# Patient Record
Sex: FEMALE | Race: WHITE | NOT HISPANIC OR LATINO | Employment: FULL TIME | URBAN - METROPOLITAN AREA
[De-identification: names, ages, dates, MRNs, and addresses within clinical notes are randomized per-mention and may not be internally consistent; named-entity substitution may affect disease eponyms.]

---

## 2017-02-07 ENCOUNTER — TELEPHONE (OUTPATIENT)
Dept: FAMILY MEDICINE | Facility: CLINIC | Age: 22
End: 2017-02-07

## 2017-02-07 NOTE — TELEPHONE ENCOUNTER
Reason for Call:  Other call back    Detailed comments: She is breasting feeding her almost 4 month old.   He eats on both sides and still acts like he is hungry.  Liat feels like her milk is not coming in. She is wondering if she should give him a bottle or what she should do.  Please advise.    Phone Number Patient can be reached at: Other phone number:  [phone]    Best Time: any    Can we leave a detailed message on this number? YES    Call taken on 2/7/2017 at 10:49 AM by Jessica Smith

## 2017-03-06 ENCOUNTER — TELEPHONE (OUTPATIENT)
Dept: FAMILY MEDICINE | Facility: CLINIC | Age: 22
End: 2017-03-06

## 2017-03-06 NOTE — TELEPHONE ENCOUNTER
Spoke with Liat, advised cool packs, might want to try to manually express a small amount of milk if pressure is too great. Also may Take Ibuprofen and Tylenol for discomfort. She does not have a fever and breasts are not hot to touch or red. Will call back with further concerns.

## 2017-03-06 NOTE — TELEPHONE ENCOUNTER
Pt stopped breast feeding a couple days ago. She says her breasts are so sore she doesn't know what to do.  Call to RN to triage.

## 2017-04-14 ENCOUNTER — TELEPHONE (OUTPATIENT)
Dept: FAMILY MEDICINE | Facility: CLINIC | Age: 22
End: 2017-04-14

## 2017-04-14 NOTE — TELEPHONE ENCOUNTER
Liat delivered in October and after got her period for 2 months and now has not had one and has nausea and cramping.  2 pregnancy tests come back negative but that happened with her last pregnancy too.  Please call.  [facility]  Clinic Station [place]

## 2017-04-14 NOTE — TELEPHONE ENCOUNTER
S-(situation): Patient called to report her LMP was 1/8/17.    B-(background): Patient has a 6 month old baby and breast feed for the first 4 months.  Patient has been having unprotected sex.    A-(assessment): Patient has been having unprotected sex.  Patient has been feeling nausea and cramping.  Patient feels she has been gaining weight. Patient has breast tenderness.  Patient has taken 2 at home pregnancy tests and they are negative.  Patient reports with her last pregnancy she had negative at home pregnancy tests.      R-(recommendations): Advised patient to be seen in clinic to discuss symptoms and further evaluation. Patient was scheduled for 4/17/17. Patient agrees with plan.     Dilcia PORTER RN

## 2017-04-17 ENCOUNTER — OFFICE VISIT (OUTPATIENT)
Dept: FAMILY MEDICINE | Facility: CLINIC | Age: 22
End: 2017-04-17
Payer: COMMERCIAL

## 2017-04-17 VITALS
HEART RATE: 94 BPM | BODY MASS INDEX: 39.38 KG/M2 | HEIGHT: 61 IN | OXYGEN SATURATION: 96 % | TEMPERATURE: 97.7 F | WEIGHT: 208.6 LBS | SYSTOLIC BLOOD PRESSURE: 110 MMHG | DIASTOLIC BLOOD PRESSURE: 84 MMHG

## 2017-04-17 DIAGNOSIS — N91.2 AMENORRHEA: Primary | ICD-10-CM

## 2017-04-17 DIAGNOSIS — R10.2 PELVIC PAIN IN FEMALE: ICD-10-CM

## 2017-04-17 DIAGNOSIS — R80.9 PROTEINURIA: ICD-10-CM

## 2017-04-17 LAB
ALBUMIN SERPL-MCNC: 3.7 G/DL (ref 3.4–5)
ALBUMIN UR-MCNC: 100 MG/DL
ALP SERPL-CCNC: 92 U/L (ref 40–150)
ALT SERPL W P-5'-P-CCNC: 18 U/L (ref 0–50)
ANION GAP SERPL CALCULATED.3IONS-SCNC: 6 MMOL/L (ref 3–14)
APPEARANCE UR: CLEAR
AST SERPL W P-5'-P-CCNC: 16 U/L (ref 0–45)
BACTERIA #/AREA URNS HPF: ABNORMAL /HPF
BASOPHILS # BLD AUTO: 0.1 10E9/L (ref 0–0.2)
BASOPHILS NFR BLD AUTO: 0.7 %
BETA HCG QUAL IFA URINE: NEGATIVE
BILIRUB SERPL-MCNC: 0.6 MG/DL (ref 0.2–1.3)
BILIRUB UR QL STRIP: NEGATIVE
BUN SERPL-MCNC: 16 MG/DL (ref 7–30)
CALCIUM SERPL-MCNC: 9 MG/DL (ref 8.5–10.1)
CHLORIDE SERPL-SCNC: 104 MMOL/L (ref 94–109)
CO2 SERPL-SCNC: 25 MMOL/L (ref 20–32)
COLOR UR AUTO: YELLOW
CREAT SERPL-MCNC: 0.81 MG/DL (ref 0.52–1.04)
DIFFERENTIAL METHOD BLD: NORMAL
EOSINOPHIL # BLD AUTO: 0.1 10E9/L (ref 0–0.7)
EOSINOPHIL NFR BLD AUTO: 1.2 %
ERYTHROCYTE [DISTWIDTH] IN BLOOD BY AUTOMATED COUNT: 13.5 % (ref 10–15)
GFR SERPL CREATININE-BSD FRML MDRD: 88 ML/MIN/1.7M2
GLUCOSE SERPL-MCNC: 74 MG/DL (ref 70–99)
GLUCOSE UR STRIP-MCNC: NEGATIVE MG/DL
GRAN CASTS #/AREA URNS LPF: ABNORMAL /LPF
HCT VFR BLD AUTO: 46.6 % (ref 35–47)
HGB BLD-MCNC: 14.9 G/DL (ref 11.7–15.7)
HGB UR QL STRIP: ABNORMAL
IMM GRANULOCYTES # BLD: 0 10E9/L (ref 0–0.4)
IMM GRANULOCYTES NFR BLD: 0.3 %
KETONES UR STRIP-MCNC: NEGATIVE MG/DL
LEUKOCYTE ESTERASE UR QL STRIP: NEGATIVE
LYMPHOCYTES # BLD AUTO: 3.7 10E9/L (ref 0.8–5.3)
LYMPHOCYTES NFR BLD AUTO: 48.6 %
MCH RBC QN AUTO: 27.6 PG (ref 26.5–33)
MCHC RBC AUTO-ENTMCNC: 32 G/DL (ref 31.5–36.5)
MCV RBC AUTO: 87 FL (ref 78–100)
MONOCYTES # BLD AUTO: 0.8 10E9/L (ref 0–1.3)
MONOCYTES NFR BLD AUTO: 10.2 %
MUCOUS THREADS #/AREA URNS LPF: PRESENT /LPF
NEUTROPHILS # BLD AUTO: 3 10E9/L (ref 1.6–8.3)
NEUTROPHILS NFR BLD AUTO: 39 %
NITRATE UR QL: NEGATIVE
NON-SQ EPI CELLS #/AREA URNS LPF: ABNORMAL /LPF
PH UR STRIP: 5.5 PH (ref 5–7)
PLATELET # BLD AUTO: 282 10E9/L (ref 150–450)
POTASSIUM SERPL-SCNC: 4.1 MMOL/L (ref 3.4–5.3)
PROT SERPL-MCNC: 8.3 G/DL (ref 6.8–8.8)
RBC # BLD AUTO: 5.39 10E12/L (ref 3.8–5.2)
RBC #/AREA URNS AUTO: ABNORMAL /HPF (ref 0–2)
SODIUM SERPL-SCNC: 135 MMOL/L (ref 133–144)
SP GR UR STRIP: >1.03 (ref 1–1.03)
URN SPEC COLLECT METH UR: ABNORMAL
UROBILINOGEN UR STRIP-ACNC: 0.2 EU/DL (ref 0.2–1)
WBC # BLD AUTO: 7.7 10E9/L (ref 4–11)
WBC #/AREA URNS AUTO: ABNORMAL /HPF (ref 0–2)

## 2017-04-17 PROCEDURE — 81001 URINALYSIS AUTO W/SCOPE: CPT | Performed by: NURSE PRACTITIONER

## 2017-04-17 PROCEDURE — 99214 OFFICE O/P EST MOD 30 MIN: CPT | Performed by: NURSE PRACTITIONER

## 2017-04-17 PROCEDURE — 85004 AUTOMATED DIFF WBC COUNT: CPT | Performed by: NURSE PRACTITIONER

## 2017-04-17 PROCEDURE — 36415 COLL VENOUS BLD VENIPUNCTURE: CPT | Performed by: NURSE PRACTITIONER

## 2017-04-17 PROCEDURE — 85027 COMPLETE CBC AUTOMATED: CPT | Performed by: NURSE PRACTITIONER

## 2017-04-17 PROCEDURE — 80053 COMPREHEN METABOLIC PANEL: CPT | Performed by: NURSE PRACTITIONER

## 2017-04-17 PROCEDURE — 84703 CHORIONIC GONADOTROPIN ASSAY: CPT | Performed by: NURSE PRACTITIONER
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
## 2017-04-17 NOTE — PATIENT INSTRUCTIONS
Urine pregnancy negative    Urinalysis negative for infection, but did show some protein and blood. Will have you schedule a pelvic and abdominal ultrasound to rule out anything going on in the kidneys and uterus/ovaries.     Will update you with results - will also get labs today    Return to clinic will be determined after test results     Amenorrhea     Normally, the uterine lining builds up and is shed each month during a woman s period. With amenorrhea, this process does not happen.   Menstruation occurs when a woman sheds the lining of her uterus (womb). It is also called a  period.  For most women, this happens once a month. But some women may not get their periods. This is called amenorrhea.  Amenorrhea may be due to a number of causes. These include:    Pregnancy, breastfeeding, or menopause    Hormone problems    Emotional stress    Being at too low body weight    Exercising too much    Poor nutrition or eating disorders    Taking certain medicines    Having certain birth defects or genetic disorders  There are 2 types of amenorrhea:    Primary amenorrhea is when a girl has not had her first period by age 15.    Secondary amenorrhea is when:    A girl or woman who has been having normal periods stops getting them for 3 months in a row    A girl or woman who has been having irregular periods stops getting them for 6 months in a row  One or more tests can be done to find out why you re not having periods. These include blood tests and imaging tests. Once the cause is found, it can be treated. Treatments can range from lifestyle and diet changes to medicines, procedures, or surgery. Your healthcare provider will discuss options with you as needed.  Follow-up care  Follow up with your healthcare provider as advised. You'll be told the results of any tests as soon as they are ready.   Note: Know that you can still become pregnant even if you are not having regular periods. It is important to use some form of  birth control if you are sexually active and do not wish to become pregnant.   When to seek medical advice  Call your healthcare provider right way if any of these occur:    Severe pain in the abdomen (belly)    Swelling of the belly    Sudden, severe vaginal bleeding    Feeling faint or passing out    5676-3930 [employer]. [address] [place] [address]. All rights reserved. This information is not intended as a substitute for professional medical care. Always follow your healthcare professional's instructions.        Proteinuria (Protein in the Urine)  The kidney filters waste products and unwanted substances from the blood. These waste products end up in the urine. Protein is an important part of the blood and is not filtered out. Normally, there is no protein in the urine.  Proteinuria occurs when some of the normal protein in the bloodstream ends up in the urine. Protein in the urine will show up on a standard urine test.   Protein in the urine can be a sign of serious disease or a harmless temporary condition. For example, heavy exercise or fever can cause temporary proteinuria. This is normal and will go away if the urine test is repeated.  If urine tests continue to show protein in the urine, chronic kidney disease may be present. Common causes of kidney disease include diabetes, hypertension, and conditions that cause inflammation in the kidneys.  Protein in the blood helps keep fluids from leaking out of the blood vessels and into the surrounding tissues. Mild or temporary proteinuria doesn't cause any symptoms. However, if too much protein is lost from the body, there may be swelling as fluid leaks from the blood vessels. Swelling may appear in legs, feet, and ankles or elsewhere such as lower back, face and eyelids.  If proteinuria persists on repeat urine testing, more tests will be needed to figure out the cause. If the cause is still unclear, you may be told to see a kidney  specialist.  Home care  No special home care is needed until the cause of your proteinuria is known.  Follow-up care  Follow up with your doctor or as advised by our staff.  When to seek medical care  Get prompt medical attention if any of the following occur:    Nausea or vomiting    Severe weakness, dizziness, fainting, drowsiness, or confusion    Chest pain or shortness of breath    Unexpected weight gain or swelling in the legs, ankles, or around the eyes    Dark colored urine    Decreased or absent urine output    2414-5980 [employer]. [address] [place] [address]87. All rights reserved. This information is not intended as a substitute for professional medical care. Always follow your healthcare professional's instructions.

## 2017-04-17 NOTE — MR AVS SNAPSHOT
After Visit Summary   4/17/2017    Liat Corcoran    MRN: [id number]           Patient Information     Date Of Birth          1995        Visit Information        Provider Department      4/17/2017 10:00 AM Alicia Cartagena APRN [facility]        Today's Diagnoses     Amenorrhea    -  1    Proteinuria        Pelvic pain in female          Care Instructions    Urine pregnancy negative    Urinalysis negative for infection, but did show some protein and blood. Will have you schedule a pelvic and abdominal ultrasound to rule out anything going on in the kidneys and uterus/ovaries.     Will update you with results - will also get labs today    Return to clinic will be determined after test results     Amenorrhea     Normally, the uterine lining builds up and is shed each month during a woman s period. With amenorrhea, this process does not happen.   Menstruation occurs when a woman sheds the lining of her uterus (womb). It is also called a  period.  For most women, this happens once a month. But some women may not get their periods. This is called amenorrhea.  Amenorrhea may be due to a number of causes. These include:    Pregnancy, breastfeeding, or menopause    Hormone problems    Emotional stress    Being at too low body weight    Exercising too much    Poor nutrition or eating disorders    Taking certain medicines    Having certain birth defects or genetic disorders  There are 2 types of amenorrhea:    Primary amenorrhea is when a girl has not had her first period by age 15.    Secondary amenorrhea is when:    A girl or woman who has been having normal periods stops getting them for 3 months in a row    A girl or woman who has been having irregular periods stops getting them for 6 months in a row  One or more tests can be done to find out why you re not having periods. These include blood tests and imaging tests. Once the cause is found, it can be treated. Treatments  can range from lifestyle and diet changes to medicines, procedures, or surgery. Your healthcare provider will discuss options with you as needed.  Follow-up care  Follow up with your healthcare provider as advised. You'll be told the results of any tests as soon as they are ready.   Note: Know that you can still become pregnant even if you are not having regular periods. It is important to use some form of birth control if you are sexually active and do not wish to become pregnant.   When to seek medical advice  Call your healthcare provider right way if any of these occur:    Severe pain in the abdomen (belly)    Swelling of the belly    Sudden, severe vaginal bleeding    Feeling faint or passing out    9623-1203 [employer]. [address] [place] [address], [place] [address]. All rights reserved. This information is not intended as a substitute for professional medical care. Always follow your healthcare professional's instructions.        Proteinuria (Protein in the Urine)  The kidney filters waste products and unwanted substances from the blood. These waste products end up in the urine. Protein is an important part of the blood and is not filtered out. Normally, there is no protein in the urine.  Proteinuria occurs when some of the normal protein in the bloodstream ends up in the urine. Protein in the urine will show up on a standard urine test.   Protein in the urine can be a sign of serious disease or a harmless temporary condition. For example, heavy exercise or fever can cause temporary proteinuria. This is normal and will go away if the urine test is repeated.  If urine tests continue to show protein in the urine, chronic kidney disease may be present. Common causes of kidney disease include diabetes, hypertension, and conditions that cause inflammation in the kidneys.  Protein in the blood helps keep fluids from leaking out of the blood vessels and into the surrounding tissues. Mild or temporary  proteinuria doesn't cause any symptoms. However, if too much protein is lost from the body, there may be swelling as fluid leaks from the blood vessels. Swelling may appear in legs, feet, and ankles or elsewhere such as lower back, face and eyelids.  If proteinuria persists on repeat urine testing, more tests will be needed to figure out the cause. If the cause is still unclear, you may be told to see a kidney specialist.  Home care  No special home care is needed until the cause of your proteinuria is known.  Follow-up care  Follow up with your doctor or as advised by our staff.  When to seek medical care  Get prompt medical attention if any of the following occur:    Nausea or vomiting    Severe weakness, dizziness, fainting, drowsiness, or confusion    Chest pain or shortness of breath    Unexpected weight gain or swelling in the legs, ankles, or around the eyes    Dark colored urine    Decreased or absent urine output    6690-4943 [employer]. [address] [place] [address]. All rights reserved. This information is not intended as a substitute for professional medical care. Always follow your healthcare professional's instructions.              Follow-ups after your visit        Future tests that were ordered for you today     Open Future Orders        Priority Expected Expires Ordered    US Pelvic Complete w Transvaginal Routine 7/16/2017 4/17/2018 4/17/2017    US Abdomen Complete Routine 7/16/2017 4/17/2018 4/17/2017            Who to contact     If you have questions or need follow up information about today's clinic visit or your schedule please [facility] at [phone].  Normal or non-critical lab and imaging results will be communicated to you by MyChart, letter or phone within 4 business days after the clinic has received the results. If you do not hear from us within 7 days, please contact the clinic through MyChart or phone. If you have a  "critical or abnormal lab result, we will notify you by phone as soon as possible.  Submit refill requests through [employer] or call your pharmacy and they will forward the refill request to us. Please allow 3 business days for your refill to be completed.          Additional Information About Your Visit        [facility] Information     [employer] gives you secure access to your electronic health record. If you see a primary care provider, you can also send messages to your care team and make appointments. If you have questions, please call your primary care clinic.  If you do not have a primary care provider, please call [phone] and they will assist you.        Care EveryWhere ID     This is your Care EveryWhere ID. This could be used by other organizations to access your [place] [facility] medical records  [id number]        Your Vitals Were     Pulse Temperature Height Last Period Pulse Oximetry BMI (Body Mass Index)    94 97.7  F (36.5  C) (Tympanic) 5' 1\" (1.549 m) 01/08/2017 96% 39.41 kg/m2       Blood Pressure from Last 3 Encounters:   04/17/17 110/84   10/26/16 120/76   10/22/16 110/65    Weight from Last 3 Encounters:   04/17/17 208 lb 9.6 oz (94.6 kg)   10/19/16 230 lb (104.3 kg)   10/13/16 228 lb 9.6 oz (103.7 kg)              We Performed the Following     *UA reflex to Microscopic and Culture ([place] [facility] (except [place] and [place])     Beta HCG qual IFA urine     CBC with platelets     Comprehensive metabolic panel (BMP + Alb, Alk Phos, ALT, AST, Total. Bili, TP)     Urine Microscopic        Primary Care Provider Office Phone # Fax #    Swati Schaffer PA-C [phone]       [facility] [address] [facility] [id number]        Goals        General    I want to get my Medicaid in place (pt-stated)     Notes - Note created  12/2/2014 12:52 PM by Dilcia Stringer LSW    As of today's date 12/2/2014 goal is met at 51 - 75%.   Goal Status:  Active  Application in " into the county and waiting for response        I want to get on the right medications for my Mental Health  (pt-stated)     Notes - Note created  12/2/2014 12:54 PM by Dilcia Stringer LSW    As of today's date 12/2/2014 goal is met at 51 - 75%.   Goal Status:  Active  Pt has been prescribed medications and is waiting for MA to be started so she can pick them up        I want to lose weight (pt-stated)     Notes - Note created  12/2/2014 12:55 PM by Dilcia Stringer LSW    As of today's date 12/2/2014 goal is met at 0 - 25%.   Goal Status:  Active  Just starting on the process with plans to join a gym once MA is in place if possible        I want to write a book about my life (pt-stated)     Notes - Note created  12/2/2014 12:54 PM by Dilcia Stringer LSW    As of today's date 12/2/2014 goal is met at 26 - 50%.   Goal Status:  Active          Thank you!     Thank you for choosing [facility]  for your care. Our goal is always to provide you with excellent care. Hearing back from our patients is one way we can continue to improve our services. Please take a few minutes to complete the written survey that you may receive in the mail after your visit with us. Thank you!             Your Updated Medication List - Protect others around you: Learn how to safely use, store and throw away your medicines at www.disposemymeds.org.          This list is accurate as of: 4/17/17 11:11 AM.  Always use your most recent med list.                   Brand Name Dispense Instructions for use    TYLENOL PO

## 2017-04-17 NOTE — NURSING NOTE
"Chief Complaint   Patient presents with     Amenorrhea     /84  Pulse 94  Ht 5' 1\" (1.549 m)  Wt 208 lb 9.6 oz (94.6 kg)  LMP 01/08/2017  SpO2 96%  BMI 39.41 kg/m2 Estimated body mass index is 39.41 kg/(m^2) as calculated from the following:    Height as of this encounter: 5' 1\" (1.549 m).    Weight as of this encounter: 208 lb 9.6 oz (94.6 kg).  bp completed using cuff size: large      Health Maintenance that is potentially due pending provider review:  Pap, Depression action plan, Chlamydia screening        "

## 2017-04-20 ENCOUNTER — HOSPITAL ENCOUNTER (OUTPATIENT)
Dept: ULTRASOUND IMAGING | Facility: CLINIC | Age: 22
End: 2017-04-20
Attending: NURSE PRACTITIONER
Payer: COMMERCIAL

## 2017-04-20 ENCOUNTER — HOSPITAL ENCOUNTER (OUTPATIENT)
Dept: ULTRASOUND IMAGING | Facility: CLINIC | Age: 22
Discharge: HOME | End: 2017-04-20
Attending: NURSE PRACTITIONER | Admitting: NURSE PRACTITIONER
Payer: COMMERCIAL

## 2017-04-20 DIAGNOSIS — R10.2 PELVIC PAIN IN FEMALE: ICD-10-CM

## 2017-04-20 DIAGNOSIS — N91.2 AMENORRHEA: ICD-10-CM

## 2017-04-20 DIAGNOSIS — R80.9 PROTEINURIA: ICD-10-CM

## 2017-04-20 PROCEDURE — 76700 US EXAM ABDOM COMPLETE: CPT

## 2017-04-20 PROCEDURE — 76830 TRANSVAGINAL US NON-OB: CPT

## 2017-04-21 ENCOUNTER — TELEPHONE (OUTPATIENT)
Dept: FAMILY MEDICINE | Facility: CLINIC | Age: 22
End: 2017-04-21

## 2017-04-21 DIAGNOSIS — R19.7 DIARRHEA, UNSPECIFIED TYPE: Primary | ICD-10-CM

## 2017-04-21 NOTE — TELEPHONE ENCOUNTER
Reason for call:  Patient reporting a symptom    Symptom or request: Liat says she was seen by Alicia on Monday April 17 to check for pregnancy but it was negative.. She says at the time she told her that her diarrhea was better that day but she says now it's worse and she can't even tell when it's coming. She says she isn't able to drink anything. She is wondering if there is something she can reshma.     Phone Number patient can be reached at:  [phone]    Best Time:  anytime    Can we leave a detailed message on this number: yes    Call taken on 4/21/2017 at 8:27 AM by Suly Ervin

## 2017-04-21 NOTE — TELEPHONE ENCOUNTER
Is she having any abdominal pain, bloody stools, nausea/vomiting or fevers? If not we can have Liat do some stool samples and then if negative for c-difficile can take imodium. Will put in orders for patient, she'll have to come  at the clinic and bring back.  If she is having any of the other symptoms will need to see her in clinic or go to ER.     Thanks.  ARIEL Abarca CNP

## 2017-04-21 NOTE — TELEPHONE ENCOUNTER
No fevers, no blood, has abdominal pain but states this is r/t to her period. Pt agreed to stool samples and then taking imodium. Adilene Norris RN

## 2017-04-21 NOTE — TELEPHONE ENCOUNTER
Pt called, states she has had diarrhea since last Thursday. It improved and now it is worse again. States she is having diarrhea constantly, states every time she eats or drinks it goes right through her. Liquid and yellow in color. Also has period now.  Has not taken anything for it. Please advise.Adilene Norris RN

## 2017-05-30 ENCOUNTER — TELEPHONE (OUTPATIENT)
Dept: FAMILY MEDICINE | Facility: CLINIC | Age: 22
End: 2017-05-30

## 2017-05-30 NOTE — TELEPHONE ENCOUNTER
S-(situation): had period last month, but not this month    B-(background): had baby 7 months ago.  Quit breastfeeding end of February/March.  States feels crampy at times and breast tender.  Has not done home pregnancy test.  Ultrasound normal, but, pelvic ultrasound shows tiny follicles on both ovaries.    A-(assessment): amenorrhea     R-(recommendations): advised to take a home pregnancy test.  If negative should see Alicia Cartagena, may need to start birth control pill  Claire Lora RN

## 2017-06-12 ENCOUNTER — TELEPHONE (OUTPATIENT)
Dept: LAB | Facility: CLINIC | Age: 22
End: 2017-06-12

## 2017-06-12 NOTE — TELEPHONE ENCOUNTER
Reason for call:  Patient reporting a symptom    Symptom or request: Liat says she has not been getting her period. She says she did talk about it with Alicia Cartagena at her last visit. She had her baby last October. She has not been breast feeding for 4 months.  She says she got her period in December and January and again in March but nothing since. She has done pregnancy tests. She wonders if she should get a pregnancy blood test.    Phone Number patient can be reached at: [phone]      Best Time:  anytime    Can we leave a detailed message on this number:  YES    Call taken on 6/12/2017 at 10:13 AM by Suly Ervin

## 2017-06-13 ENCOUNTER — OFFICE VISIT (OUTPATIENT)
Dept: FAMILY MEDICINE | Facility: CLINIC | Age: 22
End: 2017-06-13
Payer: COMMERCIAL

## 2017-06-13 VITALS
TEMPERATURE: 98.1 F | BODY MASS INDEX: 39.46 KG/M2 | HEIGHT: 61 IN | SYSTOLIC BLOOD PRESSURE: 102 MMHG | WEIGHT: 209 LBS | HEART RATE: 88 BPM | DIASTOLIC BLOOD PRESSURE: 70 MMHG

## 2017-06-13 DIAGNOSIS — R10.2 PELVIC PAIN IN FEMALE: ICD-10-CM

## 2017-06-13 DIAGNOSIS — Z01.419 ENCOUNTER FOR GYNECOLOGICAL EXAMINATION WITHOUT ABNORMAL FINDING: Primary | ICD-10-CM

## 2017-06-13 DIAGNOSIS — Z11.3 SCREEN FOR STD (SEXUALLY TRANSMITTED DISEASE): ICD-10-CM

## 2017-06-13 LAB — BETA HCG QUAL IFA URINE: NEGATIVE

## 2017-06-13 PROCEDURE — 87491 CHLMYD TRACH DNA AMP PROBE: CPT | Performed by: NURSE PRACTITIONER

## 2017-06-13 PROCEDURE — 84703 CHORIONIC GONADOTROPIN ASSAY: CPT | Performed by: NURSE PRACTITIONER

## 2017-06-13 PROCEDURE — G0145 SCR C/V CYTO,THINLAYER,RESCR: HCPCS | Performed by: NURSE PRACTITIONER

## 2017-06-13 PROCEDURE — 99213 OFFICE O/P EST LOW 20 MIN: CPT | Performed by: NURSE PRACTITIONER

## 2017-06-13 NOTE — NURSING NOTE
"Chief Complaint   Patient presents with     Confirmation Of Pregnancy       Initial /70 (Cuff Size: Adult Large)  Pulse 88  Temp 98.1  F (36.7  C) (Tympanic)  Ht 5' 1\" (1.549 m)  Wt 209 lb (94.8 kg)  LMP 04/18/2017 (Exact Date)  BMI 39.49 kg/m2 Estimated body mass index is 39.49 kg/(m^2) as calculated from the following:    Height as of this encounter: 5' 1\" (1.549 m).    Weight as of this encounter: 209 lb (94.8 kg).  Medication Reconciliation: complete    Health Maintenance that is potentially due pending provider review:  Pap Smear - possible pregnancy     Idalia Infante, CMA        "

## 2017-06-13 NOTE — PATIENT INSTRUCTIONS
Please call [phone] to schedule your ultrasound    Follow up immediately with any worsening symptoms

## 2017-06-13 NOTE — LETTER
June 19, 2017      Liat SANDHU Nilo  [address] [place] [id number]    Dear MsEloinaNilo,      I am happy to inform you that your recent cervical cancer screening test (PAP smear) was normal.      Preventative screenings such as this help to ensure your health for years to come. You should repeat a pap smear in 3 years, unless otherwise directed.      You will still need to return to the clinic every year for your annual exam and other preventive tests.     Please contact the clinic at [phone] if you have further questions.       Sincerely,      ARIEL Peck CNP/rlm

## 2017-06-13 NOTE — MR AVS SNAPSHOT
After Visit Summary   6/13/2017    Liat Corcoran    MRN: [id number]           Patient Information     Date Of Birth          1995        Visit Information        Provider Department      6/13/2017 1:20 PM Ginette Kate APRN CNP [facility]        Today's Diagnoses     Pregnancy test positive    -  1    Screen for STD (sexually transmitted disease)        Pelvic pain in female          Care Instructions    Please call [phone] to schedule your ultrasound    Follow up immediately with any worsening symptoms          Follow-ups after your visit        Future tests that were ordered for you today     Open Future Orders        Priority Expected Expires Ordered    US Pelvic Complete with Transvaginal Routine  6/14/2018 6/13/2017            Who to contact     If you have questions or need follow up information about today's clinic visit or your schedule please contact [facility] directly at [phone].  Normal or non-critical lab and imaging results will be communicated to you by Slicethepiehart, letter or phone within 4 business days after the clinic has received the results. If you do not hear from us within 7 days, please contact the clinic through Slicethepiehart or phone. If you have a critical or abnormal lab result, we will notify you by phone as soon as possible.  Submit refill requests through [employer] or call your pharmacy and they will forward the refill request to us. Please allow 3 business days for your refill to be completed.          Additional Information About Your Visit        MyChart Information     [employer] gives you secure access to your electronic health record. If you see a primary care provider, you can also send messages to your care team and make appointments. If you have questions, please call your primary care clinic.  If you do not have a primary care provider, please call [phone] and they will assist you.        Care EveryWhere ID  "    This is your Care EveryWhere ID. This could be used by other organizations to access your [place] medical records  [id number]        Your Vitals Were     Pulse Temperature Height Last Period BMI (Body Mass Index)       88 98.1  F (36.7  C) (Tympanic) 5' 1\" (1.549 m) 04/18/2017 (Exact Date) 39.49 kg/m2        Blood Pressure from Last 3 Encounters:   06/13/17 102/70   04/17/17 110/84   10/26/16 120/76    Weight from Last 3 Encounters:   06/13/17 209 lb (94.8 kg)   04/17/17 208 lb 9.6 oz (94.6 kg)   10/19/16 230 lb (104.3 kg)              We Performed the Following     Beta HCG qual IFA urine     Chlamydia trachomatis PCR          Today's Medication Changes          These changes are accurate as of: 6/13/17  1:42 PM.  If you have any questions, ask your nurse or doctor.               Stop taking these medicines if you haven't already. Please contact your care team if you have questions.     TYLENOL PO   Stopped by:  Ginette Kate APRN CNP                    Primary Care Provider Office Phone # Fax #    ARIEL Major [phone]       [address] [address]        Goals        General    I want to get my Medicaid in place (pt-stated)     Notes - Note created  12/2/2014 12:52 PM by Dilcia Stringer LSW    As of today's date 12/2/2014 goal is met at 51 - 75%.   Goal Status:  Active  Application in into the [facility] and waiting for response        I want to get on the right medications for my Mental Health  (pt-stated)     Notes - Note created  12/2/2014 12:54 PM by Dilcia Stringer LSW    As of today's date 12/2/2014 goal is met at 51 - 75%.   Goal Status:  Active  Pt has been prescribed medications and is waiting for MA to be started so she can pick them up        I want to lose weight (pt-stated)     Notes - Note created  12/2/2014 12:55 PM by Dilcia Stringer LSW    As of today's date 12/2/2014 goal is met at 0 - 25%.   Goal " Status:  Active  Just starting on the process with plans to join a gym once MA is in place if possible        I want to write a book about my life (pt-stated)     Notes - Note created  12/2/2014 12:54 PM by Dilcia Stringer LSW    As of today's date 12/2/2014 goal is met at 26 - 50%.   Goal Status:  Active          Thank you!     Thank you for choosing [facility]  for your care. Our goal is always to provide you with excellent care. Hearing back from our patients is one way we can continue to improve our services. Please take a few minutes to complete the written survey that you may receive in the mail after your visit with us. Thank you!             Your Updated Medication List - Protect others around you: Learn how to safely use, store and throw away your medicines at www.disposemymeds.org.      Notice  As of 6/13/2017  1:42 PM    You have not been prescribed any medications.

## 2017-06-13 NOTE — TELEPHONE ENCOUNTER
S-(situation): patient states had baby in October    B-(background): got first period December 2016.  Home pregnancy test last month was negative. One and half weeks ago home pregnancy test showed light pink line.  Feels breast tenderness. Three weeks ago threw up every morning, lately not so much.  Some nausea.  No fever.  Diarrhea for three days last week.  C/O abdominal pain below belly button and describes it as cramp feeling, like getting a period.  The pain may radiate to lower back on occasion.  No C/O urinary symptoms.  C/O increase in heartburn  LMP was 4-18-17 and ended 4-25-17    A-(assessment): abdominal pain. Possibly pregnant    R-(recommendations): advised needs appointment to sort all of these symptoms.  Claire Lora RN
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
## 2017-06-14 LAB
C TRACH DNA SPEC QL NAA+PROBE: NORMAL
SPECIMEN SOURCE: NORMAL

## 2017-06-15 ENCOUNTER — HOSPITAL ENCOUNTER (OUTPATIENT)
Dept: ULTRASOUND IMAGING | Facility: CLINIC | Age: 22
Discharge: HOME | End: 2017-06-15
Attending: NURSE PRACTITIONER | Admitting: NURSE PRACTITIONER
Payer: COMMERCIAL

## 2017-06-15 DIAGNOSIS — R10.2 PELVIC PAIN IN FEMALE: ICD-10-CM

## 2017-06-15 LAB
COPATH REPORT: NORMAL
PAP: NORMAL

## 2017-06-15 PROCEDURE — 76830 TRANSVAGINAL US NON-OB: CPT

## 2017-06-19 ENCOUNTER — TELEPHONE (OUTPATIENT)
Dept: FAMILY MEDICINE | Facility: CLINIC | Age: 22
End: 2017-06-19

## 2017-06-19 NOTE — TELEPHONE ENCOUNTER
Liat Corcoran is a 22 year old female  who calls with abdominal pain.    NURSING ASSESSMENT:  The pain began many months ago.    Pain scale (0-10): 5/10  LMP  was  0-95-00-4-25-17.  The pain is described as cramping and is located belly button down, which radiates to lower back  Symptom associated with the abdominal pain: nausea.  Patient has had previous abdominal surgery,   Pain is aggravated by nothing, and relieved by nothing.  Allergies:   Allergies   Allergen Reactions     Amoxicillin Hives     Penicillin G Hives       MEDICATIONS:   Taking medication(s) as prescribed? N/A  Taking over the counter medication(s)? N/A  Any medication side effects? No significant side effects    Any barriers to taking medication(s) as prescribed?  No  Medication(s) improving/managing symptoms?  N/A  Medication reconciliation completed: N/A    NURSING PLAN: Nursing advice to patient advised needs follow up appointment    RECOMMENDED DISPOSITION:  Home care advice -see above  Will comply with recommendation: Yes  If further questions/concerns or if symptoms do not improve, worsen or new symptoms develop, call your PCP or [facility] as soon as possible.    Guideline used:  Telephone Triage Protocols for Nurses    Claire Lora RN

## 2017-06-22 ENCOUNTER — OFFICE VISIT (OUTPATIENT)
Dept: FAMILY MEDICINE | Facility: CLINIC | Age: 22
End: 2017-06-22
Payer: COMMERCIAL

## 2017-06-22 VITALS
WEIGHT: 208 LBS | DIASTOLIC BLOOD PRESSURE: 73 MMHG | HEART RATE: 81 BPM | BODY MASS INDEX: 39.3 KG/M2 | SYSTOLIC BLOOD PRESSURE: 132 MMHG | TEMPERATURE: 97.1 F

## 2017-06-22 DIAGNOSIS — Z71.85 IMMUNIZATION COUNSELING: ICD-10-CM

## 2017-06-22 DIAGNOSIS — F31.31 BIPOLAR AFFECTIVE DISORDER, CURRENTLY DEPRESSED, MILD (H): Primary | ICD-10-CM

## 2017-06-22 DIAGNOSIS — F41.9 ANXIETY: ICD-10-CM

## 2017-06-22 PROCEDURE — 99214 OFFICE O/P EST MOD 30 MIN: CPT | Performed by: PHYSICIAN ASSISTANT

## 2017-06-22 RX ORDER — BUPROPION HYDROCHLORIDE 150 MG/1
150 TABLET ORAL EVERY MORNING
Qty: 30 TABLET | Refills: 0 | Status: SHIPPED | OUTPATIENT
Start: 2017-06-22 | End: 2017-07-07 | Stop reason: ALTCHOICE

## 2017-06-22 ASSESSMENT — ANXIETY QUESTIONNAIRES
6. BECOMING EASILY ANNOYED OR IRRITABLE: NEARLY EVERY DAY
GAD7 TOTAL SCORE: 17
7. FEELING AFRAID AS IF SOMETHING AWFUL MIGHT HAPPEN: MORE THAN HALF THE DAYS
3. WORRYING TOO MUCH ABOUT DIFFERENT THINGS: NEARLY EVERY DAY
2. NOT BEING ABLE TO STOP OR CONTROL WORRYING: NEARLY EVERY DAY
1. FEELING NERVOUS, ANXIOUS, OR ON EDGE: NEARLY EVERY DAY
5. BEING SO RESTLESS THAT IT IS HARD TO SIT STILL: MORE THAN HALF THE DAYS

## 2017-06-22 ASSESSMENT — PATIENT HEALTH QUESTIONNAIRE - PHQ9: 5. POOR APPETITE OR OVEREATING: SEVERAL DAYS

## 2017-06-22 NOTE — MR AVS SNAPSHOT
After Visit Summary   6/22/2017    Liat Corcoran    MRN: [id number]           Patient Information     Date Of Birth          1995        Visit Information        Provider Department      6/22/2017 10:40 AM Swati Schaffer PA-C [facility]        Today's Diagnoses     Bipolar affective disorder, currently depressed, mild (H)    -  1    Anxiety          Care Instructions    Decided to try wellbutrin  Take in mornings  Recheck in 3 weeks  Sooner if needed  Watch for any worsening  And call the clinic if you have questions    Consider vaccine:  Gardasil (Human papilloma virus vaccine) - to protect against sexually transmitted virus which causes warts and cancers of vagina, cervix, penis, anus, and head/neck.  Generally recommendations from numerous professional groups is to vaccinate at age 11-12.  Ideally vaccinate before first sexual experience but can be given up to age 26.  Is series of 3 spread over 6+ months.                    Follow-ups after your visit        Who to contact     If you have questions or need follow up information about today's clinic visit or your schedule please contact [facility] directly at [phone].  Normal or non-critical lab and imaging results will be communicated to you by Soft Tissue Regenerationhart, letter or phone within 4 business days after the clinic has received the results. If you do not hear from us within 7 days, please contact the clinic through [employer] or phone. If you have a critical or abnormal lab result, we will notify you by phone as soon as possible.  Submit refill requests through [employer] or call your pharmacy and they will forward the refill request to us. Please allow 3 business days for your refill to be completed.          Additional Information About Your Visit        Soft Tissue Regenerationhart Information     E[employer]Factor lets you send messages to your doctor, view your test results, renew your prescriptions, schedule appointments and  "more. To sign up, go to [email or web address] . Click on \"Log in\" on the left side of the screen, which will take you to the Welcome page. Then click on \"Sign up Now\" on the right side of the page.     You will be asked to enter the access code listed below, as well as some personal information. Please follow the directions to create your username and password.     Your access code is: [email or web address]  Expires: 2017 11:29 AM     Your access code will  in 90 days. If you need help or a new code, please call your [place] clinic or [phone].        Care EveryWhere ID     This is your Care EveryWhere ID. This could be used by other organizations to access your [place] medical records  [id number]        Your Vitals Were     Pulse Temperature Last Period BMI (Body Mass Index)          81 97.1  F (36.2  C) (Tympanic) 2017 39.3 kg/m2         Blood Pressure from Last 3 Encounters:   17 132/73   17 102/70   17 110/84    Weight from Last 3 Encounters:   17 208 lb (94.3 kg)   17 209 lb (94.8 kg)   17 208 lb 9.6 oz (94.6 kg)              Today, you had the following     No orders found for display         Today's Medication Changes          These changes are accurate as of: 17 11:30 AM.  If you have any questions, ask your nurse or doctor.               Start taking these medicines.        Dose/Directions    buPROPion 150 MG 24 hr tablet   Commonly known as:  WELLBUTRIN XL   Used for:  Bipolar affective disorder, currently depressed, mild (H)   Started by:  Swati Schaffer PA-C        Dose:  150 mg   Take 1 tablet (150 mg) by mouth every morning   Quantity:  30 tablet   Refills:  0            Where to get your medicines      These medications were sent to [id number] - [place], [place] - [address]  [address] [address]    Hours:  Closed 10-16-08 business to [facility] Phone:  [phone]     buPROPion 150 MG 24 hr " tablet                Primary Care Provider Office Phone # Fax #    ARIEL Major [facility] [phone]       [facility] [address] [facility] [address]        Goals        General    I want to get my Medicaid in place (pt-stated)     Notes - Note created  12/2/2014 12:52 PM by Dilcia Stringer LSW    As of today's date 12/2/2014 goal is met at 51 - 75%.   Goal Status:  Active  Application in into the [facility] and waiting for response        I want to get on the right medications for my Mental Health  (pt-stated)     Notes - Note created  12/2/2014 12:54 PM by Dilcia Stringer LSW    As of today's date 12/2/2014 goal is met at 51 - 75%.   Goal Status:  Active  Pt has been prescribed medications and is waiting for MA to be started so she can pick them up        I want to lose weight (pt-stated)     Notes - Note created  12/2/2014 12:55 PM by Dilcia Stringer LSW    As of today's date 12/2/2014 goal is met at 0 - 25%.   Goal Status:  Active  Just starting on the process with plans to join a gym once MA is in place if possible        I want to write a book about my life (pt-stated)     Notes - Note created  12/2/2014 12:54 PM by Dilcia Stringer LSW    As of today's date 12/2/2014 goal is met at 26 - 50%.   Goal Status:  Active          Equal Access to Services     [facility] AH: Hadii alonzo todd hadcalino Sotima, waaxda luqadaha, qaybta kaalmada sumi, pranay garcia gaurangcarlos trinidad. So [facility] [phone].    ATENCIÓN: Si habla español, tiene a bruno disposición servicios gratuitos de asistencia lingüística. Llame al [phone].    We comply with applicable federal civil rights laws and [place] laws. We do not discriminate on the basis of race, color, national origin, age, disability sex, sexual orientation or gender identity.            Thank you!     Thank you for choosing [facility]  for your care. Our goal is always to provide you with  excellent care. Hearing back from our patients is one way we can continue to improve our services. Please take a few minutes to complete the written survey that you may receive in the mail after your visit with us. Thank you!             Your Updated Medication List - Protect others around you: Learn how to safely use, store and throw away your medicines at www.disposemymeds.org.          This list is accurate as of: 6/22/17 11:30 AM.  Always use your most recent med list.                   Brand Name Dispense Instructions for use Diagnosis    buPROPion 150 MG 24 hr tablet    WELLBUTRIN XL    30 tablet    Take 1 tablet (150 mg) by mouth every morning    Bipolar affective disorder, currently depressed, mild (H)

## 2017-06-22 NOTE — PATIENT INSTRUCTIONS
Decided to try wellbutrin  Take in mornings  Recheck in 3 weeks  Sooner if needed  Watch for any worsening  And call the clinic if you have questions    Consider vaccine:  Gardasil (Human papilloma virus vaccine) - to protect against sexually transmitted virus which causes warts and cancers of vagina, cervix, penis, anus, and head/neck.  Generally recommendations from numerous professional groups is to vaccinate at age 11-12.  Ideally vaccinate before first sexual experience but can be given up to age 26.  Is series of 3 spread over 6+ months.

## 2017-06-22 NOTE — NURSING NOTE
"Chief Complaint   Patient presents with     Anxiety       Initial /73  Pulse 81  Temp 97.1  F (36.2  C) (Tympanic)  Wt 208 lb (94.3 kg)  LMP 06/21/2017  BMI 39.3 kg/m2 Estimated body mass index is 39.3 kg/(m^2) as calculated from the following:    Height as of 6/13/17: 5' 1\" (1.549 m).    Weight as of this encounter: 208 lb (94.3 kg).  Medication Reconciliation: complete    Health Maintenance that is potentially due pending provider review:  PHQ9    Possibly completing today per provider review.      "

## 2017-06-22 NOTE — LETTER
My Depression Action Plan  Name: Liat Corcoran   Date of Birth 1995  Date: 6/23/2017    My doctor: Alicia Cartagena   My clinic: [address] [id number] [phone]          GREEN    ZONE   Good Control    What it looks like:     Things are going generally well. You have normal up s and down s. You may even feel depressed from time to time, but bad moods usually last less than a day.   What you need to do:  1. Continue to care for yourself (see self care plan)  2. Check your depression survival kit and update it as needed  3. Follow your physician s recommendations including any medication.  4. Do not stop taking medication unless you consult with your physician first.           YELLOW         ZONE Getting Worse    What it looks like:     Depression is starting to interfere with your life.     It may be hard to get out of bed; you may be starting to isolate yourself from others.    Symptoms of depression are starting to last most all day and this has happened for several days.     You may have suicidal thoughts but they are not constant.   What you need to do:     1. Call your care team, your response to treatment will improve if you keep your care team informed of your progress. Yellow periods are signs an adjustment may need to be made.     2. Continue your self-care, even if you have to fake it!    3. Talk to someone in your support network    4. Open up your depression survival kit           RED    ZONE Medical Alert - Get Help    What it looks like:     Depression is seriously interfering with your life.     You may experience these or other symptoms: You can t get out of bed most days, can t work or engage in other necessary activities, you have trouble taking care of basic hygiene, or basic responsibilities, thoughts of suicide or death that will not go away, self-injurious behavior.     What you need to do:  1. Call your care  team and request a same-day appointment. If they are not available (weekends or after hours) call your local crisis line, emergency room or 911.      Electronically signed by: Pam Gomez, June 23, 2017    Depression Self Care Plan / Survival Kit    Self-Care for Depression  Here s the deal. Your body and mind are really not as separate as most people think.  What you do and think affects how you feel and how you feel influences what you do and think. This means if you do things that people who feel good do, it will help you feel better.  Sometimes this is all it takes.  There is also a place for medication and therapy depending on how severe your depression is, so be sure to consult with your medical provider and/ or Behavioral Health Consultant if your symptoms are worsening or not improving.     In order to better manage my stress, I will:    Exercise  Get some form of exercise, every day. This will help reduce pain and release endorphins, the  feel good  chemicals in your brain. This is almost as good as taking antidepressants!  This is not the same as joining a gym and then never going! (they count on that by the way ) It can be as simple as just going for a walk or doing some gardening, anything that will get you moving.      Hygiene   Maintain good hygiene (Get out of bed in the morning, Make your bed, Brush your teeth, Take a shower, and Get dressed like you were going to work, even if you are unemployed).  If your clothes don't fit try to get ones that do.    Diet  I will strive to eat foods that are good for me, drink plenty of water, and avoid excessive sugar, caffeine, alcohol, and other mood-altering substances.  Some foods that are helpful in depression are: complex carbohydrates, B vitamins, flaxseed, fish or fish oil, fresh fruits and vegetables.    Psychotherapy  I agree to participate in Individual Therapy (if recommended).    Medication  If prescribed medications, I agree to take them.  Missing  doses can result in serious side effects.  I understand that drinking alcohol, or other illicit drug use, may cause potential side effects.  I will not stop my medication abruptly without first discussing it with my provider.    Staying Connected With Others  I will stay in touch with my friends, family members, and my primary care provider/team.    Use your imagination  Be creative.  We all have a creative side; it doesn t matter if it s oil painting, sand castles, or mud pies! This will also kick up the endorphins.    Witness Beauty  (AKA stop and smell the roses) Take a look outside, even in mid-winter. Notice colors, textures. Watch the squirrels and birds.     Service to others  Be of service to others.  There is always someone else in need.  By helping others we can  get out of ourselves  and remember the really important things.  This also provides opportunities for practicing all the other parts of the program.    Humor  Laugh and be silly!  Adjust your TV habits for less news and crime-drama and more comedy.    Control your stress  Try breathing deep, massage therapy, biofeedback, and meditation. Find time to relax each day.     My support system    Clinic Contact:  Phone number:    Contact 1:  Phone number:    Contact 2:  Phone number:    Restorationist/:  Phone number:    Therapist:  Phone number:    Local crisis center:    Phone number:    Other community support:  Phone number:

## 2017-06-22 NOTE — PROGRESS NOTES
"  SUBJECTIVE:                                                    Liat Corcoran is a 22 year old female who presents to clinic today for the following health issues:    Abnormal Mood Symptoms      Duration: few weeks    Description:  Depression: YES  Anxiety: YES  Panic attacks: YES- 3 in the last 2 days     Accompanying signs and symptoms: see PHQ-9 and LIDIA scores    History (similar episodes/previous evaluation): has been on meds in the passed doesn't recall names of meds    Precipitating or alleviating factors: dealing with mother's death, bunch of things adding up    Therapies tried and outcome: none   Diagnoses include bipolar, anxiety, PTSD.  Past meds - celexa, trazodone, lexapro, buspar, lamotrigine, wellbutrin, numerous others which does not recall.   Can't recall how she did on any meds.  Has been on meds since age 5.  No history javier.    Last seen in Oct - started celexa but quit right away, which is typical of her.  \"In my mind, I had one good day, so I quit the med\".  Was on for about 1-1.5 mo, made tired whole time.    Feels has been not great for awhile, but worse x 2-3 weeks  Stressors - moved to new place she doesn't want to be/temporary but stressful living with other family, and still not talking to dad but hearing negative things being said about her.  \"Not happy to the point I want to be.\"  Sleep is fine.  8 mo old doing well.  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    Feels is mix of depression and anxiety.    Problem list and histories reviewed & adjusted, as indicated.  Additional history: as documented    Reviewed and updated as needed this visit by clinical staff       Reviewed and updated as needed this visit by Provider         ROS:  Constitutional, psych systems are negative, except as otherwise noted.    OBJECTIVE:                                                    /73  Pulse 81  Temp 97.1  F (36.2  C) (Tympanic)  Wt 208 lb (94.3 kg)  LMP 06/21/2017  BMI 39.3 kg/m2  Body mass index is " 39.3 kg/(m^2).  GENERAL: healthy, alert and no distress  PSYCH: mentation appears normal, affect normal/bright    PHQ-9 SCORE 10/13/2015 10/26/2016 6/22/2017   Total Score - - -   Total Score 14 3 10     ILDIA-7 SCORE 10/13/2015 10/26/2016 6/22/2017   Total Score - - -   Total Score 13 13 17        ASSESSMENT/PLAN:                                                        ICD-10-CM    1. Bipolar affective disorder, currently depressed, mild (H) F31.31 buPROPion (WELLBUTRIN XL) 150 MG 24 hr tablet   2. Anxiety F41.9    3. Immunization counseling Z71.89    decided to start with wellbutrin, but discussed monitoring for worsening of anxiety.  No history javier, but wellbutrin still may be best choice of anti-depressant.  Any consideration of lamictal would be cautious given history of frequently stopping her meds, and concern for SJS if she were to suddenly restart.  Patient Instructions   Decided to try wellbutrin  Take in mornings  Recheck in 3 weeks  Sooner if needed  Watch for any worsening  And call the clinic if you have questions    Consider vaccine:  Gardasil (Human papilloma virus vaccine) - to protect against sexually transmitted virus which causes warts and cancers of vagina, cervix, penis, anus, and head/neck.  Generally recommendations from numerous professional groups is to vaccinate at age 11-12.  Ideally vaccinate before first sexual experience but can be given up to age 26.  Is series of 3 spread over 6+ months.                Swati Schaffer PA-C  [facility]

## 2017-06-23 ASSESSMENT — ANXIETY QUESTIONNAIRES: GAD7 TOTAL SCORE: 17

## 2017-06-23 ASSESSMENT — PATIENT HEALTH QUESTIONNAIRE - PHQ9: SUM OF ALL RESPONSES TO PHQ QUESTIONS 1-9: 10

## 2017-07-07 ENCOUNTER — TELEPHONE (OUTPATIENT)
Dept: FAMILY MEDICINE | Facility: CLINIC | Age: 22
End: 2017-07-07

## 2017-07-07 DIAGNOSIS — F31.31 BIPOLAR AFFECTIVE DISORDER, CURRENTLY DEPRESSED, MILD (H): Primary | ICD-10-CM

## 2017-07-07 RX ORDER — DULOXETIN HYDROCHLORIDE 30 MG/1
30 CAPSULE, DELAYED RELEASE ORAL DAILY
Qty: 30 CAPSULE | Refills: 0 | Status: SHIPPED | OUTPATIENT
Start: 2017-07-07 | End: 2018-03-14

## 2017-07-07 NOTE — TELEPHONE ENCOUNTER
"Swati Schaffer,  Seen in clinic and prescribed Wellbutrin on 6-22-17. Patient states she took the Wellbutrin for a week and noticed her symptoms did not improve, states that she felt more irritable and angry/crabby and \"everything went down hill\" while on the medication . Continued to take until 2 days ago. Patient states no thoughts of suicide or harm to others.  States she feels better since being off the medication. Reports that her depression and anxiety worse when on the Wellbutrin.  Advised patient to do deep breathing and go to quiet place to relax if feeling anxious. Routing to provider for review.  Padmini    "

## 2017-07-07 NOTE — TELEPHONE ENCOUNTER
Dc wellbutrin, start cymbalta.  See me for recheck in 3 weeks, or we can have her see psych specialist since she has tried so many meds in past.  I placed referral that will call her to set up appt with psych specialist in [place].

## 2017-10-23 ENCOUNTER — OFFICE VISIT (OUTPATIENT)
Dept: FAMILY MEDICINE | Facility: CLINIC | Age: 22
End: 2017-10-23

## 2017-10-23 VITALS — SYSTOLIC BLOOD PRESSURE: 122 MMHG | DIASTOLIC BLOOD PRESSURE: 78 MMHG

## 2017-10-23 DIAGNOSIS — N92.6 IRREGULAR MENSTRUAL CYCLE: Primary | ICD-10-CM

## 2017-10-23 LAB — BETA HCG QUAL IFA URINE: NEGATIVE

## 2017-10-23 PROCEDURE — 83001 ASSAY OF GONADOTROPIN (FSH): CPT | Performed by: NURSE PRACTITIONER

## 2017-10-23 PROCEDURE — 84443 ASSAY THYROID STIM HORMONE: CPT | Performed by: NURSE PRACTITIONER

## 2017-10-23 PROCEDURE — 36415 COLL VENOUS BLD VENIPUNCTURE: CPT | Performed by: NURSE PRACTITIONER

## 2017-10-23 PROCEDURE — 99213 OFFICE O/P EST LOW 20 MIN: CPT | Performed by: NURSE PRACTITIONER

## 2017-10-23 PROCEDURE — 84703 CHORIONIC GONADOTROPIN ASSAY: CPT | Performed by: NURSE PRACTITIONER

## 2017-10-23 PROCEDURE — 84403 ASSAY OF TOTAL TESTOSTERONE: CPT | Performed by: NURSE PRACTITIONER

## 2017-10-23 ASSESSMENT — PAIN SCALES - GENERAL: PAINLEVEL: NO PAIN (0)
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
## 2017-10-23 NOTE — MR AVS SNAPSHOT
"              After Visit Summary   10/23/2017    Liat Corcoran    MRN: [id number]           Patient Information     Date Of Birth          1995        Visit Information        Provider Department      10/23/2017 10:20 AM Alicia Cartagena APRN CNP [facility]        Today's Diagnoses     Irregular menstrual cycle    -  1      Care Instructions    Labs today - including pregnancy - will update you on results and any further management or follow up     Return to clinic as needed          Follow-ups after your visit        Who to contact     If you have questions or need follow up information about today's clinic visit or your schedule please contact [facility] directly at [phone].  Normal or non-critical lab and imaging results will be communicated to you by MyChart, letter or phone within 4 business days after the clinic has received the results. If you do not hear from us within 7 days, please contact the clinic through MyChart or phone. If you have a critical or abnormal lab result, we will notify you by phone as soon as possible.  Submit refill requests through [employer] or call your pharmacy and they will forward the refill request to us. Please allow 3 business days for your refill to be completed.          Additional Information About Your Visit        MyChart Information     [employer] lets you send messages to your doctor, view your test results, renew your prescriptions, schedule appointments and more. To sign up, go to [email or web address] . Click on \"Log in\" on the left side of the screen, which will take you to the Welcome page. Then click on \"Sign up Now\" on the right side of the page.     You will be asked to enter the access code listed below, as well as some personal information. Please follow the directions to create your username and password.     Your access code is: [email or web address]  Expires: 2018 11:09 AM     Your access code will  in 90 " days. If you need help or a new code, please call your [facility] clinic or [phone].        Care EveryWhere ID     This is your Care EveryWhere ID. This could be used by other organizations to access your [facility] medical records  [id number]         Blood Pressure from Last 3 Encounters:   10/23/17 122/78   06/22/17 132/73   06/13/17 102/70    Weight from Last 3 Encounters:   06/22/17 208 lb (94.3 kg)   06/13/17 209 lb (94.8 kg)   04/17/17 208 lb 9.6 oz (94.6 kg)              We Performed the Following     Beta HCG qual IFA urine - [facility] and [place]     Follicle stimulating hormone     Testosterone, total     TSH with free T4 reflex        Primary Care Provider Office Phone # Fax #    Alicia ARIEL uJnior [facility] [phone]       [facility] [address] [facility] [address]        Goals        General    I want to get my Medicaid in place (pt-stated)     Notes - Note created  12/2/2014 12:52 PM by Dilcia Stringer LSW    As of today's date 12/2/2014 goal is met at 51 - 75%.   Goal Status:  Active  Application in into the [facility] and waiting for response        I want to get on the right medications for my Mental Health  (pt-stated)     Notes - Note created  12/2/2014 12:54 PM by Dilcia Stringer LSW    As of today's date 12/2/2014 goal is met at 51 - 75%.   Goal Status:  Active  Pt has been prescribed medications and is waiting for MA to be started so she can pick them up        I want to lose weight (pt-stated)     Notes - Note created  12/2/2014 12:55 PM by Dilcia Stringer LSW    As of today's date 12/2/2014 goal is met at 0 - 25%.   Goal Status:  Active  Just starting on the process with plans to join a gym once MA is in place if possible        I want to write a book about my life (pt-stated)     Notes - Note created  12/2/2014 12:54 PM by Dilcia Stringer LSW    As of today's date 12/2/2014 goal is met at 26 - 50%.   Goal Status:  Active          Equal Access to  Services     [facility]: Hadii alonzo Goncalves, waaditida luqadaha, qaybta kaalmalouie jonas, pranay trinidad. So [facility] [phone].    ATENCIÓN: Si habla español, tiene a bruno disposición servicios gratuitos de asistencia lingüística. Llame al [phone].    We comply with applicable federal civil rights laws and [place] laws. We do not discriminate on the basis of race, color, national origin, age, disability, sex, sexual orientation, or gender identity.            Thank you!     Thank you for choosing [facility]  for your care. Our goal is always to provide you with excellent care. Hearing back from our patients is one way we can continue to improve our services. Please take a few minutes to complete the written survey that you may receive in the mail after your visit with us. Thank you!             Your Updated Medication List - Protect others around you: Learn how to safely use, store and throw away your medicines at www.disposemymeds.org.          This list is accurate as of: 10/23/17 11:09 AM.  Always use your most recent med list.                   Brand Name Dispense Instructions for use Diagnosis    DULoxetine 30 MG EC capsule    CYMBALTA    30 capsule    Take 1 capsule (30 mg) by mouth daily    Bipolar affective disorder, currently depressed, mild (H)

## 2017-10-23 NOTE — NURSING NOTE
"Chief Complaint   Patient presents with     irregular periods       Initial /78 (BP Location: Right arm, Patient Position: Chair, Cuff Size: Adult Large)  Pulse (P) 72  Temp (P) 97.8  F (36.6  C) (Tympanic)  Resp (P) 18 Estimated body mass index is 39.3 kg/(m^2) as calculated from the following:    Height as of 6/13/17: 5' 1\" (1.549 m).    Weight as of 6/22/17: 208 lb (94.3 kg).  Medication Reconciliation: complete    Health Maintenance that is potentially due pending provider review:  NONE    Nettie Marina MA  10:23 AM 10/23/2017  .        "

## 2017-10-23 NOTE — PATIENT INSTRUCTIONS
Labs today - including pregnancy - will update you on results and any further management or follow up     Return to clinic as needed

## 2017-10-24 LAB
FSH SERPL-ACNC: 5 IU/L
TSH SERPL DL<=0.005 MIU/L-ACNC: 0.98 MU/L (ref 0.4–4)

## 2017-10-25 LAB — TESTOST SERPL-MCNC: 26 NG/DL (ref 8–60)

## 2018-02-12 ENCOUNTER — TELEPHONE (OUTPATIENT)
Dept: FAMILY MEDICINE | Facility: CLINIC | Age: 23
End: 2018-02-12

## 2018-02-13 ENCOUNTER — OFFICE VISIT (OUTPATIENT)
Dept: FAMILY MEDICINE | Facility: CLINIC | Age: 23
End: 2018-02-13
Payer: COMMERCIAL

## 2018-02-13 VITALS
RESPIRATION RATE: 16 BRPM | HEART RATE: 84 BPM | TEMPERATURE: 98.5 F | DIASTOLIC BLOOD PRESSURE: 64 MMHG | WEIGHT: 180.4 LBS | SYSTOLIC BLOOD PRESSURE: 114 MMHG | BODY MASS INDEX: 34.09 KG/M2

## 2018-02-13 DIAGNOSIS — R19.7 DIARRHEA, UNSPECIFIED TYPE: ICD-10-CM

## 2018-02-13 DIAGNOSIS — A08.4 VIRAL GASTROENTERITIS: Primary | ICD-10-CM

## 2018-02-13 PROCEDURE — 99213 OFFICE O/P EST LOW 20 MIN: CPT | Performed by: NURSE PRACTITIONER

## 2018-02-13 RX ORDER — ONDANSETRON 4 MG/1
4-8 TABLET, ORALLY DISINTEGRATING ORAL EVERY 8 HOURS PRN
Qty: 20 TABLET | Refills: 1 | Status: SHIPPED | OUTPATIENT
Start: 2018-02-13 | End: 2018-03-14

## 2018-02-13 ASSESSMENT — PAIN SCALES - GENERAL: PAINLEVEL: NO PAIN (0)

## 2018-02-13 NOTE — MR AVS SNAPSHOT
After Visit Summary   2/13/2018    Liat Corcoran    MRN: [id number]           Patient Information     Date Of Birth          1995        Visit Information        Provider Department      2/13/2018 10:20 AM Alicia Cartagena APRN [facility]        Today's Diagnoses     Viral gastroenteritis    -  1    Diarrhea, unspecified type          Care Instructions    Likely gastroenteritis (stomach flu)    Push fluids - dizziness should get better     Bring in stool samples if continue to have diarrhea    Zofran for nausea as needed     Return to clinic if symptoms not improving or worsening     Viral Gastroenteritis (Adult)    Gastroenteritis is commonly called the stomach flu. It is most often caused by a virus that affects the stomach and intestinal tract and usually lasts from 2 to 7 days. Common viruses causing gastroenteritis include norovirus, rotavirus, and hepatitis A. Non-viral causes of gastroenteritis include bacteria, parasites, and toxins.  The danger from repeated vomiting or diarrhea is dehydration. This is the loss of too much fluid from the body. When this occurs, body fluids must be replaced. Antibiotics do not help with this illness because it is usually viral.Simple home treatment will be helpful.  Symptoms of viral gastroenteritis may include:    Watery, loose stools    Stomach pain or abdominal cramps    Fever and chills    Nausea and vomiting    Loss of bowel control    Headache  Home care  Gastroenteritis is transmitted by contact with the stool or vomit of an infected person. This can occur from person to person or from contact with a contaminated surface.  Follow these guidelines when caring for yourself at home:    If symptoms are severe, rest at home for the next 24 hours or until you are feeling better.    Wash your hands with soap and water or use alcohol-based  to prevent the spread of infection. Wash your hands after touching anyone who  is sick.    Wash your hands or use alcohol-based  after using the toilet and before meals. Clean the toilet after each use.  Remember these tips when preparing food:    People with diarrhea should not prepare or serve food to others. When preparing foods, wash your hands before and after.    Wash your hands after using cutting boards, countertops, knives, or utensils that have been in contact with raw food.    Keep uncooked meats away from cooked and ready-to-eat foods.  Medicine  You may use acetaminophen or NSAID medicines like ibuprofen or naproxen to control fever unless another medicine was given. If you have chronic liver or kidney disease, talk with your healthcare provider before using these medicines. Also talk with your provider if you've had a stomach ulcer or gastrointestinal bleeding. Don't give aspirin to anyone under 18 years of age who is ill with a fever. It may cause severe liver damage. Don't use NSAIDS is you are already taking one for another condition (like arthritis) or are on aspirin (such as for heart disease or after a stroke).  If medicine for vomiting or diarrhea are prescribed, take these only as directed. Do not take over-the-counter medicines for vomiting or diarrhea unless instructed by your healthcare provider.  Diet  Follow these guidelines for food:    Water and liquids are important so you don't get dehydrated. Drink a small amount at a time or suck on ice chips if you are vomiting.    If you eat, avoid fatty, greasy, spicy, or fried foods.    Don't eat dairy if you have diarrhea. This can make diarrhea worse.    Avoid tobacco, alcohol, and caffeine which may worsen symptoms.  During the first 24 hours (the first full day), follow the diet below:    Beverages. Sports drinks, soft drinks without caffeine, ginger ale, mineral water (plain or flavored), decaffeinated tea and coffee. If you are very dehydrated, sports drinks aren't a good choice. They have too much sugar and  not enough electrolytes. In this case, commercially available products called oral rehydration solutions, are best.    Soups. Eat clear broth, consommé, and bouillon.    Desserts. Eat gelatin, popsicles, and fruit juice bars.  During the next 24 hours (the second day), you may add the following to the above:    Hot cereal, plain toast, bread, rolls, and crackers    Plain noodles, rice, mashed potatoes, chicken noodle or rice soup    Unsweetened canned fruit (avoid pineapple), bananas    Limit fat intake to less than 15 grams per day. Do this by avoiding margarine, butter, oils, mayonnaise, sauces, gravies, fried foods, peanut butter, meat, poultry, and fish.    Limit fiber and avoid raw or cooked vegetables, fresh fruits (except bananas), and bran cereals.    Limit caffeine and chocolate. Don't use spices or seasonings other than salt.    Limit dairy products.    Avoid alcohol.  During the next 24 hours:    Gradually resume a normal diet as you feel better and your symptoms improve.    If at any time it starts getting worse again, go back to clear liquids until you feel better.  Follow-up care  Follow up with your healthcare provider, or as advised. Call your provider if you don't get better within 24 hours or if diarrhea lasts more than a week. Also follow up if you are unable to keep down liquids and get dehydrated. If a stool (diarrhea) sample was taken, call as directed for the results.  Call 911  Call 911 if any of these occur:    Trouble breathing    Chest pain    Confused    Severe drowsiness or trouble awakening    Fainting or loss of consciousness    Rapid heart rate    Seizure    Stiff neck  When to seek medical advice  Call your healthcare provider right away if any of these occur:    Abdominal pain that gets worse    Continued vomiting (unable to keep liquids down)    Frequent diarrhea (more than 5 times a day)    Blood in vomit or stool (black or red color)    Dark urine, reduced urine output, or  "extreme thirst    Weakness or dizziness    Drowsiness    Fever of 100.4 F (38 C) oral or higher that does not get better with fever medicine    New rash  Date Last Reviewed: 1/3/2016    0877-9123 [employer] [address] [place] [address], [place] [address]. All rights reserved. This information is not intended as a substitute for professional medical care. Always follow your healthcare professional's instructions.                Follow-ups after your visit        Future tests that were ordered for you today     Open Future Orders        Priority Expected Expires Ordered    Clostridium difficile Toxin B PCR Routine  3/15/2018 2/13/2018    Enteric Bacteria and Virus Panel by MORALES Stool Routine  2/13/2019 2/13/2018            Who to contact     If you have questions or need follow up information about today's clinic visit or your schedule please contact [facility] directly at [phone].  Normal or non-critical lab and imaging results will be communicated to you by MyChart, letter or phone within 4 business days after the clinic has received the results. If you do not hear from us within 7 days, please contact the clinic through tok tok tokhart or phone. If you have a critical or abnormal lab result, we will notify you by phone as soon as possible.  Submit refill requests through [employer] or call your pharmacy and they will forward the refill request to us. Please allow 3 business days for your refill to be completed.          Additional Information About Your Visit        MyChart Information     [employer] lets you send messages to your doctor, view your test results, renew your prescriptions, schedule appointments and more. To sign up, go to [email or web address] . Click on \"Log in\" on the left side of the screen, which will take you to the Welcome page. Then click on \"Sign up Now\" on the right side of the page.     You will be asked to enter the access code listed below, as well as some personal " information. Please follow the directions to create your username and password.     Your access code is: [email or web address]  Expires: 2018 10:53 AM     Your access code will  in 90 days. If you need help or a new code, please call your [facility] or [phone].        Care EveryWhere ID     This is your Care EveryWhere ID. This could be used by other organizations to access your [place] medical records  [id number]        Your Vitals Were     Pulse Temperature Respirations Last Period BMI (Body Mass Index)       84 98.5  F (36.9  C) (Tympanic) 16 2018 34.09 kg/m2        Blood Pressure from Last 3 Encounters:   18 114/64   10/23/17 122/78   17 132/73    Weight from Last 3 Encounters:   18 180 lb 6.4 oz (81.8 kg)   17 208 lb (94.3 kg)   17 209 lb (94.8 kg)                 Today's Medication Changes          These changes are accurate as of 18 10:53 AM.  If you have any questions, ask your nurse or doctor.               Start taking these medicines.        Dose/Directions    ondansetron 4 MG ODT tab   Commonly known as:  ZOFRAN ODT   Used for:  Viral gastroenteritis   Started by:  Alicia Cartagena APRN CNP        Dose:  4-8 mg   Take 1-2 tablets (4-8 mg) by mouth every 8 hours as needed for nausea   Quantity:  20 tablet   Refills:  1            Where to get your medicines      These medications were sent to [facility] - [place] - [address]  [address] [address]     Phone:  [phone]     ondansetron 4 MG ODT tab                Primary Care Provider Office Phone # Fax #    ARIEL Major [phone]       [facility] [address] [place] [address]        Goals        General    I want to get my Medicaid in place (pt-stated)     Notes - Note created  2014 12:52 PM by Dilcia Stringer LSW    As of today's date 2014 goal is met at 51 - 75%.   Goal Status:  Active  Application in  into the county and waiting for response        I want to get on the right medications for my Mental Health  (pt-stated)     Notes - Note created  12/2/2014 12:54 PM by Dilcia Stringer LSW    As of today's date 12/2/2014 goal is met at 51 - 75%.   Goal Status:  Active  Pt has been prescribed medications and is waiting for MA to be started so she can pick them up        I want to lose weight (pt-stated)     Notes - Note created  12/2/2014 12:55 PM by Dilcia Stringer LSW    As of today's date 12/2/2014 goal is met at 0 - 25%.   Goal Status:  Active  Just starting on the process with plans to join a gym once MA is in place if possible        I want to write a book about my life (pt-stated)     Notes - Note created  12/2/2014 12:54 PM by Dilcia Stringer LSW    As of today's date 12/2/2014 goal is met at 26 - 50%.   Goal Status:  Active          Equal Access to Services     [facility]: Hadii aad ku hadasho Soomaali, waaxda luqadaha, qaybta kaalmada adeegyada, waxay taylorin jose todd . So [facility] [phone].    ATENCIÓN: Si habla español, tiene a bruno disposición servicios gratuitos de asistencia lingüística. Llame al [phone].    We comply with applicable federal civil rights laws and [place] laws. We do not discriminate on the basis of race, color, national origin, age, disability, sex, sexual orientation, or gender identity.            Thank you!     Thank you for choosing [facility]  for your care. Our goal is always to provide you with excellent care. Hearing back from our patients is one way we can continue to improve our services. Please take a few minutes to complete the written survey that you may receive in the mail after your visit with us. Thank you!             Your Updated Medication List - Protect others around you: Learn how to safely use, store and throw away your medicines at www.disposemymeds.org.          This list is accurate as of 2/13/18 10:53 AM.   Always use your most recent med list.                   Brand Name Dispense Instructions for use Diagnosis    DULoxetine 30 MG EC capsule    CYMBALTA    30 capsule    Take 1 capsule (30 mg) by mouth daily    Bipolar affective disorder, currently depressed, mild (H)       ondansetron 4 MG ODT tab    ZOFRAN ODT    20 tablet    Take 1-2 tablets (4-8 mg) by mouth every 8 hours as needed for nausea    Viral gastroenteritis
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
## 2018-02-13 NOTE — NURSING NOTE
"Chief Complaint   Patient presents with     Diarrhea       Initial /64 (BP Location: Right arm, Patient Position: Chair, Cuff Size: Adult Regular)  Pulse 84  Temp 98.5  F (36.9  C) (Tympanic)  Resp 16  Wt 180 lb 6.4 oz (81.8 kg)  LMP 02/01/2018  BMI 34.09 kg/m2 Estimated body mass index is 34.09 kg/(m^2) as calculated from the following:    Height as of 6/13/17: 5' 1\" (1.549 m).    Weight as of this encounter: 180 lb 6.4 oz (81.8 kg).      Health Maintenance that is potentially due pending provider review:  NONE    Nettie Marina MA  10:17 AM 2/13/2018  .      "

## 2018-02-13 NOTE — PATIENT INSTRUCTIONS
Likely gastroenteritis (stomach flu)    Push fluids - dizziness should get better     Bring in stool samples if continue to have diarrhea    Zofran for nausea as needed     Return to clinic if symptoms not improving or worsening     Viral Gastroenteritis (Adult)    Gastroenteritis is commonly called the stomach flu. It is most often caused by a virus that affects the stomach and intestinal tract and usually lasts from 2 to 7 days. Common viruses causing gastroenteritis include norovirus, rotavirus, and hepatitis A. Non-viral causes of gastroenteritis include bacteria, parasites, and toxins.  The danger from repeated vomiting or diarrhea is dehydration. This is the loss of too much fluid from the body. When this occurs, body fluids must be replaced. Antibiotics do not help with this illness because it is usually viral.Simple home treatment will be helpful.  Symptoms of viral gastroenteritis may include:    Watery, loose stools    Stomach pain or abdominal cramps    Fever and chills    Nausea and vomiting    Loss of bowel control    Headache  Home care  Gastroenteritis is transmitted by contact with the stool or vomit of an infected person. This can occur from person to person or from contact with a contaminated surface.  Follow these guidelines when caring for yourself at home:    If symptoms are severe, rest at home for the next 24 hours or until you are feeling better.    Wash your hands with soap and water or use alcohol-based  to prevent the spread of infection. Wash your hands after touching anyone who is sick.    Wash your hands or use alcohol-based  after using the toilet and before meals. Clean the toilet after each use.  Remember these tips when preparing food:    People with diarrhea should not prepare or serve food to others. When preparing foods, wash your hands before and after.    Wash your hands after using cutting boards, countertops, knives, or utensils that have been in contact  with raw food.    Keep uncooked meats away from cooked and ready-to-eat foods.  Medicine  You may use acetaminophen or NSAID medicines like ibuprofen or naproxen to control fever unless another medicine was given. If you have chronic liver or kidney disease, talk with your healthcare provider before using these medicines. Also talk with your provider if you've had a stomach ulcer or gastrointestinal bleeding. Don't give aspirin to anyone under 18 years of age who is ill with a fever. It may cause severe liver damage. Don't use NSAIDS is you are already taking one for another condition (like arthritis) or are on aspirin (such as for heart disease or after a stroke).  If medicine for vomiting or diarrhea are prescribed, take these only as directed. Do not take over-the-counter medicines for vomiting or diarrhea unless instructed by your healthcare provider.  Diet  Follow these guidelines for food:    Water and liquids are important so you don't get dehydrated. Drink a small amount at a time or suck on ice chips if you are vomiting.    If you eat, avoid fatty, greasy, spicy, or fried foods.    Don't eat dairy if you have diarrhea. This can make diarrhea worse.    Avoid tobacco, alcohol, and caffeine which may worsen symptoms.  During the first 24 hours (the first full day), follow the diet below:    Beverages. Sports drinks, soft drinks without caffeine, ginger ale, mineral water (plain or flavored), decaffeinated tea and coffee. If you are very dehydrated, sports drinks aren't a good choice. They have too much sugar and not enough electrolytes. In this case, commercially available products called oral rehydration solutions, are best.    Soups. Eat clear broth, consommé, and bouillon.    Desserts. Eat gelatin, popsicles, and fruit juice bars.  During the next 24 hours (the second day), you may add the following to the above:    Hot cereal, plain toast, bread, rolls, and crackers    Plain noodles, rice, mashed potatoes,  chicken noodle or rice soup    Unsweetened canned fruit (avoid pineapple), bananas    Limit fat intake to less than 15 grams per day. Do this by avoiding margarine, butter, oils, mayonnaise, sauces, gravies, fried foods, peanut butter, meat, poultry, and fish.    Limit fiber and avoid raw or cooked vegetables, fresh fruits (except bananas), and bran cereals.    Limit caffeine and chocolate. Don't use spices or seasonings other than salt.    Limit dairy products.    Avoid alcohol.  During the next 24 hours:    Gradually resume a normal diet as you feel better and your symptoms improve.    If at any time it starts getting worse again, go back to clear liquids until you feel better.  Follow-up care  Follow up with your healthcare provider, or as advised. Call your provider if you don't get better within 24 hours or if diarrhea lasts more than a week. Also follow up if you are unable to keep down liquids and get dehydrated. If a stool (diarrhea) sample was taken, call as directed for the results.  Call 911  Call 911 if any of these occur:    Trouble breathing    Chest pain    Confused    Severe drowsiness or trouble awakening    Fainting or loss of consciousness    Rapid heart rate    Seizure    Stiff neck  When to seek medical advice  Call your healthcare provider right away if any of these occur:    Abdominal pain that gets worse    Continued vomiting (unable to keep liquids down)    Frequent diarrhea (more than 5 times a day)    Blood in vomit or stool (black or red color)    Dark urine, reduced urine output, or extreme thirst    Weakness or dizziness    Drowsiness    Fever of 100.4 F (38 C) oral or higher that does not get better with fever medicine    New rash  Date Last Reviewed: 1/3/2016    1041-1430 [employer]. [address] [place] [address], [place] [address]. All rights reserved. This information is not intended as a substitute for professional medical care. Always follow your healthcare professional's  instructions.

## 2018-03-14 ENCOUNTER — OFFICE VISIT (OUTPATIENT)
Dept: FAMILY MEDICINE | Facility: CLINIC | Age: 23
End: 2018-03-14
Payer: COMMERCIAL

## 2018-03-14 VITALS
HEIGHT: 61 IN | RESPIRATION RATE: 18 BRPM | SYSTOLIC BLOOD PRESSURE: 131 MMHG | DIASTOLIC BLOOD PRESSURE: 81 MMHG | WEIGHT: 182 LBS | TEMPERATURE: 98.4 F | HEART RATE: 90 BPM | BODY MASS INDEX: 34.36 KG/M2

## 2018-03-14 DIAGNOSIS — R10.2 PELVIC PRESSURE IN FEMALE: Primary | ICD-10-CM

## 2018-03-14 LAB
ALBUMIN UR-MCNC: 30 MG/DL
APPEARANCE UR: CLEAR
BILIRUB UR QL STRIP: NEGATIVE
COLOR UR AUTO: YELLOW
GLUCOSE UR STRIP-MCNC: NEGATIVE MG/DL
HGB UR QL STRIP: NEGATIVE
KETONES UR STRIP-MCNC: NEGATIVE MG/DL
LEUKOCYTE ESTERASE UR QL STRIP: NEGATIVE
NITRATE UR QL: NEGATIVE
PH UR STRIP: 7 PH (ref 5–7)
RBC #/AREA URNS AUTO: ABNORMAL /HPF
SOURCE: ABNORMAL
SP GR UR STRIP: 1.01 (ref 1–1.03)
UROBILINOGEN UR STRIP-ACNC: 0.2 EU/DL (ref 0.2–1)
WBC #/AREA URNS AUTO: ABNORMAL /HPF

## 2018-03-14 PROCEDURE — 84703 CHORIONIC GONADOTROPIN ASSAY: CPT | Performed by: FAMILY MEDICINE

## 2018-03-14 PROCEDURE — 99213 OFFICE O/P EST LOW 20 MIN: CPT | Performed by: FAMILY MEDICINE

## 2018-03-14 PROCEDURE — 81001 URINALYSIS AUTO W/SCOPE: CPT | Performed by: FAMILY MEDICINE

## 2018-03-14 NOTE — MR AVS SNAPSHOT
After Visit Summary   3/14/2018    Liat Corcoran    MRN: [id number]           Patient Information     Date Of Birth          1995        Visit Information        Provider Department      3/14/2018 10:20 AM Rodrigo Quintanilla MD [facility]        Today's Diagnoses     Pelvic pressure in female    -  1      Care Instructions      Pelvic Pain, Uncertain Cause    Pelvic pain is pain felt in the lowest part of the belly (abdomen) and between the hipbones. The pain may be acute. This means it occurred suddenly and recently. Or the pain may be chronic. This means it has occurred for 6 months or longer.  There are many possible causes of pelvic pain. The pain may be due to a problem in the female reproductive system (pictured here). Or, it may be due to a problem in the digestive, urinary, or musculoskeletal systems.  Based on your visit today, the exact cause of your pelvic pain is not certain. Your condition does not appear to be serious at this time. But it is important for you to keep watching for any new symptoms or worsening of your condition.  General care  Your healthcare provider may advise a number of ways to help manage your pain. These can include:    Taking over-the-counter pain medicine. Stronger pain medicine may also be prescribed, if needed.    Applying heat to the pelvic area. Use a heating pad or a hot pack. Taking a hot bath may also help.    Getting plenty of rest.    Making certain lifestyle changes. These can include practicing good posture and getting regular exercise. (Studies have shown that these changes help reduce pelvic pain in some women.)    Seeing a physical therapist or pain specialist. These healthcare providers can discuss other ways to manage pain with you.  Follow-up care  Follow up with your healthcare provider, or as advised.   When to seek medical advice  Call your healthcare provider right away if any of the following occur:    Fever of 100.4 F  "or higher, or as directed by your healthcare provider    Pain worsens or you have sudden, severe pain or new pain    Nausea, vomiting, sweating, or restlessness    Dizziness or fainting    Unusual vaginal discharge    Abnormal vaginal bleeding (especially bleeding after menopause)  Date Last Reviewed: 6/11/2015 2000-2017 [employer]. [address] [place] [address]. All rights reserved. This information is not intended as a substitute for professional medical care. Always follow your healthcare professional's instructions.                Follow-ups after your visit        Future tests that were ordered for you today     Open Future Orders        Priority Expected Expires Ordered    US Pelvic Complete w Transvaginal Routine  3/14/2019 3/14/2018            Who to contact     If you have questions or need follow up information about today's clinic visit or your schedule please contact [facility] directly at [phone].  Normal or non-critical lab and imaging results will be communicated to you by MyChart, letter or phone within 4 business days after the clinic has received the results. If you do not hear from us within 7 days, please contact the clinic through [employer] or phone. If you have a critical or abnormal lab result, we will notify you by phone as soon as possible.  Submit refill requests through [employer] or call your pharmacy and they will forward the refill request to us. Please allow 3 business days for your refill to be completed.          Additional Information About Your Visit        [employer] Information     [employer] lets you send messages to your doctor, view your test results, renew your prescriptions, schedule appointments and more. To sign up, go to [email or web address] . Click on \"Log in\" on the left side of the screen, which will take you to the Welcome page. Then click on \"Sign up Now\" on the right side of the page.     You will be asked to enter the " "access code listed below, as well as some personal information. Please follow the directions to create your username and password.     Your access code is: [email or web address]  Expires: 2018 11:53 AM     Your access code will  in 90 days. If you need help or a new code, please call your [facility] or [phone].        Care EveryWhere ID     This is your Care EveryWhere ID. This could be used by other organizations to access your [facility] medical records  [id number]        Your Vitals Were     Pulse Temperature Respirations Height Last Period Breastfeeding?    90 98.4  F (36.9  C) (Tympanic) 18 5' 1\" (1.549 m) 2018 No    BMI (Body Mass Index)                   34.39 kg/m2            Blood Pressure from Last 3 Encounters:   18 131/81   18 114/64   10/23/17 122/78    Weight from Last 3 Encounters:   18 182 lb (82.6 kg)   18 180 lb 6.4 oz (81.8 kg)   17 208 lb (94.3 kg)              We Performed the Following     HCG qualitative     UA with Microscopic        Primary Care Provider Office Phone # Fax #    ARIEL Major [facility] [phone]       [address] [address]        Goals        General    I want to get my Medicaid in place (pt-stated)     Notes - Note created  2014 12:52 PM by Dilcia Stringer LSW    As of today's date 2014 goal is met at 51 - 75%.   Goal Status:  Active  Application in into the [facility] and waiting for response        I want to get on the right medications for my Mental Health  (pt-stated)     Notes - Note created  2014 12:54 PM by Dilcia Stringer LSW    As of today's date 2014 goal is met at 51 - 75%.   Goal Status:  Active  Pt has been prescribed medications and is waiting for MA to be started so she can pick them up        I want to lose weight (pt-stated)     Notes - Note created  2014 12:55 PM by Siomara, Dilcia, LSW    As of today's date 2014 " goal is met at 0 - 25%.   Goal Status:  Active  Just starting on the process with plans to join a gym once MA is in place if possible        I want to write a book about my life (pt-stated)     Notes - Note created  12/2/2014 12:54 PM by Dilcia Stringer LSW    As of today's date 12/2/2014 goal is met at 26 - 50%.   Goal Status:  Active          Equal Access to Services     [facility] AH: Hadii aad ku hadasho Soomaali, waaxda luqadaha, qaybta kaalmada adeegyada, waxay idiin hayaan adeeg kharash la'aan ah. So [facility] [phone].    ATENCIÓN: Si habla español, tiene a bruno disposición servicios gratuitos de asistencia lingüística. Llame al [phone].    We comply with applicable federal civil rights laws and [place] laws. We do not discriminate on the basis of race, color, national origin, age, disability, sex, sexual orientation, or gender identity.            Thank you!     Thank you for choosing [facility]  for your care. Our goal is always to provide you with excellent care. Hearing back from our patients is one way we can continue to improve our services. Please take a few minutes to complete the written survey that you may receive in the mail after your visit with us. Thank you!             Your Updated Medication List - Protect others around you: Learn how to safely use, store and throw away your medicines at www.disposemymeds.org.      Notice  As of 3/14/2018 11:20 AM    You have not been prescribed any medications.

## 2018-03-14 NOTE — NURSING NOTE
"Chief Complaint   Patient presents with     Vaginal Problem       Initial /81 (Cuff Size: Adult Regular)  Pulse 90  Temp 98.4  F (36.9  C) (Tympanic)  Resp 18  Ht 5' 1\" (1.549 m)  Wt 182 lb (82.6 kg)  LMP 03/04/2018  Breastfeeding? No  BMI 34.39 kg/m2 Estimated body mass index is 34.39 kg/(m^2) as calculated from the following:    Height as of this encounter: 5' 1\" (1.549 m).    Weight as of this encounter: 182 lb (82.6 kg).      Health Maintenance that is potentially due pending provider review:  NONE    n/a    Is there anyone who you would like to be able to receive your results? Not Applicable  If yes have patient fill out FADIA    "

## 2018-03-14 NOTE — PATIENT INSTRUCTIONS
Pelvic Pain, Uncertain Cause    Pelvic pain is pain felt in the lowest part of the belly (abdomen) and between the hipbones. The pain may be acute. This means it occurred suddenly and recently. Or the pain may be chronic. This means it has occurred for 6 months or longer.  There are many possible causes of pelvic pain. The pain may be due to a problem in the female reproductive system (pictured here). Or, it may be due to a problem in the digestive, urinary, or musculoskeletal systems.  Based on your visit today, the exact cause of your pelvic pain is not certain. Your condition does not appear to be serious at this time. But it is important for you to keep watching for any new symptoms or worsening of your condition.  General care  Your healthcare provider may advise a number of ways to help manage your pain. These can include:    Taking over-the-counter pain medicine. Stronger pain medicine may also be prescribed, if needed.    Applying heat to the pelvic area. Use a heating pad or a hot pack. Taking a hot bath may also help.    Getting plenty of rest.    Making certain lifestyle changes. These can include practicing good posture and getting regular exercise. (Studies have shown that these changes help reduce pelvic pain in some women.)    Seeing a physical therapist or pain specialist. These healthcare providers can discuss other ways to manage pain with you.  Follow-up care  Follow up with your healthcare provider, or as advised.   When to seek medical advice  Call your healthcare provider right away if any of the following occur:    Fever of 100.4 F or higher, or as directed by your healthcare provider    Pain worsens or you have sudden, severe pain or new pain    Nausea, vomiting, sweating, or restlessness    Dizziness or fainting    Unusual vaginal discharge    Abnormal vaginal bleeding (especially bleeding after menopause)  Date Last Reviewed: 6/11/2015 2000-2017 [employer]. [address] [place] [address]  [place] [address]. All rights reserved. This information is not intended as a substitute for professional medical care. Always follow your healthcare professional's instructions.
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
## 2018-03-15 ENCOUNTER — HOSPITAL ENCOUNTER (OUTPATIENT)
Dept: ULTRASOUND IMAGING | Facility: CLINIC | Age: 23
Discharge: HOME | End: 2018-03-15
Attending: FAMILY MEDICINE | Admitting: FAMILY MEDICINE
Payer: COMMERCIAL

## 2018-03-15 DIAGNOSIS — R10.2 PELVIC PRESSURE IN FEMALE: ICD-10-CM

## 2018-03-15 LAB — HCG SERPL QL: NEGATIVE

## 2018-03-15 PROCEDURE — 76856 US EXAM PELVIC COMPLETE: CPT

## 2018-03-16 ENCOUNTER — DOCUMENTATION ONLY (OUTPATIENT)
Dept: FAMILY MEDICINE | Facility: CLINIC | Age: 23
End: 2018-03-16

## 2018-03-16 DIAGNOSIS — R10.2 PELVIC PRESSURE IN FEMALE: ICD-10-CM

## 2018-03-16 DIAGNOSIS — R14.0 BLOATING SYMPTOM: Primary | ICD-10-CM

## 2018-03-16 NOTE — PROGRESS NOTES
Ultrasound results discussed with the patient.  UA and pregnancy test were negative.  Patient continues to have bloating, nausea and pelvic pressure.  Instructed to go to ER if symptom persists or worsen over the weekend.  CT abdomen/pelvis ordered for further evaluation.  Will consider gynecology referral depending upon CT scan result.  All questions answered.    Results for orders placed or performed during the hospital encounter of 03/15/18   US Pelvic Complete w Transvaginal    Narrative    US PELVIC COMPLETE WITH TRANSVAGINAL 3/15/2018 6:48 PM    HISTORY: Pelvic pressure for one month.    TECHNIQUE: Transabdominal images of the pelvis are supplemented with  endovaginal images to better define anatomy.    COMPARISON: 6/15/2007.    FINDINGS: The uterus measures 8.2 x 4.1 x 5.0cm. No fibroids are  present. Endometrial stripe thickness is 1.2 cm. The right ovary is  within normal limits. There is a 2.2 cm simple cyst in the left ovary.  There is trace free pelvic fluid.      Impression    IMPRESSION: There is a small simple appearing cyst in the left ovary.    MD Rodrigo LEWIS MD  [facility]

## 2018-03-19 RX ORDER — IOPAMIDOL 755 MG/ML
89 INJECTION, SOLUTION INTRAVASCULAR ONCE
Status: COMPLETED | OUTPATIENT
Start: 2018-03-20 | End: 2018-03-20

## 2018-03-20 ENCOUNTER — HOSPITAL ENCOUNTER (OUTPATIENT)
Dept: CT IMAGING | Facility: CLINIC | Age: 23
Discharge: HOME | End: 2018-03-20
Attending: FAMILY MEDICINE | Admitting: FAMILY MEDICINE
Payer: COMMERCIAL

## 2018-03-20 DIAGNOSIS — R10.2 PELVIC PRESSURE IN FEMALE: ICD-10-CM

## 2018-03-20 DIAGNOSIS — R10.2 PELVIC PRESSURE IN FEMALE: Primary | ICD-10-CM

## 2018-03-20 DIAGNOSIS — R14.0 BLOATING SYMPTOM: ICD-10-CM

## 2018-03-20 PROCEDURE — 25000125 ZZHC RX 250: Performed by: RADIOLOGY

## 2018-03-20 PROCEDURE — 74177 CT ABD & PELVIS W/CONTRAST: CPT

## 2018-03-20 PROCEDURE — 25000128 H RX IP 250 OP 636: Performed by: RADIOLOGY

## 2018-03-20 RX ADMIN — SODIUM CHLORIDE 62 ML: 9 INJECTION, SOLUTION INTRAVENOUS at 12:31

## 2018-03-20 RX ADMIN — IOPAMIDOL 89 ML: 755 INJECTION, SOLUTION INTRAVENOUS at 12:30

## 2018-06-22 ENCOUNTER — TELEPHONE (OUTPATIENT)
Dept: FAMILY MEDICINE | Facility: CLINIC | Age: 23
End: 2018-06-22

## 2018-06-22 NOTE — TELEPHONE ENCOUNTER
Patient had questions regarding the at home pregnancy test she took. She is not sure if it worked or if she got a faulty one. Advised to check the package insert for reading directions and if not sure she should buy another one and test again. She agreed with this plan.    Jennifer Youngblood RN

## 2018-06-22 NOTE — TELEPHONE ENCOUNTER
Reason for Call:  Other call back    Detailed comments: Patient requesting a call back from RN regarding a pregnancy test she took. Patient states it does not have lines on the result like it should.    Phone Number Patient can be reached at: Home number on file [phone] (home)    Best Time: any    Can we leave a detailed message on this number? YES    Call taken on 6/22/2018 at 12:11 PM by Marquita Delgado

## 2018-06-22 NOTE — TELEPHONE ENCOUNTER
I have attempted to contact this patient by phone with the following results: no answer-mailbox is full.    Jennifer Youngblood RN

## 2018-07-16 ENCOUNTER — TELEPHONE (OUTPATIENT)
Dept: FAMILY MEDICINE | Facility: CLINIC | Age: 23
End: 2018-07-16

## 2018-07-16 NOTE — TELEPHONE ENCOUNTER
Patient is calling stating she has had chest pain off and on for the last two weeks and now in the last two days her belly up to her chest is extremely tender. It hurts to even sit in her car and turn the steering wheel. Please advise.    Catrina Arred[facility] [place]

## 2018-07-16 NOTE — TELEPHONE ENCOUNTER
"Reports onset chest/ upper abd  \"naval to chest area\" pain/ tenderness 2-3 wks ago intermittently until past 2 days pain has been continuous.  Pain worsens after meal.  Also reports associated SOA, bloating, slight nausea.  Denies radiating pain, jaw/ arm pain, diaphoresis, dizziness, lightheadedness, weakness.   Denies constipation, loose stools, no vomiting.  States has not experience similar sx in past.  No current scheduled meds.  Mom passed from cardiac problems 4 yrs ago, strong family cardiac hx.  Advised ED today for eval.  Pt voices understanding/ agreement.  MICKIE Chahal RN        "

## 2018-08-21 ENCOUNTER — APPOINTMENT (OUTPATIENT)
Dept: OBGYN | Facility: CLINIC | Age: 23
End: 2018-08-21
Payer: COMMERCIAL

## 2018-08-21 ENCOUNTER — PRENATAL OFFICE VISIT (OUTPATIENT)
Dept: OBGYN | Facility: CLINIC | Age: 23
End: 2018-08-21

## 2018-08-21 DIAGNOSIS — Z34.80 PRENATAL CARE, SUBSEQUENT PREGNANCY: Primary | ICD-10-CM

## 2018-08-21 PROCEDURE — 99207 ZZC NO CHARGE NURSE ONLY: CPT | Performed by: OBSTETRICS & GYNECOLOGY

## 2018-08-21 NOTE — MR AVS SNAPSHOT
"              After Visit Summary   8/21/2018    Liat Corcoran    MRN: [id number]           Patient Information     Date Of Birth          1995        Visit Information        Provider Department      8/21/2018 5:38 PM Carli Mcmahon MD [facility]        Today's Diagnoses     Prenatal care, subsequent pregnancy    -  1       Follow-ups after your visit        Your next 10 appointments already scheduled     Sep 17, 2018  1:00 PM CDT   New Prenatal with Carli Mcmahon MD, [facility] 2   [facility]    [address] [address]   [phone]              Who to contact     If you have questions or need follow up information about today's clinic visit or your schedule please contact [facility] directly at [phone].  Normal or non-critical lab and imaging results will be communicated to you by MyChart, letter or phone within 4 business days after the clinic has received the results. If you do not hear from us within 7 days, please contact the clinic through MyChart or phone. If you have a critical or abnormal lab result, we will notify you by phone as soon as possible.  Submit refill requests through H2[employer] or call your pharmacy and they will forward the refill request to us. Please allow 3 business days for your refill to be completed.          Additional Information About Your Visit        MyChart Information     [employer] lets you send messages to your doctor, view your test results, renew your prescriptions, schedule appointments and more. To sign up, go to [email or web address] . Click on \"Log in\" on the left side of the screen, which will take you to the Welcome page. Then click on \"Sign up Now\" on the right side of the page.     You will be asked to enter the access code listed below, as well as some personal information. Please follow the directions to create your username and password.   "   Your access code is: [email or web address]  Expires: 2018 10:38 AM     Your access code will  in 90 days. If you need help or a new code, please call your [place] clinic or [phone].        Care EveryWhere ID     This is your Care EveryWhere ID. This could be used by other organizations to access your [place] medical records  [id number]        Your Vitals Were     Last Period                   2018 (Approximate)            Blood Pressure from Last 3 Encounters:   18 131/81   18 114/64   10/23/17 122/78    Weight from Last 3 Encounters:   18 82.6 kg (182 lb)   18 81.8 kg (180 lb 6.4 oz)   17 94.3 kg (208 lb)              Today, you had the following     No orders found for display       Primary Care Provider Office Phone # Fax #    Alicia Braun ARIEL [facility] [phone]       [facility] [address] [place] [address]        Goals        General    I want to get my Medicaid in place (pt-stated)     Notes - Note created  2014 12:52 PM by Dilcia Stringer LSW    As of today's date 2014 goal is met at 51 - 75%.   Goal Status:  Active  Application in into the [facility] and waiting for response        I want to get on the right medications for my Mental Health  (pt-stated)     Notes - Note created  2014 12:54 PM by Dilcia Stringer LSW    As of today's date 2014 goal is met at 51 - 75%.   Goal Status:  Active  Pt has been prescribed medications and is waiting for MA to be started so she can pick them up        I want to lose weight (pt-stated)     Notes - Note created  2014 12:55 PM by Dilcia Stringer LSW    As of today's date 2014 goal is met at 0 - 25%.   Goal Status:  Active  Just starting on the process with plans to join a gym once MA is in place if possible        I want to write a book about my life (pt-stated)     Notes - Note created  2014 12:54 PM by Dilcia Stringer LSW    As of today's  date 12/2/2014 goal is met at 26 - 50%.   Goal Status:  Active          Equal Access to Services     KAITMAXINE [facility] HospitalDARRIAN : Hadii aad ku hadcalingreer Goncalves, bandar barreto, janicefred barrerarodylouie mtzjoellouie, pranay blanco alejandraaurelio merloscarlos veenajakykelsy trinidad. So [facility] [phone].    ATENCIÓN: Si habla español, tiene a bruno disposición servicios gratuitos de asistencia lingüística. Llame al [phone].    We comply with applicable federal civil rights laws and [place] laws. We do not discriminate on the basis of race, color, national origin, age, disability, sex, sexual orientation, or gender identity.            Thank you!     Thank you for choosing [facility]  for your care. Our goal is always to provide you with excellent care. Hearing back from our patients is one way we can continue to improve our services. Please take a few minutes to complete the written survey that you may receive in the mail after your visit with us. Thank you!             Your Updated Medication List - Protect others around you: Learn how to safely use, store and throw away your medicines at www.disposemymeds.org.      Notice  As of 8/21/2018 11:59 PM    You have not been prescribed any medications.

## 2018-08-22 ENCOUNTER — PRENATAL OFFICE VISIT (OUTPATIENT)
Dept: OBGYN | Facility: CLINIC | Age: 23
End: 2018-08-22
Payer: COMMERCIAL

## 2018-08-22 DIAGNOSIS — Z53.9 ERRONEOUS ENCOUNTER--DISREGARD: Primary | ICD-10-CM

## 2018-08-22 NOTE — MR AVS SNAPSHOT
"              After Visit Summary   8/22/2018    Liat Corcoran    MRN: [id number]           Patient Information     Date Of Birth          1995        Visit Information        Provider Department      8/22/2018 10:28 AM Carli Mcmahon MD [facility]        Today's Diagnoses     ERRONEOUS ENCOUNTER--DISREGARD    -  1       Follow-ups after your visit        Your next 10 appointments already scheduled     Sep 17, 2018  1:00 PM CDT   New Prenatal with Carli Mcmahon MD, [facility] 2   [facility]    [address] [facility] [address]   [phone]              Who to contact     If you have questions or need follow up information about today's clinic visit or your schedule please contact [facility] directly at [phone].  Normal or non-critical lab and imaging results will be communicated to you by MyChart, letter or phone within 4 business days after the clinic has received the results. If you do not hear from us within 7 days, please contact the clinic through MyChart or phone. If you have a critical or abnormal lab result, we will notify you by phone as soon as possible.  Submit refill requests through [employer] or call your pharmacy and they will forward the refill request to us. Please allow 3 business days for your refill to be completed.          Additional Information About Your Visit        MyChart Information     [employer] [place] lets you send messages to your doctor, view your test results, renew your prescriptions, schedule appointments and more. To sign up, go to [email or web address] . Click on \"Log in\" on the left side of the screen, which will take you to the Welcome page. Then click on \"Sign up Now\" on the right side of the page.     You will be asked to enter the access code listed below, as well as some personal information. Please follow the directions to create your username and password.     Your " access code is: [email or web address]  Expires: 2018 10:38 AM     Your access code will  in 90 days. If you need help or a new code, please call your [place] clinic or [phone].        Care EveryWhere ID     This is your Care EveryWhere ID. This could be used by other organizations to access your [place] medical records  [id number]        Your Vitals Were     Last Period                   2018 (Approximate)            Blood Pressure from Last 3 Encounters:   18 131/81   18 114/64   10/23/17 122/78    Weight from Last 3 Encounters:   18 82.6 kg (182 lb)   18 81.8 kg (180 lb 6.4 oz)   17 94.3 kg (208 lb)              Today, you had the following     No orders found for display       Primary Care Provider Office Phone # Fax #    Alicia ARIEL [facility] [phone]       [facility] [address] [place] [address]        Goals        General    I want to get my Medicaid in place (pt-stated)     Notes - Note created  2014 12:52 PM by Dilcia Stringer LSW    As of today's date 2014 goal is met at 51 - 75%.   Goal Status:  Active  Application in into the [facility] and waiting for response        I want to get on the right medications for my Mental Health  (pt-stated)     Notes - Note created  2014 12:54 PM by Dilcia Stringer LSW    As of today's date 2014 goal is met at 51 - 75%.   Goal Status:  Active  Pt has been prescribed medications and is waiting for MA to be started so she can pick them up        I want to lose weight (pt-stated)     Notes - Note created  2014 12:55 PM by Dilcia Stringer LSW    As of today's date 2014 goal is met at 0 - 25%.   Goal Status:  Active  Just starting on the process with plans to join a gym once MA is in place if possible        I want to write a book about my life (pt-stated)     Notes - Note created  2014 12:54 PM by Dilcia Stringer LSW    As of today's date  12/2/2014 goal is met at 26 - 50%.   Goal Status:  Active          Equal Access to Services     ROBBIE [facility] CenterDARRIAN : Hadii aad ku hadcalingreer Goncalves, bandar barreto, latishasumaya barrerarodylouie jonas, pranay blanco alejandraaurelio merloscarlos veenajakykelsy trinidad. So [facility] [phone].    ATENCIÓN: Si habla español, tiene a bruno disposición servicios gratuitos de asistencia lingüística. Llame al [phone].    We comply with applicable federal civil rights laws and [place] laws. We do not discriminate on the basis of race, color, national origin, age, disability, sex, sexual orientation, or gender identity.            Thank you!     Thank you for choosing [facility]  for your care. Our goal is always to provide you with excellent care. Hearing back from our patients is one way we can continue to improve our services. Please take a few minutes to complete the written survey that you may receive in the mail after your visit with us. Thank you!             Your Updated Medication List - Protect others around you: Learn how to safely use, store and throw away your medicines at www.disposemymeds.org.      Notice  As of 8/22/2018 10:40 AM    You have not been prescribed any medications.

## 2018-09-17 ENCOUNTER — PRENATAL OFFICE VISIT (OUTPATIENT)
Dept: OBGYN | Facility: CLINIC | Age: 23
End: 2018-09-17
Payer: COMMERCIAL

## 2018-09-17 VITALS
BODY MASS INDEX: 34.36 KG/M2 | HEART RATE: 101 BPM | WEIGHT: 182 LBS | TEMPERATURE: 97.5 F | RESPIRATION RATE: 18 BRPM | HEIGHT: 61 IN | SYSTOLIC BLOOD PRESSURE: 99 MMHG | DIASTOLIC BLOOD PRESSURE: 71 MMHG

## 2018-09-17 DIAGNOSIS — Z33.1 PREGNANT STATE, INCIDENTAL: Primary | ICD-10-CM

## 2018-09-17 DIAGNOSIS — F17.200 SMOKER: ICD-10-CM

## 2018-09-17 DIAGNOSIS — Z72.51 PROBLEMS RELATED TO HIGH-RISK SEXUAL BEHAVIOR: ICD-10-CM

## 2018-09-17 LAB
ALBUMIN UR-MCNC: NEGATIVE MG/DL
APPEARANCE UR: ABNORMAL
BILIRUB UR QL STRIP: NEGATIVE
COLOR UR AUTO: YELLOW
GLUCOSE UR STRIP-MCNC: NEGATIVE MG/DL
HGB UR QL STRIP: ABNORMAL
KETONES UR STRIP-MCNC: ABNORMAL MG/DL
LEUKOCYTE ESTERASE UR QL STRIP: ABNORMAL
MUCOUS THREADS #/AREA URNS LPF: PRESENT /LPF
NITRATE UR QL: NEGATIVE
NON-SQ EPI CELLS #/AREA URNS LPF: ABNORMAL /LPF
PH UR STRIP: 6 PH (ref 5–7)
RBC #/AREA URNS AUTO: ABNORMAL /HPF
SOURCE: ABNORMAL
SP GR UR STRIP: 1.02 (ref 1–1.03)
UROBILINOGEN UR STRIP-ACNC: 0.2 EU/DL (ref 0.2–1)
WBC #/AREA URNS AUTO: ABNORMAL /HPF

## 2018-09-17 PROCEDURE — 87086 URINE CULTURE/COLONY COUNT: CPT | Performed by: OBSTETRICS & GYNECOLOGY

## 2018-09-17 PROCEDURE — 87591 N.GONORRHOEAE DNA AMP PROB: CPT | Performed by: OBSTETRICS & GYNECOLOGY

## 2018-09-17 PROCEDURE — 76817 TRANSVAGINAL US OBSTETRIC: CPT | Performed by: OBSTETRICS & GYNECOLOGY

## 2018-09-17 PROCEDURE — 87491 CHLMYD TRACH DNA AMP PROBE: CPT | Performed by: OBSTETRICS & GYNECOLOGY

## 2018-09-17 PROCEDURE — 81001 URINALYSIS AUTO W/SCOPE: CPT | Performed by: OBSTETRICS & GYNECOLOGY

## 2018-09-17 PROCEDURE — 99207 ZZC FIRST OB VISIT: CPT | Performed by: OBSTETRICS & GYNECOLOGY

## 2018-09-17 RX ORDER — PRENATAL VIT/IRON FUM/FOLIC AC 27MG-0.8MG
1 TABLET ORAL DAILY
COMMUNITY
End: 2019-04-29

## 2018-09-17 NOTE — MR AVS SNAPSHOT
After Visit Summary   9/17/2018    Liat Corcoran    MRN: [id number]           Patient Information     Date Of Birth          1995        Visit Information        Provider Department      9/17/2018 1:00 PM Carli Mcmahon MD; [facility] 2 [facility]        Today's Diagnoses     Pregnant state, incidental    -  1    Smoker           Follow-ups after your visit        Follow-up notes from your care team     Return in about 4 weeks (around 10/15/2018).      Your next 10 appointments already scheduled     Oct 15, 2018  1:30 PM CDT   ESTABLISHED PRENATAL with [facility] MD   [facility]    [address] [facility] [address] [phone]              Who to contact     If you have questions or need follow up information about today's clinic visit or your schedule please contact [facility] directly at [phone].  Normal or non-critical lab and imaging results will be communicated to you by Integrienhart, letter or phone within 4 business days after the clinic has received the results. If you do not hear from us within 7 days, please contact the clinic through Integrienhart or phone. If you have a critical or abnormal lab result, we will notify you by phone as soon as possible.  Submit refill requests through [employer] or call your pharmacy and they will forward the refill request to us. Please allow 3 business days for your refill to be completed.          Additional Information About Your Visit        MyChart Information     [employer] gives you secure access to your electronic health record. If you see a primary care provider, you can also send messages to your care team and make appointments. If you have questions, please call your primary care clinic.  If you do not have a primary care provider, please call [phone] and they will assist you.        Care EveryWhere ID     This is your Care EveryWhere ID.  "This could be used by other organizations to access your [place] medical records  [id number]        Your Vitals Were     Pulse Temperature Respirations Height Last Period BMI (Body Mass Index)    101 97.5  F (36.4  C) (Tympanic) 18 5' 1\" (1.549 m) 07/20/2018 (Approximate) 34.39 kg/m2       Blood Pressure from Last 3 Encounters:   09/17/18 99/71   03/14/18 131/81   02/13/18 114/64    Weight from Last 3 Encounters:   09/17/18 182 lb (82.6 kg)   03/14/18 182 lb (82.6 kg)   02/13/18 180 lb 6.4 oz (81.8 kg)              We Performed the Following     *UA reflex to Microscopic     ABO/Rh type and screen     CBC with platelets     Chlamydia trachomatis PCR     Hepatitis B surface antigen     HIV Antigen Antibody Combo     Neisseria gonorrhoeae PCR     Rubella Antibody IgG Quantitative     Treponema Abs w Reflex to RPR and Titer     Urine Culture Aerobic Bacterial     Urine Microscopic     US OB <14 Weeks w Transvaginal Single        Primary Care Provider Office Phone # Fax #    Alicia Cartagena, ARIEL [facility] [phone]       [facility] [address] [facility] [address]        Goals        General    I want to get my Medicaid in place (pt-stated)     Notes - Note created  12/2/2014 12:52 PM by Dilcia Stringer LSW    As of today's date 12/2/2014 goal is met at 51 - 75%.   Goal Status:  Active  Application in into the [facility] and waiting for response        I want to get on the right medications for my Mental Health  (pt-stated)     Notes - Note created  12/2/2014 12:54 PM by Dilcia Stringer LSW    As of today's date 12/2/2014 goal is met at 51 - 75%.   Goal Status:  Active  Pt has been prescribed medications and is waiting for MA to be started so she can pick them up        I want to lose weight (pt-stated)     Notes - Note created  12/2/2014 12:55 PM by Dilcia Stringer LSW    As of today's date 12/2/2014 goal is met at 0 - 25%.   Goal Status:  Active  Just starting on the process " with plans to join a gym once MA is in place if possible        I want to write a book about my life (pt-stated)     Notes - Note created  12/2/2014 12:54 PM by Dilcia Stringer LSW    As of today's date 12/2/2014 goal is met at 26 - 50%.   Goal Status:  Active          Equal Access to Services     ROBBIE GASPAR : Hadii aad ku hadasho Soomaali, waaxda luqadaha, qaybta kaalmada adeegyada, pranay blanco haynazian bibi tomkelsy lajdn . So [facility] [phone].    ATENCIÓN: Si habla español, tiene a bruno disposición servicios gratuitos de asistencia lingüística. Nemo al [phone].    We comply with applicable federal civil rights laws and [place] laws. We do not discriminate on the basis of race, color, national origin, age, disability, sex, sexual orientation, or gender identity.            Thank you!     Thank you for choosing [facility]  for your care. Our goal is always to provide you with excellent care. Hearing back from our patients is one way we can continue to improve our services. Please take a few minutes to complete the written survey that you may receive in the mail after your visit with us. Thank you!             Your Updated Medication List - Protect others around you: Learn how to safely use, store and throw away your medicines at www.disposemymeds.org.          This list is accurate as of 9/17/18  3:36 PM.  Always use your most recent med list.                   Brand Name Dispense Instructions for use Diagnosis    HYDROXYZINE HCL PO      Take 10 mg by mouth        prenatal multivitamin plus iron 27-0.8 MG Tabs per tablet      Take 1 tablet by mouth daily        ZOFRAN ODT PO      Take 1 tablet by mouth
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
## 2018-09-18 DIAGNOSIS — Z34.81 PRENATAL CARE, SUBSEQUENT PREGNANCY IN FIRST TRIMESTER: Primary | ICD-10-CM

## 2018-09-18 LAB
C TRACH DNA SPEC QL NAA+PROBE: POSITIVE
N GONORRHOEA DNA SPEC QL NAA+PROBE: NEGATIVE
SPECIMEN SOURCE: ABNORMAL
SPECIMEN SOURCE: NORMAL

## 2018-09-18 NOTE — PROGRESS NOTES
Chlamydia positive.  Please call in 1g azithromycin for the patient and expedited partner therapy for her partner (unless he is able to be seen and treated independently).  Avoid unprotected intercourse until both have tested negative.  Recommend repeat testing 3 weeks after treatment to make sure it has worked.  Also please add on hepatitis C screening to round out STI testing.    Carli Mcmahon MD, MPH  [facility]

## 2018-09-18 NOTE — PROGRESS NOTES
Call to pt to notify of below.  Unable to reach.  Left message for pt to call back.    Jennifer Brown   [facility]  RN

## 2018-09-19 LAB
BACTERIA SPEC CULT: NORMAL
Lab: NORMAL
SPECIMEN SOURCE: NORMAL

## 2018-09-19 RX ORDER — AZITHROMYCIN 500 MG/1
1000 TABLET, FILM COATED ORAL ONCE
Qty: 2 TABLET | Refills: 0 | Status: SHIPPED | OUTPATIENT
Start: 2018-09-19 | End: 2018-09-21

## 2018-09-19 NOTE — PROGRESS NOTES
Call to pt to notify of below.  Unable to reach.  Left message for pt to call back     Jennifer Brown   [facility]  RN

## 2018-09-20 ENCOUNTER — TELEPHONE (OUTPATIENT)
Dept: OBGYN | Facility: CLINIC | Age: 23
End: 2018-09-20

## 2018-09-20 DIAGNOSIS — Z72.51 PROBLEMS RELATED TO HIGH-RISK SEXUAL BEHAVIOR: ICD-10-CM

## 2018-09-21 RX ORDER — AZITHROMYCIN 500 MG/1
1000 TABLET, FILM COATED ORAL ONCE
Qty: 2 TABLET | Refills: 0 | Status: SHIPPED | OUTPATIENT
Start: 2018-09-21 | End: 2018-09-21

## 2018-09-21 NOTE — TELEPHONE ENCOUNTER
Liat tested + for chlamydia, did take the Zithromax today and threw up within 45 min-1 hour.  Does have Zofran, but reports it has not been helpful during this pregnancy.  She is pregnant, allergy to Amoxicillin.  Can you please advise on other option for treatment?  [facility]      Thanks  Roopa Ventura RN  FNA

## 2018-09-21 NOTE — TELEPHONE ENCOUNTER
She has a history of nausea/vomiting so I wouldn't interpret this as an allergy to the medication.  I would just prescribe the same dosage again (1g azithromycin) and have her attempt to take it again, hopefully at a time she isn't having the terrible nausea symptoms.    Carli Mcmahon MD, MPH  [facility]

## 2018-09-21 NOTE — TELEPHONE ENCOUNTER
Return call to patient.  Spoke with patient on the phone.  Patient will re-try the medication.  Patient has nausea throughout most of the day.  Patient will try to take at a time she is feeling well.  Patient will callback with further questions / concerns.  Will also follow up at next clinic visit.  Rx sent to pharmacy.    Jennifer Brown   [facility]Gyn Clinic  MARY

## 2018-09-26 ENCOUNTER — NURSE TRIAGE (OUTPATIENT)
Dept: NURSING | Facility: CLINIC | Age: 23
End: 2018-09-26

## 2018-09-26 NOTE — TELEPHONE ENCOUNTER
"Patient says she is 9 weeks and 3 days pregnant.  For the last hour or so she has the urge to push her stomach out.  Patient says there's pressure at top of abdomen.  Patient denies being in labor, has no  Abdominal, no bleeding.  Patient says she did have some dizziness but did not sound significant.  Reviewed guideline and care advice with caller.  FNA advised to call back with questions or worsening symptoms.  Caller verbalizes understanding.      Additional Information    Negative: MODERATE-SEVERE abdominal pain (e.g., interferes with normal activities, awakens from sleep)    Negative: Vaginal bleeding or spotting    Negative: [1] Baby moving less today (e.g., kick count < 5 in 1 hour or < 10 in 2 hours) AND [2] pregnant 23 or more weeks    Negative: Leakage of fluid from vagina    Negative: New hand or face swelling    Negative: Blurred vision or visual changes    Negative: Passed out (i.e., lost consciousness, collapsed and was not responding)    Negative: Shock suspected (e.g., cold/pale/clammy skin, too weak to stand, low BP, rapid pulse)    Negative: Difficult to awaken or acting confused  (e.g., disoriented, slurred speech)    Negative: Sounds like a life-threatening emergency to the triager    Negative: Followed an abdomen (stomach) injury    Negative: [1] Abdominal pain AND [2] pregnant > 20 weeks    Negative: MODERATE-SEVERE abdominal pain (e.g., interferes with normal activities, awakens from sleep)    Negative: [1] SEVERE vaginal bleeding (i.e., soaking 2 pads / hour, large blood clots) AND [2] present 2 or more hours    Negative: [1] MODERATE vaginal bleeding (i.e., soaking 1 pad / hour; clots) AND [2] present > 6 hours    Negative: [1] MODERATE vaginal bleeding (i.e., soaking 1 pad / hour; clots) AND [2] pregnant > 12 weeks    Negative: Passed tissue (e.g., gray-white)    Negative: [1] Vomiting AND [2] contains red blood or black (\"coffee ground\") material  (Exception: few red streaks in vomit that " "only happened once)    Negative: Shoulder pain    Negative: Lightheadedness or dizziness (e.g., feels like passing out)    Negative: Patient sounds very sick or weak to the triager    Negative: [1] Constant abdominal pain AND [2] present > 2 hours    Negative: Blood in urine (red, pink, or tea-colored)    Negative: Fever > 100.4 F (38.0 C)    Negative: White of the eyes have turned yellow (i.e., jaundice)    Negative: [1] Intermittent lower abdominal pain (e.g., cramping) AND [2] present > 24 hours    Negative: MILD vaginal bleeding (e.g., < 1 pad / hour) or SPOTTING    Negative: Discomfort when passing urine (e.g., pain, burning or stinging)    Negative: Prior history of \"ectopic pregnancy\" or previous tubal surgery (e.g., tubal ligation)    Negative: Has IUD    Negative: Unusual vaginal discharge (e.g., bad smelling, yellow, green, or foamy-white)    Negative: Not feeling pregnant any longer (e.g., breast tenderness or nausea has disappeared)    Negative: [1] Upper abdominal pains AND [2] radiate into chest, with sour taste in mouth    Negative: [1] Upper abdominal pains AND [2] occur regularly about 1 hour after meals    Negative: Abdominal pain is a chronic symptom (recurrent or ongoing AND present > 4 weeks)    Mild abdominal pain (all triage questions negative)    Protocols used: PREGNANCY - ABDOMINAL PAIN GREATER THAN 20 WEEKS EGA-ADULT-, PREGNANCY - ABDOMINAL PAIN LESS THAN 20 WEEKS EGA-ADULT-AH      "

## 2018-10-10 ENCOUNTER — PRENATAL OFFICE VISIT (OUTPATIENT)
Dept: OBGYN | Facility: CLINIC | Age: 23
End: 2018-10-10
Payer: COMMERCIAL

## 2018-10-10 VITALS
RESPIRATION RATE: 18 BRPM | DIASTOLIC BLOOD PRESSURE: 68 MMHG | HEIGHT: 61 IN | HEART RATE: 86 BPM | BODY MASS INDEX: 33.99 KG/M2 | SYSTOLIC BLOOD PRESSURE: 117 MMHG | WEIGHT: 180 LBS | TEMPERATURE: 98.5 F

## 2018-10-10 DIAGNOSIS — Z33.1 PREGNANT STATE, INCIDENTAL: ICD-10-CM

## 2018-10-10 DIAGNOSIS — Z34.81 PRENATAL CARE, SUBSEQUENT PREGNANCY IN FIRST TRIMESTER: Primary | ICD-10-CM

## 2018-10-10 LAB
ABO + RH BLD: NORMAL
ABO + RH BLD: NORMAL
BLD GP AB SCN SERPL QL: NORMAL
BLOOD BANK CMNT PATIENT-IMP: NORMAL
ERYTHROCYTE [DISTWIDTH] IN BLOOD BY AUTOMATED COUNT: 13.1 % (ref 10–15)
HCT VFR BLD AUTO: 39.2 % (ref 35–47)
HGB BLD-MCNC: 13 G/DL (ref 11.7–15.7)
MCH RBC QN AUTO: 28.6 PG (ref 26.5–33)
MCHC RBC AUTO-ENTMCNC: 33.2 G/DL (ref 31.5–36.5)
MCV RBC AUTO: 86 FL (ref 78–100)
PLATELET # BLD AUTO: 309 10E9/L (ref 150–450)
RBC # BLD AUTO: 4.54 10E12/L (ref 3.8–5.2)
SPECIMEN EXP DATE BLD: NORMAL
WBC # BLD AUTO: 10.1 10E9/L (ref 4–11)

## 2018-10-10 PROCEDURE — 86803 HEPATITIS C AB TEST: CPT | Performed by: OBSTETRICS & GYNECOLOGY

## 2018-10-10 PROCEDURE — 36415 COLL VENOUS BLD VENIPUNCTURE: CPT | Performed by: OBSTETRICS & GYNECOLOGY

## 2018-10-10 PROCEDURE — 86900 BLOOD TYPING SEROLOGIC ABO: CPT | Performed by: OBSTETRICS & GYNECOLOGY

## 2018-10-10 PROCEDURE — 87340 HEPATITIS B SURFACE AG IA: CPT | Performed by: OBSTETRICS & GYNECOLOGY

## 2018-10-10 PROCEDURE — 86780 TREPONEMA PALLIDUM: CPT | Performed by: OBSTETRICS & GYNECOLOGY

## 2018-10-10 PROCEDURE — 86901 BLOOD TYPING SEROLOGIC RH(D): CPT | Performed by: OBSTETRICS & GYNECOLOGY

## 2018-10-10 PROCEDURE — 85027 COMPLETE CBC AUTOMATED: CPT | Performed by: OBSTETRICS & GYNECOLOGY

## 2018-10-10 PROCEDURE — 86850 RBC ANTIBODY SCREEN: CPT | Performed by: OBSTETRICS & GYNECOLOGY

## 2018-10-10 PROCEDURE — 99207 ZZC PRENATAL VISIT: CPT | Performed by: OBSTETRICS & GYNECOLOGY

## 2018-10-10 PROCEDURE — 86762 RUBELLA ANTIBODY: CPT | Performed by: OBSTETRICS & GYNECOLOGY

## 2018-10-10 PROCEDURE — 87389 HIV-1 AG W/HIV-1&-2 AB AG IA: CPT | Performed by: OBSTETRICS & GYNECOLOGY

## 2018-10-10 NOTE — PROGRESS NOTES
"CC: Here for routine prenatal visit @ 11w2d   HPI:  Reviewed lab results; pt and partner took meds for chlamydia; cannot given repeat urine test today; has not had prenatal labs drawn  Declines  screening for Down's syndrome    PE: /68 (BP Location: Right arm, Patient Position: Chair, Cuff Size: Adult Small)  Pulse 86  Temp 98.5  F (36.9  C) (Tympanic)  Resp 18  Ht 5' 1\" (1.549 m)  Wt 180 lb (81.6 kg)  LMP 2018 (Approximate)  BMI 34.01 kg/m2   See OB flowsheet      A:  1. Prenatal care, subsequent pregnancy in first trimester        Routine prenatal care  RTC 4 weeks.      Brenda Tran M.D.     "

## 2018-10-10 NOTE — MR AVS SNAPSHOT
After Visit Summary   10/10/2018    Liat Corcoran    MRN: [id number]           Patient Information     Date Of Birth          1995        Visit Information        Provider Department      10/10/2018 4:00 PM Brenda Tran MD [facility]        Today's Diagnoses     Prenatal care, subsequent pregnancy in first trimester    -  1       Follow-ups after your visit        Your next 10 appointments already scheduled     Nov 05, 2018 10:15 AM CST   ESTABLISHED PRENATAL with Brenda Tran MD   [facility] ([facility])    [address] [place] [id number] [phone]              Who to contact     If you have questions or need follow up information about today's clinic visit or your schedule please contact [facility] directly at [phone].  Normal or non-critical lab and imaging results will be communicated to you by [employer], letter or phone within 4 business days after the clinic has received the results. If you do not hear from us within 7 days, please contact the clinic through [employer] or phone. If you have a critical or abnormal lab result, we will notify you by phone as soon as possible.  Submit refill requests through [employer] or call your pharmacy and they will forward the refill request to us. Please allow 3 business days for your refill to be completed.          Additional Information About Your Visit        MyChart Information     [employer] gives you secure access to your electronic health record. If you see a primary care provider, you can also send messages to your care team and make appointments. If you have questions, please call your primary care clinic.  If you do not have a primary care provider, please call [phone] and they will assist you.        Care EveryWhere ID     This is your Care EveryWhere ID. This could be used by other organizations to access your [facility]  "records  [id number]        Your Vitals Were     Pulse Temperature Respirations Height Last Period BMI (Body Mass Index)    86 98.5  F (36.9  C) (Tympanic) 18 5' 1\" (1.549 m) 07/20/2018 (Approximate) 34.01 kg/m2       Blood Pressure from Last 3 Encounters:   10/10/18 117/68   09/17/18 99/71   03/14/18 131/81    Weight from Last 3 Encounters:   10/10/18 180 lb (81.6 kg)   09/17/18 182 lb (82.6 kg)   03/14/18 182 lb (82.6 kg)              Today, you had the following     No orders found for display       Primary Care Provider Office Phone # Fax #    Alicia HammondsARIEL Nowak [facility] [phone]       [facility] [address] [facility] [address]        Goals        General    I want to get my Medicaid in place (pt-stated)     Notes - Note created  12/2/2014 12:52 PM by Dilcia Stringer LSW    As of today's date 12/2/2014 goal is met at 51 - 75%.   Goal Status:  Active  Application in into the [facility] and waiting for response        I want to get on the right medications for my Mental Health  (pt-stated)     Notes - Note created  12/2/2014 12:54 PM by Dilcia Stringer LSW    As of today's date 12/2/2014 goal is met at 51 - 75%.   Goal Status:  Active  Pt has been prescribed medications and is waiting for MA to be started so she can pick them up        I want to lose weight (pt-stated)     Notes - Note created  12/2/2014 12:55 PM by Dilcia Stringer LSW    As of today's date 12/2/2014 goal is met at 0 - 25%.   Goal Status:  Active  Just starting on the process with plans to join a gym once MA is in place if possible        I want to write a book about my life (pt-stated)     Notes - Note created  12/2/2014 12:54 PM by Dilcia Stringer LSW    As of today's date 12/2/2014 goal is met at 26 - 50%.   Goal Status:  Active          Equal Access to Services     MAXINE GASPAR : Sudeep Goncalves, bandar barreto, pranay ramirez. " So [facility] [phone].    ATENCIÓN: Si habla apryl, tiene a bruno disposición servicios gratuitos de asistencia lingüística. Nemo al [phone].    We comply with applicable federal civil rights laws and [place] laws. We do not discriminate on the basis of race, color, national origin, age, disability, sex, sexual orientation, or gender identity.            Thank you!     Thank you for choosing [facility]  for your care. Our goal is always to provide you with excellent care. Hearing back from our patients is one way we can continue to improve our services. Please take a few minutes to complete the written survey that you may receive in the mail after your visit with us. Thank you!             Your Updated Medication List - Protect others around you: Learn how to safely use, store and throw away your medicines at www.disposemymeds.org.          This list is accurate as of 10/10/18  4:19 PM.  Always use your most recent med list.                   Brand Name Dispense Instructions for use Diagnosis    HYDROXYZINE HCL PO      Take 10 mg by mouth        prenatal multivitamin plus iron 27-0.8 MG Tabs per tablet      Take 1 tablet by mouth daily        ZOFRAN ODT PO      Take 1 tablet by mouth

## 2018-10-11 LAB
HBV SURFACE AG SERPL QL IA: NONREACTIVE
HCV AB SERPL QL IA: NONREACTIVE
HIV 1+2 AB+HIV1 P24 AG SERPL QL IA: NONREACTIVE
RUBV IGG SERPL IA-ACNC: 27 IU/ML
T PALLIDUM AB SER QL: NONREACTIVE

## 2018-11-02 NOTE — PROGRESS NOTES
CT abdomen came back normal.  Patient continues to have lower abdominal cramping along with abnormal menstrual cycles.  Gynecology referral placed for further review and recommendations.  All questions answered.      Results for orders placed or performed during the hospital encounter of 03/20/18   CT Abdomen Pelvis w Contrast    Narrative    CT ABDOMEN AND PELVIS WITH CONTRAST  3/20/2018 12:42 PM     HISTORY: Bloating, nausea, pelvic pressure. Normal ultrasound pelvis.    COMPARISON: CT abdomen and pelvis 12/26/2014.    TECHNIQUE: Axial images from the lung bases to the symphysis are  performed with additional coronal reformatted images. 89 mL of Isovue  370 are given intravenously.  Radiation dose for this scan was reduced  using automated exposure control, adjustment of the mA and/or kV  according to patient size, or iterative reconstruction technique. Oral  contrast is also given.    FINDINGS: The lung bases are clear.    Abdomen: The liver, spleen, gallbladder, pancreas, adrenal glands and  kidneys are unremarkable. No hydronephrosis or obvious renal calculi.  No enlarged lymph nodes. The bowel is normal in caliber without  obstruction or diverticulitis. Appendix is normal.    Pelvis: The bladder and rectum are unremarkable. Uterus is within  normal limits. Corpus luteal cyst left ovary is noted on series 2,  image 73. Ovaries otherwise appear symmetric in size. No significant  free pelvic fluid or pelvic lymph node enlargement. Bone window  examination is unremarkable.      Impression    IMPRESSION: No acute changes in the abdomen or pelvis to account for  patient's symptoms. No evidence of bowel obstruction or  diverticulitis. Appendix is normal.    MD Rodrigo MORALES MD  [facility]      Pulse: 97   Temp: 97.9 °F (36.6 °C)   TempSrc: Tympanic   SpO2: 98%   Weight: 129 lb 9.6 oz (58.8 kg)   Height: 5' (1.524 m)         Physical Exam   Constitutional: She is oriented to person, place, and time. She appears well-developed and well-nourished. No distress. HENT:   Head: Normocephalic and atraumatic. Eyes: Pupils are equal, round, and reactive to light. Conjunctivae are normal.   Neck: Normal range of motion. Neck supple. Cardiovascular: Normal rate and regular rhythm. Exam reveals no gallop and no friction rub. No murmur heard. Pulmonary/Chest: Effort normal and breath sounds normal. No respiratory distress. She has no wheezes. She has no rales. She exhibits no tenderness. Abdominal: Soft. She exhibits no distension. There is no tenderness. There is no rebound. Musculoskeletal: She exhibits no edema or tenderness. Lymphadenopathy:     She has no cervical adenopathy. Neurological: She is alert and oriented to person, place, and time. Skin: Skin is warm and dry. She is not diaphoretic. No erythema. Psychiatric: She has a normal mood and affect. Judgment normal.       Imaging studies reviewed by me  CT chest reviewed by me shows centrilobular emphysema, left lingular nodule abutting the heart noted, has a right lower lobe calcified granuloma, and calcified mediastinal lymph node. Lab results reviewed in chart  PFT  FEV1 46%, with FEV1 to FVC of 0.63, residual volume 148%, and diffusion capacity 68%       Assessment and Plan       Diagnosis Orders   1. Lung nodule  PET CT Skull Base To Mid Melissa Michel MD   2. Tobacco abuse     3. Chronic obstructive pulmonary disease, unspecified COPD type ([facility] [address]     4. Mucopurulent chronic bronchitis (HCC)       · Lung nodule patient high risk due to smoking history, location of the nodule is tricky, will obtain PET scan and will refer patient for CT-guided biopsy. Further plans after above evaluation is done.   · Smoking

## 2018-11-05 ENCOUNTER — PRENATAL OFFICE VISIT (OUTPATIENT)
Dept: OBGYN | Facility: CLINIC | Age: 23
End: 2018-11-05
Payer: COMMERCIAL

## 2018-11-05 VITALS
HEART RATE: 106 BPM | WEIGHT: 177 LBS | BODY MASS INDEX: 33.44 KG/M2 | DIASTOLIC BLOOD PRESSURE: 71 MMHG | TEMPERATURE: 99 F | SYSTOLIC BLOOD PRESSURE: 111 MMHG

## 2018-11-05 DIAGNOSIS — Z34.82 PRENATAL CARE, SUBSEQUENT PREGNANCY IN SECOND TRIMESTER: Primary | ICD-10-CM

## 2018-11-05 PROCEDURE — 99207 ZZC PRENATAL VISIT: CPT | Performed by: OBSTETRICS & GYNECOLOGY

## 2018-11-05 NOTE — PROGRESS NOTES
CC: Here for routine prenatal visit @ 15w0d   HPI:  Feeling well; a lot of pelvic pressure; no bleeding    PE: /71 (BP Location: Right arm, Patient Position: Chair, Cuff Size: Adult Large)  Pulse 106  Temp 99  F (37.2  C) (Tympanic)  Wt 177 lb (80.3 kg)  LMP 07/20/2018 (Approximate)  BMI 33.44 kg/m2   See OB flowsheet      A:  1. Prenatal care, subsequent pregnancy in second trimester    - US OB > 14 Weeks; Future      Routine prenatal care  RTC 4 weeks.      Brenda Tran M.D.

## 2018-11-05 NOTE — MR AVS SNAPSHOT
After Visit Summary   11/5/2018    Liat Corcoran    MRN: [id number]           Patient Information     Date Of Birth          1995        Visit Information        Provider Department      11/5/2018 10:15 AM Brenda Tran MD [facility]        Today's Diagnoses     Prenatal care, subsequent pregnancy in second trimester    -  1       Follow-ups after your visit        Future tests that were ordered for you today     Open Future Orders        Priority Expected Expires Ordered    US OB > 14 Weeks Routine  11/5/2019 11/5/2018            Who to contact     If you have questions or need follow up information about today's clinic visit or your schedule please contact [facility] directly at [phone].  Normal or non-critical lab and imaging results will be communicated to you by [facility], letter or phone within 4 business days after the clinic has received the results. If you do not hear from us within 7 days, please contact the clinic through [facility] or phone. If you have a critical or abnormal lab result, we will notify you by phone as soon as possible.  Submit refill requests through [facility] or call your pharmacy and they will forward the refill request to us. Please allow 3 business days for your refill to be completed.          Additional Information About Your Visit        MyChart Information     [facility] gives you secure access to your electronic health record. If you see a primary care provider, you can also send messages to your care team and make appointments. If you have questions, please call your primary care clinic.  If you do not have a primary care provider, please call [phone] and they will assist you.        Care EveryWhere ID     This is your Care EveryWhere ID. This could be used by other organizations to access your [facility] medical records  [id number]        Your Vitals Were     Pulse Temperature Last Period BMI (Body Mass Index)          106  99  F (37.2  C) (Tympanic) 07/20/2018 (Approximate) 33.44 kg/m2         Blood Pressure from Last 3 Encounters:   11/05/18 111/71   10/10/18 117/68   09/17/18 99/71    Weight from Last 3 Encounters:   11/05/18 177 lb (80.3 kg)   10/10/18 180 lb (81.6 kg)   09/17/18 182 lb (82.6 kg)               Primary Care Provider Office Phone # Fax #    Alicia [facility]EL [facility] [phone]       [facility] [address] [facility] [address]        Goals        General    I want to get my Medicaid in place (pt-stated)     Notes - Note created  12/2/2014 12:52 PM by Dilcia Stringer LSW    As of today's date 12/2/2014 goal is met at 51 - 75%.   Goal Status:  Active  Application in into the [facility] and waiting for response        I want to get on the right medications for my Mental Health  (pt-stated)     Notes - Note created  12/2/2014 12:54 PM by Dilcia Stringer LSW    As of today's date 12/2/2014 goal is met at 51 - 75%.   Goal Status:  Active  Pt has been prescribed medications and is waiting for MA to be started so she can pick them up        I want to lose weight (pt-stated)     Notes - Note created  12/2/2014 12:55 PM by Dilcia Stringer LSW    As of today's date 12/2/2014 goal is met at 0 - 25%.   Goal Status:  Active  Just starting on the process with plans to join a gym once MA is in place if possible        I want to write a book about my life (pt-stated)     Notes - Note created  12/2/2014 12:54 PM by Dilcia Stringer LSW    As of today's date 12/2/2014 goal is met at 26 - 50%.   Goal Status:  Active          Equal Access to Services     ROBBIE GASPAR : Sudeep Goncalves, waaditida luqadaha, qaybta kaalpranay devi. [facility] [phone].    ATENCIÓN: Si habla español, tiene a bruno disposición servicios gratuitos de asistencia lingüística. Llame al [phone].    We comply with applicable federal civil rights laws and [place]  laws. We do not discriminate on the basis of race, color, national origin, age, disability, sex, sexual orientation, or gender identity.            Thank you!     Thank you for choosing [facility]  for your care. Our goal is always to provide you with excellent care. Hearing back from our patients is one way we can continue to improve our services. Please take a few minutes to complete the written survey that you may receive in the mail after your visit with us. Thank you!             Your Updated Medication List - Protect others around you: Learn how to safely use, store and throw away your medicines at www.disposemymeds.org.          This list is accurate as of 11/5/18 11:05 AM.  Always use your most recent med list.                   Brand Name Dispense Instructions for use Diagnosis    HYDROXYZINE HCL PO      Take 10 mg by mouth        prenatal multivitamin plus iron 27-0.8 MG Tabs per tablet      Take 1 tablet by mouth daily        ZOFRAN ODT PO      Take 1 tablet by mouth

## 2018-11-05 NOTE — NURSING NOTE
"Initial /71 (BP Location: Right arm, Patient Position: Chair, Cuff Size: Adult Large)  Pulse 106  Temp 99  F (37.2  C) (Tympanic)  Wt 177 lb (80.3 kg)  LMP 07/20/2018 (Approximate)  BMI 33.44 kg/m2 Estimated body mass index is 33.44 kg/(m^2) as calculated from the following:    Height as of 10/10/18: 5' 1\" (1.549 m).    Weight as of this encounter: 177 lb (80.3 kg). .    Chante Chaudhry    "

## 2018-11-13 ENCOUNTER — TELEPHONE (OUTPATIENT)
Dept: FAMILY MEDICINE | Facility: CLINIC | Age: 23
End: 2018-11-13

## 2018-11-13 NOTE — TELEPHONE ENCOUNTER
15 wk OB, reports sudden onset sx 2 hrs ago- sharp lt buttocks pain, lower back pain, lt leg numbness.   States above pain has been alleviated some with tylenol, now pain has moved around to rt lower pelvic area with mild cramping.  Denies injury or activity causing sx.  Denies GI/ sx. Denies any vag bleeding, spotting.  Similar sx with previous pregnancy- sciatica.   Has not done any stretching as too painful, no icing or heat.  Pt requesting appt with Alicia tomorrow- scheduled.  In meantime advised to continue tyl, icing, light stretching.  If sx worsen, needs to go to ED/UC.  Pt voices understanding/ agreement.  MICKIE Chahal RN

## 2018-11-13 NOTE — TELEPHONE ENCOUNTER
Reason for Call:  Other     Detailed comments: pain in left side numbness, pt 15 1/2 weeks Pregnant, also feeling nauseated.  Please advise      Phone Number Patient can be reached at: Home number on file [phone] (home)    Best Time: any    Can we leave a detailed message on this number? YES    Call taken on 11/13/2018 at 1:53 PM by Laverne Lees

## 2018-11-14 ENCOUNTER — OFFICE VISIT (OUTPATIENT)
Dept: FAMILY MEDICINE | Facility: CLINIC | Age: 23
End: 2018-11-14
Payer: COMMERCIAL

## 2018-11-14 VITALS
BODY MASS INDEX: 34.01 KG/M2 | RESPIRATION RATE: 24 BRPM | OXYGEN SATURATION: 99 % | SYSTOLIC BLOOD PRESSURE: 122 MMHG | WEIGHT: 180 LBS | DIASTOLIC BLOOD PRESSURE: 82 MMHG | HEART RATE: 98 BPM | TEMPERATURE: 99 F

## 2018-11-14 DIAGNOSIS — M54.42 ACUTE BILATERAL LOW BACK PAIN WITH LEFT-SIDED SCIATICA: Primary | ICD-10-CM

## 2018-11-14 DIAGNOSIS — N76.0 BV (BACTERIAL VAGINOSIS): ICD-10-CM

## 2018-11-14 DIAGNOSIS — B96.89 BV (BACTERIAL VAGINOSIS): ICD-10-CM

## 2018-11-14 DIAGNOSIS — R10.30 ABDOMINAL PAIN, LOWER: ICD-10-CM

## 2018-11-14 LAB
ALBUMIN UR-MCNC: NEGATIVE MG/DL
APPEARANCE UR: CLEAR
BACTERIA #/AREA URNS HPF: ABNORMAL /HPF
BILIRUB UR QL STRIP: NEGATIVE
COLOR UR AUTO: YELLOW
GLUCOSE UR STRIP-MCNC: NEGATIVE MG/DL
HGB UR QL STRIP: ABNORMAL
KETONES UR STRIP-MCNC: ABNORMAL MG/DL
LEUKOCYTE ESTERASE UR QL STRIP: ABNORMAL
MUCOUS THREADS #/AREA URNS LPF: PRESENT /LPF
NITRATE UR QL: NEGATIVE
NON-SQ EPI CELLS #/AREA URNS LPF: ABNORMAL /LPF
PH UR STRIP: 7 PH (ref 5–7)
RBC #/AREA URNS AUTO: ABNORMAL /HPF
SOURCE: ABNORMAL
SP GR UR STRIP: 1.02 (ref 1–1.03)
SPECIMEN SOURCE: ABNORMAL
UROBILINOGEN UR STRIP-ACNC: 0.2 EU/DL (ref 0.2–1)
WBC #/AREA URNS AUTO: ABNORMAL /HPF
WET PREP SPEC: ABNORMAL

## 2018-11-14 PROCEDURE — 87210 SMEAR WET MOUNT SALINE/INK: CPT | Performed by: NURSE PRACTITIONER

## 2018-11-14 PROCEDURE — 99213 OFFICE O/P EST LOW 20 MIN: CPT | Performed by: NURSE PRACTITIONER

## 2018-11-14 PROCEDURE — 81001 URINALYSIS AUTO W/SCOPE: CPT | Performed by: NURSE PRACTITIONER

## 2018-11-14 RX ORDER — METRONIDAZOLE 500 MG/1
500 TABLET ORAL 2 TIMES DAILY
Qty: 14 TABLET | Refills: 0 | Status: SHIPPED | OUTPATIENT
Start: 2018-11-14 | End: 2019-04-09

## 2018-11-14 ASSESSMENT — PAIN SCALES - GENERAL: PAINLEVEL: MODERATE PAIN (5)
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
## 2018-11-14 NOTE — NURSING NOTE
"Chief Complaint   Patient presents with     Back Pain       Initial LMP 07/20/2018 (Approximate) Estimated body mass index is 33.44 kg/(m^2) as calculated from the following:    Height as of 10/10/18: 5' 1\" (1.549 m).    Weight as of 11/5/18: 177 lb (80.3 kg).    Patient presents to the clinic using No DME    Health Maintenance that is potentially due pending provider review:  NONE    n/a    Is there anyone who you would like to be able to receive your results? not asked  If yes have patient fill out FADIA    "

## 2018-11-14 NOTE — MR AVS SNAPSHOT
After Visit Summary   11/14/2018    Liat Corcoran    MRN: [id number]           Patient Information     Date Of Birth          1995        Visit Information        Provider Department      11/14/2018 10:40 AM Alicia Cartagena APRN [facility]        Today's Diagnoses     Left-sided low back pain with left-sided sciatica    -  1    Vaginal discharge        Abdominal pain, lower        BV (bacterial vaginosis)        Acute bilateral low back pain with left-sided sciatica          Care Instructions    Urinalysis negative for infection - push fluids     Wet prep positive for bacterial vaginosis  - will treat this with antibiotics two times daily for 7 days as long as you are symptomatic     Start using a heating pad on low back     For stools want you to start prune/grape mix daily - miralax should be okay for occasional significant constipation    See OB as scheduled or sooner if symptoms not improving     Bacterial Vaginosis    You have a vaginal infection called bacterial vaginosis (BV). Both good and bad bacteria are present in a healthy vagina. BV occurs when these bacteria get out of balance. The number of bad bacteria increase. And the number of good bacteria decrease. Although BV is associated with sexual activity, it is not a sexually transmitted disease.  BV may or may not cause symptoms. If symptoms do occur, they can include:    Thin, gray, milky-white, or sometimes green discharge    Unpleasant odor or  fishy  smell    Itching, burning, or pain in or around the vagina  It is not known what causes BV, but certain factors can make the problem more likely. This can include:    Douching    Having sex with a new partner    Having sex with more than one partner  BV will sometimes go away on its own. But treatment is usually recommended. This is because untreated BV can increase the risk of more serious health problems such as:    Pelvic inflammatory disease  (PID)     delivery (giving birth to a baby early if you re pregnant)    HIV and certain other sexually transmitted diseases (STDs)    Infection after surgery on the reproductive organs  Home care  General care    BV is most often treated with medicines called antibiotics. These may be given as pills or as a vaginal cream. If antibiotics are prescribed, be sure to use them exactly as directed. Also, be sure to complete all of the medicine, even if your symptoms go away.    Don't douche or having sex during treatment.    If you have sex with a female partner, ask your healthcare provider if she should also be treated.  Prevention    Don't douche.    Don't have sex. If you do have sex, then take steps to lower your risk:  ? Use condoms when having sex.  ? Limit the number of sexual partners you have.  Follow-up care  Follow up with your healthcare provider, or as advised.  When to seek medical advice  Call your healthcare provider right away if:    You have a fever of 100.4 F (38 C) or higher, or as directed by your provider.    Your symptoms worsen, or they don t go away within a few days of starting treatment.    You have new pain in the lower belly or pelvic region.    You have side effects that bother you or a reaction to the pills or cream you re prescribed.    You or any partners you have sex with have new symptoms, such as a rash, joint pain, or sores.  Date Last Reviewed: 10/1/2017    9328-1779 [employer]. [address] [place] [address]. All rights reserved. This information is not intended as a substitute for professional medical care. Always follow your healthcare professional's instructions.                Follow-ups after your visit        Additional Services     PHYSICAL THERAPY REFERRAL       If you have not heard from the scheduling office within 2 business days, please call [phone] for all locations, with the exception of [place], please call [phone] and [place], please call [phone].    Please be aware that coverage of these services is subject to the terms and limitations of your health insurance plan.  Call member services at your health plan with any benefit or coverage questions.                  Follow-up notes from your care team     Return in about 1 week (around 11/21/2018), or if symptoms worsen or fail to improve.      Your next 10 appointments already scheduled     Dec 06, 2018  2:00 PM CST   US OB >14 WEEKS with WYUS2   [facility] ([facility])    [address] [facility] [address]   [phone]           How do I prepare for my exam? (Food and drink instructions) Drink four 8-ounce glasses of fluid an hour before your exam. If you need to empty your bladder before your exam, try to release only a little urine. Then, drink another glass of fluid.  How do I prepare for my exam? (Other instructions) You may have up to two family members in the exam room. If you bring a small child, an adult must be there to care for him or her. No video or camera photography during the procedure.  What should I wear: Wear comfortable clothes.  How long does the exam take: Most ultrasounds take 30 to 60 minutes.  What should I bring: Bring a list of your medicines, including vitamins, minerals and over-the-counter drugs. It is safest to leave personal items at home.  Do I need a :  No  is needed.  What do I need to tell my doctor: Tell your doctor about any allergies you may have.  What should I do after the exam: No restrictions, You may resume normal activities.  What is this test: An ultrasound uses sound waves to make pictures of the body. Sound waves do not cause pain. The only discomfort may be the pressure of the wand against your skin or full bladder.  Who should I call with questions: If you have any questions, please call the Imaging Department where you will have your exam. Directions, parking  instructions, and other information is available on our website, [email or web address].            Dec 06, 2018  3:15 PM CST   ESTABLISHED PRENATAL with Vlad Tavarez MD   [facility] ([facility]    [address]  [facility] [place] [place] [address]   [phone]              Future tests that were ordered for you today     Open Future Orders        Priority Expected Expires Ordered    PHYSICAL THERAPY REFERRAL Routine  11/14/2019 11/14/2018            Who to contact     If you have questions or need follow up information about today's clinic visit or your schedule please contact [facility] directly at [phone].  Normal or non-critical lab and imaging results will be communicated to you by MyChart, letter or phone within 4 business days after the clinic has received the results. If you do not hear from us within 7 days, please contact the clinic through [employer] or phone. If you have a critical or abnormal lab result, we will notify you by phone as soon as possible.  Submit refill requests through [employer] or call your pharmacy and they will forward the refill request to us. Please allow 3 business days for your refill to be completed.          Additional Information About Your Visit        MyChart Information     [employer] gives you secure access to your electronic health record. If you see a primary care provider, you can also send messages to your care team and make appointments. If you have questions, please call your primary care clinic.  If you do not have a primary care provider, please call [phone] and they will assist you.        Care EveryWhere ID     This is your Care EveryWhere ID. This could be used by other organizations to access your [facility] medical records  [id number]        Your Vitals Were     Pulse Temperature Respirations Last Period Pulse Oximetry BMI (Body Mass Index)    98 99  F (37.2  C) 24 07/20/2018 (Approximate) 99% 34.01 kg/m2        Blood Pressure from Last 3 Encounters:   11/14/18 122/82   11/05/18 111/71   10/10/18 117/68    Weight from Last 3 Encounters:   11/14/18 180 lb (81.6 kg)   11/05/18 177 lb (80.3 kg)   10/10/18 180 lb (81.6 kg)              We Performed the Following     UA reflex to Microscopic and Culture     Urine Microscopic     Wet prep          Today's Medication Changes          These changes are accurate as of 11/14/18 11:37 AM.  If you have any questions, ask your nurse or doctor.               Start taking these medicines.        Dose/Directions    metroNIDAZOLE 500 MG tablet   Commonly known as:  FLAGYL   Used for:  BV (bacterial vaginosis)   Started by:  Alicia Cartagena APRN CNP        Dose:  500 mg   Take 1 tablet (500 mg) by mouth 2 times daily for 7 days   Quantity:  14 tablet   Refills:  0            Where to get your medicines      These medications were sent to [id number] - [place], [place] - [address]  [address] [place] [address] [place] [address]     Phone:  [phone]     metroNIDAZOLE 500 MG tablet                Primary Care Provider Office Phone # Fax #    ARIEL Mjaor [phone]       [facility] [address] [address]        Goals        General    I want to get my Medicaid in place (pt-stated)     Notes - Note created  12/2/2014 12:52 PM by Dilcia Stringer LSW    As of today's date 12/2/2014 goal is met at 51 - 75%.   Goal Status:  Active  Application in into the [facility] and waiting for response        I want to get on the right medications for my Mental Health  (pt-stated)     Notes - Note created  12/2/2014 12:54 PM by Dilcia Stringer LSW    As of today's date 12/2/2014 goal is met at 51 - 75%.   Goal Status:  Active  Pt has been prescribed medications and is waiting for MA to be started so she can pick them up        I want to lose weight (pt-stated)     Notes - Note created  12/2/2014 12:55 PM by Dilcia Stringer LSW    As of today's date  12/2/2014 goal is met at 0 - 25%.   Goal Status:  Active  Just starting on the process with plans to join a gym once MA is in place if possible        I want to write a book about my life (pt-stated)     Notes - Note created  12/2/2014 12:54 PM by Dilcia Stringer LSW    As of today's date 12/2/2014 goal is met at 26 - 50%.   Goal Status:  Active          Equal Access to Services     MAX[facility] Laird HospitalDARRIAN : Hadii aad ku hadasho Soomaali, waaxda luqadaha, qaybta kaalmada adeegyada, waxюлия blanco alejandran adecarlos lowe laanibal . So [facility] [phone].    ATENCIÓN: Si mollyla apryl, tiene a bruno disposición servicios gratuitos de asistencia lingüística. Llame al [phone].    We comply with applicable federal civil rights laws and [place] laws. We do not discriminate on the basis of race, color, national origin, age, disability, sex, sexual orientation, or gender identity.            Thank you!     Thank you for choosing [facility]  for your care. Our goal is always to provide you with excellent care. Hearing back from our patients is one way we can continue to improve our services. Please take a few minutes to complete the written survey that you may receive in the mail after your visit with us. Thank you!             Your Updated Medication List - Protect others around you: Learn how to safely use, store and throw away your medicines at www.disposemymeds.org.          This list is accurate as of 11/14/18 11:37 AM.  Always use your most recent med list.                   Brand Name Dispense Instructions for use Diagnosis    HYDROXYZINE HCL PO      Take 10 mg by mouth        metroNIDAZOLE 500 MG tablet    FLAGYL    14 tablet    Take 1 tablet (500 mg) by mouth 2 times daily for 7 days    BV (bacterial vaginosis)       prenatal multivitamin plus iron 27-0.8 MG Tabs per tablet      Take 1 tablet by mouth daily        ZOFRAN ODT PO      Take 1 tablet by mouth

## 2018-11-14 NOTE — PATIENT INSTRUCTIONS
Urinalysis negative for infection - push fluids     Wet prep positive for bacterial vaginosis  - will treat this with antibiotics two times daily for 7 days as long as you are symptomatic     Start using a heating pad on low back     For stools want you to start prune/grape mix daily - miralax should be okay for occasional significant constipation    See OB as scheduled or sooner if symptoms not improving     Bacterial Vaginosis    You have a vaginal infection called bacterial vaginosis (BV). Both good and bad bacteria are present in a healthy vagina. BV occurs when these bacteria get out of balance. The number of bad bacteria increase. And the number of good bacteria decrease. Although BV is associated with sexual activity, it is not a sexually transmitted disease.  BV may or may not cause symptoms. If symptoms do occur, they can include:    Thin, gray, milky-white, or sometimes green discharge    Unpleasant odor or  fishy  smell    Itching, burning, or pain in or around the vagina  It is not known what causes BV, but certain factors can make the problem more likely. This can include:    Douching    Having sex with a new partner    Having sex with more than one partner  BV will sometimes go away on its own. But treatment is usually recommended. This is because untreated BV can increase the risk of more serious health problems such as:    Pelvic inflammatory disease (PID)     delivery (giving birth to a baby early if you re pregnant)    HIV and certain other sexually transmitted diseases (STDs)    Infection after surgery on the reproductive organs  Home care  General care    BV is most often treated with medicines called antibiotics. These may be given as pills or as a vaginal cream. If antibiotics are prescribed, be sure to use them exactly as directed. Also, be sure to complete all of the medicine, even if your symptoms go away.    Don't douche or having sex during treatment.    If you have sex with a  female partner, ask your healthcare provider if she should also be treated.  Prevention    Don't douche.    Don't have sex. If you do have sex, then take steps to lower your risk:  ? Use condoms when having sex.  ? Limit the number of sexual partners you have.  Follow-up care  Follow up with your healthcare provider, or as advised.  When to seek medical advice  Call your healthcare provider right away if:    You have a fever of 100.4 F (38 C) or higher, or as directed by your provider.    Your symptoms worsen, or they don t go away within a few days of starting treatment.    You have new pain in the lower belly or pelvic region.    You have side effects that bother you or a reaction to the pills or cream you re prescribed.    You or any partners you have sex with have new symptoms, such as a rash, joint pain, or sores.  Date Last Reviewed: 10/1/2017    1739-8960 [employer]. [address] [place] [address]. All rights reserved. This information is not intended as a substitute for professional medical care. Always follow your healthcare professional's instructions.

## 2018-12-06 ENCOUNTER — PRENATAL OFFICE VISIT (OUTPATIENT)
Dept: OBGYN | Facility: CLINIC | Age: 23
End: 2018-12-06
Payer: COMMERCIAL

## 2018-12-06 ENCOUNTER — HOSPITAL ENCOUNTER (OUTPATIENT)
Dept: ULTRASOUND IMAGING | Facility: CLINIC | Age: 23
Discharge: HOME | End: 2018-12-06
Attending: OBSTETRICS & GYNECOLOGY | Admitting: OBSTETRICS & GYNECOLOGY
Payer: COMMERCIAL

## 2018-12-06 VITALS
SYSTOLIC BLOOD PRESSURE: 126 MMHG | BODY MASS INDEX: 34.63 KG/M2 | HEART RATE: 82 BPM | TEMPERATURE: 98.4 F | RESPIRATION RATE: 16 BRPM | WEIGHT: 183.4 LBS | HEIGHT: 61 IN | DIASTOLIC BLOOD PRESSURE: 81 MMHG

## 2018-12-06 DIAGNOSIS — Z34.82 PRENATAL CARE, SUBSEQUENT PREGNANCY IN SECOND TRIMESTER: ICD-10-CM

## 2018-12-06 DIAGNOSIS — Z34.80 PRENATAL CARE, SUBSEQUENT PREGNANCY, UNSPECIFIED TRIMESTER: Primary | ICD-10-CM

## 2018-12-06 PROCEDURE — 76805 OB US >/= 14 WKS SNGL FETUS: CPT

## 2018-12-06 PROCEDURE — 99207 ZZC PRENATAL VISIT: CPT | Performed by: OBSTETRICS & GYNECOLOGY

## 2018-12-06 NOTE — MR AVS SNAPSHOT
After Visit Summary   12/6/2018    Liat Corcoran    MRN: [id number]           Patient Information     Date Of Birth          1995        Visit Information        Provider Department      12/6/2018 3:15 PM Vlad Tavarez MD [facility]        Today's Diagnoses     Prenatal care, subsequent pregnancy, unspecified trimester    -  1       Follow-ups after your visit        Follow-up notes from your care team     Return in about 4 weeks (around 1/3/2019).      Your next 10 appointments already scheduled     Dec 07, 2018  1:45 PM CST   Ortho Eval with Catherine Rodrigues PT   [facility]    [address] [facility] [place] [address]   [phone]            Jan 09, 2019  2:45 PM CST   ESTABLISHED PRENATAL with Brenda Tran MD   [facility] ([facility])    [address] [facility] [address] [phone]              Future tests that were ordered for you today     Open Future Orders        Priority Expected Expires Ordered    US OB >14 Weeks Follow Up Routine  12/6/2019 12/6/2018            Who to contact     If you have questions or need follow up information about today's clinic visit or your schedule please contact [facility] directly at [phone].  Normal or non-critical lab and imaging results will be communicated to you by MyChart, letter or phone within 4 business days after the clinic has received the results. If you do not hear from us within 7 days, please contact the clinic through MyChart or phone. If you have a critical or abnormal lab result, we will notify you by phone as soon as possible.  Submit refill requests through [employer] or call your pharmacy and they will forward the refill request to us. Please allow 3 business days for your refill to be completed.          Additional Information About Your Visit        MyChart Information     [employer] gives  "you secure access to your electronic health record. If you see a primary care provider, you can also send messages to your care team and make appointments. If you have questions, please call your primary care clinic.  If you do not have a primary care provider, please call [phone] and they will assist you.        Care EveryWhere ID     This is your Care EveryWhere ID. This could be used by other organizations to access your [facility] medical records  [id number]        Your Vitals Were     Pulse Temperature Respirations Height Last Period BMI (Body Mass Index)    82 98.4  F (36.9  C) (Tympanic) 16 5' 1\" (1.549 m) 07/20/2018 (Approximate) 34.65 kg/m2       Blood Pressure from Last 3 Encounters:   12/06/18 126/81   11/14/18 122/82   11/05/18 111/71    Weight from Last 3 Encounters:   12/06/18 183 lb 6.4 oz (83.2 kg)   11/14/18 180 lb (81.6 kg)   11/05/18 177 lb (80.3 kg)               Primary Care Provider Office Phone # Fax #    Alicia ARIEL Junior [facility] [phone]       [facility] [address] [place] [address]        Goals        General    I want to get my Medicaid in place (pt-stated)     Notes - Note created  12/2/2014 12:52 PM by Dilcia Stringer LSW    As of today's date 12/2/2014 goal is met at 51 - 75%.   Goal Status:  Active  Application in into the [facility] and waiting for response        I want to get on the right medications for my Mental Health  (pt-stated)     Notes - Note created  12/2/2014 12:54 PM by Dilcia Stringer LSW    As of today's date 12/2/2014 goal is met at 51 - 75%.   Goal Status:  Active  Pt has been prescribed medications and is waiting for MA to be started so she can pick them up        I want to lose weight (pt-stated)     Notes - Note created  12/2/2014 12:55 PM by Dilcia Stringer LSW    As of today's date 12/2/2014 goal is met at 0 - 25%.   Goal Status:  Active  Just starting on the process with plans to join a gym once MA is in place " if possible        I want to write a book about my life (pt-stated)     Notes - Note created  12/2/2014 12:54 PM by Dilcia Stringer LSW    As of today's date 12/2/2014 goal is met at 26 - 50%.   Goal Status:  Active          Equal Access to Services     KAITMAXINE HUBERT AH: Hadii aad ku hadcalino Soomaali, waaxda luqadaha, qaybta kaalmada adeegyada, waxay francis alejandraaurelio mtz chrissy peyton trinidad. So [facility] [phone].    ATENCIÓN: Si habla español, tiene a bruno disposición servicios gratuitos de asistencia lingüística. Llame al [phone].    We comply with applicable federal civil rights laws and [place] laws. We do not discriminate on the basis of race, color, national origin, age, disability, sex, sexual orientation, or gender identity.            Thank you!     Thank you for choosing [facility]  for your care. Our goal is always to provide you with excellent care. Hearing back from our patients is one way we can continue to improve our services. Please take a few minutes to complete the written survey that you may receive in the mail after your visit with us. Thank you!             Your Updated Medication List - Protect others around you: Learn how to safely use, store and throw away your medicines at www.disposemymeds.org.          This list is accurate as of 12/6/18  4:30 PM.  Always use your most recent med list.                   Brand Name Dispense Instructions for use Diagnosis    HYDROXYZINE HCL PO      Take 10 mg by mouth        prenatal multivitamin w/iron 27-0.8 MG tablet      Take 1 tablet by mouth daily        ZOFRAN ODT PO      Take 1 tablet by mouth

## 2018-12-06 NOTE — PROGRESS NOTES
Call to pt to notify of below.  Unable to reach.  Left message for pt to call back   Patient did have clinic appointment today with Dr. Tavarez.  Will check to see if results were reviewed.    Jennifer Brown   Ob/Gyn Clinic  RN

## 2018-12-06 NOTE — NURSING NOTE
"Initial /81 (BP Location: Right arm, Patient Position: Chair, Cuff Size: Adult Regular)  Pulse 82  Temp 98.4  F (36.9  C) (Tympanic)  Resp 16  Ht 5' 1\" (1.549 m)  Wt 183 lb 6.4 oz (83.2 kg)  LMP 07/20/2018 (Approximate)  BMI 34.65 kg/m2 Estimated body mass index is 34.65 kg/(m^2) as calculated from the following:    Height as of this encounter: 5' 1\" (1.549 m).    Weight as of this encounter: 183 lb 6.4 oz (83.2 kg). .      "

## 2018-12-06 NOTE — PROGRESS NOTES
Concerns today: sharp pain bilateral  PELVIS THAT RADIATES TO HER BACK LASTING 1 MINUTE   NOT EVERY DAY  Had a similar problem with her son not as severe  She has a referral to PT that she has not yet attended  No nausea/vomiting. No heartburn.  No vaginal bleeding, no contractions/severe cramping, no leakage of fluid.  No vaginal discharge. No dysuria  No headache, vision changes, lower extremity swelling, upper abdominal pain, chest pain, shortness of breath.   OB ULTRASOUND reviewed and normal but incomplete  Repeat ULTRASOUND ordered    Vlad Tavarez MD

## 2018-12-18 ENCOUNTER — TELEPHONE (OUTPATIENT)
Dept: OBGYN | Facility: CLINIC | Age: 23
End: 2018-12-18

## 2018-12-18 NOTE — TELEPHONE ENCOUNTER
Return call to pt.  Unable to reach.  Left message for pt to return call to clinic.    Jennifer Brown   Ob/Gyn Clinic  RN

## 2018-12-18 NOTE — TELEPHONE ENCOUNTER
Reason for call:  Patient reporting a symptom    Symptom or request: 21 wks and 1 day pregnant.  Pt states she can't keep anything down - vomiting, throat hurt, runny nose, can't breath, eyes are puffy.    Duration (how long have symptoms been present): 3 days     Have you been treated for this before? No    Additional comments: states she went into the clinic and was told it is a viral thing - but it keeps getting worse.    Phone Number patient can be reached at:  Home number on file [phone] (home)    Best Time:  any    Can we leave a detailed message on this number:  YES    Call taken on 12/18/2018 at 9:16 AM by Lory Luke

## 2018-12-21 ENCOUNTER — TELEPHONE (OUTPATIENT)
Dept: FAMILY MEDICINE | Facility: CLINIC | Age: 23
End: 2018-12-21

## 2018-12-21 NOTE — TELEPHONE ENCOUNTER
"S-(situation): Pt 22 weeks pregnant and calling with cramping.    B-(background): Started three days ago.     A-(assessment):   Describes \"a lot of pressure like someone pushing down on my vagina and shooting in to my back, and into my abdominal area at the same time.\"  Pain is \"tolerable\" but \"irritating,\" then takes her \"breath away.\"  \"Every time the pain hits I have constant butterflies in my stomach.\"  Some relief with walking and taking Tylenol but then it comes back.  Pain is not getting worse but the frequency is increasing.  No vaginal bleeding.     R-(recommendations): Go to [facility] - pt is in [place] at time of this call and was told to go straight there. Pt verbalized understanding and agreement with plan.     Melisa BRUNO RN        "

## 2018-12-21 NOTE — TELEPHONE ENCOUNTER
Pt is 22 weeks pregnant. Has been having severe cramping the last 3 days. They shoot around into back. They started 1/2 hour apart and are not 10 to 15 min apart. Lots of pressure pushing down.

## 2019-01-09 ENCOUNTER — TELEPHONE (OUTPATIENT)
Dept: OBGYN | Facility: CLINIC | Age: 24
End: 2019-01-09

## 2019-01-09 ENCOUNTER — HOSPITAL ENCOUNTER (OUTPATIENT)
Dept: ULTRASOUND IMAGING | Facility: CLINIC | Age: 24
Discharge: HOME | End: 2019-01-09
Attending: OBSTETRICS & GYNECOLOGY | Admitting: OBSTETRICS & GYNECOLOGY
Payer: COMMERCIAL

## 2019-01-09 DIAGNOSIS — Z34.80 PRENATAL CARE, SUBSEQUENT PREGNANCY, UNSPECIFIED TRIMESTER: ICD-10-CM

## 2019-01-09 PROCEDURE — 76816 OB US FOLLOW-UP PER FETUS: CPT

## 2019-01-09 NOTE — TELEPHONE ENCOUNTER
Notes recorded by Vlad Tavarez MD on 1/9/2019 at 2:46 PM CST  Your results are back and they are normal.  Vlad Tavarez

## 2019-01-09 NOTE — TELEPHONE ENCOUNTER
Call to patient to notify of below.  Unable to reach.  Left message for patient to return call to clinic. Also advised that message is available on Qardio.    Jennifer Brown   [facility]  RN

## 2019-01-09 NOTE — TELEPHONE ENCOUNTER
Pt notified of below.  Pt reports understanding.  Pt does not have further questions or concerns. Patient cancelled appointment today due to needing to  son, recommend patient reschedule as soon as possible.  Patient understands.    Jennifer Brown   [facility]  RN

## 2019-01-09 NOTE — TELEPHONE ENCOUNTER
Reason for Call:  Request for results:    Name of test or procedure: US    Date of test of procedure: 1/9    Location of the test or procedure: [place]    OK to leave the result message on voice mail or with a family member? YES    Phone number Patient can be reached at:  Home number on file [phone] (home)    Additional comments: pt calling stating she had to leave her appt after US because her son was sick at . She had to cancel her appt today at 245. Please call w/ US results.     Call taken on 1/9/2019 at 1:57 PM by Elke Soto

## 2019-01-16 ENCOUNTER — OFFICE VISIT (OUTPATIENT)
Dept: FAMILY MEDICINE | Facility: CLINIC | Age: 24
End: 2019-01-16
Payer: COMMERCIAL

## 2019-01-16 VITALS
HEART RATE: 107 BPM | TEMPERATURE: 98.9 F | WEIGHT: 190 LBS | SYSTOLIC BLOOD PRESSURE: 102 MMHG | HEIGHT: 61 IN | BODY MASS INDEX: 35.87 KG/M2 | OXYGEN SATURATION: 96 % | RESPIRATION RATE: 18 BRPM | DIASTOLIC BLOOD PRESSURE: 74 MMHG

## 2019-01-16 DIAGNOSIS — R10.2 PELVIC PRESSURE IN PREGNANCY: ICD-10-CM

## 2019-01-16 DIAGNOSIS — O26.899 PELVIC PRESSURE IN PREGNANCY: ICD-10-CM

## 2019-01-16 DIAGNOSIS — J20.9 ACUTE BRONCHITIS, UNSPECIFIED ORGANISM: Primary | ICD-10-CM

## 2019-01-16 DIAGNOSIS — R68.89 FLU-LIKE SYMPTOMS: ICD-10-CM

## 2019-01-16 LAB
ALBUMIN UR-MCNC: NEGATIVE MG/DL
AMORPH CRY #/AREA URNS HPF: ABNORMAL /HPF
APPEARANCE UR: CLEAR
BACTERIA #/AREA URNS HPF: ABNORMAL /HPF
BILIRUB UR QL STRIP: NEGATIVE
COLOR UR AUTO: YELLOW
FLUAV+FLUBV AG SPEC QL: NEGATIVE
FLUAV+FLUBV AG SPEC QL: NEGATIVE
GLUCOSE UR STRIP-MCNC: NEGATIVE MG/DL
HGB UR QL STRIP: ABNORMAL
KETONES UR STRIP-MCNC: ABNORMAL MG/DL
LEUKOCYTE ESTERASE UR QL STRIP: NEGATIVE
NITRATE UR QL: NEGATIVE
NON-SQ EPI CELLS #/AREA URNS LPF: ABNORMAL /LPF
PH UR STRIP: 7.5 PH (ref 5–7)
RBC #/AREA URNS AUTO: ABNORMAL /HPF
SOURCE: ABNORMAL
SP GR UR STRIP: 1.02 (ref 1–1.03)
SPECIMEN SOURCE: NORMAL
UROBILINOGEN UR STRIP-ACNC: 0.2 EU/DL (ref 0.2–1)
WBC #/AREA URNS AUTO: ABNORMAL /HPF

## 2019-01-16 PROCEDURE — 81001 URINALYSIS AUTO W/SCOPE: CPT | Performed by: NURSE PRACTITIONER

## 2019-01-16 PROCEDURE — 99213 OFFICE O/P EST LOW 20 MIN: CPT | Performed by: NURSE PRACTITIONER

## 2019-01-16 PROCEDURE — 87804 INFLUENZA ASSAY W/OPTIC: CPT | Performed by: NURSE PRACTITIONER

## 2019-01-16 ASSESSMENT — MIFFLIN-ST. JEOR: SCORE: 1554.21

## 2019-01-16 NOTE — PATIENT INSTRUCTIONS
Influenza negative     Sounds like bronchitis and you are already on inhaler - continue to use every 4-6 hours as needed     Start Prednisone today     Use cool mist vaporizer and elevate head of bed     Will get urine and call you with results     Return to clinic if not improving or to GYN if pelvic pressure not improving despite a normal urine

## 2019-01-16 NOTE — NURSING NOTE
"Chief Complaint   Patient presents with     ER F/U       Initial /74   Pulse 107   Temp 98.9  F (37.2  C) (Tympanic)   Resp 18   Ht 1.549 m (5' 1\")   Wt 86.2 kg (190 lb)   LMP 07/20/2018 (Approximate)   SpO2 96%   BMI 35.90 kg/m   Estimated body mass index is 35.9 kg/m  as calculated from the following:    Height as of this encounter: 1.549 m (5' 1\").    Weight as of this encounter: 86.2 kg (190 lb).    Patient presents to the clinic using No DME    Health Maintenance that is potentially due pending provider review:  NONE    n/a    Is there anyone who you would like to be able to receive your results? Not Applicable  If yes have patient fill out FADIA    "

## 2019-01-23 ENCOUNTER — TELEPHONE (OUTPATIENT)
Dept: OBGYN | Facility: CLINIC | Age: 24
End: 2019-01-23

## 2019-01-23 ENCOUNTER — HOSPITAL ENCOUNTER (OUTPATIENT)
Facility: CLINIC | Age: 24
Discharge: HOME | End: 2019-01-23
Attending: OBSTETRICS & GYNECOLOGY | Admitting: OBSTETRICS & GYNECOLOGY
Payer: COMMERCIAL

## 2019-01-23 VITALS
SYSTOLIC BLOOD PRESSURE: 112 MMHG | RESPIRATION RATE: 24 BRPM | TEMPERATURE: 98.4 F | DIASTOLIC BLOOD PRESSURE: 71 MMHG | HEART RATE: 107 BPM

## 2019-01-23 DIAGNOSIS — Z34.82 PRENATAL CARE, SUBSEQUENT PREGNANCY IN SECOND TRIMESTER: Primary | ICD-10-CM

## 2019-01-23 LAB
ALBUMIN UR-MCNC: NEGATIVE MG/DL
AMPHETAMINES UR QL SCN: NEGATIVE
APPEARANCE UR: ABNORMAL
BACTERIA #/AREA URNS HPF: ABNORMAL /HPF
BARBITURATES UR QL: NEGATIVE
BENZODIAZ UR QL: NEGATIVE
BILIRUB UR QL STRIP: NEGATIVE
CANNABINOIDS UR QL SCN: NEGATIVE
COCAINE UR QL: NEGATIVE
COLOR UR AUTO: YELLOW
GLUCOSE UR STRIP-MCNC: NEGATIVE MG/DL
HGB UR QL STRIP: ABNORMAL
KETONES UR STRIP-MCNC: NEGATIVE MG/DL
LEUKOCYTE ESTERASE UR QL STRIP: NEGATIVE
MUCOUS THREADS #/AREA URNS LPF: PRESENT /LPF
NITRATE UR QL: NEGATIVE
OPIATES UR QL SCN: NEGATIVE
PCP UR QL SCN: NEGATIVE
PH UR STRIP: 6 PH (ref 5–7)
RBC #/AREA URNS AUTO: 5 /HPF (ref 0–2)
SOURCE: ABNORMAL
SP GR UR STRIP: 1.02 (ref 1–1.03)
SQUAMOUS #/AREA URNS AUTO: 9 /HPF (ref 0–1)
UROBILINOGEN UR STRIP-MCNC: 0 MG/DL (ref 0–2)
WBC #/AREA URNS AUTO: 1 /HPF (ref 0–5)

## 2019-01-23 PROCEDURE — 25000128 H RX IP 250 OP 636: Performed by: OBSTETRICS & GYNECOLOGY

## 2019-01-23 PROCEDURE — 80307 DRUG TEST PRSMV CHEM ANLYZR: CPT | Performed by: OBSTETRICS & GYNECOLOGY

## 2019-01-23 PROCEDURE — 25000132 ZZH RX MED GY IP 250 OP 250 PS 637: Performed by: OBSTETRICS & GYNECOLOGY

## 2019-01-23 PROCEDURE — G0463 HOSPITAL OUTPT CLINIC VISIT: HCPCS | Mod: 25

## 2019-01-23 PROCEDURE — 81001 URINALYSIS AUTO W/SCOPE: CPT | Performed by: OBSTETRICS & GYNECOLOGY

## 2019-01-23 PROCEDURE — 96374 THER/PROPH/DIAG INJ IV PUSH: CPT

## 2019-01-23 RX ORDER — HYDROXYZINE HYDROCHLORIDE 50 MG/1
50 TABLET, FILM COATED ORAL EVERY 6 HOURS PRN
Status: DISCONTINUED | OUTPATIENT
Start: 2019-01-23 | End: 2019-01-23 | Stop reason: HOSPADM

## 2019-01-23 RX ORDER — ONDANSETRON 4 MG/1
4 TABLET, ORALLY DISINTEGRATING ORAL EVERY 6 HOURS PRN
Qty: 10 TABLET | Refills: 0 | Status: SHIPPED | OUTPATIENT
Start: 2019-01-23 | End: 2019-04-09

## 2019-01-23 RX ORDER — ACETAMINOPHEN 325 MG/1
650 TABLET ORAL EVERY 4 HOURS PRN
Status: DISCONTINUED | OUTPATIENT
Start: 2019-01-23 | End: 2019-01-23 | Stop reason: HOSPADM

## 2019-01-23 RX ORDER — SODIUM CHLORIDE, SODIUM LACTATE, POTASSIUM CHLORIDE, CALCIUM CHLORIDE 600; 310; 30; 20 MG/100ML; MG/100ML; MG/100ML; MG/100ML
INJECTION, SOLUTION INTRAVENOUS CONTINUOUS
Status: DISCONTINUED | OUTPATIENT
Start: 2019-01-23 | End: 2019-01-23 | Stop reason: HOSPADM

## 2019-01-23 RX ORDER — ACETAMINOPHEN 325 MG/1
975 TABLET ORAL EVERY 6 HOURS PRN
COMMUNITY
End: 2019-07-20 | Stop reason: DRUGHIGH

## 2019-01-23 RX ORDER — ONDANSETRON 2 MG/ML
4 INJECTION INTRAMUSCULAR; INTRAVENOUS EVERY 6 HOURS PRN
Status: DISCONTINUED | OUTPATIENT
Start: 2019-01-23 | End: 2019-01-23 | Stop reason: HOSPADM

## 2019-01-23 RX ORDER — HYDROXYZINE HYDROCHLORIDE 25 MG/1
25-50 TABLET, FILM COATED ORAL EVERY 6 HOURS PRN
Qty: 30 TABLET | Refills: 0 | Status: SHIPPED | OUTPATIENT
Start: 2019-01-23 | End: 2019-02-26

## 2019-01-23 RX ORDER — ALUMINA, MAGNESIA, AND SIMETHICONE 2400; 2400; 240 MG/30ML; MG/30ML; MG/30ML
30 SUSPENSION ORAL
Status: DISCONTINUED | OUTPATIENT
Start: 2019-01-23 | End: 2019-01-23 | Stop reason: HOSPADM

## 2019-01-23 RX ORDER — LIDOCAINE HYDROCHLORIDE 10 MG/ML
INJECTION, SOLUTION INFILTRATION; PERINEURAL
Status: DISCONTINUED
Start: 2019-01-23 | End: 2019-01-23 | Stop reason: WASHOUT

## 2019-01-23 RX ADMIN — SODIUM CHLORIDE, POTASSIUM CHLORIDE, SODIUM LACTATE AND CALCIUM CHLORIDE 500 ML: 600; 310; 30; 20 INJECTION, SOLUTION INTRAVENOUS at 13:04

## 2019-01-23 RX ADMIN — ONDANSETRON 4 MG: 2 INJECTION INTRAMUSCULAR; INTRAVENOUS at 13:07

## 2019-01-23 RX ADMIN — HYDROXYZINE HYDROCHLORIDE 50 MG: 50 TABLET ORAL at 13:48

## 2019-01-23 RX ADMIN — ACETAMINOPHEN 650 MG: 325 TABLET, FILM COATED ORAL at 13:48

## 2019-01-23 NOTE — TELEPHONE ENCOUNTER
Pt states she has tried a warm bath- resting until it awoke her due to the discomfort.  denies any blood, tissue, or discharge.  Constant cramping and pelvic pain.  (do not come and go any longer).    No history of pre term labor.  Called and consulted with OB RN in clinic today and consensus is ER.  Pt advised to have someone drive her to ED so she can recline and stay towards left side and relax as much as possible.  Pt agrees and states travel will be expected 1 hour.    Stefany Mejia RN  [facility]

## 2019-01-23 NOTE — TELEPHONE ENCOUNTER
"Reason for call:  Patient reporting a symptom    Symptom or request: Pt states she is about 26-27.  States had contractions from about 1-3 last night.  Then she fell asleep and then woke up again about an hour ago due to the pain of the contractions.  Very light headed, dizzy, can't stop throwing up - started last night.  When the baby moves \"I can feel her in my vagina\"    Duration (how long have symptoms been present): since 1am    Have you been treated for this before? No    Additional comments:     Phone Number patient can be reached at:  Home number on file [phone] (home)    Best Time:      Can we leave a detailed message on this number:  Not Applicable    Call taken on 1/23/2019 at 9:05 AM by Lory Luke    "

## 2019-01-23 NOTE — DISCHARGE INSTRUCTIONS
Discharge Instruction for Undelivered Patients      You were seen for: Labor Assessment and Fetal Assessment  We Consulted: Dr Tran  You had (Test or Medicine):EFM, UA, Drug Tox,  IV Fluids, Zofran and food     Diet:   Drink 8 to 12 glasses of liquids (milk, juice, water) every day.  You may eat meals and snacks.     Activity:  Count fetal kicks everyday (see handout)  Call your doctor or nurse midwife if your baby is moving less than usual.     Call your provider if you notice:  Swelling in your face or increased swelling in your hands or legs.  Headaches that are not relieved by Tylenol (acetaminophen).  Changes in your vision (blurring: seeing spots or stars.)  Nausea (sick to your stomach) and vomiting (throwing up).   Weight gain of 5 pounds or more per week.  Heartburn that doesn't go away.  Signs of bladder infection: pain when you urinate (use the toilet), need to go more often and more urgently.  The bag of hu (rupture of membranes) breaks, or you notice leaking in your underwear.  Bright red blood in your underwear.  Abdominal (lower belly) or stomach pain.  For first baby: Contractions (tightening) less than 5 minutes apart for one hour or more.  Second (plus) baby: Contractions (tightening) less than 10 minutes apart and getting stronger.  *If less than 34 weeks: Contractions (tightenings) more than 6 times in one hour.  Increase or change in vaginal discharge (note the color and amount)  Other: ***    Follow-up:  As scheduled in the clinic.   Call the clinic or the Birthplace after hours for any questions or concerns about your pregnancy.  @ [phone].

## 2019-01-23 NOTE — PROGRESS NOTES
Pt was given 1 liter of LR, Zofran and when she could tolerate oral, she received Tylenol and Vistaril 50 mg.  Pt's UA and drug screen were both negative.  Pt reports her pain on arrival had been intolerable.  Now her pain is tolerable low back discomfort but not radiating to her front abdomen.  Is very relaxed and sleepy.  Undelivered discharge instructions given and reviewed with pt.  Encouraged F/U as scheduled.  Note for work off tomorrow given along with the AVS.  Pt was wheel chaired to the door accompanied by her QUAN Danielson.  Will  her new RX's for Zofran and Atarax  On the way to the car at our outpt pharmacy.

## 2019-01-23 NOTE — PROGRESS NOTES
26+ arrived to the Birthplace in a wheelchair from the ED.  She was crying.  Reports low back pain and contractions.  Has been sick with an upper respiratory infection x 1 week.  Has difficulty with breathing.  Took inhaler at home.  +FM that definitely can hear on EFM.  Pt has been unable to keep anything down since 6 pm yesterday.  Even throwing up water.  Placed on EFM and oriented to it.  Will contact Dr Tran.  Start IV, Fluid bolus, and give Zofran.

## 2019-01-23 NOTE — PROGRESS NOTES
IV attempted in top of left hand.  Pt screamed.  The IV may have been up against a valve.  Good blood return but unable to advance it with a fluid flush.  Pt did not tolerate it at all.  Refused to have another one placed in in her hands or lower arms.  Currently, warm packs to right antecubital.  ED RN will attempt with lidocaine when available.  Continue to monitor.

## 2019-02-26 ENCOUNTER — PRENATAL OFFICE VISIT (OUTPATIENT)
Dept: OBGYN | Facility: CLINIC | Age: 24
End: 2019-02-26
Payer: COMMERCIAL

## 2019-02-26 VITALS
RESPIRATION RATE: 18 BRPM | HEIGHT: 61 IN | BODY MASS INDEX: 38.1 KG/M2 | SYSTOLIC BLOOD PRESSURE: 104 MMHG | TEMPERATURE: 98.5 F | HEART RATE: 91 BPM | DIASTOLIC BLOOD PRESSURE: 64 MMHG | WEIGHT: 201.8 LBS

## 2019-02-26 DIAGNOSIS — Z34.82 PRENATAL CARE, SUBSEQUENT PREGNANCY IN SECOND TRIMESTER: Primary | ICD-10-CM

## 2019-02-26 DIAGNOSIS — O26.893 PREGNANCY RELATED BACK PAIN IN THIRD TRIMESTER, ANTEPARTUM: ICD-10-CM

## 2019-02-26 DIAGNOSIS — M54.9 PREGNANCY RELATED BACK PAIN IN THIRD TRIMESTER, ANTEPARTUM: ICD-10-CM

## 2019-02-26 PROCEDURE — 99207 ZZC PRENATAL VISIT: CPT | Performed by: OBSTETRICS & GYNECOLOGY

## 2019-02-26 RX ORDER — HYDROXYZINE HYDROCHLORIDE 25 MG/1
25-50 TABLET, FILM COATED ORAL EVERY 6 HOURS PRN
Qty: 30 TABLET | Refills: 3 | Status: ON HOLD | OUTPATIENT
Start: 2019-02-26 | End: 2019-04-12

## 2019-02-26 RX ORDER — CYCLOBENZAPRINE HCL 5 MG
5 TABLET ORAL 3 TIMES DAILY PRN
Qty: 10 TABLET | Refills: 0 | Status: SHIPPED | OUTPATIENT
Start: 2019-02-26 | End: 2019-04-09

## 2019-02-26 ASSESSMENT — MIFFLIN-ST. JEOR: SCORE: 1607.74

## 2019-02-26 ASSESSMENT — PAIN SCALES - GENERAL: PAINLEVEL: MILD PAIN (3)

## 2019-02-26 NOTE — NURSING NOTE
"Initial /64 (BP Location: Right arm, Patient Position: Chair, Cuff Size: Adult Large)   Pulse 91   Temp 98.5  F (36.9  C) (Tympanic)   Resp 18   Ht 1.549 m (5' 1\")   Wt 91.5 kg (201 lb 12.8 oz)   LMP 07/20/2018 (Approximate)   Breastfeeding? No   BMI 38.13 kg/m   Estimated body mass index is 38.13 kg/m  as calculated from the following:    Height as of this encounter: 1.549 m (5' 1\").    Weight as of this encounter: 91.5 kg (201 lb 12.8 oz). .    Liz Kendall, TRINIDAD    "

## 2019-02-26 NOTE — PROGRESS NOTES
"CC: Here for routine prenatal visit @ 31w1d   HPI: + FM, no ctx, no LOF, no VB.  Still having a lot of back and pelvic pain.     PE: /64 (BP Location: Right arm, Patient Position: Chair, Cuff Size: Adult Large)   Pulse 91   Temp 98.5  F (36.9  C) (Tympanic)   Resp 18   Ht 1.549 m (5' 1\")   Wt 91.5 kg (201 lb 12.8 oz)   LMP 2018 (Approximate)   Breastfeeding? No   BMI 38.13 kg/m     See OB flowsheet    A/P  @ 31w1d normal pregnancy    1. Routine prenatal care.  Reviewed supportive care of aches/pains in pregnancy.  SI belt offered and declined.  Flexeril offered and accepted.  Vistaril refilled.     RTC 2 weeks.      Lizz Delgado M.D.    "

## 2019-03-13 ENCOUNTER — PRENATAL OFFICE VISIT (OUTPATIENT)
Dept: OBGYN | Facility: CLINIC | Age: 24
End: 2019-03-13
Payer: COMMERCIAL

## 2019-03-13 VITALS
RESPIRATION RATE: 18 BRPM | HEIGHT: 61 IN | WEIGHT: 205.4 LBS | BODY MASS INDEX: 38.78 KG/M2 | HEART RATE: 105 BPM | TEMPERATURE: 97.4 F | OXYGEN SATURATION: 93 % | DIASTOLIC BLOOD PRESSURE: 63 MMHG | SYSTOLIC BLOOD PRESSURE: 116 MMHG

## 2019-03-13 DIAGNOSIS — Z34.83 PRENATAL CARE, SUBSEQUENT PREGNANCY IN THIRD TRIMESTER: Primary | ICD-10-CM

## 2019-03-13 PROCEDURE — 99207 ZZC PRENATAL VISIT: CPT | Performed by: OBSTETRICS & GYNECOLOGY

## 2019-03-13 ASSESSMENT — MIFFLIN-ST. JEOR: SCORE: 1624.07

## 2019-03-15 NOTE — PROGRESS NOTES
"CC: Here for routine prenatal visit @ 33w4d   HPI: + FM, no ctx, no LOF, no VB.  Still having a lot of back and pelvic pain.      PE: /63   Pulse 105   Temp 97.4  F (36.3  C)   Resp 18   Ht 1.549 m (5' 1\")   Wt 93.2 kg (205 lb 6.4 oz)   LMP 2018 (Approximate)   SpO2 93%   BMI 38.81 kg/m      See OB flowsheet     A/P  @ 33w4d normal pregnancy  Needs CASSI for chlamydia at next visit   Needs flu/Tdap at next visit?      RTC 2 weeks.      Kiesha Gibson      "

## 2019-04-01 ENCOUNTER — PRENATAL OFFICE VISIT (OUTPATIENT)
Dept: OBGYN | Facility: CLINIC | Age: 24
End: 2019-04-01
Payer: COMMERCIAL

## 2019-04-01 VITALS
BODY MASS INDEX: 38.42 KG/M2 | RESPIRATION RATE: 18 BRPM | DIASTOLIC BLOOD PRESSURE: 68 MMHG | HEIGHT: 61 IN | WEIGHT: 203.5 LBS | HEART RATE: 101 BPM | SYSTOLIC BLOOD PRESSURE: 107 MMHG | TEMPERATURE: 97.3 F

## 2019-04-01 DIAGNOSIS — Z23 NEED FOR PROPHYLACTIC VACCINATION AND INOCULATION AGAINST INFLUENZA: ICD-10-CM

## 2019-04-01 DIAGNOSIS — Z34.83 PRENATAL CARE, SUBSEQUENT PREGNANCY IN THIRD TRIMESTER: Primary | ICD-10-CM

## 2019-04-01 DIAGNOSIS — O36.8130 DECREASED FETAL MOVEMENTS IN THIRD TRIMESTER, SINGLE OR UNSPECIFIED FETUS: ICD-10-CM

## 2019-04-01 PROCEDURE — 87591 N.GONORRHOEAE DNA AMP PROB: CPT | Performed by: OBSTETRICS & GYNECOLOGY

## 2019-04-01 PROCEDURE — 87491 CHLMYD TRACH DNA AMP PROBE: CPT | Performed by: OBSTETRICS & GYNECOLOGY

## 2019-04-01 PROCEDURE — 90471 IMMUNIZATION ADMIN: CPT | Performed by: OBSTETRICS & GYNECOLOGY

## 2019-04-01 PROCEDURE — 99207 ZZC PRENATAL VISIT: CPT | Performed by: OBSTETRICS & GYNECOLOGY

## 2019-04-01 PROCEDURE — 90686 IIV4 VACC NO PRSV 0.5 ML IM: CPT | Performed by: OBSTETRICS & GYNECOLOGY

## 2019-04-01 PROCEDURE — 87653 STREP B DNA AMP PROBE: CPT | Performed by: OBSTETRICS & GYNECOLOGY

## 2019-04-01 PROCEDURE — 87186 SC STD MICRODIL/AGAR DIL: CPT | Performed by: OBSTETRICS & GYNECOLOGY

## 2019-04-01 PROCEDURE — 59025 FETAL NON-STRESS TEST: CPT | Performed by: OBSTETRICS & GYNECOLOGY

## 2019-04-01 ASSESSMENT — MIFFLIN-ST. JEOR: SCORE: 1615.45

## 2019-04-01 NOTE — PROGRESS NOTES
"Doing well.   Reports decreased fetal movement. Grandmother recently  and as such has been very emotional.  is being planned. Also reports back discomfort radiating to lower abdomen; does not think that they are contractions, but she is unsure what contractions feel like. Denies bladder or bowel habit issues.   Denies VB, ctx, LOF. +FM  /68 (BP Location: Right arm, Patient Position: Chair, Cuff Size: Adult Large)   Pulse 101   Temp 97.3  F (36.3  C) (Tympanic)   Resp 18   Ht 1.549 m (5' 1\")   Wt 92.3 kg (203 lb 8 oz)   LMP 2018 (Approximate)   Breastfeeding? No   BMI 38.45 kg/m    General Appearance: NAD  Abdomen: Gravid, NT  Refer to flow sheet above.   Reactive NST  A/P: 23 year old  at 36w0d  -- reassurance provided regarding reactive NST, other concerns addressed above and reassurance provided  -- GBS collected today  -- history of Chlamydia in pregnancy: GC/CT cultures collected today  -- Discussed with Liat Corcoran, the following; indications; the agents and methods of labor augmentation, including risks, benefits, and alternative approaches; and the possible need for  birth. EFW is AGA. The Labor Induction:what you need to know information sheet was made available to her. Questions and concerns were addressed and patient agrees to above if necessary during the course of her labor.  -- labor precautions reviewed  RTC in 1 week    Trudy Flood MD  [facility]            "

## 2019-04-01 NOTE — PROGRESS NOTES

## 2019-04-01 NOTE — NURSING NOTE
"Initial /68 (BP Location: Right arm, Patient Position: Chair, Cuff Size: Adult Large)   Pulse 101   Temp 97.3  F (36.3  C) (Tympanic)   Resp 18   Ht 1.549 m (5' 1\")   Wt 92.3 kg (203 lb 8 oz)   LMP 07/20/2018 (Approximate)   Breastfeeding? No   BMI 38.45 kg/m   Estimated body mass index is 38.45 kg/m  as calculated from the following:    Height as of this encounter: 1.549 m (5' 1\").    Weight as of this encounter: 92.3 kg (203 lb 8 oz). .    Liz Kendall, TRINIDAD    "

## 2019-04-02 LAB
C TRACH DNA SPEC QL NAA+PROBE: NEGATIVE
GP B STREP DNA SPEC QL NAA+PROBE: POSITIVE
N GONORRHOEA DNA SPEC QL NAA+PROBE: NEGATIVE
SPECIMEN SOURCE: ABNORMAL
SPECIMEN SOURCE: NORMAL
SPECIMEN SOURCE: NORMAL

## 2019-04-04 PROBLEM — O99.820 GBS (GROUP B STREPTOCOCCUS CARRIER), +RV CULTURE, CURRENTLY PREGNANT: Status: ACTIVE | Noted: 2019-04-04

## 2019-04-04 NOTE — RESULT ENCOUNTER NOTE
Negative gonorrhea and chlamydia.   Positive GBS status.   Will review at next follow-up visit.     Trudy Flood MD  [facility]

## 2019-04-07 LAB
BACTERIA SPEC CULT: ABNORMAL
SPECIMEN SOURCE: ABNORMAL

## 2019-04-09 ENCOUNTER — PRENATAL OFFICE VISIT (OUTPATIENT)
Dept: OBGYN | Facility: CLINIC | Age: 24
End: 2019-04-09
Payer: COMMERCIAL

## 2019-04-09 VITALS
TEMPERATURE: 98.6 F | BODY MASS INDEX: 37.79 KG/M2 | SYSTOLIC BLOOD PRESSURE: 110 MMHG | WEIGHT: 200 LBS | HEART RATE: 104 BPM | DIASTOLIC BLOOD PRESSURE: 74 MMHG

## 2019-04-09 DIAGNOSIS — Z34.83 PRENATAL CARE, SUBSEQUENT PREGNANCY IN THIRD TRIMESTER: Primary | ICD-10-CM

## 2019-04-09 PROCEDURE — 99207 ZZC PRENATAL VISIT: CPT | Performed by: OBSTETRICS & GYNECOLOGY

## 2019-04-09 NOTE — PROGRESS NOTES
Doing well.   Desires membranes to be swept. Reports worsening contractions and pelvic pressure  Denies VB, LOF. +FM  /74 (BP Location: Left arm, Patient Position: Chair, Cuff Size: Adult Regular)   Pulse 104   Temp 98.6  F (37  C) (Tympanic)   Wt 90.7 kg (200 lb)   LMP 2018 (Approximate)   BMI 37.79 kg/m    General Appearance: NAD  Abdomen: Gravid, NT  Refer to flow sheet above.   A/P: 23 year old  at 37w1d  -- membranes swept  -- labor precautions reviewed  RTC in 1 week    Trudy Flood MD  [facility]

## 2019-04-09 NOTE — NURSING NOTE
"Initial /74 (BP Location: Left arm, Patient Position: Chair, Cuff Size: Adult Regular)   Pulse 104   Temp 98.6  F (37  C) (Tympanic)   Wt 90.7 kg (200 lb)   LMP 07/20/2018 (Approximate)   BMI 37.79 kg/m   Estimated body mass index is 37.79 kg/m  as calculated from the following:    Height as of 4/1/19: 1.549 m (5' 1\").    Weight as of this encounter: 90.7 kg (200 lb). .    Chante Chaudhry    "

## 2019-04-12 ENCOUNTER — HOSPITAL ENCOUNTER (OUTPATIENT)
Facility: CLINIC | Age: 24
Discharge: HOME | End: 2019-04-12
Attending: OBSTETRICS & GYNECOLOGY | Admitting: OBSTETRICS & GYNECOLOGY
Payer: COMMERCIAL

## 2019-04-12 ENCOUNTER — TELEPHONE (OUTPATIENT)
Dept: FAMILY MEDICINE | Facility: CLINIC | Age: 24
End: 2019-04-12

## 2019-04-12 ENCOUNTER — TELEPHONE (OUTPATIENT)
Dept: OBGYN | Facility: CLINIC | Age: 24
End: 2019-04-12

## 2019-04-12 VITALS
HEIGHT: 60 IN | SYSTOLIC BLOOD PRESSURE: 113 MMHG | WEIGHT: 200 LBS | RESPIRATION RATE: 20 BRPM | DIASTOLIC BLOOD PRESSURE: 76 MMHG | TEMPERATURE: 97.4 F | HEART RATE: 88 BPM | BODY MASS INDEX: 39.27 KG/M2

## 2019-04-12 LAB
ALBUMIN UR-MCNC: NEGATIVE MG/DL
AMPHETAMINES UR QL SCN: NEGATIVE
APPEARANCE UR: ABNORMAL
BARBITURATES UR QL: NEGATIVE
BENZODIAZ UR QL: NEGATIVE
BILIRUB UR QL STRIP: NEGATIVE
CANNABINOIDS UR QL SCN: NEGATIVE
COCAINE UR QL: NEGATIVE
COLOR UR AUTO: YELLOW
GLUCOSE UR STRIP-MCNC: NEGATIVE MG/DL
HGB UR QL STRIP: NEGATIVE
KETONES UR STRIP-MCNC: NEGATIVE MG/DL
LEUKOCYTE ESTERASE UR QL STRIP: NEGATIVE
MUCOUS THREADS #/AREA URNS LPF: PRESENT /LPF
NITRATE UR QL: NEGATIVE
OPIATES UR QL SCN: NEGATIVE
PCP UR QL SCN: NEGATIVE
PH UR STRIP: 7 PH (ref 5–7)
RBC #/AREA URNS AUTO: <1 /HPF (ref 0–2)
SOURCE: ABNORMAL
SP GR UR STRIP: 1.01 (ref 1–1.03)
SQUAMOUS #/AREA URNS AUTO: 10 /HPF (ref 0–1)
UROBILINOGEN UR STRIP-MCNC: 0 MG/DL (ref 0–2)
WBC #/AREA URNS AUTO: 3 /HPF (ref 0–5)

## 2019-04-12 PROCEDURE — G0463 HOSPITAL OUTPT CLINIC VISIT: HCPCS | Mod: 25

## 2019-04-12 PROCEDURE — 59025 FETAL NON-STRESS TEST: CPT | Mod: 26 | Performed by: OBSTETRICS & GYNECOLOGY

## 2019-04-12 PROCEDURE — 80307 DRUG TEST PRSMV CHEM ANLYZR: CPT | Performed by: OBSTETRICS & GYNECOLOGY

## 2019-04-12 PROCEDURE — 59025 FETAL NON-STRESS TEST: CPT

## 2019-04-12 PROCEDURE — 81001 URINALYSIS AUTO W/SCOPE: CPT | Mod: XU | Performed by: OBSTETRICS & GYNECOLOGY

## 2019-04-12 RX ORDER — ONDANSETRON 2 MG/ML
4 INJECTION INTRAMUSCULAR; INTRAVENOUS EVERY 6 HOURS PRN
Status: DISCONTINUED | OUTPATIENT
Start: 2019-04-12 | End: 2019-04-12 | Stop reason: HOSPADM

## 2019-04-12 ASSESSMENT — ACTIVITIES OF DAILY LIVING (ADL)
AMBULATION: 0-->INDEPENDENT
BATHING: 0-->INDEPENDENT
TOILETING: 0-->INDEPENDENT
RETIRED_EATING: 0-->INDEPENDENT
FALL_HISTORY_WITHIN_LAST_SIX_MONTHS: NO
SWALLOWING: 0-->SWALLOWS FOODS/LIQUIDS WITHOUT DIFFICULTY
TRANSFERRING: 0-->INDEPENDENT
RETIRED_COMMUNICATION: 0-->UNDERSTANDS/COMMUNICATES WITHOUT DIFFICULTY
DRESS: 0-->INDEPENDENT

## 2019-04-12 ASSESSMENT — MIFFLIN-ST. JEOR: SCORE: 1583.69

## 2019-04-12 NOTE — PROGRESS NOTES
Pt presented to the birthplace for low back, low abd, and pelvic pain.  She has had back pain for 1 week and abd pain 2 days.  Pt is having some irrit on the monitor.  Will send UA for drug test as well as to rule out UTI.  Pt want her cervix checked, will do that after labs come back.

## 2019-04-12 NOTE — TELEPHONE ENCOUNTER
Pt called stating she has been losing her mucous plug for 2 days.  Pt has had contractions every 30 min.  Baby is active.  Pt is currently in [place] and was wondering if she could be seen at the [facility] to have someone check her cervix.  She was informed that she could call the clinic to see if they could get her in.  Pt was given the contraction and fetal kick count instructions and stated she could come in to the hospital and get checked out if she is concerned.  Pt stated she will call the clinic and will keep the birthplace updated.

## 2019-04-12 NOTE — TELEPHONE ENCOUNTER
Pt is 37 weeks 4 days Pregnant. Pt is having some contractions last night. Pt was into see the Provider 4/9/19 TYRA Hough she was Dilated  to 2. Pt spoke with TYRA Hough and told her to call us to see if she can come here to be checked.  Stefany Orn Station Sec

## 2019-04-12 NOTE — PROGRESS NOTES
Dr. Tran was informed about the urine.  MD did a SVE.  Pt was discharged.  A reactive NST was obtained.

## 2019-04-12 NOTE — DISCHARGE INSTRUCTIONS
Discharge Instruction for Undelivered Patients      You were seen for: Labor Assessment  We Consulted: Dr. Tran  You had (Test or Medicine):NST, URINE     Diet:   Drink 8 to 12 glasses of liquids (milk, juice, water) every day.  You may eat meals and snacks.     Activity:  Count fetal kicks everyday (see handout)     Call your provider if you notice:  Swelling in your face or increased swelling in your hands or legs.  Headaches that are not relieved by Tylenol (acetaminophen).  Changes in your vision (blurring: seeing spots or stars.)  Nausea (sick to your stomach) and vomiting (throwing up).   Weight gain of 5 pounds or more per week.  Heartburn that doesn't go away.  Signs of bladder infection: pain when you urinate (use the toilet), need to go more often and more urgently.  The bag of hu (rupture of membranes) breaks, or you notice leaking in your underwear.  Bright red blood in your underwear.  Abdominal (lower belly) or stomach pain.  For first baby: Contractions (tightening) less than 5 minutes apart for one hour or more.  Second (plus) baby: Contractions (tightening) less than 10 minutes apart and getting stronger.  *If less than 34 weeks: Contractions (tightenings) more than 6 times in one hour.  Increase or change in vaginal discharge (note the color and amount)  Other: KEEP NEXT SCHEDULED APPOINTMENT    Follow-up:  As scheduled in the clinic   Kick Counts    It s normal to worry about your baby s health. One way you can know your baby s doing well is to record the baby s movements once a day. This is called a kick count. Remember to take your kick count records to all your appointments with your healthcare provider.  How to count kicks  Here are tips for counting kicks:    Choose a time when the baby is active, such as after a meal.     Sit comfortably or lie on your side.     The first time the baby moves, write down the time.     Count each movement until the baby has moved 10 times. This can take  from 20 minutes to 2 hours.     Try to do it at the same time each day.  When to call your healthcare provider  Call your healthcare provider right away if you notice any of the following:    Your baby moves fewer than 10 times in 2 hours while you re doing kick counts.    Your baby moves much less often than on the days before.    You have not felt your baby move all day.  Date Last Reviewed: 12/1/2017 2000-2018 [employer]. [address] [place] [address], [place] [id number]. All rights reserved. This information is not intended as a substitute for professional medical care. Always follow your healthcare professional's instructions.

## 2019-04-12 NOTE — TELEPHONE ENCOUNTER
Pt states she is having contractions every 10-15 minutes lasting a minute. States the pain is sharp in back and abdomen. Advised she needs to be evaluated at labor and delivery. Pt verbalized understanding. Adilene Norris RN

## 2019-04-17 ENCOUNTER — HOSPITAL ENCOUNTER (OUTPATIENT)
Facility: CLINIC | Age: 24
Discharge: HOME | End: 2019-04-17
Attending: OBSTETRICS & GYNECOLOGY | Admitting: OBSTETRICS & GYNECOLOGY
Payer: COMMERCIAL

## 2019-04-17 ENCOUNTER — PRENATAL OFFICE VISIT (OUTPATIENT)
Dept: OBGYN | Facility: CLINIC | Age: 24
End: 2019-04-17
Payer: COMMERCIAL

## 2019-04-17 VITALS
WEIGHT: 204 LBS | BODY MASS INDEX: 38.51 KG/M2 | HEIGHT: 61 IN | DIASTOLIC BLOOD PRESSURE: 74 MMHG | TEMPERATURE: 97.7 F | SYSTOLIC BLOOD PRESSURE: 112 MMHG | RESPIRATION RATE: 18 BRPM | HEART RATE: 86 BPM

## 2019-04-17 VITALS
HEART RATE: 99 BPM | WEIGHT: 204 LBS | DIASTOLIC BLOOD PRESSURE: 69 MMHG | RESPIRATION RATE: 16 BRPM | SYSTOLIC BLOOD PRESSURE: 118 MMHG | TEMPERATURE: 98.1 F | BODY MASS INDEX: 38.55 KG/M2

## 2019-04-17 DIAGNOSIS — Z34.83 PRENATAL CARE, SUBSEQUENT PREGNANCY IN THIRD TRIMESTER: Primary | ICD-10-CM

## 2019-04-17 PROBLEM — O98.819 CHLAMYDIA INFECTION AFFECTING PREGNANCY, ANTEPARTUM: Status: ACTIVE | Noted: 2018-09-18

## 2019-04-17 PROBLEM — A74.9 CHLAMYDIA INFECTION AFFECTING PREGNANCY, ANTEPARTUM: Status: ACTIVE | Noted: 2018-09-18

## 2019-04-17 LAB — RUPTURE OF FETAL MEMBRANES BY ROM PLUS: NEGATIVE

## 2019-04-17 PROCEDURE — 99207 ZZC PRENATAL VISIT: CPT | Performed by: OBSTETRICS & GYNECOLOGY

## 2019-04-17 PROCEDURE — 84112 EVAL AMNIOTIC FLUID PROTEIN: CPT | Performed by: OBSTETRICS & GYNECOLOGY

## 2019-04-17 PROCEDURE — 59025 FETAL NON-STRESS TEST: CPT | Mod: 26 | Performed by: OBSTETRICS & GYNECOLOGY

## 2019-04-17 PROCEDURE — G0463 HOSPITAL OUTPT CLINIC VISIT: HCPCS

## 2019-04-17 PROCEDURE — 59025 FETAL NON-STRESS TEST: CPT

## 2019-04-17 RX ORDER — ONDANSETRON 2 MG/ML
4 INJECTION INTRAMUSCULAR; INTRAVENOUS EVERY 6 HOURS PRN
Status: DISCONTINUED | OUTPATIENT
Start: 2019-04-17 | End: 2019-04-17 | Stop reason: HOSPADM

## 2019-04-17 ASSESSMENT — MIFFLIN-ST. JEOR: SCORE: 1617.72

## 2019-04-17 NOTE — PROGRESS NOTES
Pt arrived to OB unit to rule out ROM.  Put pt on eum/us.  ROM test obtained.  Updated Dr Tran of pt's arrival.  Will monitor, obtain results & update Dr Tran on pt's status.

## 2019-04-17 NOTE — PROGRESS NOTES
"CC: Here for routine prenatal visit @ 38w2d   HPI:  Very uncomfortable; some daily contractions; denies LOF; discussed s/s of labor    PE: /74 (BP Location: Right arm, Patient Position: Chair, Cuff Size: Adult Large)   Pulse 86   Temp 97.7  F (36.5  C) (Tympanic)   Resp 18   Ht 1.549 m (5' 1\")   Wt 92.5 kg (204 lb)   LMP 07/20/2018 (Approximate)   BMI 38.55 kg/m     See OB flowsheet      A:  1. Prenatal care, subsequent pregnancy in third trimester        Routine prenatal care  RTC 1 weeks.      Brenda Tran M.D.     "

## 2019-04-17 NOTE — PROGRESS NOTES
ROM neg result via lab.  Reactive NST. FHT's 125 mod jameel.  Pt having uterine irrit. Pt  states having membranes stripped at clinic visit today.    Updated Dr Tran on pt's status.  Orders to discharge pt to home & follow up at next scheduled appointment.

## 2019-04-22 ENCOUNTER — PRENATAL OFFICE VISIT (OUTPATIENT)
Dept: OBGYN | Facility: CLINIC | Age: 24
End: 2019-04-22
Payer: COMMERCIAL

## 2019-04-22 VITALS
WEIGHT: 207 LBS | RESPIRATION RATE: 16 BRPM | SYSTOLIC BLOOD PRESSURE: 114 MMHG | BODY MASS INDEX: 39.08 KG/M2 | DIASTOLIC BLOOD PRESSURE: 74 MMHG | HEIGHT: 61 IN | HEART RATE: 85 BPM | TEMPERATURE: 96.4 F

## 2019-04-22 DIAGNOSIS — O99.820 GBS (GROUP B STREPTOCOCCUS CARRIER), +RV CULTURE, CURRENTLY PREGNANT: ICD-10-CM

## 2019-04-22 PROCEDURE — 99207 ZZC PRENATAL VISIT: CPT | Performed by: OBSTETRICS & GYNECOLOGY

## 2019-04-22 ASSESSMENT — MIFFLIN-ST. JEOR: SCORE: 1631.33

## 2019-04-22 NOTE — NURSING NOTE
"Initial /74 (BP Location: Left arm, Patient Position: Chair, Cuff Size: Adult Regular)   Pulse 85   Temp 96.4  F (35.8  C) (Tympanic)   Resp 16   Ht 1.549 m (5' 1\")   Wt 93.9 kg (207 lb)   LMP 07/20/2018 (Approximate)   BMI 39.11 kg/m   Estimated body mass index is 39.11 kg/m  as calculated from the following:    Height as of this encounter: 1.549 m (5' 1\").    Weight as of this encounter: 93.9 kg (207 lb). .      "

## 2019-04-22 NOTE — PROGRESS NOTES
"[facility]   OB/GYN Clinic    CC: Return OB     Subjective:    Liat is a 23 year old  at 39w0d by Patient's last menstrual period was 2018 (approximate). who presents for return OB visit. She reports feeling very uncomfortable. She's been having significant pain, possible contractions for about a week. Had a small amount of vaginal bleeding yesterday, none today. No gush of fluid but has had lots of mucous liquid and some fluid in underwear. +fetal movement.     Concerns: wants induction    Objective:  /74 (BP Location: Left arm, Patient Position: Chair, Cuff Size: Adult Regular)   Pulse 85   Temp 96.4  F (35.8  C) (Tympanic)   Resp 16   Ht 1.549 m (5' 1\")   Wt 93.9 kg (207 lb)   LMP 2018 (Approximate)   BMI 39.11 kg/m      Estimated body mass index is 39.11 kg/m  as calculated from the following:    Height as of this encounter: 1.549 m (5' 1\").    Weight as of this encounter: 93.9 kg (207 lb).    Physical Exam:  Gen: Pleasant, talkative female in no apparent distress   Respiratory: breathing comfortably on room air   Cardiac: Warm and well-perfused.   GI: Abd soft and non-tender, gravid  : cervical exam 1.5/50/-2, anterior, medium - Briceño's score of 6  MSK: Grossly normal movement of all four extremities  Psych: mood and affect bright     Fetal dop tones: 140 bpm  Fundal height: 41 cm    Assessment/Plan:   23 year old  at 39w0d by LMP of Patient's last menstrual period was 2018 (approximate). who presents for follow-up OB visit.   1) New OB lab: O+, Betty-, RI. 36 weeks GBS +  2) Dating/viability scan confirmed JOSE ROBERTO 19, Anatomy scan 18 - normal  3) Concerns: wants elective induction  4) PMH/OBHx problems: none  5) Delivery plan: planning for elective induction with pitocin 19 at 7am, breastfeeding, pp contraception - pt does not want any, pain management in labor - pt would like epidural. Discussed with Liat Corcoran, the " following; indications; the agents and methods of labor augmentation, including risks, benefits, and alternative approaches; and the possible need for  birth. EFW is AGA. The Labor Induction:what you need to know information sheet was made available to her. Questions and concerns were addressed and patient agreed to above if necessary during the course of her labor.  6) Immunizations: s/p flu shot 19    Return to L&D tomorrow for elective induction as scheduled     Dilcia Kirk, MS3  [facility] [employer] [facility] [employer]    I interviewed and examined the patient myself along with the medical student and agree with the documentation above.   Kiesha Gibson MD  OB/GYN  2019

## 2019-04-23 ENCOUNTER — ANESTHESIA EVENT (OUTPATIENT)
Dept: OBGYN | Facility: CLINIC | Age: 24
End: 2019-04-23
Payer: COMMERCIAL

## 2019-04-23 ENCOUNTER — HOSPITAL ENCOUNTER (INPATIENT)
Facility: CLINIC | Age: 24
LOS: 2 days | Discharge: HOME | End: 2019-04-25
Attending: OBSTETRICS & GYNECOLOGY | Admitting: OBSTETRICS & GYNECOLOGY
Payer: COMMERCIAL

## 2019-04-23 ENCOUNTER — ANESTHESIA (OUTPATIENT)
Dept: OBGYN | Facility: CLINIC | Age: 24
End: 2019-04-23
Payer: COMMERCIAL

## 2019-04-23 LAB
ABO + RH BLD: NORMAL
ABO + RH BLD: NORMAL
AMPHETAMINES UR QL SCN: NEGATIVE
BARBITURATES UR QL: NEGATIVE
BASOPHILS # BLD AUTO: 0 10E9/L (ref 0–0.2)
BASOPHILS NFR BLD AUTO: 0.4 %
BENZODIAZ UR QL: NEGATIVE
BLD GP AB SCN SERPL QL: NORMAL
BLOOD BANK CMNT PATIENT-IMP: NORMAL
CANNABINOIDS UR QL SCN: NEGATIVE
COCAINE UR QL: NEGATIVE
DIFFERENTIAL METHOD BLD: ABNORMAL
EOSINOPHIL # BLD AUTO: 0.1 10E9/L (ref 0–0.7)
EOSINOPHIL NFR BLD AUTO: 1.2 %
ERYTHROCYTE [DISTWIDTH] IN BLOOD BY AUTOMATED COUNT: 12.8 % (ref 10–15)
HCT VFR BLD AUTO: 35 % (ref 35–47)
HGB BLD-MCNC: 10.7 G/DL (ref 11.7–15.7)
IMM GRANULOCYTES # BLD: 0 10E9/L (ref 0–0.4)
IMM GRANULOCYTES NFR BLD: 0.4 %
LYMPHOCYTES # BLD AUTO: 3.4 10E9/L (ref 0.8–5.3)
LYMPHOCYTES NFR BLD AUTO: 34.5 %
MCH RBC QN AUTO: 26.7 PG (ref 26.5–33)
MCHC RBC AUTO-ENTMCNC: 30.6 G/DL (ref 31.5–36.5)
MCV RBC AUTO: 87 FL (ref 78–100)
MONOCYTES # BLD AUTO: 0.5 10E9/L (ref 0–1.3)
MONOCYTES NFR BLD AUTO: 5.3 %
NEUTROPHILS # BLD AUTO: 5.8 10E9/L (ref 1.6–8.3)
NEUTROPHILS NFR BLD AUTO: 58.2 %
NRBC # BLD AUTO: 0 10*3/UL
NRBC BLD AUTO-RTO: 0 /100
OPIATES UR QL SCN: NEGATIVE
PCP UR QL SCN: NEGATIVE
PLATELET # BLD AUTO: 276 10E9/L (ref 150–450)
RBC # BLD AUTO: 4.01 10E12/L (ref 3.8–5.2)
SPECIMEN EXP DATE BLD: NORMAL
WBC # BLD AUTO: 9.9 10E9/L (ref 4–11)

## 2019-04-23 PROCEDURE — 80307 DRUG TEST PRSMV CHEM ANLYZR: CPT | Performed by: OBSTETRICS & GYNECOLOGY

## 2019-04-23 PROCEDURE — 59400 OBSTETRICAL CARE: CPT | Performed by: OBSTETRICS & GYNECOLOGY

## 2019-04-23 PROCEDURE — 25000132 ZZH RX MED GY IP 250 OP 250 PS 637: Performed by: OBSTETRICS & GYNECOLOGY

## 2019-04-23 PROCEDURE — 86900 BLOOD TYPING SEROLOGIC ABO: CPT | Performed by: OBSTETRICS & GYNECOLOGY

## 2019-04-23 PROCEDURE — 40000671 ZZH STATISTIC ANESTHESIA CASE

## 2019-04-23 PROCEDURE — 10907ZC DRAINAGE OF AMNIOTIC FLUID, THERAPEUTIC FROM PRODUCTS OF CONCEPTION, VIA NATURAL OR ARTIFICIAL OPENING: ICD-10-PCS | Performed by: OBSTETRICS & GYNECOLOGY

## 2019-04-23 PROCEDURE — 00HU33Z INSERTION OF INFUSION DEVICE INTO SPINAL CANAL, PERCUTANEOUS APPROACH: ICD-10-PCS | Performed by: NURSE ANESTHETIST, CERTIFIED REGISTERED

## 2019-04-23 PROCEDURE — 72200001 ZZH LABOR CARE VAGINAL DELIVERY SINGLE

## 2019-04-23 PROCEDURE — 25800030 ZZH RX IP 258 OP 636: Performed by: NURSE ANESTHETIST, CERTIFIED REGISTERED

## 2019-04-23 PROCEDURE — 27110038 ZZH RX 271: Performed by: NURSE ANESTHETIST, CERTIFIED REGISTERED

## 2019-04-23 PROCEDURE — 12000000 ZZH R&B MED SURG/OB

## 2019-04-23 PROCEDURE — 86850 RBC ANTIBODY SCREEN: CPT | Performed by: OBSTETRICS & GYNECOLOGY

## 2019-04-23 PROCEDURE — 25000128 H RX IP 250 OP 636: Performed by: OBSTETRICS & GYNECOLOGY

## 2019-04-23 PROCEDURE — 25800030 ZZH RX IP 258 OP 636: Performed by: OBSTETRICS & GYNECOLOGY

## 2019-04-23 PROCEDURE — 37000011 ZZH ANESTHESIA WARD SERVICE: Performed by: NURSE ANESTHETIST, CERTIFIED REGISTERED

## 2019-04-23 PROCEDURE — 3E033VJ INTRODUCTION OF OTHER HORMONE INTO PERIPHERAL VEIN, PERCUTANEOUS APPROACH: ICD-10-PCS | Performed by: OBSTETRICS & GYNECOLOGY

## 2019-04-23 PROCEDURE — 3E0R3BZ INTRODUCTION OF ANESTHETIC AGENT INTO SPINAL CANAL, PERCUTANEOUS APPROACH: ICD-10-PCS | Performed by: NURSE ANESTHETIST, CERTIFIED REGISTERED

## 2019-04-23 PROCEDURE — 86901 BLOOD TYPING SEROLOGIC RH(D): CPT | Performed by: OBSTETRICS & GYNECOLOGY

## 2019-04-23 PROCEDURE — 85025 COMPLETE CBC W/AUTO DIFF WBC: CPT | Performed by: OBSTETRICS & GYNECOLOGY

## 2019-04-23 PROCEDURE — 25000128 H RX IP 250 OP 636: Performed by: NURSE ANESTHETIST, CERTIFIED REGISTERED

## 2019-04-23 PROCEDURE — 0064U ANTB TP TOTAL&RPR IA QUAL: CPT | Performed by: OBSTETRICS & GYNECOLOGY

## 2019-04-23 PROCEDURE — 36415 COLL VENOUS BLD VENIPUNCTURE: CPT | Performed by: OBSTETRICS & GYNECOLOGY

## 2019-04-23 PROCEDURE — 25000125 ZZHC RX 250: Performed by: NURSE ANESTHETIST, CERTIFIED REGISTERED

## 2019-04-23 PROCEDURE — 25000125 ZZHC RX 250: Performed by: OBSTETRICS & GYNECOLOGY

## 2019-04-23 RX ORDER — NALOXONE HYDROCHLORIDE 0.4 MG/ML
.1-.4 INJECTION, SOLUTION INTRAMUSCULAR; INTRAVENOUS; SUBCUTANEOUS
Status: DISCONTINUED | OUTPATIENT
Start: 2019-04-23 | End: 2019-04-25 | Stop reason: HOSPADM

## 2019-04-23 RX ORDER — IBUPROFEN 800 MG/1
800 TABLET, FILM COATED ORAL
Status: DISCONTINUED | OUTPATIENT
Start: 2019-04-23 | End: 2019-04-23

## 2019-04-23 RX ORDER — ONDANSETRON 2 MG/ML
4 INJECTION INTRAMUSCULAR; INTRAVENOUS EVERY 6 HOURS PRN
Status: DISCONTINUED | OUTPATIENT
Start: 2019-04-23 | End: 2019-04-23

## 2019-04-23 RX ORDER — OXYTOCIN/0.9 % SODIUM CHLORIDE 30/500 ML
1-24 PLASTIC BAG, INJECTION (ML) INTRAVENOUS CONTINUOUS
Status: DISCONTINUED | OUTPATIENT
Start: 2019-04-23 | End: 2019-04-23

## 2019-04-23 RX ORDER — AMOXICILLIN 250 MG
2 CAPSULE ORAL 2 TIMES DAILY
Status: DISCONTINUED | OUTPATIENT
Start: 2019-04-23 | End: 2019-04-25 | Stop reason: HOSPADM

## 2019-04-23 RX ORDER — CEFAZOLIN SODIUM 2 G/100ML
2 INJECTION, SOLUTION INTRAVENOUS ONCE
Status: COMPLETED | OUTPATIENT
Start: 2019-04-23 | End: 2019-04-23

## 2019-04-23 RX ORDER — CEFAZOLIN SODIUM 1 G/50ML
1 INJECTION, SOLUTION INTRAVENOUS EVERY 8 HOURS
Status: DISCONTINUED | OUTPATIENT
Start: 2019-04-23 | End: 2019-04-23

## 2019-04-23 RX ORDER — HYDROCORTISONE 2.5 %
CREAM (GRAM) TOPICAL 3 TIMES DAILY PRN
Status: DISCONTINUED | OUTPATIENT
Start: 2019-04-23 | End: 2019-04-25 | Stop reason: HOSPADM

## 2019-04-23 RX ORDER — OXYTOCIN 10 [USP'U]/ML
10 INJECTION, SOLUTION INTRAMUSCULAR; INTRAVENOUS
Status: DISCONTINUED | OUTPATIENT
Start: 2019-04-23 | End: 2019-04-23

## 2019-04-23 RX ORDER — HYDROMORPHONE HYDROCHLORIDE 1 MG/ML
0.3 INJECTION, SOLUTION INTRAMUSCULAR; INTRAVENOUS; SUBCUTANEOUS
Status: DISCONTINUED | OUTPATIENT
Start: 2019-04-23 | End: 2019-04-25 | Stop reason: HOSPADM

## 2019-04-23 RX ORDER — OXYTOCIN/0.9 % SODIUM CHLORIDE 30/500 ML
100 PLASTIC BAG, INJECTION (ML) INTRAVENOUS CONTINUOUS
Status: DISCONTINUED | OUTPATIENT
Start: 2019-04-23 | End: 2019-04-25 | Stop reason: HOSPADM

## 2019-04-23 RX ORDER — FENTANYL CITRATE 50 UG/ML
50-100 INJECTION, SOLUTION INTRAMUSCULAR; INTRAVENOUS
Status: DISCONTINUED | OUTPATIENT
Start: 2019-04-23 | End: 2019-04-23

## 2019-04-23 RX ORDER — LIDOCAINE HCL/EPINEPHRINE/PF 2%-1:200K
VIAL (ML) INJECTION PRN
Status: DISCONTINUED | OUTPATIENT
Start: 2019-04-23 | End: 2019-04-23

## 2019-04-23 RX ORDER — IBUPROFEN 800 MG/1
800 TABLET, FILM COATED ORAL EVERY 6 HOURS PRN
Status: DISCONTINUED | OUTPATIENT
Start: 2019-04-23 | End: 2019-04-25 | Stop reason: HOSPADM

## 2019-04-23 RX ORDER — ONDANSETRON 4 MG/1
4 TABLET, ORALLY DISINTEGRATING ORAL EVERY 6 HOURS PRN
Status: DISCONTINUED | OUTPATIENT
Start: 2019-04-23 | End: 2019-04-23

## 2019-04-23 RX ORDER — OXYCODONE AND ACETAMINOPHEN 5; 325 MG/1; MG/1
1 TABLET ORAL
Status: DISCONTINUED | OUTPATIENT
Start: 2019-04-23 | End: 2019-04-23

## 2019-04-23 RX ORDER — MISOPROSTOL 200 UG/1
400 TABLET ORAL
Status: DISCONTINUED | OUTPATIENT
Start: 2019-04-23 | End: 2019-04-25 | Stop reason: HOSPADM

## 2019-04-23 RX ORDER — LIDOCAINE HYDROCHLORIDE 10 MG/ML
INJECTION, SOLUTION INFILTRATION; PERINEURAL PRN
Status: DISCONTINUED | OUTPATIENT
Start: 2019-04-23 | End: 2019-04-23

## 2019-04-23 RX ORDER — AMOXICILLIN 250 MG
1 CAPSULE ORAL 2 TIMES DAILY
Status: DISCONTINUED | OUTPATIENT
Start: 2019-04-23 | End: 2019-04-25 | Stop reason: HOSPADM

## 2019-04-23 RX ORDER — LANOLIN 100 %
OINTMENT (GRAM) TOPICAL
Status: DISCONTINUED | OUTPATIENT
Start: 2019-04-23 | End: 2019-04-25 | Stop reason: HOSPADM

## 2019-04-23 RX ORDER — CARBOPROST TROMETHAMINE 250 UG/ML
250 INJECTION, SOLUTION INTRAMUSCULAR
Status: DISCONTINUED | OUTPATIENT
Start: 2019-04-23 | End: 2019-04-25 | Stop reason: HOSPADM

## 2019-04-23 RX ORDER — OXYTOCIN/0.9 % SODIUM CHLORIDE 30/500 ML
340 PLASTIC BAG, INJECTION (ML) INTRAVENOUS CONTINUOUS PRN
Status: DISCONTINUED | OUTPATIENT
Start: 2019-04-23 | End: 2019-04-25 | Stop reason: HOSPADM

## 2019-04-23 RX ORDER — ALBUTEROL SULFATE 90 UG/1
2 AEROSOL, METERED RESPIRATORY (INHALATION) EVERY 6 HOURS PRN
Status: DISCONTINUED | OUTPATIENT
Start: 2019-04-23 | End: 2019-04-25 | Stop reason: HOSPADM

## 2019-04-23 RX ORDER — SODIUM CHLORIDE, SODIUM LACTATE, POTASSIUM CHLORIDE, CALCIUM CHLORIDE 600; 310; 30; 20 MG/100ML; MG/100ML; MG/100ML; MG/100ML
INJECTION, SOLUTION INTRAVENOUS CONTINUOUS
Status: DISCONTINUED | OUTPATIENT
Start: 2019-04-23 | End: 2019-04-23

## 2019-04-23 RX ORDER — METHYLERGONOVINE MALEATE 0.2 MG/ML
200 INJECTION INTRAVENOUS
Status: DISCONTINUED | OUTPATIENT
Start: 2019-04-23 | End: 2019-04-23

## 2019-04-23 RX ORDER — BISACODYL 10 MG
10 SUPPOSITORY, RECTAL RECTAL DAILY PRN
Status: DISCONTINUED | OUTPATIENT
Start: 2019-04-25 | End: 2019-04-25 | Stop reason: HOSPADM

## 2019-04-23 RX ORDER — METHYLERGONOVINE MALEATE 0.2 MG/ML
200 INJECTION INTRAVENOUS
Status: DISCONTINUED | OUTPATIENT
Start: 2019-04-23 | End: 2019-04-25 | Stop reason: HOSPADM

## 2019-04-23 RX ORDER — OXYCODONE HYDROCHLORIDE 5 MG/1
5 TABLET ORAL EVERY 4 HOURS PRN
Status: DISCONTINUED | OUTPATIENT
Start: 2019-04-23 | End: 2019-04-25 | Stop reason: HOSPADM

## 2019-04-23 RX ORDER — OXYTOCIN 10 [USP'U]/ML
10 INJECTION, SOLUTION INTRAMUSCULAR; INTRAVENOUS
Status: DISCONTINUED | OUTPATIENT
Start: 2019-04-23 | End: 2019-04-25 | Stop reason: HOSPADM

## 2019-04-23 RX ORDER — NALBUPHINE HYDROCHLORIDE 10 MG/ML
2.5-5 INJECTION, SOLUTION INTRAMUSCULAR; INTRAVENOUS; SUBCUTANEOUS EVERY 6 HOURS PRN
Status: DISCONTINUED | OUTPATIENT
Start: 2019-04-23 | End: 2019-04-23

## 2019-04-23 RX ORDER — ACETAMINOPHEN 325 MG/1
650 TABLET ORAL EVERY 4 HOURS PRN
Status: DISCONTINUED | OUTPATIENT
Start: 2019-04-23 | End: 2019-04-23

## 2019-04-23 RX ORDER — ACETAMINOPHEN 325 MG/1
650 TABLET ORAL EVERY 4 HOURS PRN
Status: DISCONTINUED | OUTPATIENT
Start: 2019-04-23 | End: 2019-04-25 | Stop reason: HOSPADM

## 2019-04-23 RX ORDER — NALOXONE HYDROCHLORIDE 0.4 MG/ML
.1-.4 INJECTION, SOLUTION INTRAMUSCULAR; INTRAVENOUS; SUBCUTANEOUS
Status: DISCONTINUED | OUTPATIENT
Start: 2019-04-23 | End: 2019-04-23

## 2019-04-23 RX ORDER — LIDOCAINE HCL/EPINEPHRINE/PF 2%-1:200K
VIAL (ML) INJECTION
Status: COMPLETED
Start: 2019-04-23 | End: 2019-04-23

## 2019-04-23 RX ORDER — CARBOPROST TROMETHAMINE 250 UG/ML
250 INJECTION, SOLUTION INTRAMUSCULAR
Status: DISCONTINUED | OUTPATIENT
Start: 2019-04-23 | End: 2019-04-23

## 2019-04-23 RX ORDER — OXYTOCIN/0.9 % SODIUM CHLORIDE 30/500 ML
100-340 PLASTIC BAG, INJECTION (ML) INTRAVENOUS CONTINUOUS PRN
Status: DISCONTINUED | OUTPATIENT
Start: 2019-04-23 | End: 2019-04-23

## 2019-04-23 RX ORDER — ALBUTEROL SULFATE 90 UG/1
2 AEROSOL, METERED RESPIRATORY (INHALATION) EVERY 6 HOURS PRN
COMMUNITY
End: 2019-11-08

## 2019-04-23 RX ORDER — LIDOCAINE 40 MG/G
CREAM TOPICAL
Status: DISCONTINUED | OUTPATIENT
Start: 2019-04-23 | End: 2019-04-23

## 2019-04-23 RX ORDER — EPHEDRINE SULFATE 50 MG/ML
5 INJECTION, SOLUTION INTRAMUSCULAR; INTRAVENOUS; SUBCUTANEOUS
Status: DISCONTINUED | OUTPATIENT
Start: 2019-04-23 | End: 2019-04-23

## 2019-04-23 RX ADMIN — LIDOCAINE HYDROCHLORIDE,EPINEPHRINE BITARTRATE 5 ML: 20; .005 INJECTION, SOLUTION EPIDURAL; INFILTRATION; INTRACAUDAL; PERINEURAL at 14:47

## 2019-04-23 RX ADMIN — SODIUM CHLORIDE, POTASSIUM CHLORIDE, SODIUM LACTATE AND CALCIUM CHLORIDE: 600; 310; 30; 20 INJECTION, SOLUTION INTRAVENOUS at 08:11

## 2019-04-23 RX ADMIN — OXYTOCIN-SODIUM CHLORIDE 0.9% IV SOLN 30 UNIT/500ML 2 MILLI-UNITS/MIN: 30-0.9/5 SOLUTION at 09:30

## 2019-04-23 RX ADMIN — LIDOCAINE HYDROCHLORIDE 50 MG: 10 INJECTION, SOLUTION INFILTRATION; PERINEURAL at 14:42

## 2019-04-23 RX ADMIN — IBUPROFEN 800 MG: 800 TABLET ORAL at 21:59

## 2019-04-23 RX ADMIN — SODIUM CHLORIDE, POTASSIUM CHLORIDE, SODIUM LACTATE AND CALCIUM CHLORIDE: 600; 310; 30; 20 INJECTION, SOLUTION INTRAVENOUS at 18:11

## 2019-04-23 RX ADMIN — CEFAZOLIN SODIUM 1 G: 1 INJECTION, SOLUTION INTRAVENOUS at 16:36

## 2019-04-23 RX ADMIN — Medication 10 ML: at 14:50

## 2019-04-23 RX ADMIN — LIDOCAINE HYDROCHLORIDE 50 MG: 10 INJECTION, SOLUTION INFILTRATION; PERINEURAL at 14:18

## 2019-04-23 RX ADMIN — CEFAZOLIN SODIUM 2 G: 2 INJECTION, SOLUTION INTRAVENOUS at 08:25

## 2019-04-23 RX ADMIN — SODIUM CHLORIDE, POTASSIUM CHLORIDE, SODIUM LACTATE AND CALCIUM CHLORIDE 1000 ML: 600; 310; 30; 20 INJECTION, SOLUTION INTRAVENOUS at 14:12

## 2019-04-23 RX ADMIN — Medication 12 ML/HR: at 14:54
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
## 2019-04-23 NOTE — ANESTHESIA PREPROCEDURE EVALUATION
Anesthesia Pre-Procedure Evaluation    Patient: Liat Corcoran   MRN: [id number] : 1995          Preoperative Diagnosis: * No surgery found *        Past Medical History:   Diagnosis Date     Accidental overdose     sleep meds, tylenol, opioids     Aspiration pneumonia (H) 2015     Bipolar disorder (H)      Chickenpox      Chlamydia infection affecting pregnancy, antepartum 2018     Depressive disorder      History of recurrent ear infection      Intracranial swelling 2015     Ovarian cysts     ultrasound dx by genoveva     Past Surgical History:   Procedure Laterality Date     PE TUBES       TONSILLECTOMY         Anesthesia Evaluation       history and physical reviewed .      No history of anesthetic complications          ROS/MED HX    ENT/Pulmonary: Comment: Hx aspiration pneumonia      Neurologic: Comment: Depression, bipolar disorder, PTSD, ADHD      Cardiovascular:  - neg cardiovascular ROS       METS/Exercise Tolerance:     Hematologic:         Musculoskeletal:         GI/Hepatic:  - neg GI/hepatic ROS       Renal/Genitourinary:         Endo:         Psychiatric:         Infectious Disease:         Malignancy:         Other:                     neg OB ROS            Physical Exam  Normal systems: cardiovascular, pulmonary and dental    Airway   Mallampati: II  TM distance: > 3 FB  Neck ROM: full  Mouth opening: > 3 cm    Dental     Cardiovascular       Pulmonary             Lab Results   Component Value Date    WBC 9.9 2019    HGB 10.7 (L) 2019    HCT 35.0 2019     2019    CRP 12.4 (H) 2014    SED 16 2014     2017    POTASSIUM 4.1 2017    CHLORIDE 104 2017    CO2 25 2017    BUN 16 2017    CR 0.81 2017    GLC 74 2017    ABEL 9.0 2017    ALBUMIN 3.7 2017    PROTTOTAL 8.3 2017    ALT 18 2017    AST 16 2017    ALKPHOS 92 2017    BILITOTAL 0.6 2017     "LIPASE 105 12/26/2014    TSH 0.98 10/23/2017    HCG Negative 02/04/2015    HCGS Negative 03/14/2018       Preop Vitals  BP Readings from Last 3 Encounters:   04/23/19 118/72   04/22/19 114/74   04/17/19 118/69    Pulse Readings from Last 3 Encounters:   04/23/19 90   04/22/19 85   04/17/19 99      Resp Readings from Last 3 Encounters:   04/23/19 16   04/22/19 16   04/17/19 16    SpO2 Readings from Last 3 Encounters:   03/13/19 93%   01/16/19 96%   11/14/18 99%      Temp Readings from Last 1 Encounters:   04/23/19 36.9  C (98.5  F)    Ht Readings from Last 1 Encounters:   04/22/19 1.549 m (5' 1\")      Wt Readings from Last 1 Encounters:   04/22/19 93.9 kg (207 lb)    Estimated body mass index is 39.11 kg/m  as calculated from the following:    Height as of 4/22/19: 1.549 m (5' 1\").    Weight as of 4/22/19: 93.9 kg (207 lb).       Anesthesia Plan      History & Physical Review      ASA Status:  .  OB Epidural Asa: 2            Postoperative Care      Consents  Anesthetic plan, risks, benefits and alternatives discussed with:  Patient..                 Jose Sampson CRNA, APRN CRNA  "

## 2019-04-23 NOTE — PROGRESS NOTES
From [id number] difficulties were encountered with maintaining US on EFM due to a variety of factors:  Fetal movement, maternal habitus, frequent coughing, frequent urination and unable to maintain one position due to back discomfort.  Day was set up.  Still some difficulty maintaining tracing due to battery issues but much better than EFM.  Verified that the provider has discussed with Liat Corcoran, the following; indications; the agents and methods of labor induction or augmentation, including risks, benefits, and alternative approached; and the possible need for repeat induction or  birth. Questions and concerns were addressed and patient agrees to above. Instructed regarding continuous fetal monitoring and BRP's prn if FHR/uterine activity stable.IV Oxytocin per OB protocol beginning at 2 mU/min. Patient has contractions every 3 min. lasting 45-60 sec. Pt rates her pain at a 0/10. Patient not aware of contractions.  Understands that she will be on continuous EFM throughout induction. FHR baseline is 130 with moderate variability. Patient agrees with plan of care. Will observe for active phase of labor and fetal tolerance.

## 2019-04-23 NOTE — PROGRESS NOTES
Patient arrived at 0700 for elective IOL.  She is currently by herself but states her mother and significant other will be arriving to support.  Patient oriented to room and OB profile completed.  Patient met criteria for urine drug tox due to smoking and has been informed that this test will be completed and cord specimen tested after delivery. Care Transitions was notified of testing.

## 2019-04-23 NOTE — ANESTHESIA PROCEDURE NOTES
Peripheral nerve/Neuraxial procedure note : epidural catheter  Pre-Procedure  Performed by  Jose Sampson APRN CRNA   Location: OB      Pre-Anesthestic Checklist: patient identified, IV checked, risks and benefits discussed, informed consent, monitors and equipment checked, pre-op evaluation and at physician/surgeon's request    Timeout  Correct Patient: Yes   Correct Procedure: Yes   Correct Site: Yes   Correct Laterality: N/A   Correct Position: Yes   Site Marked: N/A   .   Procedure Documentation    .    Procedure:    Epidural catheter.  Insertion Site:L4-5  (midline approach) Injection technique: LORT saline   Local skin infiltrated with mL of 1% lidocaine.       Patient Prep;mask, sterile gloves, patient draped.  .  Needle: Touhy needle Needle Gauge: 17.    Needle Length (Inches) 3.5  # of attempts: 2 and  # of redirects:  2 .   Catheter: 19 G . .  Catheter threaded easily  4 cm epidural space.  8 cm at skin.   .    Assessment/Narrative  Paresthesias: No.  .  .  Aspiration negative for heme or CSF  . Test dose of mL at. Test dose negative for signs of intravascular, subdural or intrathecal injection. Comments:  VAS pain score prior to epidural:10    VAS pain score after epidural:2    Pt. Tolerated well, FHR stable.    Difficult placement due to extreme anxiety and inability to maintain position due to anxiety.  Future epidurals should be placed EARLY in labor so she can co-operate with placement.

## 2019-04-23 NOTE — PLAN OF CARE
IOL in process.  Patient just beginning to have minimal discomfort with contractions which are become more frequent but no evidence of tachysystole.  Fetus tolerating pitocin.

## 2019-04-24 LAB — T PALLIDUM AB SER QL: NONREACTIVE

## 2019-04-24 PROCEDURE — 25000132 ZZH RX MED GY IP 250 OP 250 PS 637: Performed by: OBSTETRICS & GYNECOLOGY

## 2019-04-24 PROCEDURE — 12000000 ZZH R&B MED SURG/OB

## 2019-04-24 RX ADMIN — ACETAMINOPHEN 650 MG: 325 TABLET, FILM COATED ORAL at 04:23

## 2019-04-24 RX ADMIN — IBUPROFEN 800 MG: 800 TABLET ORAL at 23:40

## 2019-04-24 RX ADMIN — OXYCODONE HYDROCHLORIDE 5 MG: 5 TABLET ORAL at 08:19

## 2019-04-24 RX ADMIN — IBUPROFEN 800 MG: 800 TABLET ORAL at 18:02

## 2019-04-24 RX ADMIN — OXYCODONE HYDROCHLORIDE 5 MG: 5 TABLET ORAL at 11:21

## 2019-04-24 RX ADMIN — ACETAMINOPHEN 650 MG: 325 TABLET, FILM COATED ORAL at 08:25

## 2019-04-24 RX ADMIN — ACETAMINOPHEN 650 MG: 325 TABLET, FILM COATED ORAL at 14:43

## 2019-04-24 RX ADMIN — SENNOSIDES AND DOCUSATE SODIUM 1 TABLET: 8.6; 5 TABLET ORAL at 08:20

## 2019-04-24 RX ADMIN — OXYCODONE HYDROCHLORIDE 5 MG: 5 TABLET ORAL at 19:20

## 2019-04-24 RX ADMIN — OXYCODONE HYDROCHLORIDE 5 MG: 5 TABLET ORAL at 14:43

## 2019-04-24 RX ADMIN — IBUPROFEN 800 MG: 800 TABLET ORAL at 11:21

## 2019-04-24 RX ADMIN — OXYCODONE HYDROCHLORIDE 5 MG: 5 TABLET ORAL at 23:20

## 2019-04-24 RX ADMIN — IBUPROFEN 800 MG: 800 TABLET ORAL at 04:23

## 2019-04-24 RX ADMIN — ACETAMINOPHEN 650 MG: 325 TABLET, FILM COATED ORAL at 00:30

## 2019-04-24 RX ADMIN — ACETAMINOPHEN 650 MG: 325 TABLET, FILM COATED ORAL at 23:19

## 2019-04-24 RX ADMIN — ACETAMINOPHEN 650 MG: 325 TABLET, FILM COATED ORAL at 19:19

## 2019-04-24 RX ADMIN — SENNOSIDES AND DOCUSATE SODIUM 1 TABLET: 8.6; 5 TABLET ORAL at 19:19

## 2019-04-24 NOTE — L&D DELIVERY NOTE
"Delivery Summary    Liat Corcoran MRN# [id number]   Age: 23 year old YOB: 1995     ASSESSMENT & PLAN: 24 y/o  admitted at 39w1d for elective IOL; known GBS positive status, received appropriate ABX coverage in labor; pitocin utilized to effect labor and AROM after 1st dose of ABX in >4 hrs.  Epidural effective for labor analgesia  Normal stage 1 progress; at onset of stage 1, pt felt strong urge to push; pushing efforts stalled at perineum with FHR 80-90 ; Kiwi vacuum applied and fetal head delivered with ease over intact perineum  Delayed cord clamping performed  Placenta spontaneous, visually intact  QBL 50 ml  \"Callie\"  Brenda Tran MD  [facility]– [place]         Labor Event Times    Labor onset date:  19 Onset time:   4:00 PM   Dilation complete date:  19 Complete time:   8:30 PM   Start pushing date/time:  2019      Labor Length    1st Stage (hrs):  4 (min):  30   2nd Stage (hrs):  0 (min):  38   3rd Stage (hrs):  0 (min):  2      Labor Events     labor?:  No   steroids:  None  Labor Type:  Induction  Predominate monitoring during 1st stage:  continuous electronic fetal monitoring     Antibiotics received during labor?:  Yes  Reason for Antibiotics:  GBS  Antibiotics received for GBS:  Cefazolin  Antibiotics Given (GBS):  Greater than 4 hours prior to delivery     Rupture date/time: 19 1340   Rupture type:  Artificial Rupture of Membranes  Fluid color:  Clear, Pink  Fluid odor:  Normal     Induction:  Oxytocin  Induction date/time:     Cervical ripening date/time:     Indications for induction:  Elective     1:1 continuous labor support provided by?:  RN Labor partogram used?:  no      Delivery/Placenta Date and Time    Delivery Date:  19 Delivery Time:   9:08 PM   Placenta Date/Time:  2019  9:11 PM  Oxytocin given at the time of delivery:  after delivery of baby     Vaginal Counts     Initial count performed by 2 team " "members:   Two Team Members   Ashley Richmond       Needles Suture Vowinckel Sponges Instruments   Initial counts 2 1 5    Added to count       Final counts 2 1 5    Placed during labor Accounted for at the end of labor   NA NA   NA NA   NA NA    Final count performed by 2 team members:   Two Team Members   Dr. Marc Baez RN         Apgars    Living status:  Living   1 Minute 5 Minute 10 Minute 15 Minute 20 Minute   Skin color: 1  1       Heart rate: 2  2       Reflex irritability: 2  2       Muscle tone: 2  2       Respiratory effort: 2  2       Total: 9  9       Apgars assigned by:  LYSSA HERNANDEZ     Cord    Vessels:  3 Vessels Complications:  None   Cord Blood Disposition:  Lab Gases Sent?:  No       Resuscitation    Methods:  None     [place] Measurements    Weight:  6 lb 12.3 oz Length:  1' 8\"   Head circumference:  33.7 cm       Skin to Skin and Feeding Plan    Skin to skin initiation date/time: 1841    Skin to skin with:  Mother  Skin to skin end date/time: 1841    Breastfeeding initiated date/time:  2019  How do you plan to feed your baby:  Breastfeeding     Labor Events and Shoulder Dystocia    Fetal Tracing Prior to Delivery:  Category 2  Fetal Tracing Comments:  FHR 80-90 in late stage 2  Shoulder dystocia present?:  Neg     Delivery (Maternal) (Provider to Complete) ([id number])    Episiotomy:  None  Perineal lacerations:  None    Vaginal laceration?:  No    Cervical laceration?:  No       Blood Loss  Mother: Liat Corcoran #[id number]   Start of Mother's Information    IO Blood Loss  19 1600 - 19 2108    Total QBL Blood Loss (mL) Hospital Encounter 329 mL    Total  329 mL         End of Mother's Information  Mother: Liat Corcoran #[id number]         Delivery - Provider to Complete ([id number])    Delivering clinician:  Brenda Tran MD  Attempted Delivery Types (Choose all that apply):  Spontaneous Vaginal Delivery  Delivery Type (Choose " the 1 that will go to the Birth History):  Vaginal, Vacuum (Extractor)  Verbal Informed Consent Obtained For Vacuum:  Yes Alternate Labor Strategies Discussed (vacuum):  Yes   Emergency Resources Available (vacuum):  Charge Nurse/Team  Type (vacuum):  Kiwi Accrued Pulling Time (vacuum):  15 sec   # of Pop-Offs (vacuum):  0 # of Pulls/Contractions (vacuum):  1   Position (vacuum):  OA Fetal Station (vacuum):  +3   Indication for Operative Vaginal Delivery (vacuum):  Fetal Status  Operative Vaginal Delivery Brief Note Vacuum:  Maternal pushing efforts encouraged; fetal heart rate 80-90 during late stage 2 and fetal head arrested on perineum; verbal consent obtained from pt for use of Kiwi vacuum; occiput located and moderate traction applied; head delivered smoothly over intact perineum through 1 contraction; spontaneous cry noted.  Peds arrived just after delivery.   Other personnel:   Provider Role   Ketty Pace, RN Delivery Nurse   Loulou Baez RN Charge Nurse   Lorie Bajwa NP Nurse Practitioner         Placenta    Delayed Cord Clamping:  Done  Date/Time:  4/23/2019  9:11 PM  Removal:  Spontaneous  Disposition:  Hospital disposal     Anesthesia    Method:  Epidural          Presentation and Position    Presentation:  Vertex  Position:  Right Occiput Anterior           Brenda Tran MD

## 2019-04-24 NOTE — PLAN OF CARE
Care of patient assumed after report received. Patient having pressure in perineum that is tolerable with epidural. FHR baseline 120, cat 1 tracing. Contractions every 2-3 min, lasting  sec palpating strong. Pitocin currently infusing at 14ml/hr. Patient positioned with peanut ball.   Support person(s) present. Plan of care reviewed with patient and family.  Will continue to monitor and assess.

## 2019-04-24 NOTE — PLAN OF CARE
Patient has been experiencing bouts of low mid back pain that she describes as chronic but worse after epidural received in labor.  Dr. Tran ordered a stronger pain medication which patient states has been partially effective.  Encouraged to ambulate and increase fluids to prevent constipation; however patient states she has had a bm today.

## 2019-04-24 NOTE — PLAN OF CARE
Mother and baby transferred to postpartum unit at 2330 via zeyad ferrera after completion of immediate recovery period. Patient oriented to room and report given to Aayush Mullins RN who assumes patient care. Mother and baby bonding well and in stable condition upon transfer.

## 2019-04-24 NOTE — PLAN OF CARE
S:Delivery  B:Induced  Labor,39w1d    Lab Results   Component Value Date    GBS Positive (A) 2019    with antibiotic treatment greater than or equal to 4 hours prior to delivery.  A: Patient delivered VAVD with one pull and no pop offs, over intact perineum at 2108 with Dr. DRAKE Tran in attendance and baby placed on mother's abdomen for delayed cord clamping. Baby dried and stimulated. Baby placed  skin to skin @ 2109.. Apgars 9/9.  IV infusion of Oxytocin  infused. Placenta removal spontaneous. MD does not want placenta sent to pathology.  See Flowsheet for VS and PP checks.  .  Labor care plan goals met, transition now to postpartum care.  R: Expect routine postpartum care. Anticipate first feeding within the hour or whenever infant displays feeding cues. Continue skin to skin. Prior discussion with mother indicates that feeding plan is Breast feeding . Educated mother on importance of exclusive breastfeeding, expected feeding readiness cues and encouraged her to observe for these cues while rooming in. Informed her that breastfeeding assistance would be provided.

## 2019-04-24 NOTE — PLAN OF CARE
Data: Vital signs within normal limits. Postpartum checks within normal limits - see flow record. Patient eating and drinking normally. Patient able to empty bladder independently. . Patient ambulating independently..   No apparent signs of infection. perineum healing well. Patient Is performing self cares and Is able to care for infant. Positive attachment behaviors are observed with infant. Support persons are present.  Action:  Pain plan was discussed. Patient would like pain meds to be brought in when they are due. Patient was medicated during the shift for pain. See MAR.Patient education done about  cares. See flow record.  Response:   Patient reassessed within 1 hour after each medication for pain. Patient stated that pain had improved. Patient stated that she was comfortable. .   Plan: Anticipate discharge on .

## 2019-04-24 NOTE — PROGRESS NOTES
[facility] Obstetrics Post-Partum Progress Note          Assessment and Plan:    Assessment:   Post-partum day #1  Vacuum extraction VD  L&D complications: none      Doing well.  No excessive bleeding      Plan:   Ambulate; pain management           Interval History:   Doing well.  Pain is well-controlled.  No fevers.  No history of foul-smelling vaginal discharge.  Good appetite.  Denies chest pain, shortness of breath, nausea or vomiting.  Vaginal bleeding is similar to a heavy menstrual flow.  Ambulatory.  Breastfeeding well.          Significant Problems:    Active Problems:    GBS (group B Streptococcus carrier), +RV culture, currently pregnant    Normal labor and delivery    Vacuum extractor delivery, delivered            Review of Systems:    The patient denies any chest pain, shortness of breath, excessive pain, fever, chills, purulent drainage from the wound, nausea or vomiting.          Medications:   All medications related to the patient's surgery have been reviewed          Physical Exam:     All vitals stable  Patient Vitals for the past 12 hrs:   BP Temp Temp src Pulse Resp SpO2   04/24/19 0030 115/64 97.8  F (36.6  C) Oral 72 16 97 %   04/23/19 2315 121/68 -- -- 82 -- --   04/23/19 2300 113/61 99.5  F (37.5  C) Oral 84 16 --   04/23/19 2245 112/64 -- -- 88 -- --   04/23/19 2230 110/77 -- -- 89 -- --   04/23/19 2215 119/58 -- -- 82 -- --   04/23/19 2200 118/56 -- -- 83 -- --   04/23/19 2145 111/73 -- -- 81 -- --   04/23/19 2130 125/83 98.3  F (36.8  C) Oral 90 -- --   04/23/19 2115 133/69 -- -- 93 -- --   04/23/19 2015 -- 98.7  F (37.1  C) Oral -- -- --     Uterine fundus is firm, non-tender and at the level of the umbilicus          Data:     All laboratory data related to this surgery reviewed  Hemoglobin   Date Value Ref Range Status   04/23/2019 10.7 (L) 11.7 - 15.7 g/dL Final   10/10/2018 13.0 11.7 - 15.7 g/dL Final   04/17/2017 14.9 11.7 - 15.7 g/dL Final   10/20/2016 11.5 (L) 11.7 - 15.7  g/dL Final   03/07/2016 14.3 11.7 - 15.7 g/dL Final     No imaging studies have been ordered    Brenda Tran MD

## 2019-04-25 ENCOUNTER — TELEPHONE (OUTPATIENT)
Dept: OBGYN | Facility: CLINIC | Age: 24
End: 2019-04-25

## 2019-04-25 VITALS
DIASTOLIC BLOOD PRESSURE: 80 MMHG | TEMPERATURE: 98.6 F | OXYGEN SATURATION: 97 % | RESPIRATION RATE: 18 BRPM | SYSTOLIC BLOOD PRESSURE: 120 MMHG | HEART RATE: 77 BPM

## 2019-04-25 PROCEDURE — 25000132 ZZH RX MED GY IP 250 OP 250 PS 637: Performed by: OBSTETRICS & GYNECOLOGY

## 2019-04-25 RX ORDER — IBUPROFEN 800 MG/1
800 TABLET, FILM COATED ORAL EVERY 6 HOURS PRN
Qty: 40 TABLET | Refills: 1 | Status: SHIPPED | OUTPATIENT
Start: 2019-04-25 | End: 2019-06-25

## 2019-04-25 RX ORDER — TRAMADOL HYDROCHLORIDE 50 MG/1
50 TABLET ORAL EVERY 6 HOURS PRN
Qty: 10 TABLET | Refills: 0 | Status: SHIPPED | OUTPATIENT
Start: 2019-04-25 | End: 2019-04-29

## 2019-04-25 RX ADMIN — IBUPROFEN 800 MG: 800 TABLET ORAL at 06:28

## 2019-04-25 RX ADMIN — OXYCODONE HYDROCHLORIDE 5 MG: 5 TABLET ORAL at 03:23

## 2019-04-25 RX ADMIN — ACETAMINOPHEN 650 MG: 325 TABLET, FILM COATED ORAL at 03:23

## 2019-04-25 RX ADMIN — OXYCODONE HYDROCHLORIDE 5 MG: 5 TABLET ORAL at 07:33

## 2019-04-25 RX ADMIN — ACETAMINOPHEN 650 MG: 325 TABLET, FILM COATED ORAL at 07:32

## 2019-04-25 RX ADMIN — SENNOSIDES AND DOCUSATE SODIUM 1 TABLET: 8.6; 5 TABLET ORAL at 07:33

## 2019-04-25 NOTE — TELEPHONE ENCOUNTER
Pt delivered 4/23/19 is having pain like contractions, in the back and stomach area. She is taking tylenol and motrin with no help.  Pain is so bad it feels like it is locking up.  Pt said that the oxy she was given in the hospital Did help the pain.    Please call-     Kim Churchill  Clinic [facility]

## 2019-04-25 NOTE — DISCHARGE INSTRUCTIONS
Discharge diet: Regular   Discharge activity: Activity as tolerated   Discharge follow-up: Follow up with OB in 6 weeks   Wound care: Drink plenty of fluids       Postpartum Vaginal Delivery Instructions    Activity       Ask family and friends for help when you need it.    Do not place anything in your vagina for 6 weeks.    You are not restricted on other activities, but take it easy for a few weeks to allow your body to recover from delivery.  You are able to do any activities you feel up to that point.    No driving until you have stopped taking your pain medications (usually two weeks after delivery).     Call your health care provider if you have any of these symptoms:       Increased pain, swelling, redness, or fluid around your stiches from an episiotomy or perineal tear.    A fever above 100.4 F (38 C) with or without chills when placing a thermometer under your tongue.    You soak a sanitary pad with blood within 1 hour, or you see blood clots larger than a golf ball.    Bleeding that lasts more than 6 weeks.    Vaginal discharge that smells bad.    Severe pain, cramping or tenderness in your lower belly area.    A need to urinate more frequently (use the toilet more often), more urgently (use the toilet very quickly), or it burns when you urinate.    Nausea and vomiting.    Redness, swelling or pain around a vein in your leg.    Problems breastfeeding or a red or painful area on your breast.    Chest pain and cough or are gasping for air.    Problems coping with sadness, anxiety, or depression.  If you have any concerns about hurting yourself or the baby, call your provider immediately.     You have questions or concerns after you return home.     Keep your hands clean:  Always wash your hands before touching your perineal area and stitches.  This helps reduce your risk of infection.  If your hands aren't dirty, you may use an alcohol hand-rub to clean your hands. Keep your nails clean and short.

## 2019-04-25 NOTE — TELEPHONE ENCOUNTER
"Return call to patient.  Spoke with patient on the phone.    S-(situation): Patient reports pain 7-8/10 in her abdomen from cramping and also in the middle of her back. Patient reports \" it all locks up in there and then I cannot even move.\" patient reports pain was this bad in the hospital and she was given oxycodone which helped. Patient tearful that she is not able to care for her 2 year old and her new baby. Patient currently at home by herself. Patient reports over the past 2 hours she is unable to get any relief from the pain. Patient reports taking 800 mg of Ibuprofen 3 hours ago and 1000 mg of ES Tylenol at the same time. Patient reports no change. Patient reports she is breastfeeding her baby and cramping is exacerbated with this. Patient denies nausea or vomiting. Patient reports normal urination. Patient reports bleeding is normal.    B-(background): vaginal delivery over intact perineum on 4/23/19  home from the hospital today     A-(assessment): pain secondary to childbirth 2 days ago    R-(recommendations): Discussed comfort measures and relaxation techniques. Reviewed normalcy of increased cramping with breastfeeding, Recommended patient try a low set heating pad or heat pack on her abdomen to help with muscle relaxation. Recommend patient soak in warm tub of water several times throughout the day. Recommend patient have family/friends/boyfriend help with care of 2 year old so patient can focus on herself and new baby. Encouraged resting when baby is resting. Encouraged pushing fluids to stay well hydrated and to help with breast milk. If pain does not improve and patient feels she needs further evaluation, recommend Urgent Care or ER.    Huddled with Dr. Delgado - roxi prescription for Tramadol can be given. Patient would like prescription sent to [facility]. Can be hand faxed.    Pt in agreement and reports understanding.    Jennifer Brown   [facility]  RN      "

## 2019-04-25 NOTE — DISCHARGE SUMMARY
[facility] Discharge Summary    Liat Corcoran MRN# [id number]   Age: 23 year old YOB: 1995     Date of Admission:  4/23/2019  Date of Discharge::  4/25/2019  Admitting Physician:  Brenda Tran MD  Discharge Physician:  Brenda Tran MD     Home clinic: [facility]          Admission Diagnoses:   Normal labor and delivery          Discharge Diagnosis:   Intrauterine pregnancy at 39 weeks gestation  Vacuum extraction          Procedures:   Procedure(s): No additional procedures performed       No other procedures performed during this admission           Medications Prior to Admission:     Medications Prior to Admission   Medication Sig Dispense Refill Last Dose     acetaminophen (TYLENOL) 325 MG tablet Take 975 mg by mouth every 6 hours as needed for mild pain or headaches   4/22/2019 at pm     albuterol (PROAIR HFA/PROVENTIL HFA/VENTOLIN HFA) 108 (90 Base) MCG/ACT inhaler Inhale 2 puffs into the lungs every 6 hours as needed for shortness of breath / dyspnea or wheezing    Past Week at Unknown time     Prenatal Vit-Fe Fumarate-FA (PRENATAL MULTIVITAMIN PLUS IRON) 27-0.8 MG TABS per tablet Take 1 tablet by mouth daily   Past Week at Unknown time             Discharge Medications:     Current Discharge Medication List      START taking these medications    Details   ibuprofen (ADVIL/MOTRIN) 800 MG tablet Take 1 tablet (800 mg) by mouth every 6 hours as needed for other (cramping)  Qty: 40 tablet, Refills: 1    Associated Diagnoses: Vacuum extractor delivery, delivered         CONTINUE these medications which have NOT CHANGED    Details   acetaminophen (TYLENOL) 325 MG tablet Take 975 mg by mouth every 6 hours as needed for mild pain or headaches      albuterol (PROAIR HFA/PROVENTIL HFA/VENTOLIN HFA) 108 (90 Base) MCG/ACT inhaler Inhale 2 puffs into the lungs every 6 hours as needed for shortness of breath / dyspnea or wheezing       Prenatal Vit-Fe  "Fumarate-FA (PRENATAL MULTIVITAMIN PLUS IRON) 27-0.8 MG TABS per tablet Take 1 tablet by mouth daily                   Consultations:   No consultations were requested during this admission          Brief History of Labor:       : 22 y/o  admitted at 39w1d for elective IOL; known GBS positive status, received appropriate ABX coverage in labor; pitocin utilized to effect labor and AROM after 1st dose of ABX in >4 hrs.  Epidural effective for labor analgesia  Normal stage 1 progress; at onset of stage 1, pt felt strong urge to push; pushing efforts stalled at perineum with FHR 80-90 ; Kiwi vacuum applied and fetal head delivered with ease over intact perineum  Delayed cord clamping performed  Placenta spontaneous, visually intact  QBL 50 ml  \"Callie\"  Brenda Tran MD  [facility] Course:   The patient's hospital course was unremarkable.  On discharge, her pain was well controlled. Vaginal bleeding is similar to peak menstrual flow.  Voiding without difficulty.  Ambulating well and tolerating a normal diet.  No fever.  Breastfeeding well.  Infant is stable.  No bowel movement yet.*  She was discharged on post-partum day #2.    Post-partum hemoglobin:   Hemoglobin   Date Value Ref Range Status   2019 10.7 (L) 11.7 - 15.7 g/dL Final             Discharge Instructions and Follow-Up:   Discharge diet: Regular   Discharge activity: Activity as tolerated   Discharge follow-up: Follow up with OB in 6 weeks   Wound care: Drink plenty of fluids           Discharge Disposition:   Discharged to home      Attestation:  I have reviewed today's vital signs, notes, medications, labs and imaging.    Brenda Tran MD     "

## 2019-04-25 NOTE — PLAN OF CARE
Discharge instructions printed out and gone over with patient and significant other. Patient verbalized understanding and had all questions answered.  Information given to patient on post partum education.  Discharge Medications gone over with patient and prescriptions sent to retail pharmacy for  on D/C.  Signature obtained. Pt escorted out with nursing staff and all belongings in hand at 11:20.

## 2019-04-25 NOTE — PROGRESS NOTES
[facility] Obstetrics Post-Partum Progress Note          Assessment and Plan:    Assessment:   Post-partum day #2  Normal spontaneous vaginal delivery  L&D complications: none      Doing well.  No excessive bleeding  Pain well-controlled.      Plan:   Discharge homme           Interval History:   Doing well.  Pain is well-controlled.  No fevers.  No history of foul-smelling vaginal discharge.  Good appetite.  Denies chest pain, shortness of breath, nausea or vomiting.  Vaginal bleeding is similar to a heavy menstrual flow.  Ambulatory.  Breastfeeding well.          Significant Problems:    Active Problems:    GBS (group B Streptococcus carrier), +RV culture, currently pregnant    Normal labor and delivery    Vacuum extractor delivery, delivered            Review of Systems:    The patient denies any chest pain, shortness of breath, excessive pain, fever, chills, purulent drainage from the wound, nausea or vomiting.          Medications:   All medications related to the patient's surgery have been reviewed          Physical Exam:     All vitals stable  Patient Vitals for the past 8 hrs:   BP Temp Temp src Pulse Resp   04/25/19 0734 120/80 98.6  F (37  C) Oral 77 18     Uterine fundus is firm, non-tender and at the level of the umbilicus          Data:   All laboratory data related to this surgery reviewed  All imaging studies related to this surgery reviewed    Brenda Tran MD

## 2019-04-25 NOTE — PLAN OF CARE
Pt follows PP pathway without incident, tolerates po meds for pain which are working well for her.  She is up and about independently caring for herself & her . She is breastfeeding, that is going well.  Gave her the depression screening which she turned in, she scored 7; attributes this to the loss of her maternal grandmother 3 weeks ago who was present at the birth of her son; reports she is not in need of any interventions at this time; reviewed s/s PP depression.  Anticipate discharge , will continue to monitor & update as needed.

## 2019-04-25 NOTE — PLAN OF CARE
Pt  has followed her postpartum vaginal delivery pathway with VSS, assessments WNL, is tolerating her activities and is able to care for her . Pt reports adequate pain control using oral pain medications.

## 2019-04-29 ENCOUNTER — OFFICE VISIT (OUTPATIENT)
Dept: FAMILY MEDICINE | Facility: CLINIC | Age: 24
End: 2019-04-29
Payer: COMMERCIAL

## 2019-04-29 VITALS — HEART RATE: 77 BPM | BODY MASS INDEX: 37.22 KG/M2 | OXYGEN SATURATION: 97 % | TEMPERATURE: 98.6 F | WEIGHT: 197 LBS

## 2019-04-29 DIAGNOSIS — R31.9 URINARY TRACT INFECTION WITH HEMATURIA, SITE UNSPECIFIED: Primary | ICD-10-CM

## 2019-04-29 DIAGNOSIS — M54.6 ACUTE MIDLINE THORACIC BACK PAIN: ICD-10-CM

## 2019-04-29 DIAGNOSIS — N39.0 URINARY TRACT INFECTION WITH HEMATURIA, SITE UNSPECIFIED: Primary | ICD-10-CM

## 2019-04-29 LAB
ALBUMIN UR-MCNC: 100 MG/DL
APPEARANCE UR: ABNORMAL
BACTERIA #/AREA URNS HPF: ABNORMAL /HPF
BILIRUB UR QL STRIP: NEGATIVE
COLOR UR AUTO: ABNORMAL
GLUCOSE UR STRIP-MCNC: NEGATIVE MG/DL
HGB UR QL STRIP: ABNORMAL
KETONES UR STRIP-MCNC: NEGATIVE MG/DL
LEUKOCYTE ESTERASE UR QL STRIP: ABNORMAL
NITRATE UR QL: NEGATIVE
NON-SQ EPI CELLS #/AREA URNS LPF: ABNORMAL /LPF
PH UR STRIP: 7 PH (ref 5–7)
RBC #/AREA URNS AUTO: >100 /HPF
SOURCE: ABNORMAL
SP GR UR STRIP: 1.02 (ref 1–1.03)
UROBILINOGEN UR STRIP-ACNC: 0.2 EU/DL (ref 0.2–1)
WBC #/AREA URNS AUTO: ABNORMAL /HPF

## 2019-04-29 PROCEDURE — 81001 URINALYSIS AUTO W/SCOPE: CPT | Performed by: NURSE PRACTITIONER

## 2019-04-29 PROCEDURE — 99214 OFFICE O/P EST MOD 30 MIN: CPT | Performed by: NURSE PRACTITIONER

## 2019-04-29 PROCEDURE — 87086 URINE CULTURE/COLONY COUNT: CPT | Performed by: NURSE PRACTITIONER

## 2019-04-29 RX ORDER — CIPROFLOXACIN 500 MG/1
500 TABLET, FILM COATED ORAL 2 TIMES DAILY
Qty: 6 TABLET | Refills: 0 | Status: SHIPPED | OUTPATIENT
Start: 2019-04-29 | End: 2019-06-14

## 2019-04-29 RX ORDER — DULOXETIN HYDROCHLORIDE 30 MG/1
30 CAPSULE, DELAYED RELEASE ORAL DAILY
Qty: 60 CAPSULE | Refills: 0 | Status: SHIPPED | OUTPATIENT
Start: 2019-04-29 | End: 2019-11-08

## 2019-04-29 ASSESSMENT — PATIENT HEALTH QUESTIONNAIRE - PHQ9: SUM OF ALL RESPONSES TO PHQ QUESTIONS 1-9: 11
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
## 2019-04-29 NOTE — NURSING NOTE
"Chief Complaint   Patient presents with     Back Pain       Initial LMP 07/20/2018 (Approximate)  Estimated body mass index is 39.11 kg/m  as calculated from the following:    Height as of 4/22/19: 1.549 m (5' 1\").    Weight as of 4/22/19: 93.9 kg (207 lb).    Patient presents to the clinic using No DME    Health Maintenance that is potentially due pending provider review:  NONE    n/a    Is there anyone who you would like to be able to receive your results? No  If yes have patient fill out FADIA    "

## 2019-04-29 NOTE — PATIENT INSTRUCTIONS
Treat Urinary Tract Infection with antibiotics for 3 days    Push fluids     Start Cymbalta for the depression - E-visit or Telephone visit in 3-4 weeks for recheck     For back ice as much as able and continue to be as active as your body will allow    See me back otherwise as needed

## 2019-04-30 LAB
BACTERIA SPEC CULT: NO GROWTH
Lab: NORMAL
SPECIMEN SOURCE: NORMAL

## 2019-06-14 ENCOUNTER — OFFICE VISIT (OUTPATIENT)
Dept: FAMILY MEDICINE | Facility: CLINIC | Age: 24
End: 2019-06-14
Payer: COMMERCIAL

## 2019-06-14 VITALS
WEIGHT: 185 LBS | OXYGEN SATURATION: 98 % | DIASTOLIC BLOOD PRESSURE: 98 MMHG | RESPIRATION RATE: 12 BRPM | TEMPERATURE: 98.5 F | HEIGHT: 61 IN | SYSTOLIC BLOOD PRESSURE: 134 MMHG | BODY MASS INDEX: 34.93 KG/M2 | HEART RATE: 85 BPM

## 2019-06-14 DIAGNOSIS — S02.5XXA CLOSED FRACTURE OF TOOTH, INITIAL ENCOUNTER: ICD-10-CM

## 2019-06-14 DIAGNOSIS — K04.7 TOOTH INFECTION: Primary | ICD-10-CM

## 2019-06-14 PROCEDURE — 99213 OFFICE O/P EST LOW 20 MIN: CPT | Performed by: NURSE PRACTITIONER

## 2019-06-14 RX ORDER — CLINDAMYCIN HCL 300 MG
300 CAPSULE ORAL 3 TIMES DAILY
Qty: 30 CAPSULE | Refills: 0 | Status: SHIPPED | OUTPATIENT
Start: 2019-06-14 | End: 2019-11-08

## 2019-06-14 RX ORDER — HYDROCODONE BITARTRATE AND ACETAMINOPHEN 5; 325 MG/1; MG/1
1 TABLET ORAL EVERY 8 HOURS PRN
Qty: 14 TABLET | Refills: 0 | Status: SHIPPED | OUTPATIENT
Start: 2019-06-14 | End: 2019-11-08

## 2019-06-14 RX ORDER — IBUPROFEN 800 MG/1
800 TABLET, FILM COATED ORAL EVERY 8 HOURS PRN
Qty: 60 TABLET | Refills: 0 | Status: SHIPPED | OUTPATIENT
Start: 2019-06-14 | End: 2019-07-20 | Stop reason: DRUGHIGH

## 2019-06-14 ASSESSMENT — MIFFLIN-ST. JEOR: SCORE: 1531.53

## 2019-06-14 NOTE — NURSING NOTE
"Chief Complaint   Patient presents with     Dental Problem     broke left upper back tooth two weeks ago        Initial BP (!) 134/98   Pulse 85   Temp 98.5  F (36.9  C) (Tympanic)   Resp 12   Ht 1.549 m (5' 1\")   Wt 83.9 kg (185 lb)   LMP 07/20/2018 (Approximate)   SpO2 98%   BMI 34.96 kg/m   Estimated body mass index is 34.96 kg/m  as calculated from the following:    Height as of this encounter: 1.549 m (5' 1\").    Weight as of this encounter: 83.9 kg (185 lb).    Patient presents to the clinic using No DME    Health Maintenance that is potentially due pending provider review:  NONE    n/a    Is there anyone who you would like to be able to receive your results? No  If yes have patient fill out FADIA    "
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
## 2019-06-14 NOTE — PATIENT INSTRUCTIONS
Antibiotics for 10 days     Norco for pain - sparingly and at night    Still use Ibuprofen as needed     Ice to face/cheeck area     See dentist as scheduled     Return to clinic if worsening in the meantime

## 2019-06-25 ENCOUNTER — APPOINTMENT (OUTPATIENT)
Dept: ULTRASOUND IMAGING | Facility: CLINIC | Age: 24
End: 2019-06-25
Attending: EMERGENCY MEDICINE
Payer: COMMERCIAL

## 2019-06-25 ENCOUNTER — HOSPITAL ENCOUNTER (EMERGENCY)
Facility: CLINIC | Age: 24
Discharge: HOME | End: 2019-06-25
Attending: EMERGENCY MEDICINE | Admitting: EMERGENCY MEDICINE
Payer: COMMERCIAL

## 2019-06-25 ENCOUNTER — NURSE TRIAGE (OUTPATIENT)
Dept: NURSING | Facility: CLINIC | Age: 24
End: 2019-06-25

## 2019-06-25 VITALS
SYSTOLIC BLOOD PRESSURE: 142 MMHG | OXYGEN SATURATION: 99 % | WEIGHT: 170 LBS | DIASTOLIC BLOOD PRESSURE: 98 MMHG | RESPIRATION RATE: 16 BRPM | HEART RATE: 53 BPM | HEIGHT: 61 IN | TEMPERATURE: 98 F | BODY MASS INDEX: 32.1 KG/M2

## 2019-06-25 DIAGNOSIS — R10.13 EPIGASTRIC PAIN: ICD-10-CM

## 2019-06-25 DIAGNOSIS — K27.9 PEPTIC ULCER: ICD-10-CM

## 2019-06-25 DIAGNOSIS — K82.8 GALLBLADDER SLUDGE: ICD-10-CM

## 2019-06-25 LAB
ALBUMIN SERPL-MCNC: 3.5 G/DL (ref 3.4–5)
ALP SERPL-CCNC: 76 U/L (ref 40–150)
ALT SERPL W P-5'-P-CCNC: 18 U/L (ref 0–50)
ANION GAP SERPL CALCULATED.3IONS-SCNC: 6 MMOL/L (ref 3–14)
AST SERPL W P-5'-P-CCNC: 15 U/L (ref 0–45)
BASOPHILS # BLD AUTO: 0.1 10E9/L (ref 0–0.2)
BASOPHILS NFR BLD AUTO: 0.7 %
BILIRUB SERPL-MCNC: 0.4 MG/DL (ref 0.2–1.3)
BUN SERPL-MCNC: 8 MG/DL (ref 7–30)
CALCIUM SERPL-MCNC: 8.7 MG/DL (ref 8.5–10.1)
CHLORIDE SERPL-SCNC: 111 MMOL/L (ref 94–109)
CO2 SERPL-SCNC: 23 MMOL/L (ref 20–32)
CREAT SERPL-MCNC: 0.58 MG/DL (ref 0.52–1.04)
DIFFERENTIAL METHOD BLD: ABNORMAL
EOSINOPHIL # BLD AUTO: 0.2 10E9/L (ref 0–0.7)
EOSINOPHIL NFR BLD AUTO: 3 %
ERYTHROCYTE [DISTWIDTH] IN BLOOD BY AUTOMATED COUNT: 14.5 % (ref 10–15)
GFR SERPL CREATININE-BSD FRML MDRD: >90 ML/MIN/{1.73_M2}
GLUCOSE SERPL-MCNC: 78 MG/DL (ref 70–99)
HCT VFR BLD AUTO: 40.6 % (ref 35–47)
HGB BLD-MCNC: 12.3 G/DL (ref 11.7–15.7)
IMM GRANULOCYTES # BLD: 0 10E9/L (ref 0–0.4)
IMM GRANULOCYTES NFR BLD: 0.1 %
LIPASE SERPL-CCNC: 60 U/L (ref 73–393)
LYMPHOCYTES # BLD AUTO: 3.6 10E9/L (ref 0.8–5.3)
LYMPHOCYTES NFR BLD AUTO: 45.3 %
MCH RBC QN AUTO: 26 PG (ref 26.5–33)
MCHC RBC AUTO-ENTMCNC: 30.3 G/DL (ref 31.5–36.5)
MCV RBC AUTO: 86 FL (ref 78–100)
MONOCYTES # BLD AUTO: 0.6 10E9/L (ref 0–1.3)
MONOCYTES NFR BLD AUTO: 6.8 %
NEUTROPHILS # BLD AUTO: 3.5 10E9/L (ref 1.6–8.3)
NEUTROPHILS NFR BLD AUTO: 44.1 %
NRBC # BLD AUTO: 0 10*3/UL
NRBC BLD AUTO-RTO: 0 /100
PLATELET # BLD AUTO: 383 10E9/L (ref 150–450)
POTASSIUM SERPL-SCNC: 4 MMOL/L (ref 3.4–5.3)
PROT SERPL-MCNC: 7.3 G/DL (ref 6.8–8.8)
RBC # BLD AUTO: 4.73 10E12/L (ref 3.8–5.2)
SODIUM SERPL-SCNC: 140 MMOL/L (ref 133–144)
TROPONIN I SERPL-MCNC: <0.015 UG/L (ref 0–0.04)
WBC # BLD AUTO: 8 10E9/L (ref 4–11)

## 2019-06-25 PROCEDURE — 99285 EMERGENCY DEPT VISIT HI MDM: CPT | Mod: 25 | Performed by: EMERGENCY MEDICINE

## 2019-06-25 PROCEDURE — 93010 ELECTROCARDIOGRAM REPORT: CPT | Mod: Z6 | Performed by: EMERGENCY MEDICINE

## 2019-06-25 PROCEDURE — 83690 ASSAY OF LIPASE: CPT | Performed by: EMERGENCY MEDICINE

## 2019-06-25 PROCEDURE — 96361 HYDRATE IV INFUSION ADD-ON: CPT | Performed by: EMERGENCY MEDICINE

## 2019-06-25 PROCEDURE — 93005 ELECTROCARDIOGRAM TRACING: CPT | Performed by: EMERGENCY MEDICINE

## 2019-06-25 PROCEDURE — 25000128 H RX IP 250 OP 636: Performed by: EMERGENCY MEDICINE

## 2019-06-25 PROCEDURE — 85025 COMPLETE CBC W/AUTO DIFF WBC: CPT | Performed by: EMERGENCY MEDICINE

## 2019-06-25 PROCEDURE — 84484 ASSAY OF TROPONIN QUANT: CPT | Performed by: EMERGENCY MEDICINE

## 2019-06-25 PROCEDURE — 96374 THER/PROPH/DIAG INJ IV PUSH: CPT | Performed by: EMERGENCY MEDICINE

## 2019-06-25 PROCEDURE — 25000125 ZZHC RX 250: Performed by: EMERGENCY MEDICINE

## 2019-06-25 PROCEDURE — 96375 TX/PRO/DX INJ NEW DRUG ADDON: CPT | Performed by: EMERGENCY MEDICINE

## 2019-06-25 PROCEDURE — 76705 ECHO EXAM OF ABDOMEN: CPT

## 2019-06-25 PROCEDURE — 80053 COMPREHEN METABOLIC PANEL: CPT | Performed by: EMERGENCY MEDICINE

## 2019-06-25 PROCEDURE — 25000132 ZZH RX MED GY IP 250 OP 250 PS 637: Performed by: EMERGENCY MEDICINE

## 2019-06-25 RX ORDER — LORAZEPAM 2 MG/ML
1 INJECTION INTRAMUSCULAR ONCE
Status: COMPLETED | OUTPATIENT
Start: 2019-06-25 | End: 2019-06-25

## 2019-06-25 RX ORDER — SODIUM CHLORIDE 9 MG/ML
1000 INJECTION, SOLUTION INTRAVENOUS CONTINUOUS
Status: DISCONTINUED | OUTPATIENT
Start: 2019-06-25 | End: 2019-06-25 | Stop reason: HOSPADM

## 2019-06-25 RX ORDER — ONDANSETRON 2 MG/ML
4 INJECTION INTRAMUSCULAR; INTRAVENOUS EVERY 30 MIN PRN
Status: DISCONTINUED | OUTPATIENT
Start: 2019-06-25 | End: 2019-06-25 | Stop reason: HOSPADM

## 2019-06-25 RX ORDER — MORPHINE SULFATE 4 MG/ML
4 INJECTION, SOLUTION INTRAMUSCULAR; INTRAVENOUS
Status: DISCONTINUED | OUTPATIENT
Start: 2019-06-25 | End: 2019-06-25 | Stop reason: HOSPADM

## 2019-06-25 RX ORDER — HYDROCODONE BITARTRATE AND ACETAMINOPHEN 5; 325 MG/1; MG/1
1 TABLET ORAL EVERY 6 HOURS PRN
Qty: 6 TABLET | Refills: 0 | Status: SHIPPED | OUTPATIENT
Start: 2019-06-25 | End: 2019-11-08

## 2019-06-25 RX ADMIN — LIDOCAINE HYDROCHLORIDE 30 ML: 20 SOLUTION ORAL; TOPICAL at 07:12

## 2019-06-25 RX ADMIN — MORPHINE SULFATE 4 MG: 4 INJECTION INTRAVENOUS at 07:36

## 2019-06-25 RX ADMIN — ONDANSETRON 4 MG: 2 INJECTION INTRAMUSCULAR; INTRAVENOUS at 07:13

## 2019-06-25 RX ADMIN — SODIUM CHLORIDE 1000 ML: 9 INJECTION, SOLUTION INTRAVENOUS at 07:22

## 2019-06-25 RX ADMIN — LORAZEPAM 1 MG: 2 INJECTION, SOLUTION INTRAMUSCULAR; INTRAVENOUS at 07:13

## 2019-06-25 ASSESSMENT — ENCOUNTER SYMPTOMS
VOMITING: 0
SHORTNESS OF BREATH: 0
FEVER: 0
ABDOMINAL PAIN: 1
NAUSEA: 1

## 2019-06-25 ASSESSMENT — MIFFLIN-ST. JEOR: SCORE: 1458.49

## 2019-06-25 NOTE — ED AVS SNAPSHOT
[facility]  [address] [address]  Phone:  [phone]  Fax:  [phone]                                    Liat Corcoran   MRN: [id number]    Department:  [facility]   Date of Visit:  6/25/2019           After Visit Summary Signature Page    I have received my discharge instructions, and my questions have been answered. I have discussed any challenges I see with this plan with the nurse or doctor.    ..........................................................................................................................................  Patient/Patient Representative Signature      ..........................................................................................................................................  Patient Representative Print Name and Relationship to Patient    ..................................................               ................................................  Date                                   Time    ..........................................................................................................................................  Reviewed by Signature/Title    ...................................................              ..............................................  Date                                               Time          22EPIC Rev 08/18
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
## 2019-06-25 NOTE — TELEPHONE ENCOUNTER
"Liat is having chest pain that is radiating to back and is having shortness of breath.  [place] [facility] [place] her way and will be there shortly.      Reason for Disposition    Difficulty breathing    Additional Information    Negative: Severe difficulty breathing (e.g., struggling for each breath, speaks in single words)    Negative: Difficult to awaken or acting confused (e.g., disoriented, slurred speech)    Negative: Shock suspected (e.g., cold/pale/clammy skin, too weak to stand, low BP, rapid pulse)    Negative: [1] Chest pain lasts > 5 minutes AND [2] history of heart disease  (i.e., heart attack, bypass surgery, angina, angioplasty, CHF; not just a heart murmur)    Negative: [1] Chest pain lasts > 5 minutes AND [2] described as crushing, pressure-like, or heavy    Negative: [1] Chest pain lasts > 5 minutes AND [2] age > 50    Negative: [1] Chest pain lasts > 5 minutes AND [2] age > 30 AND [3] at least one cardiac risk factor (i.e., hypertension, diabetes, obesity, smoker or strong family history of heart disease)    Negative: [1] Chest pain lasts > 5 minutes AND [2] not relieved with nitroglycerin    Negative: Passed out (i.e., lost consciousness, collapsed and was not responding)    Negative: Heart beating < 50 beats per minute OR > 140 beats per minute    Negative: Visible sweat on face or sweat dripping down face    Negative: Sounds like a life-threatening emergency to the triager    Negative: SEVERE chest pain    Negative: [1] Intermittent  chest pain or \"angina\" AND [2] increasing in severity or frequency  (Exception: pains lasting a few seconds)    Negative: Pain also present in shoulder(s) or arm(s) or jaw  (Exception: pain is clearly made worse by movement)    Protocols used: CHEST PAIN-A-AH      "

## 2019-06-25 NOTE — DISCHARGE INSTRUCTIONS
You can take Maalox, Pepcid, and Tums with the onset of pain.    Norco for severe pains.    Recommend omeprazole/Prilosec on a daily basis    Follow-up in surgery clinic if ongoing right upper abdominal pains.

## 2019-07-18 ENCOUNTER — HOSPITAL ENCOUNTER (EMERGENCY)
Facility: CLINIC | Age: 24
Discharge: HOME | End: 2019-07-18
Attending: NURSE PRACTITIONER | Admitting: NURSE PRACTITIONER
Payer: COMMERCIAL

## 2019-07-18 VITALS
BODY MASS INDEX: 33.49 KG/M2 | WEIGHT: 182 LBS | DIASTOLIC BLOOD PRESSURE: 87 MMHG | OXYGEN SATURATION: 99 % | RESPIRATION RATE: 16 BRPM | SYSTOLIC BLOOD PRESSURE: 122 MMHG | TEMPERATURE: 97.6 F | HEIGHT: 62 IN

## 2019-07-18 DIAGNOSIS — K08.89 PAIN, DENTAL: ICD-10-CM

## 2019-07-18 PROCEDURE — G0463 HOSPITAL OUTPT CLINIC VISIT: HCPCS | Mod: 25 | Performed by: NURSE PRACTITIONER

## 2019-07-18 PROCEDURE — 25000128 H RX IP 250 OP 636: Performed by: NURSE PRACTITIONER

## 2019-07-18 PROCEDURE — 99214 OFFICE O/P EST MOD 30 MIN: CPT | Mod: 25 | Performed by: NURSE PRACTITIONER

## 2019-07-18 PROCEDURE — 96374 THER/PROPH/DIAG INJ IV PUSH: CPT | Performed by: NURSE PRACTITIONER

## 2019-07-18 RX ORDER — HYDROCODONE BITARTRATE AND ACETAMINOPHEN 5; 325 MG/1; MG/1
1 TABLET ORAL EVERY 6 HOURS PRN
COMMUNITY
End: 2019-11-08

## 2019-07-18 RX ORDER — KETOROLAC TROMETHAMINE 30 MG/ML
30 INJECTION, SOLUTION INTRAMUSCULAR; INTRAVENOUS ONCE
Status: COMPLETED | OUTPATIENT
Start: 2019-07-18 | End: 2019-07-18

## 2019-07-18 RX ORDER — CLINDAMYCIN HCL 300 MG
CAPSULE ORAL
Qty: 38 CAPSULE | Refills: 0 | Status: SHIPPED | OUTPATIENT
Start: 2019-07-18 | End: 2019-11-08

## 2019-07-18 RX ADMIN — KETOROLAC TROMETHAMINE 30 MG: 30 INJECTION, SOLUTION INTRAMUSCULAR at 17:11

## 2019-07-18 ASSESSMENT — ENCOUNTER SYMPTOMS
ABDOMINAL PAIN: 0
COUGH: 0
ARTHRALGIAS: 0
RHINORRHEA: 0
FACIAL SWELLING: 1
NAUSEA: 0
FATIGUE: 0
MYALGIAS: 0
LIGHT-HEADEDNESS: 0
FEVER: 0
JOINT SWELLING: 0
SINUS PRESSURE: 0
SINUS PAIN: 0
CHILLS: 0
SHORTNESS OF BREATH: 0
TROUBLE SWALLOWING: 0
EYE DISCHARGE: 0
SORE THROAT: 0
NUMBNESS: 0
WEAKNESS: 0
HEADACHES: 0
EYE REDNESS: 0
DIZZINESS: 0
VOMITING: 0

## 2019-07-18 ASSESSMENT — MIFFLIN-ST. JEOR: SCORE: 1520.86
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
## 2019-07-18 NOTE — DISCHARGE INSTRUCTIONS
Many of these clinics offer a sliding fee option for patients that qualify, and see patients on a walk-in or same day basis. Please call each clinic directly. As services, hours, fees and policies vary greatly.          [facility], [facility]  [phone]  Sees no insurance  [facility], [place]  [phone]  Preventive services only  [facility] (mult loc) [phone]  [facility]    ([facility]), [facility]  [phone]  [facility], [place]       [phone]  Preventive services only  [facility]  [phone]  Accepts MA & sees no ins  [facility],      Accepts MA & sees no ins   [place]   [phone]; [phone]  [facility], [facility]   Accepts MA & sees no ins       [phone]  [facility], [facility]  [phone]   Accepts MA emergencies  [facility], [place] [phone]  [facility],     Accepts MA   [place]   [phone]    [facility] [phone]  Accepts MA & sees no ins   [facility]   Dental Clinic    [phone]  [facility], [facility]  [phone]   Pending sale to [facility] [phone]  [facility]  Preventive services only   [place]   [phone]  [facility]) [phone]  [facility], [place]  [phone]  Same day [facility] [phone]  Same day [facility],      Same day [place]   [phone]    [facility], [facility] [phone]  Free [facility], walk-in only  [facility] (multiple locations) [phone]      [facility] , [facility] [phone]    [facility] [phone]  Free clinic, walk-in only  [place] Pending sale to [facility]  [phone]  [facility] [phone] (adults)       [phone]  (children)  [place] [facility] [phone]    Also, referral service for low cost dental and healthcare: [phone]  And [phone]

## 2019-07-18 NOTE — ED AVS SNAPSHOT
[facility] Emergency Department  [address] [address]  Phone:  [phone]  Fax:  [phone]                                    Liat Corcoran   MRN: [id number]    Department:  [facility] Emergency Department   Date of Visit:  7/18/2019           After Visit Summary Signature Page    I have received my discharge instructions, and my questions have been answered. I have discussed any challenges I see with this plan with the nurse or doctor.    ..........................................................................................................................................  Patient/Patient Representative Signature      ..........................................................................................................................................  Patient Representative Print Name and Relationship to Patient    ..................................................               ................................................  Date                                   Time    ..........................................................................................................................................  Reviewed by Signature/Title    ...................................................              ..............................................  Date                                               Time          22EPIC Rev 08/18

## 2019-07-19 PROCEDURE — 99284 EMERGENCY DEPT VISIT MOD MDM: CPT | Mod: 25 | Performed by: EMERGENCY MEDICINE

## 2019-07-19 PROCEDURE — 96374 THER/PROPH/DIAG INJ IV PUSH: CPT | Performed by: EMERGENCY MEDICINE

## 2019-07-19 PROCEDURE — 96375 TX/PRO/DX INJ NEW DRUG ADDON: CPT | Performed by: EMERGENCY MEDICINE

## 2019-07-19 PROCEDURE — 99284 EMERGENCY DEPT VISIT MOD MDM: CPT | Mod: Z6 | Performed by: EMERGENCY MEDICINE

## 2019-07-19 PROCEDURE — 96361 HYDRATE IV INFUSION ADD-ON: CPT | Performed by: EMERGENCY MEDICINE

## 2019-07-20 ENCOUNTER — HOSPITAL ENCOUNTER (EMERGENCY)
Facility: CLINIC | Age: 24
Discharge: HOME | End: 2019-07-20
Attending: FAMILY MEDICINE | Admitting: FAMILY MEDICINE
Payer: COMMERCIAL

## 2019-07-20 ENCOUNTER — HOSPITAL ENCOUNTER (EMERGENCY)
Facility: CLINIC | Age: 24
Discharge: HOME | End: 2019-07-20
Attending: EMERGENCY MEDICINE | Admitting: EMERGENCY MEDICINE
Payer: COMMERCIAL

## 2019-07-20 VITALS
RESPIRATION RATE: 18 BRPM | WEIGHT: 182.3 LBS | HEART RATE: 84 BPM | BODY MASS INDEX: 33.89 KG/M2 | SYSTOLIC BLOOD PRESSURE: 137 MMHG | OXYGEN SATURATION: 97 % | TEMPERATURE: 98.2 F | DIASTOLIC BLOOD PRESSURE: 108 MMHG

## 2019-07-20 VITALS
HEART RATE: 88 BPM | TEMPERATURE: 98.5 F | RESPIRATION RATE: 18 BRPM | BODY MASS INDEX: 34.36 KG/M2 | WEIGHT: 182 LBS | DIASTOLIC BLOOD PRESSURE: 120 MMHG | HEIGHT: 61 IN | SYSTOLIC BLOOD PRESSURE: 161 MMHG | OXYGEN SATURATION: 96 %

## 2019-07-20 DIAGNOSIS — G43.909 MIGRAINE WITHOUT STATUS MIGRAINOSUS, NOT INTRACTABLE, UNSPECIFIED MIGRAINE TYPE: ICD-10-CM

## 2019-07-20 DIAGNOSIS — K02.9 DENTAL CARIES: ICD-10-CM

## 2019-07-20 DIAGNOSIS — K08.89 PAIN, DENTAL: ICD-10-CM

## 2019-07-20 LAB
ANION GAP SERPL CALCULATED.3IONS-SCNC: 4 MMOL/L (ref 3–14)
APAP SERPL-MCNC: 7 MG/L (ref 10–20)
BASOPHILS # BLD AUTO: 0.1 10E9/L (ref 0–0.2)
BASOPHILS NFR BLD AUTO: 0.6 %
BUN SERPL-MCNC: 12 MG/DL (ref 7–30)
CALCIUM SERPL-MCNC: 8.9 MG/DL (ref 8.5–10.1)
CHLORIDE SERPL-SCNC: 110 MMOL/L (ref 94–109)
CO2 SERPL-SCNC: 27 MMOL/L (ref 20–32)
CREAT SERPL-MCNC: 0.69 MG/DL (ref 0.52–1.04)
DIFFERENTIAL METHOD BLD: ABNORMAL
EOSINOPHIL # BLD AUTO: 0.2 10E9/L (ref 0–0.7)
EOSINOPHIL NFR BLD AUTO: 1.7 %
ERYTHROCYTE [DISTWIDTH] IN BLOOD BY AUTOMATED COUNT: 15.2 % (ref 10–15)
GFR SERPL CREATININE-BSD FRML MDRD: >90 ML/MIN/{1.73_M2}
GLUCOSE SERPL-MCNC: 88 MG/DL (ref 70–99)
HCT VFR BLD AUTO: 42.3 % (ref 35–47)
HGB BLD-MCNC: 12.9 G/DL (ref 11.7–15.7)
IMM GRANULOCYTES # BLD: 0 10E9/L (ref 0–0.4)
IMM GRANULOCYTES NFR BLD: 0.2 %
LYMPHOCYTES # BLD AUTO: 4.4 10E9/L (ref 0.8–5.3)
LYMPHOCYTES NFR BLD AUTO: 45.2 %
MCH RBC QN AUTO: 26.9 PG (ref 26.5–33)
MCHC RBC AUTO-ENTMCNC: 30.5 G/DL (ref 31.5–36.5)
MCV RBC AUTO: 88 FL (ref 78–100)
MONOCYTES # BLD AUTO: 0.6 10E9/L (ref 0–1.3)
MONOCYTES NFR BLD AUTO: 6.3 %
NEUTROPHILS # BLD AUTO: 4.4 10E9/L (ref 1.6–8.3)
NEUTROPHILS NFR BLD AUTO: 46 %
NRBC # BLD AUTO: 0 10*3/UL
NRBC BLD AUTO-RTO: 0 /100
PLATELET # BLD AUTO: 435 10E9/L (ref 150–450)
POTASSIUM SERPL-SCNC: 3.9 MMOL/L (ref 3.4–5.3)
RBC # BLD AUTO: 4.79 10E12/L (ref 3.8–5.2)
SODIUM SERPL-SCNC: 141 MMOL/L (ref 133–144)
WBC # BLD AUTO: 9.6 10E9/L (ref 4–11)

## 2019-07-20 PROCEDURE — 99284 EMERGENCY DEPT VISIT MOD MDM: CPT | Mod: 25 | Performed by: FAMILY MEDICINE

## 2019-07-20 PROCEDURE — 80329 ANALGESICS NON-OPIOID 1 OR 2: CPT | Performed by: EMERGENCY MEDICINE

## 2019-07-20 PROCEDURE — D3110 ZZHC DENTAL PULP CAP DIRECT: HCPCS | Performed by: FAMILY MEDICINE

## 2019-07-20 PROCEDURE — 85025 COMPLETE CBC W/AUTO DIFF WBC: CPT | Performed by: EMERGENCY MEDICINE

## 2019-07-20 PROCEDURE — 25000128 H RX IP 250 OP 636: Performed by: EMERGENCY MEDICINE

## 2019-07-20 PROCEDURE — D3110 ZZHC DENTAL PULP CAP DIRECT: HCPCS | Mod: Z6 | Performed by: FAMILY MEDICINE

## 2019-07-20 PROCEDURE — 80048 BASIC METABOLIC PNL TOTAL CA: CPT | Performed by: EMERGENCY MEDICINE

## 2019-07-20 PROCEDURE — 99283 EMERGENCY DEPT VISIT LOW MDM: CPT | Mod: 25 | Performed by: FAMILY MEDICINE

## 2019-07-20 PROCEDURE — 25800030 ZZH RX IP 258 OP 636: Performed by: EMERGENCY MEDICINE

## 2019-07-20 RX ORDER — BUPIVACAINE HYDROCHLORIDE AND EPINEPHRINE 5; 5 MG/ML; UG/ML
INJECTION, SOLUTION PERINEURAL
Status: DISCONTINUED
Start: 2019-07-20 | End: 2019-07-21 | Stop reason: HOSPADM

## 2019-07-20 RX ORDER — DIPHENHYDRAMINE HYDROCHLORIDE 50 MG/ML
25 INJECTION INTRAMUSCULAR; INTRAVENOUS ONCE
Status: COMPLETED | OUTPATIENT
Start: 2019-07-20 | End: 2019-07-20

## 2019-07-20 RX ORDER — BUPIVACAINE HYDROCHLORIDE AND EPINEPHRINE 5; 5 MG/ML; UG/ML
INJECTION, SOLUTION PERINEURAL
Status: DISCONTINUED
Start: 2019-07-20 | End: 2019-07-20 | Stop reason: HOSPADM

## 2019-07-20 RX ORDER — KETOROLAC TROMETHAMINE 15 MG/ML
15 INJECTION, SOLUTION INTRAMUSCULAR; INTRAVENOUS ONCE
Status: COMPLETED | OUTPATIENT
Start: 2019-07-20 | End: 2019-07-20

## 2019-07-20 RX ORDER — OXYCODONE HYDROCHLORIDE 5 MG/1
5 TABLET ORAL EVERY 6 HOURS PRN
Qty: 12 TABLET | Refills: 0 | Status: SHIPPED | OUTPATIENT
Start: 2019-07-20 | End: 2019-11-08

## 2019-07-20 RX ORDER — ACETAMINOPHEN 500 MG
1000 TABLET ORAL EVERY 8 HOURS PRN
Qty: 100 TABLET | Refills: 0 | COMMUNITY
Start: 2019-07-20 | End: 2019-11-08

## 2019-07-20 RX ORDER — IBUPROFEN 200 MG
800 TABLET ORAL EVERY 8 HOURS PRN
Qty: 30 TABLET | Refills: 0 | COMMUNITY
Start: 2019-07-20 | End: 2019-11-08

## 2019-07-20 RX ADMIN — PROCHLORPERAZINE EDISYLATE 10 MG: 5 INJECTION INTRAMUSCULAR; INTRAVENOUS at 01:04

## 2019-07-20 RX ADMIN — KETOROLAC TROMETHAMINE 15 MG: 15 INJECTION, SOLUTION INTRAMUSCULAR; INTRAVENOUS at 01:01

## 2019-07-20 RX ADMIN — SODIUM CHLORIDE, POTASSIUM CHLORIDE, SODIUM LACTATE AND CALCIUM CHLORIDE 1000 ML: 600; 310; 30; 20 INJECTION, SOLUTION INTRAVENOUS at 00:59

## 2019-07-20 RX ADMIN — DIPHENHYDRAMINE HYDROCHLORIDE 25 MG: 50 INJECTION, SOLUTION INTRAMUSCULAR; INTRAVENOUS at 01:02

## 2019-07-20 ASSESSMENT — ENCOUNTER SYMPTOMS
CHILLS: 0
HEADACHES: 1
SHORTNESS OF BREATH: 0
COUGH: 0
NAUSEA: 0
VOMITING: 0
WEAKNESS: 0
BACK PAIN: 0
NUMBNESS: 0
FATIGUE: 0
ABDOMINAL PAIN: 0
FEVER: 0
WOUND: 0

## 2019-07-20 ASSESSMENT — MIFFLIN-ST. JEOR: SCORE: 1512.93
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
## 2019-07-20 NOTE — ED NOTES
Pt reports headache all day today due to teeth aches. Pt states she needs 6 teeth removed but is not able to afford removal. Pt reports severe headache, nausea and vomiting. Pt reports hx of migraines. Pt reports sx are typical migraine. Pt tried heat, cold, salt, tylenol, ibuprofen, norco and aleve without relief of sx.

## 2019-07-20 NOTE — ED AVS SNAPSHOT
[facility] Emergency Department  [address] [address]  Phone:  [phone]  Fax:  [phone]                                    Liat Corcoran   MRN: [id number]    Department:  [facility] Emergency Department   Date of Visit:  7/19/2019           After Visit Summary Signature Page    I have received my discharge instructions, and my questions have been answered. I have discussed any challenges I see with this plan with the nurse or doctor.    ..........................................................................................................................................  Patient/Patient Representative Signature      ..........................................................................................................................................  Patient Representative Print Name and Relationship to Patient    ..................................................               ................................................  Date                                   Time    ..........................................................................................................................................  Reviewed by Signature/Title    ...................................................              ..............................................  Date                                               Time          22EPIC Rev 08/18

## 2019-07-20 NOTE — DISCHARGE INSTRUCTIONS
Alternate acetaminophen with ibuprofen every 4 hours as needed for pain or fever (example: acetaminophen at 8am, ibuprofen at 12pm, acetaminophen at 4pm, ibuprofen at 8pm, etc).  I recommended keeping a note documenting which medication you gave and the time it was given. This will help you keep track of what medication to give next.  See discharge papers or medication label for dose.    Continue taking the prescribed antibiotic and follow up with a dentist next week.              Many of these clinics offer a sliding fee option for patients that qualify, and see patients on a walk-in or same day basis. Please call each clinic directly. As services, hours, fees and policies vary greatly.          [facility], [facility]  [phone]  Sees no insurance  [facility], [place]  [phone]  Preventive services only  [facility] (mult loc) [phone]  [facility]    ([facility]), [facility]  [phone]  [facility]       [phone]  Preventive services only  [facility]  [phone]  Accepts MA & sees no ins  [facility],      Accepts MA & sees no ins   [place]   [phone]; [phone]  [facility]   Accepts MA & sees no ins       [phone]  [facility], [facility]  [phone]   Accepts MA emergencies  [facility] [phone]  [facility],     Accepts [facility]   [phone]    [facility] [phone]  Accepts MA & sees no ins   [facility]   Dental Clinic    [phone]  [facility], [facility]  [phone]   [facility] [phone]  [facility]  Preventive services only   [place]   [phone]  [facility] [phone]  [facility], [place]  [phone]  Same day [facility]  [facility] [phone]  Same day [facility],      Same day [facility]   [phone]    [facility], [facility] in [place] [phone]  Free clinic, walk-in only  [facility] (multiple locations) [phone]      [facility] , Hospitals in [place] [phone]    [facility] [phone]  Free clinic, walk-in only  [facility]  [phone]  [facility] [phone] (adults)       [phone] (children)  [facility] [phone]    Also, referral service for low cost dental and healthcare: [phone]  And [phone]

## 2019-07-20 NOTE — ED AVS SNAPSHOT
[facility] Emergency Department  [address] [address]  Phone:  [phone]  Fax:  [phone]                                    Liat Corcoran   MRN: [id number]    Department:  [facility] Emergency Department   Date of Visit:  7/20/2019           After Visit Summary Signature Page    I have received my discharge instructions, and my questions have been answered. I have discussed any challenges I see with this plan with the nurse or doctor.    ..........................................................................................................................................  Patient/Patient Representative Signature      ..........................................................................................................................................  Patient Representative Print Name and Relationship to Patient    ..................................................               ................................................  Date                                   Time    ..........................................................................................................................................  Reviewed by Signature/Title    ...................................................              ..............................................  Date                                               Time          22EPIC Rev 08/18

## 2019-07-20 NOTE — ED NOTES
Pt crying, called out from room. Pt c/o tingling/restlessness in legs and generally uncomfortable. Pt is requesting to use bathroom--pt assisted to bathroom with SBA, and SO in restroom with patient. Will update MD asap. Of note, compazine was put in 100cc piggyback and given over 20 minutes.

## 2019-07-21 ENCOUNTER — HOSPITAL ENCOUNTER (EMERGENCY)
Facility: CLINIC | Age: 24
Discharge: HOME | End: 2019-07-21
Attending: EMERGENCY MEDICINE | Admitting: EMERGENCY MEDICINE
Payer: COMMERCIAL

## 2019-07-21 VITALS
TEMPERATURE: 97.7 F | OXYGEN SATURATION: 97 % | DIASTOLIC BLOOD PRESSURE: 109 MMHG | RESPIRATION RATE: 20 BRPM | SYSTOLIC BLOOD PRESSURE: 144 MMHG

## 2019-07-21 DIAGNOSIS — K08.89 PAIN, DENTAL: ICD-10-CM

## 2019-07-21 PROCEDURE — D3110 ZZHC DENTAL PULP CAP DIRECT: HCPCS | Mod: Z6 | Performed by: EMERGENCY MEDICINE

## 2019-07-21 PROCEDURE — 99283 EMERGENCY DEPT VISIT LOW MDM: CPT | Mod: 25 | Performed by: EMERGENCY MEDICINE

## 2019-07-21 PROCEDURE — D3110 ZZHC DENTAL PULP CAP DIRECT: HCPCS | Performed by: EMERGENCY MEDICINE

## 2019-07-21 PROCEDURE — 99284 EMERGENCY DEPT VISIT MOD MDM: CPT | Mod: 25 | Performed by: EMERGENCY MEDICINE

## 2019-07-21 RX ORDER — BUPIVACAINE HYDROCHLORIDE AND EPINEPHRINE 5; 5 MG/ML; UG/ML
INJECTION, SOLUTION PERINEURAL
Status: DISCONTINUED
Start: 2019-07-21 | End: 2019-07-21 | Stop reason: HOSPADM

## 2019-07-21 ASSESSMENT — ENCOUNTER SYMPTOMS
NECK PAIN: 0
CHEST TIGHTNESS: 0
TROUBLE SWALLOWING: 0
ANAL BLEEDING: 0
SHORTNESS OF BREATH: 0
FEVER: 0
SINUS PAIN: 0
NAUSEA: 0
BACK PAIN: 0
HEADACHES: 0
VOICE CHANGE: 0

## 2019-07-21 NOTE — ED TRIAGE NOTES
Pt presents for 3rd ED visit due to dental issues. Pt reports broken teeth and need for extraction of several teeth. Pt discusses difficulty in paying for tooth extraction--though has been given list of low cost/sliding scale dental clinics. Pt currently on antibiotics for infection. Pt states she has been taking tylenol and ibuprofen but pain was too severe 10/10 tonight that she returned. Pt also c/o headache and nausea. Pt last dose of tylenol and ibuprofen at 1500. Pt reports emesis x1 today. Pt is accompanied by SO.
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
## 2019-07-21 NOTE — ED NOTES
Right upper/back tooth pain for approximately 1 month.  Patient has a dentist appointment on August 16th to have tooth pulled.  Acetaminophen and Ibuprofen at 1500 with no improvement.  Yinka Lund RN on 7/20/2019 at 7:42 PM

## 2019-07-21 NOTE — DISCHARGE INSTRUCTIONS
Use ibuprofen 400-600 mg up to 4 times per day if needed for pain. Stop if it is causing nausea or abdominal pain.   Add acetaminophen 500 mg 1-2 pills up to every 4 hours if needed for pain.   You may add oxycodone 5 mg, 1-2 tablets up to every 6 hours if needed for pain.  Try to use this primarily only at night to help with sleep.    Do not use alcohol, operate machinery, drive, or climb on ladders for 8 hours after taking oxycodone. Use docusate (100mg) 2 times a day to prevent constipation while on narcotics.  See dentist ASAP

## 2019-07-21 NOTE — ED AVS SNAPSHOT
[facility] Emergency Department  [address] [address]  Phone:  [phone]  Fax:  [phone]                                    Liat Corcoran   MRN: [id number]    Department:  [facility] Emergency Department   Date of Visit:  7/21/2019           After Visit Summary Signature Page    I have received my discharge instructions, and my questions have been answered. I have discussed any challenges I see with this plan with the nurse or doctor.    ..........................................................................................................................................  Patient/Patient Representative Signature      ..........................................................................................................................................  Patient Representative Print Name and Relationship to Patient    ..................................................               ................................................  Date                                   Time    ..........................................................................................................................................  Reviewed by Signature/Title    ...................................................              ..............................................  Date                                               Time          22EPIC Rev 08/18

## 2019-07-21 NOTE — ED TRIAGE NOTES
Seen yesterday and temporary fillings were put in the upper right and upper left sides. About 30 minutes right side fell out and patient states was in instant pain.
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
## 2019-11-03 ENCOUNTER — HEALTH MAINTENANCE LETTER (OUTPATIENT)
Age: 24
End: 2019-11-03

## 2019-11-08 ENCOUNTER — OFFICE VISIT (OUTPATIENT)
Dept: FAMILY MEDICINE | Facility: CLINIC | Age: 24
End: 2019-11-08
Payer: COMMERCIAL

## 2019-11-08 VITALS
WEIGHT: 168.7 LBS | TEMPERATURE: 98.3 F | OXYGEN SATURATION: 98 % | RESPIRATION RATE: 18 BRPM | DIASTOLIC BLOOD PRESSURE: 62 MMHG | SYSTOLIC BLOOD PRESSURE: 122 MMHG | HEART RATE: 89 BPM | BODY MASS INDEX: 31.88 KG/M2

## 2019-11-08 DIAGNOSIS — F41.8 DEPRESSION WITH ANXIETY: Primary | ICD-10-CM

## 2019-11-08 DIAGNOSIS — Z23 NEED FOR PROPHYLACTIC VACCINATION AND INOCULATION AGAINST INFLUENZA: ICD-10-CM

## 2019-11-08 PROCEDURE — 99213 OFFICE O/P EST LOW 20 MIN: CPT | Mod: 25 | Performed by: NURSE PRACTITIONER

## 2019-11-08 PROCEDURE — 90471 IMMUNIZATION ADMIN: CPT | Performed by: NURSE PRACTITIONER

## 2019-11-08 PROCEDURE — 90686 IIV4 VACC NO PRSV 0.5 ML IM: CPT | Performed by: NURSE PRACTITIONER

## 2019-11-08 RX ORDER — DULOXETIN HYDROCHLORIDE 30 MG/1
CAPSULE, DELAYED RELEASE ORAL
Qty: 57 CAPSULE | Refills: 0 | Status: SHIPPED | OUTPATIENT
Start: 2019-11-08 | End: 2020-02-20

## 2019-11-08 ASSESSMENT — PATIENT HEALTH QUESTIONNAIRE - PHQ9
SUM OF ALL RESPONSES TO PHQ QUESTIONS 1-9: 12
5. POOR APPETITE OR OVEREATING: MORE THAN HALF THE DAYS

## 2019-11-08 ASSESSMENT — ANXIETY QUESTIONNAIRES
2. NOT BEING ABLE TO STOP OR CONTROL WORRYING: MORE THAN HALF THE DAYS
7. FEELING AFRAID AS IF SOMETHING AWFUL MIGHT HAPPEN: MORE THAN HALF THE DAYS
5. BEING SO RESTLESS THAT IT IS HARD TO SIT STILL: MORE THAN HALF THE DAYS
3. WORRYING TOO MUCH ABOUT DIFFERENT THINGS: MORE THAN HALF THE DAYS
6. BECOMING EASILY ANNOYED OR IRRITABLE: NEARLY EVERY DAY
1. FEELING NERVOUS, ANXIOUS, OR ON EDGE: MORE THAN HALF THE DAYS
GAD7 TOTAL SCORE: 15

## 2019-11-08 ASSESSMENT — PAIN SCALES - GENERAL: PAINLEVEL: NO PAIN (0)

## 2019-11-08 NOTE — PATIENT INSTRUCTIONS
Restart Cymbalta starting at 30 mg daily for 1 week, then increasing to 30 mg two times daily. If you seem to forget it is okay to do the 60 mg once in the morning to.    Continue working on your self-care     Check in with me either in office or telephone visit in 3-4 weeks for follow up.
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
## 2019-11-08 NOTE — NURSING NOTE
"Chief Complaint   Patient presents with     Depression       Initial /62   Pulse 89   Temp 98.3  F (36.8  C)   Resp 18   Wt 76.5 kg (168 lb 11.2 oz)   SpO2 98%   BMI 31.88 kg/m   Estimated body mass index is 31.88 kg/m  as calculated from the following:    Height as of 7/20/19: 1.549 m (5' 1\").    Weight as of this encounter: 76.5 kg (168 lb 11.2 oz).    Patient presents to the clinic using No DME    Health Maintenance that is potentially due pending provider review:  NONE    n/a    Is there anyone who you would like to be able to receive your results? No  If yes have patient fill out FADIA    "

## 2019-11-09 ASSESSMENT — ANXIETY QUESTIONNAIRES: GAD7 TOTAL SCORE: 15

## 2019-12-11 ENCOUNTER — OFFICE VISIT (OUTPATIENT)
Dept: FAMILY MEDICINE | Facility: CLINIC | Age: 24
End: 2019-12-11
Payer: COMMERCIAL

## 2019-12-11 VITALS
TEMPERATURE: 99.2 F | RESPIRATION RATE: 24 BRPM | BODY MASS INDEX: 32.27 KG/M2 | OXYGEN SATURATION: 97 % | WEIGHT: 170.8 LBS | SYSTOLIC BLOOD PRESSURE: 122 MMHG | HEART RATE: 102 BPM | DIASTOLIC BLOOD PRESSURE: 62 MMHG

## 2019-12-11 DIAGNOSIS — R68.89 FLU-LIKE SYMPTOMS: ICD-10-CM

## 2019-12-11 DIAGNOSIS — J06.9 VIRAL URI WITH COUGH: Primary | ICD-10-CM

## 2019-12-11 LAB
DEPRECATED S PYO AG THROAT QL EIA: NORMAL
FLUAV+FLUBV AG SPEC QL: NEGATIVE
FLUAV+FLUBV AG SPEC QL: NEGATIVE
SPECIMEN SOURCE: NORMAL
SPECIMEN SOURCE: NORMAL

## 2019-12-11 PROCEDURE — 99213 OFFICE O/P EST LOW 20 MIN: CPT | Performed by: NURSE PRACTITIONER

## 2019-12-11 PROCEDURE — 87880 STREP A ASSAY W/OPTIC: CPT | Performed by: NURSE PRACTITIONER

## 2019-12-11 PROCEDURE — 87081 CULTURE SCREEN ONLY: CPT | Performed by: NURSE PRACTITIONER

## 2019-12-11 PROCEDURE — 87804 INFLUENZA ASSAY W/OPTIC: CPT | Performed by: NURSE PRACTITIONER

## 2019-12-11 ASSESSMENT — PAIN SCALES - GENERAL: PAINLEVEL: MILD PAIN (2)
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
## 2019-12-11 NOTE — PATIENT INSTRUCTIONS
Rapid strep negative - will await culture and Influenza negative     This is likely a virus that is going around the house    Would recommend fluids and rest     Tylenol and/or ibuprofen for fever or discomfort     If symptoms worsen or not improving please return to clinic

## 2019-12-11 NOTE — LETTER
December 12, 2019      Liat Corcoran  [address]  [place] [facility] [address]        Dear Liat,       The results of your 24 hour throat culture were negative. Please contact your clinic if you have any questions or concerns.      Sincerely,        ARIEL Luis CNP

## 2019-12-12 LAB
BACTERIA SPEC CULT: NORMAL
SPECIMEN SOURCE: NORMAL

## 2020-02-06 ENCOUNTER — NURSE TRIAGE (OUTPATIENT)
Dept: NURSING | Facility: CLINIC | Age: 25
End: 2020-02-06

## 2020-02-07 NOTE — TELEPHONE ENCOUNTER
"\"My periods are usually normal almost the exact same date(the 15th) every month. This last month it was normal, but now for the past week I have had just light spotting and some cramping, but usually just when I pee and wipe.\" denies other sx. Patient states she does have a hx of cysts on her ovaries. Triaged, gave home care advice and to make an appt with PCP/OB as needed.  Whitney Roe RN [facility]        Additional Information    Negative: Passed tissue (e.g., gray-white)    Negative: Taking Coumadin (warfarin) or other strong blood thinner, or known bleeding disorder (e.g., thrombocytopenia)    Negative: [1] Skin bruises or nosebleed AND [2] not caused by an injury    Negative: [1] Periods with > 6 soaked pads or tampons per day AND [2] last > 7 days    Negative: [1] Bleeding or spotting after procedure (e.g., biopsy) or pelvic examination (e.g., pap smear) AND [2] lasts > 7 days    Negative: Periods with > 6 soaked pads or tampons per day    Negative: Periods last > 7 days    Negative: [1] Uses menstrual cups AND [2] more than 80 ml blood per menstrual period.    Negative: [1] Missed period AND [2] has occurred 2 or more times in the last year    Negative: [1] Menstrual cycle < 21 days OR > 35 days AND [2] occurs more than two cycles (2 months) this past year    Negative: [1] Bleeding or spotting between regular periods AND [2] occurs more than two cycles (2 months) this past year    Negative: [1] Bleeding or spotting between regular periods AND [2] occurs more than two cycles (2 months) AND [3] using birth control medicine (pills, patch, Depo-Provera, Implanon, vaginal ring, Mirena IUD)    Negative: Bleeding or spotting occurs after hysterectomy    Negative: Bleeding or spotting occurs after sex (Exception: first intercourse)    Negative: Normal menstrual flow    Negative: [1] Menstrual cycle < 21 days OR > 35 days AND [2] has occurred once this past year    [1] MILD bleeding or SPOTTING AND " [2] could be pregnant  (e.g., missed last period)    Protocols used: VAGINAL BLEEDING - [id number]

## 2020-02-12 ENCOUNTER — TELEPHONE (OUTPATIENT)
Dept: FAMILY MEDICINE | Facility: CLINIC | Age: 25
End: 2020-02-12

## 2020-02-12 ENCOUNTER — OFFICE VISIT (OUTPATIENT)
Dept: FAMILY MEDICINE | Facility: CLINIC | Age: 25
End: 2020-02-12
Payer: MEDICAID

## 2020-02-12 VITALS
BODY MASS INDEX: 32.85 KG/M2 | HEART RATE: 78 BPM | DIASTOLIC BLOOD PRESSURE: 64 MMHG | RESPIRATION RATE: 16 BRPM | TEMPERATURE: 98.2 F | OXYGEN SATURATION: 98 % | HEIGHT: 61 IN | WEIGHT: 174 LBS | SYSTOLIC BLOOD PRESSURE: 110 MMHG

## 2020-02-12 DIAGNOSIS — R10.2 PELVIC PAIN IN FEMALE: ICD-10-CM

## 2020-02-12 DIAGNOSIS — N93.9 VAGINAL BLEEDING: Primary | ICD-10-CM

## 2020-02-12 LAB
ALBUMIN UR-MCNC: NEGATIVE MG/DL
APPEARANCE UR: CLEAR
BACTERIA #/AREA URNS HPF: ABNORMAL /HPF
BILIRUB UR QL STRIP: NEGATIVE
COLOR UR AUTO: YELLOW
GLUCOSE UR STRIP-MCNC: NEGATIVE MG/DL
HCG UR QL: NEGATIVE
HGB UR QL STRIP: ABNORMAL
KETONES UR STRIP-MCNC: NEGATIVE MG/DL
LEUKOCYTE ESTERASE UR QL STRIP: NEGATIVE
NITRATE UR QL: NEGATIVE
NON-SQ EPI CELLS #/AREA URNS LPF: ABNORMAL /LPF
PH UR STRIP: 6.5 PH (ref 5–7)
RBC #/AREA URNS AUTO: ABNORMAL /HPF
SOURCE: ABNORMAL
SP GR UR STRIP: 1.01 (ref 1–1.03)
UROBILINOGEN UR STRIP-ACNC: 0.2 EU/DL (ref 0.2–1)
WBC #/AREA URNS AUTO: ABNORMAL /HPF

## 2020-02-12 PROCEDURE — 81001 URINALYSIS AUTO W/SCOPE: CPT | Performed by: NURSE PRACTITIONER

## 2020-02-12 PROCEDURE — 99213 OFFICE O/P EST LOW 20 MIN: CPT | Performed by: NURSE PRACTITIONER

## 2020-02-12 PROCEDURE — 81025 URINE PREGNANCY TEST: CPT | Performed by: NURSE PRACTITIONER

## 2020-02-12 ASSESSMENT — MIFFLIN-ST. JEOR: SCORE: 1483.25

## 2020-02-12 NOTE — TELEPHONE ENCOUNTER
Going through a super tampon every 1-1.5 hours, not really any cramping but did have 2 large clots this morning. She would like to be seen today to rule out miscarriage. She says her periods have been really irregular for the past 2 months with a lot of spotting and now this heavy bleeding. Appointment scheduled for today

## 2020-02-12 NOTE — TELEPHONE ENCOUNTER
"Patient is calling stating she has been spotting for the last two weeks and woke up in a \"puddle of blood\". Has had two yo-yo sized blood clots since 5 am. She is not suppose to get her period until the 15th. Would like to speak with a nurse. Please advise.    Catrina Arredondo-[facility] [place]    "
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
## 2020-02-12 NOTE — PATIENT INSTRUCTIONS
Urinalysis negative for infection     Urine pregnancy negative     Would like to have you do a pelvic ultrasound to evaluate further if not improving by next week     Will call you with results and recommendations

## 2020-02-18 ENCOUNTER — APPOINTMENT (OUTPATIENT)
Dept: GENERAL RADIOLOGY | Facility: CLINIC | Age: 25
End: 2020-02-18
Attending: PHYSICIAN ASSISTANT
Payer: MEDICAID

## 2020-02-18 ENCOUNTER — HOSPITAL ENCOUNTER (EMERGENCY)
Facility: CLINIC | Age: 25
Discharge: HOME | End: 2020-02-18
Attending: PHYSICIAN ASSISTANT | Admitting: PHYSICIAN ASSISTANT
Payer: MEDICAID

## 2020-02-18 VITALS
BODY MASS INDEX: 33.77 KG/M2 | WEIGHT: 172 LBS | HEIGHT: 60 IN | TEMPERATURE: 99 F | OXYGEN SATURATION: 96 % | HEART RATE: 111 BPM | RESPIRATION RATE: 18 BRPM

## 2020-02-18 DIAGNOSIS — J11.1 INFLUENZA-LIKE ILLNESS: ICD-10-CM

## 2020-02-18 DIAGNOSIS — J45.901 ASTHMA EXACERBATION: ICD-10-CM

## 2020-02-18 PROCEDURE — 71046 X-RAY EXAM CHEST 2 VIEWS: CPT

## 2020-02-18 PROCEDURE — G0463 HOSPITAL OUTPT CLINIC VISIT: HCPCS | Performed by: PHYSICIAN ASSISTANT

## 2020-02-18 PROCEDURE — 99214 OFFICE O/P EST MOD 30 MIN: CPT | Mod: Z6 | Performed by: PHYSICIAN ASSISTANT

## 2020-02-18 RX ORDER — ALBUTEROL SULFATE 0.83 MG/ML
2.5 SOLUTION RESPIRATORY (INHALATION) EVERY 4 HOURS PRN
Qty: 1 BOX | Refills: 0 | Status: SHIPPED | OUTPATIENT
Start: 2020-02-18 | End: 2020-02-20

## 2020-02-18 RX ORDER — PREDNISONE 20 MG/1
TABLET ORAL
Qty: 10 TABLET | Refills: 0 | Status: SHIPPED | OUTPATIENT
Start: 2020-02-18 | End: 2020-04-10

## 2020-02-18 ASSESSMENT — MIFFLIN-ST. JEOR: SCORE: 1451.69

## 2020-02-18 NOTE — ED AVS SNAPSHOT
[facility]  [address] [address]  Phone:  [phone]  Fax:  [phone]                                    Liat Corcoran   MRN: [id number]    Department:  [facility]   Date of Visit:  2/18/2020           After Visit Summary Signature Page    I have received my discharge instructions, and my questions have been answered. I have discussed any challenges I see with this plan with the nurse or doctor.    ..........................................................................................................................................  Patient/Patient Representative Signature      ..........................................................................................................................................  Patient Representative Print Name and Relationship to Patient    ..................................................               ................................................  Date                                   Time    ..........................................................................................................................................  Reviewed by Signature/Title    ...................................................              ..............................................  Date                                               Time          22EPIC Rev 08/18
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
## 2020-02-18 NOTE — LETTER
February 18, 2020      To Whom It May Concern:      Liat Corcoran was seen in our Emergency Department today, 02/18/20.  Please excuse from work today and tomorrow.  Thank you.      Sincerely,        Madeline Kumar PA-C

## 2020-02-19 ASSESSMENT — ENCOUNTER SYMPTOMS
COUGH: 1
SHORTNESS OF BREATH: 1
ARTHRALGIAS: 1
CHILLS: 1
WHEEZING: 1
FEVER: 1

## 2020-02-20 ENCOUNTER — OFFICE VISIT (OUTPATIENT)
Dept: FAMILY MEDICINE | Facility: CLINIC | Age: 25
End: 2020-02-20
Payer: MEDICAID

## 2020-02-20 VITALS
TEMPERATURE: 99 F | DIASTOLIC BLOOD PRESSURE: 68 MMHG | HEIGHT: 61 IN | BODY MASS INDEX: 31.91 KG/M2 | RESPIRATION RATE: 16 BRPM | HEART RATE: 110 BPM | WEIGHT: 169 LBS | SYSTOLIC BLOOD PRESSURE: 108 MMHG | OXYGEN SATURATION: 96 %

## 2020-02-20 DIAGNOSIS — J02.9 SORE THROAT: ICD-10-CM

## 2020-02-20 DIAGNOSIS — R21 RASH: ICD-10-CM

## 2020-02-20 DIAGNOSIS — J02.0 STREP THROAT: Primary | ICD-10-CM

## 2020-02-20 LAB
DEPRECATED S PYO AG THROAT QL EIA: POSITIVE
SPECIMEN SOURCE: ABNORMAL

## 2020-02-20 PROCEDURE — 87880 STREP A ASSAY W/OPTIC: CPT | Performed by: NURSE PRACTITIONER

## 2020-02-20 PROCEDURE — 99213 OFFICE O/P EST LOW 20 MIN: CPT | Performed by: NURSE PRACTITIONER

## 2020-02-20 RX ORDER — AZITHROMYCIN 250 MG/1
TABLET, FILM COATED ORAL
Qty: 6 TABLET | Refills: 0 | Status: SHIPPED | OUTPATIENT
Start: 2020-02-20 | End: 2020-04-10

## 2020-02-20 ASSESSMENT — MIFFLIN-ST. JEOR: SCORE: 1453.96

## 2020-02-20 NOTE — PATIENT INSTRUCTIONS
1. Strep throat  Acute, stable  - azithromycin 250 MG PO tablet; Two tablets first day, then one tablet daily for four days.  Dispense: 6 tablet; Refill: 0  Zpack $9.33  Prednisone $9.03    2. Sore throat  Acute, stable  - Streptococcus A Rapid Scr w Reflx to PCR  Results for orders placed or performed in visit on 02/20/20   Streptococcus A Rapid Scr w Reflx to PCR     Status: Abnormal   Result Value Ref Range    Strep Specimen Description Throat     Streptococcus Group A Rapid Screen Positive (A) NEG^Negative       3. Rash  Acute, stable  Use antihistamine: Claritin daily, could use Benadryl at night  [place] packets- in bath for a soak, or as a cold compress        Pharyngitis: Strep [Confirmed]    Your test for strep throat was positive. Strep throat is a contagious illness. It is spread by coughing, kissing or by touching others after touching your mouth or nose. Symptoms include throat pain which is worse with swallowing, aching all over, headache and fever. You will be treated with an antibiotic which should make you start to feel better within 1-2 days.  Home Care:    Rest at home and drink plenty of fluids to avoid dehydration.    No school or work for the first two days on antibiotics. You will not be contagious after this time and if you are feeling better, you can return to school or work.    Take your antibiotics for a full 10 days, even if you feel better after the first few days of treatment. This is very important to prevent heart or kidney disease that can result as a complication of untreated strep throat infection.    Children: Use acetaminophen (Tylenol) for fever, fussiness or discomfort. In infants over six months of age, you may use ibuprofen (Children's Motrin) instead of Tylenol. [NOTE: If your child has chronic liver or kidney disease or ever had a stomach ulcer or GI bleeding, talk with your doctor before using these medicines.] (Aspirin should never be used in anyone under 18 years of age  who is ill with a fever. It may cause severe liver damage.)Adults: You may use acetaminophen (Tylenol) or ibuprofen (Motrin, Advil) to control pain or fever, unless another medicine was prescribed for this. [NOTE: If you have chronic liver or kidney disease or ever had a stomach ulcer or GI bleeding, talk with your doctor before using these medicines.]    Throat lozenges or sprays (Chloraseptic and others) will reduce pain. Gargling with warm salt water will also reduce throat pain. Dissolve 1/2 teaspoon of salt in 1 glass of warm water. This is especially useful just before meals.  Follow Up  with your doctor or as directed by our staff if you are not improving over the next week.  Get Prompt Medical Attention  if any of the following occur:    Fever of 100.4 F (38 C) oral or higher, not better with fever medication    New or worsening ear pain, sinus pain or headache    Painful lumps in the back of your neck    Unable to swallow liquids or open your mouth wide due to throat pain    Trouble breathing or noisy breathing    Muffled voice    New rash    [id number] [facility], [address] [place] [address], [place] [address]. All rights reserved. This information is not intended as a substitute for professional medical care. Always follow your healthcare professional's instructions.

## 2020-02-20 NOTE — LETTER
"My Asthma Action Plan    Name: Liat Corcoran   YOB: 1995  Date: 2/20/2020   My doctor: Melissa Bhat CNP   My clinic: [facility]        My Rescue Medicine:   Albuterol inhaler (Proair/Ventolin/Proventil HFA)  2-4 puffs EVERY 4 HOURS as needed. Use a spacer if recommended by your provider.   My Asthma Severity:   { :183904::\"Intermittent / Exercise Induced\"}  Know your asthma triggers: { :540142}             GREEN ZONE   Good Control    I feel good    No cough or wheeze    Can work, sleep and play without asthma symptoms       Take your asthma control medicine every day.     1. If exercise triggers your asthma, take your rescue medication    15 minutes before exercise or sports, and    During exercise if you have asthma symptoms  2. Spacer to use with inhaler: If you have a spacer, make sure to use it with your inhaler             YELLOW ZONE Getting Worse  I have ANY of these:    I do not feel good    Cough or wheeze    Chest feels tight    Wake up at night   1. Keep taking your Green Zone medications  2. Start taking your rescue medicine:    every 20 minutes for up to 1 hour. Then every 4 hours for 24-48 hours.  3. If you stay in the Yellow Zone for more than 12-24 hours, contact your doctor.  4. If you do not return to the Green Zone in 12-24 hours or you get worse, start taking your oral steroid medicine if prescribed by your provider.           RED ZONE Medical Alert - Get Help  I have ANY of these:    I feel awful    Medicine is not helping    Breathing getting harder    Trouble walking or talking    Nose opens wide to breathe       1. Take your rescue medicine NOW  2. If your provider has prescribed an oral steroid medicine, start taking it NOW  3. Call your doctor NOW  4. If you are still in the Red Zone after 20 minutes and you have not reached your doctor:    Take your rescue medicine again and    Call 911 or go to the emergency room right away    See your " regular doctor within 2 weeks of an Emergency Room or Urgent Care visit for follow-up treatment.          Annual Reminders:  Meet with Asthma Educator,  Flu Shot in the Fall, consider Pneumonia Vaccination for patients with asthma (aged 19 and older).    Pharmacy:    [facility]  [id number] - [place], [place] - [address]    Electronically signed by Melissa Bhat CNP   Date: 02/20/20                    Asthma Triggers  How To Control Things That Make Your Asthma Worse    Triggers are things that make your asthma worse.  Look at the list below to help you find your triggers and   what you can do about them. You can help prevent asthma flare-ups by staying away from your triggers.      Trigger                                                          What you can do   Cigarette Smoke  Tobacco smoke can make asthma worse. Do not allow smoking in your home, car or around you.  Be sure no one smokes at a child s day care or school.  If you smoke, ask your health care provider for ways to help you quit.  Ask family members to quit too.  Ask your health care provider for a referral to Quit Plan to help you quit smoking, or call 2-109-832-PLAN.     Colds, Flu, Bronchitis  These are common triggers of asthma. Wash your hands often.  Don t touch your eyes, nose or mouth.  Get a flu shot every year.     Dust Mites  These are tiny bugs that live in cloth or carpet. They are too small to see. Wash sheets and blankets in hot water every week.   Encase pillows and mattress in dust mite proof covers.  Avoid having carpet if you can. If you have carpet, vacuum weekly.   Use a dust mask and HEPA vacuum.   Pollen and Outdoor Mold  Some people are allergic to trees, grass, or weed pollen, or molds. Try to keep your windows closed.  Limit time out doors when pollen count is high.   Ask you health care provider about taking medicine during allergy season.     Animal Dander  Some people are allergic to skin flakes,  urine or saliva from pets with fur or feathers. Keep pets with fur or feathers out of your home.    If you can t keep the pet outdoors, then keep the pet out of your bedroom.  Keep the bedroom door closed.  Keep pets off cloth furniture and away from stuffed toys.     Mice, Rats, and Cockroaches  Some people are allergic to the waste from these pests.   Cover food and garbage.  Clean up spills and food crumbs.  Store grease in the refrigerator.   Keep food out of the bedroom.   Indoor Mold  This can be a trigger if your home has high moisture. Fix leaking faucets, pipes, or other sources of water.   Clean moldy surfaces.  Dehumidify basement if it is damp and smelly.   Smoke, Strong Odors, and Sprays  These can reduce air quality. Stay away from strong odors and sprays, such as perfume, powder, hair spray, paints, smoke incense, paint, cleaning products, candles and new carpet.   Exercise or Sports  Some people with asthma have this trigger. Be active!  Ask your doctor about taking medicine before sports or exercise to prevent symptoms.    Warm up for 5-10 minutes before and after sports or exercise.     Other Triggers of Asthma  Cold air:  Cover your nose and mouth with a scarf.  Sometimes laughing or crying can be a trigger.  Some medicines and food can trigger asthma.

## 2020-02-20 NOTE — NURSING NOTE
"Chief Complaint   Patient presents with     Pharyngitis       Initial /68   Pulse 110   Temp 99  F (37.2  C) (Tympanic)   Resp 16   Ht 1.549 m (5' 1\")   Wt 76.7 kg (169 lb)   LMP 02/11/2020   SpO2 96%   BMI 31.93 kg/m   Estimated body mass index is 31.93 kg/m  as calculated from the following:    Height as of this encounter: 1.549 m (5' 1\").    Weight as of this encounter: 76.7 kg (169 lb).    Patient presents to the clinic using No DME    Health Maintenance that is potentially due pending provider review:  ACT    Done.    Is there anyone who you would like to be able to receive your results? No  If yes have patient fill out FADIA    "
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
## 2020-02-20 NOTE — LETTER
February 20, 2020      Liat Corcoran  [address]  [place] [address]        To Whom It May Concern:    Liat Corcoran  was seen on today for strep.  Please excuse her from Feb 18 to 22 due to illness.        Sincerely,        Melissa Bhat, CNP

## 2020-02-21 ASSESSMENT — ASTHMA QUESTIONNAIRES: ACT_TOTALSCORE: 24

## 2020-04-10 ENCOUNTER — TELEPHONE (OUTPATIENT)
Dept: FAMILY MEDICINE | Facility: CLINIC | Age: 25
End: 2020-04-10

## 2020-04-10 ENCOUNTER — VIRTUAL VISIT (OUTPATIENT)
Dept: FAMILY MEDICINE | Facility: CLINIC | Age: 25
End: 2020-04-10
Payer: COMMERCIAL

## 2020-04-10 DIAGNOSIS — F41.8 DEPRESSION WITH ANXIETY: Primary | ICD-10-CM

## 2020-04-10 PROCEDURE — 99214 OFFICE O/P EST MOD 30 MIN: CPT | Mod: GT | Performed by: NURSE PRACTITIONER

## 2020-04-10 RX ORDER — HYDROXYZINE HYDROCHLORIDE 25 MG/1
25 TABLET, FILM COATED ORAL 3 TIMES DAILY PRN
Qty: 45 TABLET | Refills: 0 | Status: SHIPPED | OUTPATIENT
Start: 2020-04-10 | End: 2020-07-20

## 2020-04-10 RX ORDER — DULOXETIN HYDROCHLORIDE 30 MG/1
CAPSULE, DELAYED RELEASE ORAL
Qty: 62 CAPSULE | Refills: 0 | Status: SHIPPED | OUTPATIENT
Start: 2020-04-10 | End: 2020-04-10

## 2020-04-10 RX ORDER — DULOXETIN HYDROCHLORIDE 30 MG/1
CAPSULE, DELAYED RELEASE ORAL
Qty: 57 CAPSULE | Refills: 0 | Status: SHIPPED | OUTPATIENT
Start: 2020-04-10 | End: 2020-07-20

## 2020-04-10 ASSESSMENT — ANXIETY QUESTIONNAIRES
3. WORRYING TOO MUCH ABOUT DIFFERENT THINGS: NEARLY EVERY DAY
1. FEELING NERVOUS, ANXIOUS, OR ON EDGE: NEARLY EVERY DAY
5. BEING SO RESTLESS THAT IT IS HARD TO SIT STILL: NEARLY EVERY DAY
7. FEELING AFRAID AS IF SOMETHING AWFUL MIGHT HAPPEN: NEARLY EVERY DAY
2. NOT BEING ABLE TO STOP OR CONTROL WORRYING: NEARLY EVERY DAY
GAD7 TOTAL SCORE: 21
6. BECOMING EASILY ANNOYED OR IRRITABLE: NEARLY EVERY DAY

## 2020-04-10 ASSESSMENT — PATIENT HEALTH QUESTIONNAIRE - PHQ9
5. POOR APPETITE OR OVEREATING: NEARLY EVERY DAY
SUM OF ALL RESPONSES TO PHQ QUESTIONS 1-9: 17

## 2020-04-10 NOTE — TELEPHONE ENCOUNTER
"Reason for call:  Patient reporting a symptom    Symptom or request: Patient Patti Cruz is calling to report that the patient has depression and used to be on Cymbalta.  She states that patient called us and was told that we can't see her due to everything going on.  Patti states that patient is really struggling and taking it out on her \"little boy\" and he is taking it out on the baby\".  Patti is asking if patient can get a new rx for Cymbalta?  Patti was told that we can not give her any information and to have patient call the clinic back.    Additional comments: RN was informed of call and will contact patient if patient does not call.    Phone Number patient can be reached at:  Other phone number:  Patti - [phone]    Best Time:  Any    Can we leave a detailed message on this number:  YES    Call taken on 4/10/2020 at 9:28 AM by Qian Cantrell  "

## 2020-04-10 NOTE — TELEPHONE ENCOUNTER
Pt has not called clinic.  Grandmother will reach out to pt again to have her call clinic nurse today.  MICKIE Chahal RN

## 2020-04-10 NOTE — TELEPHONE ENCOUNTER
[facility] in [place] is calling for clarification of direction of the following medication:   Disp  Refills  Start  End  JAISON    DULoxetine (CYMBALTA) 30 MG capsule  62 capsule  0  4/10/2020  5/11/2020  --    Sig - Route: Take 1 capsule (30 mg) by mouth 2 times daily for 5 days, THEN 1 capsule (30 mg) 2 times daily for 26 days. - Oral    Sent to pharmacy as: DULoxetine (CYMBALTA) 30 MG capsule    Class: E-Prescribe    Order: [id number]    E-Prescribing Status: Receipt confirmed by pharmacy (4/10/2020 12:06 PM CDT)    Printout Tracking     External Result Report    Ph[facility]     [id number] - [place] [place] [facility] [address]  [facility]
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
## 2020-04-10 NOTE — PATIENT INSTRUCTIONS
Restart Cymbalta - going to start with 30 mg daily for 5 days THEN 30 mg two times daily     Hydroxyzine also ordered for anxiety to use as needed. Can have 1-2. Try one and see how you tolerate, if too drowsy don't take during the day. Can take at night for sleep.     Continue to work on self-cares     Would like you to get into grief therapy once COVID pandemic over     Schedule a video visit with me in 3-4 weeks for recheck

## 2020-04-10 NOTE — TELEPHONE ENCOUNTER
Spoke with pt who admits has had increased anxiety, depression sx which are affecting home and work.  Finds self being short with 3 and 1 yr old, rude to co- workers and customers.   Has recognized sx but has not wanted to go back on med.  States has good support system with grandparents.  Advised video visit with Alicia today, scheduled 11:20 AM.  Alicia trevino.  MICKIE Chahal RN

## 2020-04-10 NOTE — TELEPHONE ENCOUNTER
Per video visit today-  Restart Cymbalta - going to start with 30 mg daily for 5 days THEN 30 mg two times daily     Please send new script with correction.  Thank you-  MICKIE Chahal RN

## 2020-04-11 ASSESSMENT — ANXIETY QUESTIONNAIRES: GAD7 TOTAL SCORE: 21

## 2020-05-05 ENCOUNTER — PATIENT OUTREACH (OUTPATIENT)
Dept: CARE COORDINATION | Facility: CLINIC | Age: 25
End: 2020-05-05

## 2020-05-05 ASSESSMENT — ACTIVITIES OF DAILY LIVING (ADL): DEPENDENT_IADLS:: INDEPENDENT

## 2020-05-05 NOTE — LETTER
MOSES CARE COORDINATION  [facility]  [address] [place] [address]      May 5, 2020    Liat Corcoran  [address]  [facility] [address]      Dear Liat,    I am a clinic care coordinator who works with ARIEL Luis CNP at [facility]. I wanted to thank you for spending the time to talk with me.  Below is a description of clinic care coordination and how I can further assist you.      The clinic care coordination team is made up of a registered nurse,  and community health worker who understand the health care system. The goal of clinic care coordination is to help you manage your health and improve access to the health care system in the most efficient manner. The team can assist you in meeting your health care goals by providing education, coordinating services, strengthening the communication among your providers and supporting you with any resource needs.    Please feel free to contact me at [phone] with any questions or concerns. We are focused on providing you with the highest-quality healthcare experience possible and that all starts with you.     Sincerely,     Baron Jolly RN  Clinic Care Coordinator

## 2020-05-05 NOTE — PROGRESS NOTES
Clinic Care Coordination Contact    Clinic Care Coordination Contact  OUTREACH    Referral Information:  Referral Source: Pro-Active Outreach    Primary Diagnosis: Respiratory Disorders - other    Chief Complaint   Patient presents with     Clinic Care Coordination - Initial     Care Team        [facility] Utilization: Patient identified for Care Coordination follow up due to:  Hospitalization:   NO  Emergency Department utilization risk:  NO  Fall risk:   NO  Pain:  NO  Medication concerns:  NO  Social Determinants of Health risks:   NO    Clinic Utilization  Difficulty keeping appointments:: No  Compliance Concerns: No  No-Show Concerns: No  No PCP office visit in Past Year: No  Utilization    Last refreshed: 5/4/2020  2:04 PM:  Hospital Admissions 0           Last refreshed: 5/4/2020  2:04 PM:  ED Visits 6           Last refreshed: 4/26/2020  7:26 AM:  No Show Count (past year) 0              Current as of: 4/26/2020  7:26 AM              Clinical Concerns:  Current Medical Concerns:  Patient reports no concerns currently. Patient has no COVID s/s to report.     Current Behavioral Concerns: none    Education Provided to patient: Encouraged follow up appointment with PCP.     Pain  Pain (GOAL):: No  Health Maintenance Reviewed: Due/Overdue   Health Maintenance Due   Topic Date Due     ASTHMA ACTION PLAN  1995     HPV IMMUNIZATION (1 - 2-dose series) 06/19/2006     PNEUMOCOCCAL IMMUNIZATION 19-64 MEDIUM RISK (1 of 1 - PPSV23) 06/19/2014     PREVENTIVE CARE VISIT  03/31/2015     DTAP/TDAP/TD IMMUNIZATION (7 - Td) 11/13/2017     CHLAMYDIA SCREENING  04/01/2020     PAP  06/13/2020       Medication Management:  Medications Reviewed:  yes  New Medications prescribed: no  All medications on hand yes  Patient stated understanding of medication and has no concerns  No concerns with being able to afford medication.   Patient is adherent with medications as directed.   Medication adherence barriers identified during  call: none       Functional Status:  Dependent ADLs:: Independent  Dependent IADLs:: Independent  Bed or wheelchair confined:: No  Mobility Status: Independent    Living Situation:  Current living arrangement:: I live in a private home  Type of residence:: Private home - stairs    Lifestyle & Psychosocial Needs:        Diet:: Regular  Inadequate nutrition (GOAL):: No  Tube Feeding: No  Inadequate activity/exercise (GOAL):: No  Significant changes in sleep pattern (GOAL): No  Transportation means:: Regular car     Scientologist or spiritual beliefs that impact treatment:: No  Mental health DX:: No  Mental health management concern (GOAL):: No  Informal Support system:: Family, Friends   Socioeconomic History     Marital status: Single     Spouse name: Not on file     Number of children: 0     Years of education: Not on file     Highest education level: Not on file     Tobacco Use     Smoking status: Current Every Day Smoker     Packs/day: 0.50     Types: Cigarettes     Smokeless tobacco: Never Used   Substance and Sexual Activity     Alcohol use: No     Alcohol/week: 0.0 standard drinks     Drug use: No     Sexual activity: Yes     Partners: Male     Birth control/protection: None             Resources and Interventions:  Current Resources:      Community Resources: None  Supplies used at home:: None  Equipment Currently Used at Home: none         Referrals Placed: None     Goals:   Goals        General    I want to get my Medicaid in place (pt-stated)     Notes - Note created  12/2/2014 12:52 PM by Dilcia Stringer LSW    As of today's date 12/2/2014 goal is met at 51 - 75%.   Goal Status:  Active  Application in into the county and waiting for response        I want to get on the right medications for my Mental Health  (pt-stated)     Notes - Note created  12/2/2014 12:54 PM by Dilcia Stringer LSW    As of today's date 12/2/2014 goal is met at 51 - 75%.   Goal Status:  Active  Pt has been prescribed medications and  is waiting for MA to be started so she can pick them up        I want to lose weight (pt-stated)     Notes - Note created  12/2/2014 12:55 PM by Dilcia Stringer LSW    As of today's date 12/2/2014 goal is met at 0 - 25%.   Goal Status:  Active  Just starting on the process with plans to join a gym once MA is in place if possible        I want to write a book about my life (pt-stated)     Notes - Note created  12/2/2014 12:54 PM by Dilcia Stringer LSW    As of today's date 12/2/2014 goal is met at 26 - 50%.   Goal Status:  Active                     Plan: 1) Patient will continue to follow treatment plan as directed and follow up with PCP with concerns ongoing.   2) Care Coordination to remain available for future needs.     Baron Jolly RN  Clinic Care Coordinator  [phone]

## 2020-07-16 ENCOUNTER — VIRTUAL VISIT (OUTPATIENT)
Dept: FAMILY MEDICINE | Facility: CLINIC | Age: 25
End: 2020-07-16
Payer: COMMERCIAL

## 2020-07-16 DIAGNOSIS — J06.9 VIRAL URI WITH COUGH: Primary | ICD-10-CM

## 2020-07-16 PROCEDURE — 99207 ZZC NON-BILLABLE SERV PER CHARTING: CPT | Performed by: FAMILY MEDICINE

## 2020-07-16 NOTE — NURSING NOTE
Tried calling patient 4 times with no answer, left four messages. Last message noted she had to call and re-schedule her visit.

## 2020-07-16 NOTE — LETTER
[facility]  [address] [facility] [address]  Phone: [phone]    July 16, 2020      Liat Corcoran  [address] [place] [id number]      Dear Ms. Corcoran    You should be excused from work today for medical reasons.   You may return to work on 7-17-20 with no restrictions.     Sincerely,    / Harmeet Alexander MD
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
## 2020-07-20 ENCOUNTER — VIRTUAL VISIT (OUTPATIENT)
Dept: FAMILY MEDICINE | Facility: CLINIC | Age: 25
End: 2020-07-20
Payer: COMMERCIAL

## 2020-07-20 DIAGNOSIS — J01.90 ACUTE SINUSITIS WITH SYMPTOMS > 10 DAYS: Primary | ICD-10-CM

## 2020-07-20 PROCEDURE — 99213 OFFICE O/P EST LOW 20 MIN: CPT | Mod: 95 | Performed by: NURSE PRACTITIONER

## 2020-07-20 RX ORDER — DOXYCYCLINE 100 MG/1
100 CAPSULE ORAL 2 TIMES DAILY
Qty: 14 CAPSULE | Refills: 0 | Status: SHIPPED | OUTPATIENT
Start: 2020-07-20 | End: 2020-07-31

## 2020-07-20 NOTE — LETTER
My Asthma Action Plan    Name: Liat Corcoran   YOB: 1995  Date: 7/20/2020   My doctor: ARIEL Aldrich CNP   My clinic: [facility]        My Rescue Medicine:   Albuterol inhaler (Proair/Ventolin/Proventil HFA)  2-4 puffs EVERY 4 HOURS as needed. Use a spacer if recommended by your provider.   My Asthma Severity:   Intermittent / Exercise Induced  Know your asthma triggers: .             GREEN ZONE   Good Control    I feel good    No cough or wheeze    Can work, sleep and play without asthma symptoms       Take your asthma control medicine every day.     1. If exercise triggers your asthma, take your rescue medication    15 minutes before exercise or sports, and    During exercise if you have asthma symptoms  2. Spacer to use with inhaler: If you have a spacer, make sure to use it with your inhaler             YELLOW ZONE Getting Worse  I have ANY of these:    I do not feel good    Cough or wheeze    Chest feels tight    Wake up at night   1. Keep taking your Green Zone medications  2. Start taking your rescue medicine:    every 20 minutes for up to 1 hour. Then every 4 hours for 24-48 hours.  3. If you stay in the Yellow Zone for more than 12-24 hours, contact your doctor.  4. If you do not return to the Green Zone in 12-24 hours or you get worse, start taking your oral steroid medicine if prescribed by your provider.           RED ZONE Medical Alert - Get Help  I have ANY of these:    I feel awful    Medicine is not helping    Breathing getting harder    Trouble walking or talking    Nose opens wide to breathe       1. Take your rescue medicine NOW  2. If your provider has prescribed an oral steroid medicine, start taking it NOW  3. Call your doctor NOW  4. If you are still in the Red Zone after 20 minutes and you have not reached your doctor:    Take your rescue medicine again and    Call 911 or go to the emergency room right away    See your regular doctor within 2  weeks of an Emergency Room or Urgent Care visit for follow-up treatment.          Annual Reminders:  Meet with Asthma Educator,  Flu Shot in the Fall, consider Pneumonia Vaccination for patients with asthma (aged 19 and older).    Pharmacy:    [id number] - [place], [place] - [address]    Electronically signed by ARIEL Aldrich CNP   Date: 07/20/20                    Asthma Triggers  How To Control Things That Make Your Asthma Worse    Triggers are things that make your asthma worse.  Look at the list below to help you find your triggers and   what you can do about them. You can help prevent asthma flare-ups by staying away from your triggers.      Trigger                                                          What you can do   Cigarette Smoke  Tobacco smoke can make asthma worse. Do not allow smoking in your home, car or around you.  Be sure no one smokes at a child s day care or school.  If you smoke, ask your health care provider for ways to help you quit.  Ask family members to quit too.  Ask your health care provider for a referral to Quit Plan to help you quit smoking, or call 3-874-314PLAN.     Colds, Flu, Bronchitis  These are common triggers of asthma. Wash your hands often.  Don t touch your eyes, nose or mouth.  Get a flu shot every year.     Dust Mites  These are tiny bugs that live in cloth or carpet. They are too small to see. Wash sheets and blankets in hot water every week.   Encase pillows and mattress in dust mite proof covers.  Avoid having carpet if you can. If you have carpet, vacuum weekly.   Use a dust mask and HEPA vacuum.   Pollen and Outdoor Mold  Some people are allergic to trees, grass, or weed pollen, or molds. Try to keep your windows closed.  Limit time out doors when pollen count is high.   Ask you health care provider about taking medicine during allergy season.     Animal Dander  Some people are allergic to skin flakes, urine or saliva from pets with  fur or feathers. Keep pets with fur or feathers out of your home.    If you can t keep the pet outdoors, then keep the pet out of your bedroom.  Keep the bedroom door closed.  Keep pets off cloth furniture and away from stuffed toys.     Mice, Rats, and Cockroaches  Some people are allergic to the waste from these pests.   Cover food and garbage.  Clean up spills and food crumbs.  Store grease in the refrigerator.   Keep food out of the bedroom.   Indoor Mold  This can be a trigger if your home has high moisture. Fix leaking faucets, pipes, or other sources of water.   Clean moldy surfaces.  Dehumidify basement if it is damp and smelly.   Smoke, Strong Odors, and Sprays  These can reduce air quality. Stay away from strong odors and sprays, such as perfume, powder, hair spray, paints, smoke incense, paint, cleaning products, candles and new carpet.   Exercise or Sports  Some people with asthma have this trigger. Be active!  Ask your doctor about taking medicine before sports or exercise to prevent symptoms.    Warm up for 5-10 minutes before and after sports or exercise.     Other Triggers of Asthma  Cold air:  Cover your nose and mouth with a scarf.  Sometimes laughing or crying can be a trigger.  Some medicines and food can trigger asthma.

## 2020-07-20 NOTE — LETTER
July 20, 2020      Liat Corcoran  [address] [facility] [address]        To Whom It May Concern:    Liat Corcoran was seen in our clinic. She may return to work 7/23/2020 without restrictions.      Sincerely,        ARIEL lAdrich CNP
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
## 2020-07-31 ENCOUNTER — OFFICE VISIT (OUTPATIENT)
Dept: FAMILY MEDICINE | Facility: CLINIC | Age: 25
End: 2020-07-31
Payer: COMMERCIAL

## 2020-07-31 VITALS
BODY MASS INDEX: 33.04 KG/M2 | HEART RATE: 76 BPM | TEMPERATURE: 97.5 F | HEIGHT: 61 IN | DIASTOLIC BLOOD PRESSURE: 70 MMHG | WEIGHT: 175 LBS | SYSTOLIC BLOOD PRESSURE: 124 MMHG | RESPIRATION RATE: 18 BRPM

## 2020-07-31 DIAGNOSIS — R10.84 ABDOMINAL PAIN, GENERALIZED: Primary | ICD-10-CM

## 2020-07-31 LAB
ALBUMIN SERPL-MCNC: 3.8 G/DL (ref 3.4–5)
ALBUMIN UR-MCNC: NEGATIVE MG/DL
ALP SERPL-CCNC: 64 U/L (ref 40–150)
ALT SERPL W P-5'-P-CCNC: 14 U/L (ref 0–50)
ANION GAP SERPL CALCULATED.3IONS-SCNC: 3 MMOL/L (ref 3–14)
APPEARANCE UR: CLEAR
AST SERPL W P-5'-P-CCNC: 12 U/L (ref 0–45)
B-HCG SERPL-ACNC: <1 IU/L (ref 0–5)
BASOPHILS # BLD AUTO: 0 10E9/L (ref 0–0.2)
BASOPHILS NFR BLD AUTO: 0.5 %
BILIRUB SERPL-MCNC: 0.7 MG/DL (ref 0.2–1.3)
BILIRUB UR QL STRIP: NEGATIVE
BUN SERPL-MCNC: 15 MG/DL (ref 7–30)
CALCIUM SERPL-MCNC: 9 MG/DL (ref 8.5–10.1)
CHLORIDE SERPL-SCNC: 105 MMOL/L (ref 94–109)
CO2 SERPL-SCNC: 30 MMOL/L (ref 20–32)
COLOR UR AUTO: YELLOW
CREAT SERPL-MCNC: 0.77 MG/DL (ref 0.52–1.04)
DIFFERENTIAL METHOD BLD: ABNORMAL
EOSINOPHIL # BLD AUTO: 0.1 10E9/L (ref 0–0.7)
EOSINOPHIL NFR BLD AUTO: 1.5 %
ERYTHROCYTE [DISTWIDTH] IN BLOOD BY AUTOMATED COUNT: 14.6 % (ref 10–15)
GFR SERPL CREATININE-BSD FRML MDRD: >90 ML/MIN/{1.73_M2}
GLUCOSE SERPL-MCNC: 72 MG/DL (ref 70–99)
GLUCOSE UR STRIP-MCNC: NEGATIVE MG/DL
HCT VFR BLD AUTO: 39.3 % (ref 35–47)
HGB BLD-MCNC: 12.2 G/DL (ref 11.7–15.7)
HGB UR QL STRIP: ABNORMAL
KETONES UR STRIP-MCNC: NEGATIVE MG/DL
LEUKOCYTE ESTERASE UR QL STRIP: NEGATIVE
LIPASE SERPL-CCNC: 67 U/L (ref 73–393)
LYMPHOCYTES # BLD AUTO: 3 10E9/L (ref 0.8–5.3)
LYMPHOCYTES NFR BLD AUTO: 36.5 %
MCH RBC QN AUTO: 25.7 PG (ref 26.5–33)
MCHC RBC AUTO-ENTMCNC: 31 G/DL (ref 31.5–36.5)
MCV RBC AUTO: 83 FL (ref 78–100)
MONOCYTES # BLD AUTO: 0.6 10E9/L (ref 0–1.3)
MONOCYTES NFR BLD AUTO: 7.8 %
NEUTROPHILS # BLD AUTO: 4.4 10E9/L (ref 1.6–8.3)
NEUTROPHILS NFR BLD AUTO: 53.7 %
NITRATE UR QL: NEGATIVE
NON-SQ EPI CELLS #/AREA URNS LPF: ABNORMAL /LPF
PH UR STRIP: 6 PH (ref 5–7)
PLATELET # BLD AUTO: 396 10E9/L (ref 150–450)
POTASSIUM SERPL-SCNC: 4.7 MMOL/L (ref 3.4–5.3)
PROT SERPL-MCNC: 7.7 G/DL (ref 6.8–8.8)
RBC # BLD AUTO: 4.75 10E12/L (ref 3.8–5.2)
RBC #/AREA URNS AUTO: ABNORMAL /HPF
SODIUM SERPL-SCNC: 138 MMOL/L (ref 133–144)
SOURCE: ABNORMAL
SP GR UR STRIP: 1.02 (ref 1–1.03)
UROBILINOGEN UR STRIP-ACNC: 0.2 EU/DL (ref 0.2–1)
WBC # BLD AUTO: 8.2 10E9/L (ref 4–11)
WBC #/AREA URNS AUTO: ABNORMAL /HPF

## 2020-07-31 PROCEDURE — 36415 COLL VENOUS BLD VENIPUNCTURE: CPT | Performed by: NURSE PRACTITIONER

## 2020-07-31 PROCEDURE — 85025 COMPLETE CBC W/AUTO DIFF WBC: CPT | Performed by: NURSE PRACTITIONER

## 2020-07-31 PROCEDURE — 80053 COMPREHEN METABOLIC PANEL: CPT | Performed by: NURSE PRACTITIONER

## 2020-07-31 PROCEDURE — 81001 URINALYSIS AUTO W/SCOPE: CPT | Performed by: NURSE PRACTITIONER

## 2020-07-31 PROCEDURE — 84702 CHORIONIC GONADOTROPIN TEST: CPT | Performed by: NURSE PRACTITIONER

## 2020-07-31 PROCEDURE — 83690 ASSAY OF LIPASE: CPT | Performed by: NURSE PRACTITIONER

## 2020-07-31 PROCEDURE — 99214 OFFICE O/P EST MOD 30 MIN: CPT | Performed by: NURSE PRACTITIONER

## 2020-07-31 ASSESSMENT — MIFFLIN-ST. JEOR: SCORE: 1476.17

## 2020-07-31 NOTE — PATIENT INSTRUCTIONS
Patient Education     Unknown Causes of Abdominal Pain (Female)    The exact cause of your belly (abdominal) pain is not clear. This does not mean that this is something to worry about. Everyone likes to know the exact cause of the problem. But sometimes with belly pain, there is no clear-cut cause, and this could be a good thing. The good news is that your symptoms can be treated, and you will feel better.   Your condition does not seem serious now. But sometimes the signs of a serious problem may take more time to appear. For this reason, it is important for you to watch for any new symptoms, problems, or worsening of your condition.  Over the next few days, the abdominal pain may come and go. Or it may be constant. Other common symptoms can include nausea and vomiting. Sometimes it can be difficult to tell if you feel nauseous. You may just feel bad and not connect that feeling to nausea. Constipation, diarrhea, and a fever may go along with the pain.  The pain may continue even if treated correctly over the following days. Depending on how things go, sometimes the cause can become clear and may need more or different treatment. Additional evaluations, medicines, or tests may also be needed.  Home care  Your healthcare provider may prescribe medicine for pain, symptoms, or an infection.  Follow the healthcare provider's instructions for taking these medicines.  General care    Rest as much as you can until your next exam. No strenuous activities.    Try to find positions that ease discomfort. A small pillow placed on the abdomen may help relieve pain.    Something warm on your abdomen (such as a heating pad) may help, but be careful not to burn yourself.  Diet    Don t force yourself to eat, especially if having cramps, vomiting, or diarrhea.    Water is important so you don't get dehydrated. Soup may also be good. Sports drinks may also help, especially if they are not too acidic. Don't drink sugary drinks as  this can make things worse. Take liquids in small amounts. Don t guzzle them.    Caffeine sometimes makes the pain and cramping worse.    Don t take dairy products if you have vomiting or diarrhea.    Don't eat large amounts at a time. Wait a few minutes between bites.    Eat a diet low in fiber (called a low-residue diet). Foods allowed include refined breads, white rice, fruit and vegetable juices without pulp, tender meats. These foods will pass more easily through the intestine.    Don t have whole-grain foods, whole fruits and vegetables, meats, seeds and nuts, fried or fatty foods, dairy, alcohol and spicy foods until your symptoms go away.  Follow-up care  Follow up with your healthcare provider, or as advised, if your pain does not begin to improve in the next 24 hours.  Call 911  Call 911 if any of these occur:    Trouble breathing    Confusion    Fainting or loss of consciousness    Rapid heart rate    Seizure  When to seek medical advice  Call your healthcare provider right away if any of these occur:    Pain gets worse or moves to the right lower abdomen    New or worsening vomiting or diarrhea    Swelling of the abdomen    Unable to pass stool for more than 3 days    Fever of 100.4 F (38 C) or higher, or as directed by your healthcare provider.    Blood in vomit or bowel movements (dark red or black color)    Yellow color of eyes and skin (jaundice)    Weakness, dizziness    Chest, arm, back, neck, or jaw pain    Unexpected vaginal bleeding or missed period    Can't keep down liquids or water and you are getting dehydrated  Date Last Reviewed: 6/1/2018 2000-2019 [employer]. [address] [place] [address], [place] [address]. All rights reserved. This information is not intended as a substitute for professional medical care. Always follow your healthcare professional's instructions.
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
## 2020-08-30 ENCOUNTER — HOSPITAL ENCOUNTER (EMERGENCY)
Facility: CLINIC | Age: 25
Discharge: HOME | End: 2020-08-31
Attending: EMERGENCY MEDICINE | Admitting: EMERGENCY MEDICINE
Payer: COMMERCIAL

## 2020-08-30 VITALS
DIASTOLIC BLOOD PRESSURE: 70 MMHG | WEIGHT: 170 LBS | OXYGEN SATURATION: 96 % | TEMPERATURE: 97.4 F | HEIGHT: 60 IN | BODY MASS INDEX: 33.38 KG/M2 | RESPIRATION RATE: 20 BRPM | SYSTOLIC BLOOD PRESSURE: 109 MMHG | HEART RATE: 86 BPM

## 2020-08-30 DIAGNOSIS — R07.9 ACUTE CHEST PAIN: ICD-10-CM

## 2020-08-30 DIAGNOSIS — G43.909 MIGRAINE WITHOUT STATUS MIGRAINOSUS, NOT INTRACTABLE, UNSPECIFIED MIGRAINE TYPE: ICD-10-CM

## 2020-08-30 LAB
ALBUMIN SERPL-MCNC: 4.2 G/DL (ref 3.4–5)
ALP SERPL-CCNC: 69 U/L (ref 40–150)
ALT SERPL W P-5'-P-CCNC: 20 U/L (ref 0–50)
ANION GAP SERPL CALCULATED.3IONS-SCNC: 5 MMOL/L (ref 3–14)
AST SERPL W P-5'-P-CCNC: 20 U/L (ref 0–45)
BASOPHILS # BLD AUTO: 0.1 10E9/L (ref 0–0.2)
BASOPHILS NFR BLD AUTO: 0.8 %
BILIRUB SERPL-MCNC: 0.5 MG/DL (ref 0.2–1.3)
BUN SERPL-MCNC: 15 MG/DL (ref 7–30)
CALCIUM SERPL-MCNC: 8.8 MG/DL (ref 8.5–10.1)
CHLORIDE SERPL-SCNC: 110 MMOL/L (ref 94–109)
CO2 SERPL-SCNC: 26 MMOL/L (ref 20–32)
CREAT SERPL-MCNC: 0.65 MG/DL (ref 0.52–1.04)
DIFFERENTIAL METHOD BLD: ABNORMAL
EOSINOPHIL # BLD AUTO: 0.2 10E9/L (ref 0–0.7)
EOSINOPHIL NFR BLD AUTO: 1.1 %
ERYTHROCYTE [DISTWIDTH] IN BLOOD BY AUTOMATED COUNT: 13.4 % (ref 10–15)
GFR SERPL CREATININE-BSD FRML MDRD: >90 ML/MIN/{1.73_M2}
GLUCOSE SERPL-MCNC: 83 MG/DL (ref 70–99)
HCT VFR BLD AUTO: 40.1 % (ref 35–47)
HGB BLD-MCNC: 12.4 G/DL (ref 11.7–15.7)
IMM GRANULOCYTES # BLD: 0 10E9/L (ref 0–0.4)
IMM GRANULOCYTES NFR BLD: 0.2 %
LYMPHOCYTES # BLD AUTO: 4.5 10E9/L (ref 0.8–5.3)
LYMPHOCYTES NFR BLD AUTO: 34.2 %
MCH RBC QN AUTO: 25.8 PG (ref 26.5–33)
MCHC RBC AUTO-ENTMCNC: 30.9 G/DL (ref 31.5–36.5)
MCV RBC AUTO: 83 FL (ref 78–100)
MONOCYTES # BLD AUTO: 0.9 10E9/L (ref 0–1.3)
MONOCYTES NFR BLD AUTO: 6.7 %
NEUTROPHILS # BLD AUTO: 7.5 10E9/L (ref 1.6–8.3)
NEUTROPHILS NFR BLD AUTO: 57 %
NRBC # BLD AUTO: 0 10*3/UL
NRBC BLD AUTO-RTO: 0 /100
PLATELET # BLD AUTO: 428 10E9/L (ref 150–450)
POTASSIUM SERPL-SCNC: 3.5 MMOL/L (ref 3.4–5.3)
PROT SERPL-MCNC: 7.9 G/DL (ref 6.8–8.8)
RBC # BLD AUTO: 4.81 10E12/L (ref 3.8–5.2)
SODIUM SERPL-SCNC: 141 MMOL/L (ref 133–144)
TROPONIN I SERPL-MCNC: <0.015 UG/L (ref 0–0.04)
WBC # BLD AUTO: 13.2 10E9/L (ref 4–11)

## 2020-08-30 PROCEDURE — 25000128 H RX IP 250 OP 636: Performed by: EMERGENCY MEDICINE

## 2020-08-30 PROCEDURE — 93010 ELECTROCARDIOGRAM REPORT: CPT | Mod: Z6 | Performed by: EMERGENCY MEDICINE

## 2020-08-30 PROCEDURE — 96374 THER/PROPH/DIAG INJ IV PUSH: CPT | Performed by: EMERGENCY MEDICINE

## 2020-08-30 PROCEDURE — 96361 HYDRATE IV INFUSION ADD-ON: CPT | Performed by: EMERGENCY MEDICINE

## 2020-08-30 PROCEDURE — 25800030 ZZH RX IP 258 OP 636: Performed by: EMERGENCY MEDICINE

## 2020-08-30 PROCEDURE — 80053 COMPREHEN METABOLIC PANEL: CPT | Performed by: EMERGENCY MEDICINE

## 2020-08-30 PROCEDURE — 85025 COMPLETE CBC W/AUTO DIFF WBC: CPT | Performed by: EMERGENCY MEDICINE

## 2020-08-30 PROCEDURE — 96375 TX/PRO/DX INJ NEW DRUG ADDON: CPT | Performed by: EMERGENCY MEDICINE

## 2020-08-30 PROCEDURE — 99285 EMERGENCY DEPT VISIT HI MDM: CPT | Mod: 25 | Performed by: EMERGENCY MEDICINE

## 2020-08-30 PROCEDURE — 84484 ASSAY OF TROPONIN QUANT: CPT | Performed by: EMERGENCY MEDICINE

## 2020-08-30 PROCEDURE — 93005 ELECTROCARDIOGRAM TRACING: CPT | Performed by: EMERGENCY MEDICINE

## 2020-08-30 RX ORDER — DIPHENHYDRAMINE HYDROCHLORIDE 50 MG/ML
25 INJECTION INTRAMUSCULAR; INTRAVENOUS ONCE
Status: DISCONTINUED | OUTPATIENT
Start: 2020-08-30 | End: 2020-08-31 | Stop reason: HOSPADM

## 2020-08-30 RX ORDER — METOCLOPRAMIDE HYDROCHLORIDE 5 MG/ML
10 INJECTION INTRAMUSCULAR; INTRAVENOUS ONCE
Status: COMPLETED | OUTPATIENT
Start: 2020-08-30 | End: 2020-08-30

## 2020-08-30 RX ORDER — LORAZEPAM 2 MG/ML
1 INJECTION INTRAMUSCULAR ONCE
Status: COMPLETED | OUTPATIENT
Start: 2020-08-30 | End: 2020-08-30

## 2020-08-30 RX ORDER — SUMATRIPTAN 50 MG/1
TABLET, FILM COATED ORAL
Qty: 9 TABLET | Refills: 1 | Status: SHIPPED | OUTPATIENT
Start: 2020-08-30 | End: 2020-08-31

## 2020-08-30 RX ORDER — DIPHENHYDRAMINE HYDROCHLORIDE 50 MG/ML
25 INJECTION INTRAMUSCULAR; INTRAVENOUS ONCE
Status: COMPLETED | OUTPATIENT
Start: 2020-08-30 | End: 2020-08-30

## 2020-08-30 RX ORDER — KETOROLAC TROMETHAMINE 30 MG/ML
30 INJECTION, SOLUTION INTRAMUSCULAR; INTRAVENOUS ONCE
Status: COMPLETED | OUTPATIENT
Start: 2020-08-30 | End: 2020-08-30

## 2020-08-30 RX ADMIN — DIPHENHYDRAMINE HYDROCHLORIDE 25 MG: 50 INJECTION, SOLUTION INTRAMUSCULAR; INTRAVENOUS at 22:50

## 2020-08-30 RX ADMIN — METOCLOPRAMIDE HYDROCHLORIDE 10 MG: 5 INJECTION INTRAMUSCULAR; INTRAVENOUS at 22:55

## 2020-08-30 RX ADMIN — LORAZEPAM 1 MG: 2 INJECTION INTRAMUSCULAR; INTRAVENOUS at 23:27

## 2020-08-30 RX ADMIN — KETOROLAC TROMETHAMINE 30 MG: 30 INJECTION, SOLUTION INTRAMUSCULAR at 22:53

## 2020-08-30 RX ADMIN — SODIUM CHLORIDE 1000 ML: 9 INJECTION, SOLUTION INTRAVENOUS at 22:49

## 2020-08-30 ASSESSMENT — MIFFLIN-ST. JEOR: SCORE: 1437.61

## 2020-08-30 NOTE — ED AVS SNAPSHOT
[facility]  [address] [address]  Phone:  [phone]  Fax:  [phone]                                    Liat Corcoran   MRN: [id number]    Department:  [facility] Emergency Department   Date of Visit:  8/30/2020           After Visit Summary Signature Page    I have received my discharge instructions, and my questions have been answered. I have discussed any challenges I see with this plan with the nurse or doctor.    ..........................................................................................................................................  Patient/Patient Representative Signature      ..........................................................................................................................................  Patient Representative Print Name and Relationship to Patient    ..................................................               ................................................  Date                                   Time    ..........................................................................................................................................  Reviewed by Signature/Title    ...................................................              ..............................................  Date                                               Time          22EPIC Rev 08/18

## 2020-08-30 NOTE — LETTER
August 31, 2020      To Whom It May Concern:      Liat Corcoran was seen in our Emergency Department today, Sunday 08/31/20.  Please excuse her from work tonight and Monday, 8/31/2020 if needed.    Sincerely,      BARON Jimenez MD

## 2020-08-31 ENCOUNTER — TELEPHONE (OUTPATIENT)
Dept: FAMILY MEDICINE | Facility: CLINIC | Age: 25
End: 2020-08-31

## 2020-08-31 RX ORDER — SUMATRIPTAN 50 MG/1
TABLET, FILM COATED ORAL
Qty: 9 TABLET | Refills: 1 | Status: SHIPPED | OUTPATIENT
Start: 2020-08-31 | End: 2021-01-22

## 2020-08-31 NOTE — TELEPHONE ENCOUNTER
Reason for Call:  Other     Detailed comments: Patient was in ED in [place] last night with a headache - she is feeling very dizzy and lightheaded this morning - along with the headache - please advise    Phone Number Patient can be reached at: Home number on file [phone] (home)    Best Time:     Can we leave a detailed message on this number? YES    Call taken on 8/31/2020 at 9:17 AM by Lizz Crane

## 2020-08-31 NOTE — ED TRIAGE NOTES
Hx of migraines, this feels similar except that tonight she is dropping things (out of hands), impaired gait (leg weakness) these two Sx abnormal for her migraines
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
## 2020-08-31 NOTE — TELEPHONE ENCOUNTER
Per 08-30-20 ED visit-  History, signs and symptoms, and exam are consistent with migraine headache, typical symmetrical upper extremity weakness and earlier lower extremity weakness with no neurologic deficit on examination. Doubt SAH/ICH, CVA, venous sinus thrombosis, meningitis or encephalitis.  Significantly improved after migraine meds and comfortable with d/c home.  Additionally typical chest pains recently, a benign anxiety or stress related chest pain.  Today's EKG and troponin were normal.  Doubt atypical ACS, PE/DVT or aneurysm/dissection.  Prior evaluation including CT of the chest PE protocol for chest pain have been negative.  She was provided instructions for supportive care and grabbed Imitrex to try.  She will return as needed for worsened condition or worsening symptoms, or new problems or concerns.  I recommended clinic follow-up this coming week.       Reports persistent migraine sx, dizziness, lightheadedness, confusion which she had while in ED yest.  States knows surroundings but feels not tracking well.  Denies vision changes, nausea, vomiting. States well hydrated.  Has not taken Imitrex yet, just picking up from pharm, going home to eat then take Rx.  On way (with ) to  small children who she will be caring for today.  Advised to take imitrex as soon as able, ensure adequate fluid intake, rest as able. No driving while dizzy, lightheaded.  Advised return to ED if sx persist, worsen despite taking imitrex.  F/U with PCP as needed.  Pt voices understanding/ agreement.  MICKIE Chahal RN

## 2020-09-01 ENCOUNTER — TELEPHONE (OUTPATIENT)
Dept: FAMILY MEDICINE | Facility: CLINIC | Age: 25
End: 2020-09-01

## 2020-09-01 ENCOUNTER — HOSPITAL ENCOUNTER (EMERGENCY)
Facility: CLINIC | Age: 25
Discharge: HOME | End: 2020-09-01
Attending: EMERGENCY MEDICINE | Admitting: EMERGENCY MEDICINE
Payer: COMMERCIAL

## 2020-09-01 ENCOUNTER — APPOINTMENT (OUTPATIENT)
Dept: MRI IMAGING | Facility: CLINIC | Age: 25
End: 2020-09-01
Attending: EMERGENCY MEDICINE
Payer: COMMERCIAL

## 2020-09-01 VITALS
TEMPERATURE: 98 F | WEIGHT: 170 LBS | HEIGHT: 60 IN | RESPIRATION RATE: 15 BRPM | OXYGEN SATURATION: 100 % | DIASTOLIC BLOOD PRESSURE: 72 MMHG | BODY MASS INDEX: 33.38 KG/M2 | HEART RATE: 60 BPM | SYSTOLIC BLOOD PRESSURE: 110 MMHG

## 2020-09-01 DIAGNOSIS — R51.9 NONINTRACTABLE HEADACHE, UNSPECIFIED CHRONICITY PATTERN, UNSPECIFIED HEADACHE TYPE: ICD-10-CM

## 2020-09-01 LAB
ALBUMIN SERPL-MCNC: 3.8 G/DL (ref 3.4–5)
ALP SERPL-CCNC: 70 U/L (ref 40–150)
ALT SERPL W P-5'-P-CCNC: 20 U/L (ref 0–50)
ANION GAP SERPL CALCULATED.3IONS-SCNC: 2 MMOL/L (ref 3–14)
AST SERPL W P-5'-P-CCNC: 14 U/L (ref 0–45)
BASOPHILS # BLD AUTO: 0.1 10E9/L (ref 0–0.2)
BASOPHILS NFR BLD AUTO: 0.7 %
BILIRUB SERPL-MCNC: 0.5 MG/DL (ref 0.2–1.3)
BUN SERPL-MCNC: 12 MG/DL (ref 7–30)
CALCIUM SERPL-MCNC: 9 MG/DL (ref 8.5–10.1)
CHLORIDE SERPL-SCNC: 109 MMOL/L (ref 94–109)
CO2 SERPL-SCNC: 27 MMOL/L (ref 20–32)
CREAT SERPL-MCNC: 0.74 MG/DL (ref 0.52–1.04)
CRP SERPL-MCNC: <2.9 MG/L (ref 0–8)
DIFFERENTIAL METHOD BLD: ABNORMAL
EOSINOPHIL # BLD AUTO: 0.1 10E9/L (ref 0–0.7)
EOSINOPHIL NFR BLD AUTO: 1.1 %
ERYTHROCYTE [DISTWIDTH] IN BLOOD BY AUTOMATED COUNT: 13.4 % (ref 10–15)
GFR SERPL CREATININE-BSD FRML MDRD: >90 ML/MIN/{1.73_M2}
GLUCOSE BLDC GLUCOMTR-MCNC: 74 MG/DL (ref 70–99)
GLUCOSE SERPL-MCNC: 85 MG/DL (ref 70–99)
HCT VFR BLD AUTO: 40.3 % (ref 35–47)
HGB BLD-MCNC: 12.6 G/DL (ref 11.7–15.7)
IMM GRANULOCYTES # BLD: 0 10E9/L (ref 0–0.4)
IMM GRANULOCYTES NFR BLD: 0.3 %
LYMPHOCYTES # BLD AUTO: 3.6 10E9/L (ref 0.8–5.3)
LYMPHOCYTES NFR BLD AUTO: 31.7 %
MCH RBC QN AUTO: 26.5 PG (ref 26.5–33)
MCHC RBC AUTO-ENTMCNC: 31.3 G/DL (ref 31.5–36.5)
MCV RBC AUTO: 85 FL (ref 78–100)
MONOCYTES # BLD AUTO: 0.7 10E9/L (ref 0–1.3)
MONOCYTES NFR BLD AUTO: 5.8 %
NEUTROPHILS # BLD AUTO: 6.8 10E9/L (ref 1.6–8.3)
NEUTROPHILS NFR BLD AUTO: 60.4 %
NRBC # BLD AUTO: 0 10*3/UL
NRBC BLD AUTO-RTO: 0 /100
PLATELET # BLD AUTO: 430 10E9/L (ref 150–450)
POTASSIUM SERPL-SCNC: 3.7 MMOL/L (ref 3.4–5.3)
PROT SERPL-MCNC: 7.7 G/DL (ref 6.8–8.8)
RBC # BLD AUTO: 4.75 10E12/L (ref 3.8–5.2)
SODIUM SERPL-SCNC: 138 MMOL/L (ref 133–144)
WBC # BLD AUTO: 11.2 10E9/L (ref 4–11)

## 2020-09-01 PROCEDURE — 25800030 ZZH RX IP 258 OP 636: Performed by: EMERGENCY MEDICINE

## 2020-09-01 PROCEDURE — 99285 EMERGENCY DEPT VISIT HI MDM: CPT | Mod: 25 | Performed by: EMERGENCY MEDICINE

## 2020-09-01 PROCEDURE — 93005 ELECTROCARDIOGRAM TRACING: CPT | Performed by: EMERGENCY MEDICINE

## 2020-09-01 PROCEDURE — 70553 MRI BRAIN STEM W/O & W/DYE: CPT

## 2020-09-01 PROCEDURE — 96361 HYDRATE IV INFUSION ADD-ON: CPT | Performed by: EMERGENCY MEDICINE

## 2020-09-01 PROCEDURE — 85025 COMPLETE CBC W/AUTO DIFF WBC: CPT | Performed by: EMERGENCY MEDICINE

## 2020-09-01 PROCEDURE — 25000128 H RX IP 250 OP 636: Performed by: EMERGENCY MEDICINE

## 2020-09-01 PROCEDURE — 96375 TX/PRO/DX INJ NEW DRUG ADDON: CPT | Mod: 59 | Performed by: EMERGENCY MEDICINE

## 2020-09-01 PROCEDURE — 25500064 ZZH RX 255 OP 636: Performed by: EMERGENCY MEDICINE

## 2020-09-01 PROCEDURE — 96374 THER/PROPH/DIAG INJ IV PUSH: CPT | Mod: 59 | Performed by: EMERGENCY MEDICINE

## 2020-09-01 PROCEDURE — 80053 COMPREHEN METABOLIC PANEL: CPT | Performed by: EMERGENCY MEDICINE

## 2020-09-01 PROCEDURE — 00000146 ZZHCL STATISTIC GLUCOSE BY METER IP

## 2020-09-01 PROCEDURE — A9585 GADOBUTROL INJECTION: HCPCS | Performed by: EMERGENCY MEDICINE

## 2020-09-01 PROCEDURE — 99284 EMERGENCY DEPT VISIT MOD MDM: CPT | Mod: Z6 | Performed by: EMERGENCY MEDICINE

## 2020-09-01 PROCEDURE — 86140 C-REACTIVE PROTEIN: CPT | Performed by: EMERGENCY MEDICINE

## 2020-09-01 RX ORDER — ONDANSETRON 2 MG/ML
4 INJECTION INTRAMUSCULAR; INTRAVENOUS ONCE
Status: COMPLETED | OUTPATIENT
Start: 2020-09-01 | End: 2020-09-01

## 2020-09-01 RX ORDER — DIPHENHYDRAMINE HYDROCHLORIDE 50 MG/ML
25 INJECTION INTRAMUSCULAR; INTRAVENOUS ONCE
Status: COMPLETED | OUTPATIENT
Start: 2020-09-01 | End: 2020-09-01

## 2020-09-01 RX ORDER — GADOBUTROL 604.72 MG/ML
7 INJECTION INTRAVENOUS ONCE
Status: COMPLETED | OUTPATIENT
Start: 2020-09-01 | End: 2020-09-01

## 2020-09-01 RX ORDER — KETOROLAC TROMETHAMINE 15 MG/ML
15 INJECTION, SOLUTION INTRAMUSCULAR; INTRAVENOUS ONCE
Status: COMPLETED | OUTPATIENT
Start: 2020-09-01 | End: 2020-09-01

## 2020-09-01 RX ADMIN — KETOROLAC TROMETHAMINE 15 MG: 15 INJECTION, SOLUTION INTRAMUSCULAR; INTRAVENOUS at 14:35

## 2020-09-01 RX ADMIN — SODIUM CHLORIDE 1000 ML: 9 INJECTION, SOLUTION INTRAVENOUS at 14:10

## 2020-09-01 RX ADMIN — ONDANSETRON 4 MG: 2 INJECTION INTRAMUSCULAR; INTRAVENOUS at 14:34

## 2020-09-01 RX ADMIN — DIPHENHYDRAMINE HYDROCHLORIDE 25 MG: 50 INJECTION, SOLUTION INTRAMUSCULAR; INTRAVENOUS at 14:27

## 2020-09-01 RX ADMIN — GADOBUTROL 7 ML: 604.72 INJECTION INTRAVENOUS at 15:24

## 2020-09-01 ASSESSMENT — MIFFLIN-ST. JEOR: SCORE: 1437.61

## 2020-09-01 NOTE — ED AVS SNAPSHOT
[facility] Emergency Department  [address] [address]  Phone:  [phone]  Fax:  [phone]                                    Liat Corcoran   MRN: [id number]    Department:  [facility] Emergency Department   Date of Visit:  9/1/2020           After Visit Summary Signature Page    I have received my discharge instructions, and my questions have been answered. I have discussed any challenges I see with this plan with the nurse or doctor.    ..........................................................................................................................................  Patient/Patient Representative Signature      ..........................................................................................................................................  Patient Representative Print Name and Relationship to Patient    ..................................................               ................................................  Date                                   Time    ..........................................................................................................................................  Reviewed by Signature/Title    ...................................................              ..............................................  Date                                               Time          22EPIC Rev 08/18

## 2020-09-01 NOTE — TELEPHONE ENCOUNTER
"See 08-30-20 ED visit, yesterday TE.  Pt reports migraine headache improved with Imitrex, still has pressure above eyes, vision is \"foggy\", notices memory issues, some stuttering.  Denies weakness, lightheadedness, dizziness.   Advised ED with  now.  Pt voices agreement.  Forwarded to Alicia as PRIYA Chahal RN    "
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
## 2020-09-01 NOTE — TELEPHONE ENCOUNTER
Reason for call:  Patient reporting a symptom    Symptom or request: Headache- Pressure above eyes, Blurry vision, foggy in the mind.  Pt went to the ER last Sunday was given Imitrex.    Phone Number patient can be reached at:  Home number on file [phone] (home)    Best Time:  Any Time      Can we leave a detailed message on this number:  YES    Call taken on 9/1/2020 at 12:40 PM by Stefany Bond

## 2020-09-01 NOTE — DISCHARGE INSTRUCTIONS
Tylenol/ibuprofen for discomfort    Return here for progressive headache, focal neurologic change or any other concern

## 2020-09-01 NOTE — ED NOTES
"Pt reports on Saturday developed BROCK with chest pain, on Sunday pt reports she was dropping things at work and having a difficult itme concentrating and was sent home, pt came to the ED at that time. Pt reports worsening symptoms and has no developed \"foggy vision\" today with some balance issues and stuttering her words.     "
Blood glucose 74  
no

## 2020-09-02 ENCOUNTER — PATIENT OUTREACH (OUTPATIENT)
Dept: CARE COORDINATION | Facility: CLINIC | Age: 25
End: 2020-09-02

## 2020-09-02 NOTE — LETTER
[place] CARE COORDINATION  [address] [address]    September 3, 2020    Liat Corcoran  [address] [address]      Dear Liat,    I am a clinic community health worker who works with ARIEL Luis CNP at [facility] in [place]. I have been trying to reach you recently to introduce Clinic Care Coordination and to see if there was anything I could assist you with.  Below is a description of clinic care coordination and how I can further assist you.      The clinic care coordination team is made up of a registered nurse,  and community health worker who understand the health care system. The goal of clinic care coordination is to help you manage your health and improve access to the health care system in the most efficient manner. The team can assist you in meeting your health care goals by providing education, coordinating services, strengthening the communication among your providers and supporting you with any resource needs.    Please feel free to contact me at [phone] with any questions or concerns. We are focused on providing you with the highest-quality healthcare experience possible and that all starts with you.     Sincerely,     Roselyn Patel  [facility] Health Worker   [facility]  [email or web address]   Office: [phone]  Fax: [phone]

## 2020-09-02 NOTE — PROGRESS NOTES
Clinic Care Coordination Contact  [facility]/Voicemail       Clinical Data: CHW Outreach  Outreach attempted x 1. Left message on patient's voicemail with call back information and requested return call.    Plan: CHW will try to reach patient again in 1-2 business days.    Roselyn Patel  [facility] Health Worker   [facility]  [email or web address]   Office: [phone]  Fax: [phone]

## 2020-09-03 NOTE — PROGRESS NOTES
Clinic Care Coordination Contact  [facility]/Voicemail       Clinical Data: CHW Outreach  Outreach attempted x 2. Left message on patient's voicemail with call back information and requested return call.    Plan: CHW will send unable to contact letter with care coordinator contact information via [employer]. CHW will do no further outreaches at this time.    Roselyn Patel  Community Health Health Worker   [facility]  [email or web address]   Office: [phone]  Fax: [phone]

## 2020-09-09 ENCOUNTER — OFFICE VISIT (OUTPATIENT)
Dept: FAMILY MEDICINE | Facility: CLINIC | Age: 25
End: 2020-09-09
Payer: COMMERCIAL

## 2020-09-09 VITALS
BODY MASS INDEX: 34.65 KG/M2 | OXYGEN SATURATION: 99 % | WEIGHT: 177.4 LBS | HEART RATE: 89 BPM | DIASTOLIC BLOOD PRESSURE: 66 MMHG | SYSTOLIC BLOOD PRESSURE: 112 MMHG | TEMPERATURE: 98.5 F | RESPIRATION RATE: 18 BRPM

## 2020-09-09 DIAGNOSIS — R05.9 COUGH: ICD-10-CM

## 2020-09-09 DIAGNOSIS — R42 DIZZINESS: Primary | ICD-10-CM

## 2020-09-09 PROCEDURE — 99213 OFFICE O/P EST LOW 20 MIN: CPT | Performed by: NURSE PRACTITIONER

## 2020-09-09 RX ORDER — MECLIZINE HYDROCHLORIDE 25 MG/1
25 TABLET ORAL 3 TIMES DAILY PRN
Qty: 30 TABLET | Refills: 0 | Status: SHIPPED | OUTPATIENT
Start: 2020-09-09 | End: 2021-02-01
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
## 2020-09-09 NOTE — PATIENT INSTRUCTIONS
Schedule COVID testing - they should be contacting you to schedule     Start Meclizine three times daily as needed for dizziness    Push fluids     Schedule with the [facility] for evaluation     If symptoms progressively get worse go into ER

## 2020-09-10 ENCOUNTER — NURSE TRIAGE (OUTPATIENT)
Dept: NURSING | Facility: CLINIC | Age: 25
End: 2020-09-10

## 2020-09-10 DIAGNOSIS — R42 DIZZINESS: Primary | ICD-10-CM

## 2020-09-10 NOTE — TELEPHONE ENCOUNTER
Patient calls stating she was seen yesterday at [place].   She was referred to [facility].   It is out of network for her insurance. She would like a new referral or recommendation of what to do.   Informed will get message to her provider and have them get back with her.   Also suggested she contact her insurance to see who is in network for her.   She can be reached at: [phone], Ok to leave message .     Please contact patient.     TIGRE Hogan RN

## 2020-09-10 NOTE — TELEPHONE ENCOUNTER
Reason for Disposition    [1] Caller requesting NON-URGENT health information AND [2] PCP's office is the best resource    Protocols used: INFORMATION ONLY CALL-A-AH

## 2020-09-18 NOTE — TELEPHONE ENCOUNTER
Please attempt to contact patient and inform new referral has been placed for neurology.  Referral for either [facility] or [facility].    Thanks,  ARIEL Abarca CNP

## 2020-09-21 ENCOUNTER — NURSE TRIAGE (OUTPATIENT)
Dept: NURSING | Facility: CLINIC | Age: 25
End: 2020-09-21

## 2020-09-21 ENCOUNTER — TELEPHONE (OUTPATIENT)
Dept: FAMILY MEDICINE | Facility: CLINIC | Age: 25
End: 2020-09-21

## 2020-09-21 ENCOUNTER — HOSPITAL ENCOUNTER (EMERGENCY)
Facility: CLINIC | Age: 25
Discharge: HOME | End: 2020-09-21
Attending: EMERGENCY MEDICINE | Admitting: EMERGENCY MEDICINE
Payer: COMMERCIAL

## 2020-09-21 VITALS
SYSTOLIC BLOOD PRESSURE: 110 MMHG | HEIGHT: 60 IN | OXYGEN SATURATION: 98 % | TEMPERATURE: 98 F | BODY MASS INDEX: 33.96 KG/M2 | DIASTOLIC BLOOD PRESSURE: 78 MMHG | HEART RATE: 82 BPM | WEIGHT: 173 LBS

## 2020-09-21 DIAGNOSIS — G43.109 MIGRAINE WITH AURA AND WITHOUT STATUS MIGRAINOSUS, NOT INTRACTABLE: ICD-10-CM

## 2020-09-21 PROCEDURE — 99283 EMERGENCY DEPT VISIT LOW MDM: CPT | Performed by: EMERGENCY MEDICINE

## 2020-09-21 PROCEDURE — 25000132 ZZH RX MED GY IP 250 OP 250 PS 637: Performed by: EMERGENCY MEDICINE

## 2020-09-21 PROCEDURE — 99284 EMERGENCY DEPT VISIT MOD MDM: CPT | Mod: Z6 | Performed by: EMERGENCY MEDICINE

## 2020-09-21 RX ORDER — SUMATRIPTAN 50 MG/1
50 TABLET, FILM COATED ORAL ONCE
Status: COMPLETED | OUTPATIENT
Start: 2020-09-21 | End: 2020-09-21

## 2020-09-21 RX ADMIN — SUMATRIPTAN SUCCINATE 50 MG: 50 TABLET ORAL at 15:20

## 2020-09-21 ASSESSMENT — MIFFLIN-ST. JEOR: SCORE: 1451.22

## 2020-09-21 NOTE — TELEPHONE ENCOUNTER
Spoke to this patient who is reporting worsening of symptoms, says she blacked out twice today, was upright on her knees when she awoke and so did not hit head, reporting worsening visual concerns with foggy, blurry vision that feels like she has beer goggles on, also says she sees an aura. Patient has ongoing head pressure that she rates 8/10 and says is above the eyes and goes back around head to her ears, says this has been going on for a month and a half and this is getting worse, feels dizzy and light headed. Patient does say the Meclizine helps for a few hours and then wears off. Patient is advised to have someone drive her to the ER at [place] now for evaluation. Patient agrees with the plan.    MARY Maddox

## 2020-09-21 NOTE — TELEPHONE ENCOUNTER
Spoke to PCP and Alicia also agrees the patient needs to be seen in ER. Called the patient and grace Lees answered the phone saying the patient was just taken into the ER now and will be advised from there.    MARY Maddox

## 2020-09-21 NOTE — TELEPHONE ENCOUNTER
Seen by Alicia Orlando two weeks ago.  The Insurance company is not accepted at the [facility]. What should she do from there?  The black outs, dizziness, vision changes have gotten worse in the past two days. Please call and advise further. Please call her by 2 p.m. today or she will call the clinic back.

## 2020-09-21 NOTE — TELEPHONE ENCOUNTER
Additional Information    Negative: Nursing judgment    Nursing judgment    Protocols used: NO PROTOCOL AVAILABLE - INFORMATION ONLY-A-OH

## 2020-09-21 NOTE — TELEPHONE ENCOUNTER
Spoke to the patient who called back and is saying she is waiting in the ER too long and has to get back to her kids as the care-giver can not stay any longer, she was told the wait time could be 2 more hours. Patient says she is leaving the ER that she was advised to go to. Told the patient that that is where she needs to be still, the patient says she will call the [facility] and schedule an appointment.    Will forward this to PCP to review, please see triage note from today as well as patient was instructed to seek the ER.    MARY Maddox
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
## 2020-09-21 NOTE — ED AVS SNAPSHOT
[facility] Emergency Department  [address] [facility] [address]  Phone:  [phone]  Fax:  [phone]                                    Liat Corcoran   MRN: [id number]    Department:  [facility] Emergency Department   Date of Visit:  9/21/2020           After Visit Summary Signature Page    I have received my discharge instructions, and my questions have been answered. I have discussed any challenges I see with this plan with the nurse or doctor.    ..........................................................................................................................................  Patient/Patient Representative Signature      ..........................................................................................................................................  Patient Representative Print Name and Relationship to Patient    ..................................................               ................................................  Date                                   Time    ..........................................................................................................................................  Reviewed by Signature/Title    ...................................................              ..............................................  Date                                               Time          22EPIC Rev 08/18

## 2020-09-23 ASSESSMENT — ENCOUNTER SYMPTOMS
WEAKNESS: 0
DIZZINESS: 1
HEADACHES: 1
CONSTITUTIONAL NEGATIVE: 1
NUMBNESS: 0
CARDIOVASCULAR NEGATIVE: 1
LIGHT-HEADEDNESS: 1
SEIZURES: 0
FACIAL ASYMMETRY: 0
RESPIRATORY NEGATIVE: 1

## 2020-10-07 ENCOUNTER — NURSE TRIAGE (OUTPATIENT)
Dept: NURSING | Facility: CLINIC | Age: 25
End: 2020-10-07

## 2020-10-07 NOTE — TELEPHONE ENCOUNTER
Urinating every 5 minutes/ past 4-5 days/no pain/ did have some bleeding on 10'04 but that is gone now/does not thinks she is pregnant/no fever/sent to scheduling for MD input for today since this has gone on for so long Akira Small RN [phone]    Additional Information    Negative: Shock suspected (e.g., cold/pale/clammy skin, too weak to stand, low BP, rapid pulse)    Negative: Sounds like a life-threatening emergency to the triager    Negative: Followed a genital area injury    Negative: Followed a genital area injury (penis, scrotum)    Negative: Vaginal discharge    Negative: Pus (white, yellow) or bloody discharge from end of penis    Negative: [1] Taking antibiotic for urinary tract infection (UTI) AND [2] female    Negative: [1] Taking antibiotic for urinary tract infection (UTI) AND [2] male    Negative: [1] Discomfort (pain, burning or stinging) when passing urine AND [2] pregnant    Negative: [1] Discomfort (pain, burning or stinging) when passing urine AND [2] postpartum (from 0 to 6 weeks after delivery)    Negative: [1] Discomfort (pain, burning or stinging) when passing urine AND [2] female    Negative: [1] Discomfort (pain, burning or stinging) when passing urine AND [2] male    Negative: Pain or itching in the vulvar area    Negative: Pain in scrotum is main symptom    Negative: Blood in the urine is main symptom    Negative: Symptoms arising from use of a urinary catheter (Proctor or Coude)    Negative: [1] Unable to urinate (or only a few drops) > 4 hours AND     [2] bladder feels very full (e.g., palpable bladder or strong urge to urinate)    Negative: [1] Decreased urination and [2] drinking very little AND [2] dehydration suspected (e.g., dark urine, no urine > 12 hours, very dry mouth, very lightheaded)    Negative: Patient sounds very sick or weak to the triager    Negative: Fever > 100.5 F (38.1 C)    Negative: [1] Can't control passage of urine (i.e., urinary incontinence) AND [2]  new onset (< 2 weeks) or worsening    Negative: Side (flank) or lower back pain present    Urinating more frequently than usual (i.e., frequency)    Protocols used: URINARY SYMPTOMS-A-AH

## 2020-10-08 ENCOUNTER — VIRTUAL VISIT (OUTPATIENT)
Dept: FAMILY MEDICINE | Facility: CLINIC | Age: 25
End: 2020-10-08
Payer: COMMERCIAL

## 2020-10-08 DIAGNOSIS — R10.2 PELVIC PAIN IN FEMALE: ICD-10-CM

## 2020-10-08 DIAGNOSIS — N30.01 ACUTE CYSTITIS WITH HEMATURIA: Primary | ICD-10-CM

## 2020-10-08 DIAGNOSIS — B96.89 BV (BACTERIAL VAGINOSIS): ICD-10-CM

## 2020-10-08 DIAGNOSIS — N93.9 VAGINAL BLEEDING: ICD-10-CM

## 2020-10-08 DIAGNOSIS — R35.0 URINARY FREQUENCY: ICD-10-CM

## 2020-10-08 DIAGNOSIS — R35.0 URINARY FREQUENCY: Primary | ICD-10-CM

## 2020-10-08 DIAGNOSIS — N76.0 BV (BACTERIAL VAGINOSIS): ICD-10-CM

## 2020-10-08 LAB
ALBUMIN UR-MCNC: 30 MG/DL
APPEARANCE UR: CLEAR
BACTERIA #/AREA URNS HPF: ABNORMAL /HPF
BILIRUB UR QL STRIP: NEGATIVE
COLOR UR AUTO: YELLOW
GLUCOSE UR STRIP-MCNC: NEGATIVE MG/DL
HGB UR QL STRIP: ABNORMAL
KETONES UR STRIP-MCNC: NEGATIVE MG/DL
LEUKOCYTE ESTERASE UR QL STRIP: ABNORMAL
NITRATE UR QL: NEGATIVE
NON-SQ EPI CELLS #/AREA URNS LPF: ABNORMAL /LPF
PH UR STRIP: 8.5 PH (ref 5–7)
RBC #/AREA URNS AUTO: ABNORMAL /HPF
SOURCE: ABNORMAL
SP GR UR STRIP: 1.02 (ref 1–1.03)
SPECIMEN SOURCE: ABNORMAL
UROBILINOGEN UR STRIP-ACNC: 0.2 EU/DL (ref 0.2–1)
WBC #/AREA URNS AUTO: ABNORMAL /HPF
WET PREP SPEC: ABNORMAL

## 2020-10-08 PROCEDURE — 87491 CHLMYD TRACH DNA AMP PROBE: CPT | Performed by: NURSE PRACTITIONER

## 2020-10-08 PROCEDURE — 87591 N.GONORRHOEAE DNA AMP PROB: CPT | Performed by: NURSE PRACTITIONER

## 2020-10-08 PROCEDURE — 87210 SMEAR WET MOUNT SALINE/INK: CPT | Performed by: NURSE PRACTITIONER

## 2020-10-08 PROCEDURE — 99213 OFFICE O/P EST LOW 20 MIN: CPT | Mod: 95 | Performed by: NURSE PRACTITIONER

## 2020-10-08 PROCEDURE — 81001 URINALYSIS AUTO W/SCOPE: CPT | Performed by: NURSE PRACTITIONER

## 2020-10-08 RX ORDER — SULFAMETHOXAZOLE/TRIMETHOPRIM 800-160 MG
1 TABLET ORAL 2 TIMES DAILY
Qty: 6 TABLET | Refills: 0 | Status: SHIPPED | OUTPATIENT
Start: 2020-10-08 | End: 2020-10-11

## 2020-10-08 RX ORDER — METRONIDAZOLE 7.5 MG/G
1 GEL VAGINAL DAILY
Qty: 25 G | Refills: 0 | Status: SHIPPED | OUTPATIENT
Start: 2020-10-08 | End: 2020-10-13

## 2020-10-08 NOTE — PATIENT INSTRUCTIONS
1.  Drink plenty of fluids.  2.  This could have been an ovarian cyst but also could have been kidney stone.  Continuing to push fluids will reduce recurrence if this is the case.   3.  Labs today to rule out infection in the urine, bacterial vaginosis and STD due to symptoms.  I will call you with results when they are back.  STD testing can take 2-3 days.  4.  Follow-up in clinic if any recurrent or worsening symptoms.

## 2020-10-08 NOTE — PROGRESS NOTES
"Liat Corcoran is a 25 year old female who is being evaluated via a billable video visit.      The patient has been notified of following:     \"This video visit will be conducted via a call between you and your physician/provider. We have found that certain health care needs can be provided without the need for an in-person physical exam.  This service lets us provide the care you need with a video conversation.  If a prescription is necessary we can send it directly to your pharmacy.  If lab work is needed we can place an order for that and you can then stop by our lab to have the test done at a later time.    Video visits are billed at different rates depending on your insurance coverage.  Please reach out to your insurance provider with any questions.    If during the course of the call the physician/provider feels a video visit is not appropriate, you will not be charged for this service.\"    Patient has given verbal consent for Video visit? Yes  How would you like to obtain your AVS? Mail a copy  If you are dropped from the video visit, the video invite should be resent to: Text to cell phone: [phone]  Will anyone else be joining your video visit? No    Liat Corcoran is a 25 year old female who presents today via video visit for the following health issues:    HPI     Genitourinary - Female  Onset/Duration: 1 week   Description:   Painful urination (Dysuria): no           Frequency: YES - since sunday  Blood in urine (Hematuria): YES, occasionally   Delay in urine (Hesitency): no  Intensity: moderate  Progression of Symptoms:  worsening and constant  Accompanying Signs & Symptoms:  Fever/chills: no  Flank pain: no  Nausea and vomiting: YES, nausea and vomitting, dry heaves with pain on Sunday  Vaginal symptoms: none  Abdominal/Pelvic Pain: YES- pelvic pain on Sunday.  Pain with intercourse after pain on Sunday but not on monday.    History:   History of frequent UTI s: no  History of kidney " stones: no  Sexually Active: YES, has a history of cysts on ovaries   Possibility of pregnancy: No, partner had vasectomy, 3 years monogamous    Precipitating or alleviating factors: None  Therapies tried and outcome:  None     Pain on Sunday was like ovarian cysts that burst and then has frequency in urination.  Occasional blood in urine (Sunday in the morning and night with wiping, yesterday faint red with wiping).  Bleeding assumed from vaginal.  No abdominal pain now just on Sunday morning.  Denies any pain with urination or urgency, or any other vaginal symptoms.  Partner has been tested and deemed sterile so no chance of pregnancy.    Video Start Time: 9:24 a.m.    Review of Systems   CONSTITUTIONAL: NEGATIVE for fever, chills, change in weight  RESP: NEGATIVE for significant cough or SOB  CV: NEGATIVE for chest pain, palpitations or peripheral edema  GI: POSITIVE for nausea and vomiting on Sunday with pain  : frequency since Sunday after pain and some blood that is smeared on toilet paper Sunday and Monday otherwise no other vaginal symptoms noted  PSYCHIATRIC: NEGATIVE for changes in mood or affect  ROS otherwise negative      Objective           Vitals:  No vitals were obtained today due to virtual visit.    Physical Exam     GENERAL: Healthy, alert and no distress  EYES: Eyes grossly normal to inspection.  No discharge or erythema, or obvious scleral/conjunctival abnormalities.  RESP: No audible wheeze, cough, or visible cyanosis.  No visible retractions or increased work of breathing.  SKIN: Visible skin clear. No significant rash, abnormal pigmentation or lesions.  NEURO: Cranial nerves grossly intact.  Mentation and speech appropriate for age.  PSYCH: Mentation appears normal, affect normal/bright, judgement and insight intact, normal speech and appearance well-groomed.        Assessment & Plan     Urinary frequency  Unknown cause will test Wet prep for BV, UA for UTI, and chlamydia/gonorrhea for STD  causes.  May be due to ruptured cyst and since she no longer has pain OTC treatment at this time if needed.  Since symptoms are improved with pain most likely healing ovarian cyst and no imaging needed at this time.  Also could be kidney stone however most likely past since patient is improving with pain symptoms.  Will check labs and call her with results when they are back.  - NEISSERIA GONORRHOEA PCR; Future  - CHLAMYDIA TRACHOMATIS PCR; Future  - **UA reflex to Microscopic FUTURE anytime; Future    Pelvic pain in female  See note above.  - NEISSERIA GONORRHOEA PCR; Future  - CHLAMYDIA TRACHOMATIS PCR; Future    Vaginal bleeding  See note above.  - NEISSERIA GONORRHOEA PCR; Future  - CHLAMYDIA TRACHOMATIS PCR; Future  - Wet prep; Future    See Patient Instructions    No follow-ups on file.    Dilcia Perez NP  [facility]      Video-Visit Details    Type of service:  Video Visit    Video End Time:9:34 a.m    Originating Location (pt. Location): Home    Distant Location (provider location):  [facility]     Platform used for Video Visit: [employer]

## 2020-10-09 ENCOUNTER — TELEPHONE (OUTPATIENT)
Dept: FAMILY MEDICINE | Facility: CLINIC | Age: 25
End: 2020-10-09

## 2020-10-09 LAB
C TRACH DNA SPEC QL NAA+PROBE: NEGATIVE
N GONORRHOEA DNA SPEC QL NAA+PROBE: NEGATIVE
SPECIMEN SOURCE: NORMAL
SPECIMEN SOURCE: NORMAL

## 2020-10-09 NOTE — TELEPHONE ENCOUNTER
CSS said the patient called, still having concerns regarding BV.    Tried to call the patient but mail box is full, will need to retry.    MARY Maddox

## 2020-10-15 ENCOUNTER — TELEPHONE (OUTPATIENT)
Dept: FAMILY MEDICINE | Facility: CLINIC | Age: 25
End: 2020-10-15

## 2020-10-15 NOTE — TELEPHONE ENCOUNTER
Pt reports reg menses typically 15th-18th each month, last 4-5 days with heavy flow initially. Had reg period in Sept.   LMP Mon 10-19-20 with heavy flow saturating super tampon every 2 hrs, passed clots size of 3 25 cent pieces also on Mon and Tues this week. C/O pelvic cramping. Continues to have heavy flow with cramping, no further clots. Denies weakness, fatigue.  No current birth control.   Previous similar sx 02-12-20 OV. Neg preg. Did not proceed with pelvic US as sx improved.  Advise F/U with Alicia however not sure Alicia has appt availability tomorrow (Fri) and provider out of office today.  Advised to maintain adequate fluid intake, eat foods rich in iron. ED if blood flow, cramping increases, recurrent clots.  Await Alicia's recommendations.  MICKIE Chahal RN

## 2020-10-15 NOTE — TELEPHONE ENCOUNTER
Reason for Call:  Other     Detailed comments: Patient is having a lot of period bleeding this month - please call patient    Phone Number Patient can be reached at: Home number on file [phone] (home)    Best Time:     Can we leave a detailed message on this number? YES    Call taken on 10/15/2020 at 10:37 AM by Lizz Crane

## 2020-10-19 ENCOUNTER — VIRTUAL VISIT (OUTPATIENT)
Dept: FAMILY MEDICINE | Facility: CLINIC | Age: 25
End: 2020-10-19
Payer: COMMERCIAL

## 2020-10-19 DIAGNOSIS — N93.9 ABNORMAL UTERINE BLEEDING: Primary | ICD-10-CM

## 2020-10-19 PROCEDURE — 99213 OFFICE O/P EST LOW 20 MIN: CPT | Mod: 95 | Performed by: NURSE PRACTITIONER

## 2020-10-19 RX ORDER — PROPRANOLOL HYDROCHLORIDE 20 MG/1
40 TABLET ORAL 2 TIMES DAILY PRN
COMMUNITY
Start: 2020-10-13 | End: 2021-01-22

## 2020-10-19 NOTE — TELEPHONE ENCOUNTER
States heavy flow has subsided, now has mucous like spotting varying from lighter to darker color, still some intermittent cramping.  Agreeable to VV, scheduled today with Francisca Chahal RN

## 2020-10-19 NOTE — TELEPHONE ENCOUNTER
Can we check in with patient to see how symptoms are doing. If still having concerns have her schedule a virtual visit with me.     Thanks,  ARIEL Abarca CNP

## 2020-10-19 NOTE — PROGRESS NOTES
"Liat Corcoran is a 25 year old female who is being evaluated via a billable video visit.      The patient has been notified of following:     \"This video visit will be conducted via a call between you and your physician/provider. We have found that certain health care needs can be provided without the need for an in-person physical exam.  This service lets us provide the care you need with a video conversation.  If a prescription is necessary we can send it directly to your pharmacy.  If lab work is needed we can place an order for that and you can then stop by our lab to have the test done at a later time.    Video visits are billed at different rates depending on your insurance coverage.  Please reach out to your insurance provider with any questions.    If during the course of the call the physician/provider feels a video visit is not appropriate, you will not be charged for this service.\"    Patient has given verbal consent for Video visit? Yes  How would you like to obtain your AVS? none  If you are dropped from the video visit, the video invite should be resent to: Text to cell phone: [phone]  Will anyone else be joining your video visit? No      Subjective     Liat Corcoran is a 25 year old female who presents today via video visit for the following health issues:    HPI     Concern - Abnormal Menses   Onset: this month   Description:  Menses started last Monday, it stopped Saturday and then restarted Sunday afternoon only with wiping.   Intensity: 7/10 pain level   Progression of Symptoms:  waxing and waning  Accompanying Signs & Symptoms: abdominal pain and low back pain. Bleeding is not heavy. Only see's the blood when she wipes and there is a lot of discharge.   Previous history of similar problem: none but does have a hx of cysts on her ovaries. Has hx of spine problems as well.    Precipitating factors:        Worsened by: none   Alleviating factors:        Improved by: none   Therapies " tried and outcome: none     Had clots this time around again - about the size of a little cutie         Video Start Time: 11:22 AM    Review of Systems   Constitutional, HEENT, cardiovascular, pulmonary, gi and gu systems are negative, except as otherwise noted.      Objective           Vitals:  No vitals were obtained today due to virtual visit.    Physical Exam     GENERAL: Healthy, alert and no distress  EYES: Eyes grossly normal to inspection.  No discharge or erythema, or obvious scleral/conjunctival abnormalities.  RESP: No audible wheeze, cough, or visible cyanosis.  No visible retractions or increased work of breathing.    SKIN: Visible skin clear. No significant rash, abnormal pigmentation or lesions.  NEURO: Cranial nerves grossly intact.  Mentation and speech appropriate for age.  PSYCH: Mentation appears normal, affect normal/bright, judgement and insight intact, normal speech and appearance well-groomed.      Diagnostic Test Results:  Pending ultrasound         Assessment & Plan     1. Abnormal uterine bleeding  Recurrent abnormal uterine bleeding with clots and cramping. History of PCOS. Pelvic ultrasound ordered earlier in the year, was not completed. Advised to schedule this ultrasound to further evaluate. Will await results and call with recommendations. Briefly discussed option such as ablation for period irregularity. Patient voiced interest.   - US Pelvic Complete with Transvaginal; Future      Tobacco Cessation:   reports that she has been smoking cigarettes. She has been smoking about 0.50 packs per day. She has never used smokeless tobacco.         See Patient Instructions    Return in about 1 week (around 10/26/2020), or if symptoms worsen or fail to improve.    ARIEL Luis [facility]      Video-Visit Details    Type of service:  Video Visit    Video End Time:11:31 AM    Originating Location (pt. Location): Home    Distant Location (provider location):    [facility]     Platform used for Video Visit: [employer]

## 2020-10-19 NOTE — PATIENT INSTRUCTIONS
1. Abnormal uterine bleeding  Recurrent abnormal uterine bleeding with clots and cramping. History of PCOS. Pelvic ultrasound ordered earlier in the year, was not completed. Advised to schedule this ultrasound to further evaluate. Will await results and call with recommendations. Briefly discussed option such as ablation for period irregularity. Patient voiced interest.   - US Pelvic Complete with Transvaginal; Future

## 2020-11-16 ENCOUNTER — HEALTH MAINTENANCE LETTER (OUTPATIENT)
Age: 25
End: 2020-11-16

## 2020-12-08 ENCOUNTER — VIRTUAL VISIT (OUTPATIENT)
Dept: FAMILY MEDICINE | Facility: CLINIC | Age: 25
End: 2020-12-08
Payer: COMMERCIAL

## 2020-12-08 DIAGNOSIS — M54.2 NECK PAIN: Primary | ICD-10-CM

## 2020-12-08 DIAGNOSIS — M54.12 CERVICAL RADICULOPATHY: ICD-10-CM

## 2020-12-08 PROCEDURE — 99213 OFFICE O/P EST LOW 20 MIN: CPT | Mod: 95 | Performed by: NURSE PRACTITIONER

## 2020-12-08 RX ORDER — CYCLOBENZAPRINE HCL 5 MG
5-10 TABLET ORAL 3 TIMES DAILY PRN
Qty: 42 TABLET | Refills: 0 | Status: SHIPPED | OUTPATIENT
Start: 2020-12-08 | End: 2020-12-15

## 2020-12-08 RX ORDER — METHYLPREDNISOLONE 4 MG
TABLET, DOSE PACK ORAL
Qty: 21 TABLET | Refills: 0 | Status: SHIPPED | OUTPATIENT
Start: 2020-12-08 | End: 2020-12-15

## 2020-12-08 NOTE — PATIENT INSTRUCTIONS
1. Neck pain  Ongoing x 3 months with no particular precipitating activity or injury. Pain has worsened with radiation into right arm with numbness and tingling. Has been working with chiropractor, taking NSAIDS and using heat and/or ice without improvement. Decreased strength in right arm reported by patient from chiropractor. Given severity of symptoms and length with conservative management will pursue MRI imaging of cervical spine. Patient to schedule this and will be notified of results and recommendations. In the interim continue conservative management, start medrol dose pack as well as as needed muscle relaxer.  - MR Cervical Spine w/o Contrast; Future  - cyclobenzaprine (FLEXERIL) 5 MG tablet; Take 1-2 tablets (5-10 mg) by mouth 3 times daily as needed for muscle spasms  Dispense: 42 tablet; Refill: 0    2. Cervical radiculopathy  Ongoing as above.   - MR Cervical Spine w/o Contrast; Future    - methylPREDNISolone (MEDROL DOSEPAK) 4 MG tablet therapy pack; Follow package instructions  Dispense: 21 tablet; Refill: 0
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
## 2020-12-09 ENCOUNTER — HOSPITAL ENCOUNTER (OUTPATIENT)
Dept: MRI IMAGING | Facility: CLINIC | Age: 25
Discharge: HOME | End: 2020-12-09
Attending: NURSE PRACTITIONER | Admitting: NURSE PRACTITIONER
Payer: COMMERCIAL

## 2020-12-09 DIAGNOSIS — M54.12 CERVICAL RADICULOPATHY: ICD-10-CM

## 2020-12-09 DIAGNOSIS — M54.2 NECK PAIN: ICD-10-CM

## 2020-12-09 PROCEDURE — 72141 MRI NECK SPINE W/O DYE: CPT

## 2020-12-15 ENCOUNTER — VIRTUAL VISIT (OUTPATIENT)
Dept: FAMILY MEDICINE | Facility: CLINIC | Age: 25
End: 2020-12-15
Payer: COMMERCIAL

## 2020-12-15 DIAGNOSIS — M54.12 CERVICAL RADICULOPATHY: ICD-10-CM

## 2020-12-15 DIAGNOSIS — M54.2 NECK PAIN: Primary | ICD-10-CM

## 2020-12-15 PROCEDURE — 99213 OFFICE O/P EST LOW 20 MIN: CPT | Mod: 95 | Performed by: NURSE PRACTITIONER

## 2020-12-15 RX ORDER — MELOXICAM 7.5 MG/1
7.5 TABLET ORAL DAILY
Qty: 30 TABLET | Refills: 0 | Status: SHIPPED | OUTPATIENT
Start: 2020-12-15 | End: 2021-01-22

## 2020-12-15 RX ORDER — METHOCARBAMOL 500 MG/1
500 TABLET, FILM COATED ORAL 4 TIMES DAILY PRN
Qty: 60 TABLET | Refills: 0 | Status: SHIPPED | OUTPATIENT
Start: 2020-12-15 | End: 2021-01-22

## 2020-12-15 NOTE — PROGRESS NOTES
"Liat Corcoran is a 25 year old female who is being evaluated via a billable video visit.      The patient has been notified of following:     \"This video visit will be conducted via a call between you and your physician/provider. We have found that certain health care needs can be provided without the need for an in-person physical exam.  This service lets us provide the care you need with a video conversation.  If a prescription is necessary we can send it directly to your pharmacy.  If lab work is needed we can place an order for that and you can then stop by our lab to have the test done at a later time.    Video visits are billed at different rates depending on your insurance coverage.  Please reach out to your insurance provider with any questions.    If during the course of the call the physician/provider feels a video visit is not appropriate, you will not be charged for this service.\"    Patient has given verbal consent for Video visit? Yes  How would you like to obtain your AVS? MyChart  If you are dropped from the video visit, the video invite should be resent to: Text to cell phone: [phone]  Will anyone else be joining your video visit? No      Subjective     Liat Corcoran is a 25 year old female who presents today via video visit for the following health issues:    HPI     * Results- MRI, completed on 12/9/2020.  Not having pain as much as she was. Pain is still going into her shoulder/arm and into her fingers.  Tingling in hand/fingers    Neck Pain  Onset: Follow up from 12/8/2020    Description:   Location: Neck, shoulder, right arm  Radiation: into the right shoulder, into the right forearm and into the right hand    Intensity: mild    Progression of Symptoms:  Slight improvement    Accompanying Signs & Symptoms:  Burning, prickly sensation (paresthesias) in arm(s): YES  Numbness in arm(s): YES  Weakness in arm(s):  YES  Fever: no   Headache: no   Nausea and/or vomiting: no     History: "   Trauma: no   Previous neck pain: YES  Previous surgery or injections: no   Previous Imaging (MRI,X ray): YES- MRI 12/9/2020    Precipitating factors:   Does movement increase the pain:  YES    Alleviating factors:      Writing lists even will make her arm go numb   Wakes her up constantly during the night - whole hand goes numb from elbow into all fingers         Video Start Time: 11:24 AM      Review of Systems   Constitutional, HEENT, cardiovascular, pulmonary, gi and gu systems are negative, except as otherwise noted.      Objective           Vitals:  No vitals were obtained today due to virtual visit.    Physical Exam     GENERAL: Healthy, alert and no distress  EYES: Eyes grossly normal to inspection.  No discharge or erythema, or obvious scleral/conjunctival abnormalities.  RESP: No audible wheeze, cough, or visible cyanosis.  No visible retractions or increased work of breathing.    MS: negative phalen test   SKIN: Visible skin clear. No significant rash, abnormal pigmentation or lesions.  NEURO: Cranial nerves grossly intact.  Mentation and speech appropriate for age.  PSYCH: Mentation appears normal, affect normal/bright, judgement and insight intact, normal speech and appearance well-groomed.      Diagnostic Test Results:  none          Assessment & Plan     1. Neck pain  Chronic, has been ongoing x 3 months. Has done conservative management including chiropractor, NSAIDs, ice/heat, medrol dose bo and muscle relaxant without much relief. MRI of cervical spine relatively normal with mild facet arthropathy otherwise negative for any acute or degenerative changes. Radiculopathy worsening and pain into right shoulder blade. Discussed trialing alternate muscle relaxant, Robaxin, as well as Meloxicam. Would recommend starting physical therapy and scheduling with orthopedics for further evaluation. Patient agreeable. Follow-up as needed after specialist appointments.   - methocarbamol (ROBAXIN) 500 MG tablet;  Take 1 tablet (500 mg) by mouth 4 times daily as needed for muscle spasms  Dispense: 60 tablet; Refill: 0  - Orthopedic & Spine  Referral; Future  - PHYSICAL THERAPY REFERRAL; Future  - meloxicam (MOBIC) 7.5 MG tablet; Take 1 tablet (7.5 mg) by mouth daily  Dispense: 30 tablet; Refill: 0    2. Cervical radiculopathy  See above.   - methocarbamol (ROBAXIN) 500 MG tablet; Take 1 tablet (500 mg) by mouth 4 times daily as needed for muscle spasms  Dispense: 60 tablet; Refill: 0  - Orthopedic & Spine  Referral; Future  - PHYSICAL THERAPY REFERRAL; Future      Tobacco Cessation:   reports that she has been smoking cigarettes. She has been smoking about 0.50 packs per day. She has never used smokeless tobacco.         See Patient Instructions    Return in about 1 month (around 1/15/2021), or if symptoms worsen or fail to improve.    ARIEL Luis CNP  [facility]      Video-Visit Details    Type of service:  Video Visit    Video End Time:11:37 AM    Originating Location (pt. Location): Home    Distant Location (provider location):  [facility]     Platform used for Video Visit: GSOUND

## 2020-12-15 NOTE — PATIENT INSTRUCTIONS
1. Neck pain  Chronic, has been ongoing x 3 months. Has done conservative management including chiropractor, NSAIDs, ice/heat, medrol dose bo and muscle relaxant without much relief. MRI of cervical spine relatively normal with mild facet arthropathy otherwise negative for any acute or degenerative changes. Radiculopathy worsening and pain into right shoulder blade. Discussed trialing alternate muscle relaxant, Robaxin, as well as Meloxicam. Would recommend starting physical therapy and scheduling with orthopedics for further evaluation. Patient agreeable. Follow-up as needed after specialist appointments.   - methocarbamol (ROBAXIN) 500 MG tablet; Take 1 tablet (500 mg) by mouth 4 times daily as needed for muscle spasms  Dispense: 60 tablet; Refill: 0  - Orthopedic & Spine  Referral; Future  - PHYSICAL THERAPY REFERRAL; Future  - meloxicam (MOBIC) 7.5 MG tablet; Take 1 tablet (7.5 mg) by mouth daily  Dispense: 30 tablet; Refill: 0    2. Cervical radiculopathy  See above.   - methocarbamol (ROBAXIN) 500 MG tablet; Take 1 tablet (500 mg) by mouth 4 times daily as needed for muscle spasms  Dispense: 60 tablet; Refill: 0  - Orthopedic & Spine  Referral; Future  - PHYSICAL THERAPY REFERRAL; Future

## 2021-01-11 ENCOUNTER — HOSPITAL ENCOUNTER (EMERGENCY)
Facility: CLINIC | Age: 26
Discharge: HOME | End: 2021-01-11
Attending: PHYSICIAN ASSISTANT | Admitting: PHYSICIAN ASSISTANT
Payer: COMMERCIAL

## 2021-01-11 ENCOUNTER — TELEPHONE (OUTPATIENT)
Dept: OBGYN | Facility: CLINIC | Age: 26
End: 2021-01-11

## 2021-01-11 ENCOUNTER — NURSE TRIAGE (OUTPATIENT)
Dept: FAMILY MEDICINE | Facility: CLINIC | Age: 26
End: 2021-01-11

## 2021-01-11 ENCOUNTER — APPOINTMENT (OUTPATIENT)
Dept: ULTRASOUND IMAGING | Facility: CLINIC | Age: 26
End: 2021-01-11
Attending: PHYSICIAN ASSISTANT
Payer: COMMERCIAL

## 2021-01-11 VITALS
BODY MASS INDEX: 33.96 KG/M2 | OXYGEN SATURATION: 100 % | WEIGHT: 173 LBS | DIASTOLIC BLOOD PRESSURE: 83 MMHG | HEART RATE: 79 BPM | TEMPERATURE: 98.2 F | SYSTOLIC BLOOD PRESSURE: 118 MMHG | HEIGHT: 60 IN | RESPIRATION RATE: 16 BRPM

## 2021-01-11 DIAGNOSIS — R10.30 LOWER ABDOMINAL PAIN: ICD-10-CM

## 2021-01-11 DIAGNOSIS — Z32.01 PREGNANCY TEST POSITIVE: Primary | ICD-10-CM

## 2021-01-11 DIAGNOSIS — O26.899 ABDOMINAL PAIN IN PREGNANCY: ICD-10-CM

## 2021-01-11 DIAGNOSIS — R10.9 ABDOMINAL PAIN IN PREGNANCY: ICD-10-CM

## 2021-01-11 DIAGNOSIS — R10.84 ABDOMINAL PAIN, GENERALIZED: ICD-10-CM

## 2021-01-11 LAB
ALBUMIN UR-MCNC: NEGATIVE MG/DL
APPEARANCE UR: CLEAR
B-HCG SERPL-ACNC: 453 IU/L (ref 0–5)
BILIRUB UR QL STRIP: NEGATIVE
COLOR UR AUTO: YELLOW
GLUCOSE UR STRIP-MCNC: NEGATIVE MG/DL
HCG UR QL: POSITIVE
HGB UR QL STRIP: NEGATIVE
KETONES UR STRIP-MCNC: NEGATIVE MG/DL
LEUKOCYTE ESTERASE UR QL STRIP: NEGATIVE
NITRATE UR QL: NEGATIVE
PH UR STRIP: 6 PH (ref 5–7)
SOURCE: NORMAL
SP GR UR STRIP: 1.01 (ref 1–1.03)
UROBILINOGEN UR STRIP-MCNC: 0 MG/DL (ref 0–2)

## 2021-01-11 PROCEDURE — 99284 EMERGENCY DEPT VISIT MOD MDM: CPT | Performed by: PHYSICIAN ASSISTANT

## 2021-01-11 PROCEDURE — 84702 CHORIONIC GONADOTROPIN TEST: CPT | Performed by: PHYSICIAN ASSISTANT

## 2021-01-11 PROCEDURE — 99284 EMERGENCY DEPT VISIT MOD MDM: CPT | Mod: 25 | Performed by: PHYSICIAN ASSISTANT

## 2021-01-11 PROCEDURE — 76801 OB US < 14 WKS SINGLE FETUS: CPT

## 2021-01-11 PROCEDURE — 81003 URINALYSIS AUTO W/O SCOPE: CPT | Performed by: FAMILY MEDICINE

## 2021-01-11 PROCEDURE — 36415 COLL VENOUS BLD VENIPUNCTURE: CPT | Performed by: PHYSICIAN ASSISTANT

## 2021-01-11 PROCEDURE — 81025 URINE PREGNANCY TEST: CPT | Performed by: FAMILY MEDICINE

## 2021-01-11 ASSESSMENT — MIFFLIN-ST. JEOR: SCORE: 1451.22
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
## 2021-01-11 NOTE — ED AVS SNAPSHOT
[facility] Emergency Dept  [address] [address]  Phone: [phone]  Fax: [phone]                                    Liat Corcoran   MRN: [id number]    Department: [facility] Emergency Dept   Date of Visit: 1/11/2021           After Visit Summary Signature Page    I have received my discharge instructions, and my questions have been answered. I have discussed any challenges I see with this plan with the nurse or doctor.    ..........................................................................................................................................  Patient/Patient Representative Signature      ..........................................................................................................................................  Patient Representative Print Name and Relationship to Patient    ..................................................               ................................................  Date                                   Time    ..........................................................................................................................................  Reviewed by Signature/Title    ...................................................              ..............................................  Date                                               Time          22EPIC Rev 08/18

## 2021-01-11 NOTE — TELEPHONE ENCOUNTER
Return call to patient.  Spoke with patient on the phone.    Patient in the ER with abdominal pain.  Patient is not having any bleeding or cramping.  LMP 12/9/2020  Patient reports being about 5 weeks pregnant.  Provider was concerned with HCG level, recommended repeating in 48 hours as ectopic pregnancy not excluded yet from diagnosis.    Patient needs order.  Lab appointment scheduled.    Please review and advise.  Thank you.    Jennifer Brown   [facility]  RN

## 2021-01-11 NOTE — TELEPHONE ENCOUNTER
Reason for Call: Request for an order or referral:    Order or referral being requested: to check levels of pregnancy    Date needed: as soon as possible    Has the patient been seen by the PCP for this problem? YES    Additional comments: please call when order placed for scheduling    Phone number Patient can be reached at:  Home number on file [phone] (home)    Best Time:  any    Can we leave a detailed message on this number?  YES    Call taken on 1/11/2021 at 3:07 PM by Idalia Shah

## 2021-01-11 NOTE — TELEPHONE ENCOUNTER
Unable to find an appointment right away for OB/GYN.  Patient will go to the ED for further evaluation.    Reason for Disposition    MODERATE-SEVERE abdominal pain    Additional Information    Negative: Passed out (i.e., fainted, collapsed and was not responding)    Negative: Shock suspected (e.g., cold/pale/clammy skin, too weak to stand, low BP, rapid pulse)    Negative: Difficult to awaken or acting confused (e.g., disoriented, slurred speech)    Negative: Sounds like a life-threatening emergency to the triager    Negative: Abdominal pain and pregnant > 20 weeks    Protocols used: PREGNANCY - ABDOMINAL PAIN LESS THAN 20 WEEKS EGA-A-OH

## 2021-01-12 ASSESSMENT — ENCOUNTER SYMPTOMS
MUSCULOSKELETAL NEGATIVE: 1
CONSTITUTIONAL NEGATIVE: 1
ABDOMINAL PAIN: 1
RESPIRATORY NEGATIVE: 1

## 2021-01-13 DIAGNOSIS — Z32.01 PREGNANCY TEST POSITIVE: ICD-10-CM

## 2021-01-13 DIAGNOSIS — R10.84 ABDOMINAL PAIN, GENERALIZED: ICD-10-CM

## 2021-01-13 LAB — B-HCG SERPL-ACNC: 1110 IU/L (ref 0–5)

## 2021-01-13 PROCEDURE — 84702 CHORIONIC GONADOTROPIN TEST: CPT | Performed by: OBSTETRICS & GYNECOLOGY

## 2021-01-13 PROCEDURE — 36415 COLL VENOUS BLD VENIPUNCTURE: CPT | Performed by: OBSTETRICS & GYNECOLOGY

## 2021-01-22 ENCOUNTER — PRENATAL OFFICE VISIT (OUTPATIENT)
Dept: OBGYN | Facility: CLINIC | Age: 26
End: 2021-01-22

## 2021-01-22 ENCOUNTER — APPOINTMENT (OUTPATIENT)
Dept: OBGYN | Facility: CLINIC | Age: 26
End: 2021-01-22
Payer: COMMERCIAL

## 2021-01-22 DIAGNOSIS — Z34.80 PRENATAL CARE OF MULTIGRAVIDA, ANTEPARTUM: Primary | ICD-10-CM

## 2021-01-22 PROCEDURE — 99207 PR NO CHARGE NURSE ONLY: CPT | Performed by: OBSTETRICS & GYNECOLOGY

## 2021-01-22 SDOH — HEALTH STABILITY: MENTAL HEALTH: HOW OFTEN DO YOU HAVE A DRINK CONTAINING ALCOHOL?: NOT ASKED

## 2021-01-22 SDOH — HEALTH STABILITY: MENTAL HEALTH: HOW OFTEN DO YOU HAVE 6 OR MORE DRINKS ON ONE OCCASION?: NOT ASKED

## 2021-01-22 SDOH — HEALTH STABILITY: MENTAL HEALTH: HOW MANY STANDARD DRINKS CONTAINING ALCOHOL DO YOU HAVE ON A TYPICAL DAY?: NOT ASKED

## 2021-01-25 ENCOUNTER — TELEPHONE (OUTPATIENT)
Dept: FAMILY MEDICINE | Facility: CLINIC | Age: 26
End: 2021-01-25

## 2021-01-25 ENCOUNTER — VIRTUAL VISIT (OUTPATIENT)
Dept: FAMILY MEDICINE | Facility: CLINIC | Age: 26
End: 2021-01-25
Payer: COMMERCIAL

## 2021-01-25 DIAGNOSIS — O21.9 NAUSEA/VOMITING IN PREGNANCY: Primary | ICD-10-CM

## 2021-01-25 PROCEDURE — 99213 OFFICE O/P EST LOW 20 MIN: CPT | Mod: 95 | Performed by: NURSE PRACTITIONER

## 2021-01-25 RX ORDER — ONDANSETRON 4 MG/1
4 TABLET, FILM COATED ORAL EVERY 6 HOURS PRN
Qty: 30 TABLET | Refills: 1 | Status: ON HOLD | OUTPATIENT
Start: 2021-01-25 | End: 2021-08-30

## 2021-01-25 NOTE — PATIENT INSTRUCTIONS
Nausea/vomiting in pregnancy  Patient 7 weeks pregnant, having daily nausea/vomiting, vomiting mainly in the a.m. keeping enough fluids down. Start Zofran as needed for nausea and follow up with GYN as scheduled.   - ondansetron (ZOFRAN) 4 MG tablet; Take 1 tablet (4 mg) by mouth every 6 hours as needed for nausea

## 2021-01-25 NOTE — PROGRESS NOTES
Liat is a 25 year old who is being evaluated via a billable telephone visit.      What phone number would you like to be contacted at? [phone]  How would you like to obtain your AVS? Mail a copy     12:10 PM  - 12:17 PM     Assessment & Plan     Nausea/vomiting in pregnancy  Patient 7 weeks pregnant, having daily nausea/vomiting, vomiting mainly in the a.m. keeping enough fluids down. Start Zofran as needed for nausea and follow up with GYN as scheduled.   - ondansetron (ZOFRAN) 4 MG tablet; Take 1 tablet (4 mg) by mouth every 6 hours as needed for nausea             Tobacco Cessation:   reports that she has been smoking cigarettes. She has been smoking about 0.50 packs per day. She has never used smokeless tobacco.      BMI:   Estimated body mass index is 33.79 kg/m  as calculated from the following:    Height as of 1/11/21: 1.524 m (5').    Weight as of 1/11/21: 78.5 kg (173 lb).       See Patient Instructions    Return in about 2 weeks (around 2/8/2021), or if symptoms worsen or fail to improve.    Alicia Cartagena, ARIEL CNP  [facility]    Subjective     Liat is a 25 year old who presents to clinic today for the following health issues:    HPI       Concern - Nausea  Onset: couple weeks  Description: nauseated in morning - currently pregnant  Intensity: severe  Progression of Symptoms:  worsening  Accompanying Signs & Symptoms: started throwing up at 2am this morning, can't get comfortable sitting or laying down  Previous history of similar problem: neither other pregnancies have been this bad  Nausea will go away in the afternoon - smells trigger it  Therapies tried and outcome: ginger ale and saltine crackers - all the time    Vomiting a few times every morning - okay through the rest of the day  Not this bad with other   Tried ginger   Feels is keeping down enough fluids       Review of Systems   Constitutional, HEENT, cardiovascular, pulmonary, gi and gu systems are  negative, except as otherwise noted.      Objective           Vitals:  No vitals were obtained today due to virtual visit.    Physical Exam   healthy, alert and no distress  PSYCH: Alert and oriented times 3; coherent speech, normal   rate and volume, able to articulate logical thoughts, able   to abstract reason, no tangential thoughts, no hallucinations   or delusions  Her affect is normal and pleasant  RESP: No cough, no audible wheezing, able to talk in full sentences  Remainder of exam unable to be completed due to telephone visits    Diagnostic Test Results:  none              Phone call duration: 7 minutes

## 2021-01-25 NOTE — TELEPHONE ENCOUNTER
Liat doesn't have an active My Chart to do an E-visit.  Could you do a phone visit or Virtual Visit?

## 2021-01-25 NOTE — TELEPHONE ENCOUNTER
Reason for call:  Patient reporting a symptom    Symptom or request: Pt says she is 7 weeks pregnant and won't have her 1st OB visit until Feb 5. She wants to know if she can get something for her morning sickness. States she has a lot of nausea and vomiting all day. She has been up most of the night vomiting. She wants to go to work tomorrow but says she is going to need something for this.   Additional comments: [employer]    Phone Number patient can be reached at:  Home number on file [phone] (home)    Best Time:  anytime    Can we leave a detailed message on this number:  YES    Call taken on 1/25/2021 at 7:59 AM by Suly Ervin

## 2021-02-01 ENCOUNTER — OFFICE VISIT (OUTPATIENT)
Dept: FAMILY MEDICINE | Facility: CLINIC | Age: 26
End: 2021-02-01
Payer: COMMERCIAL

## 2021-02-01 ENCOUNTER — MYC MEDICAL ADVICE (OUTPATIENT)
Dept: FAMILY MEDICINE | Facility: CLINIC | Age: 26
End: 2021-02-01

## 2021-02-01 VITALS
WEIGHT: 179 LBS | BODY MASS INDEX: 34.96 KG/M2 | TEMPERATURE: 98.8 F | HEART RATE: 92 BPM | SYSTOLIC BLOOD PRESSURE: 110 MMHG | RESPIRATION RATE: 12 BRPM | DIASTOLIC BLOOD PRESSURE: 70 MMHG

## 2021-02-01 DIAGNOSIS — O21.9 NAUSEA/VOMITING IN PREGNANCY: Primary | ICD-10-CM

## 2021-02-01 LAB
ANION GAP SERPL CALCULATED.3IONS-SCNC: 4 MMOL/L (ref 3–14)
BUN SERPL-MCNC: 11 MG/DL (ref 7–30)
CALCIUM SERPL-MCNC: 9.1 MG/DL (ref 8.5–10.1)
CHLORIDE SERPL-SCNC: 105 MMOL/L (ref 94–109)
CO2 SERPL-SCNC: 27 MMOL/L (ref 20–32)
CREAT SERPL-MCNC: 0.49 MG/DL (ref 0.52–1.04)
GFR SERPL CREATININE-BSD FRML MDRD: >90 ML/MIN/{1.73_M2}
GLUCOSE SERPL-MCNC: 78 MG/DL (ref 70–99)
POTASSIUM SERPL-SCNC: 4.1 MMOL/L (ref 3.4–5.3)
SODIUM SERPL-SCNC: 136 MMOL/L (ref 133–144)

## 2021-02-01 PROCEDURE — 99213 OFFICE O/P EST LOW 20 MIN: CPT | Performed by: NURSE PRACTITIONER

## 2021-02-01 PROCEDURE — 36415 COLL VENOUS BLD VENIPUNCTURE: CPT | Performed by: NURSE PRACTITIONER

## 2021-02-01 PROCEDURE — 80048 BASIC METABOLIC PNL TOTAL CA: CPT | Performed by: NURSE PRACTITIONER

## 2021-02-01 ASSESSMENT — ANXIETY QUESTIONNAIRES
1. FEELING NERVOUS, ANXIOUS, OR ON EDGE: NOT AT ALL
5. BEING SO RESTLESS THAT IT IS HARD TO SIT STILL: NEARLY EVERY DAY
2. NOT BEING ABLE TO STOP OR CONTROL WORRYING: NOT AT ALL
GAD7 TOTAL SCORE: 8
3. WORRYING TOO MUCH ABOUT DIFFERENT THINGS: MORE THAN HALF THE DAYS
7. FEELING AFRAID AS IF SOMETHING AWFUL MIGHT HAPPEN: NOT AT ALL
6. BECOMING EASILY ANNOYED OR IRRITABLE: NEARLY EVERY DAY

## 2021-02-01 ASSESSMENT — PATIENT HEALTH QUESTIONNAIRE - PHQ9
5. POOR APPETITE OR OVEREATING: NOT AT ALL
SUM OF ALL RESPONSES TO PHQ QUESTIONS 1-9: 3

## 2021-02-01 NOTE — PROGRESS NOTES
Assessment & Plan     Nausea/vomiting in pregnancy  Ongoing nausea with some increased vomiting and dry heaving. Weight stable. Zofran not very helpful. Recommend checking labs - will call with results and recommendations. Recommend starting over the counter B6 daily. Follow up with OB on Friday as scheduled.   - Basic metabolic panel  (Ca, Cl, CO2, Creat, Gluc, K, Na, BUN)           Tobacco Cessation:   reports that she has been smoking cigarettes. She has been smoking about 0.50 packs per day. She has never used smokeless tobacco.  Tobacco Cessation Action Plan: Information offered: Patient not interested at this time    BMI:   Estimated body mass index is 34.96 kg/m  as calculated from the following:    Height as of 1/11/21: 1.524 m (5').    Weight as of this encounter: 81.2 kg (179 lb).         See Patient Instructions    Return in about 1 month (around 3/1/2021), or if symptoms worsen or fail to improve.    ARIEL Luis CNP  [facility]    Fatuma Josue is a 25 year old who presents to clinic today for the following health issues:    HPI       Concern - Nausea - morning sickness  Onset: 3 weeks  Description: waking up with headaches, nausea lasts all day and waking up at night  Intensity: severe  Progression of Symptoms:  worsening  Accompanying Signs & Symptoms: headaches, vomiting  Previous history of similar problem:   Precipitating factors:        Worsened by:   Alleviating factors:        Improved by: water  Therapies tried and outcome: zofran, ginger ale - helps some, saltine crackers - vomits    Zofran not helping, taking in the morning and afternoon          Review of Systems   Constitutional, HEENT, cardiovascular, pulmonary, gi and gu systems are negative, except as otherwise noted.      Objective    /70   Pulse 92   Temp 98.8  F (37.1  C) (Tympanic)   Resp 12   Wt 81.2 kg (179 lb)   LMP 12/09/2020   BMI 34.96 kg/m    Body mass index is 34.96  kg/m .  Physical Exam   GENERAL APPEARANCE: healthy, alert and no distress  RESP: lungs clear to auscultation - no rales, rhonchi or wheezes  CV: regular rates and rhythm, normal S1 S2, no S3 or S4 and no murmur, click or rub  ABDOMEN: soft, nontender, without hepatosplenomegaly or masses and bowel sounds normal  MS: extremities normal- no gross deformities noted  SKIN: no suspicious lesions or rashes  PSYCH: mentation appears normal and affect normal/bright    Diagnostic Test Results:  Pending

## 2021-02-01 NOTE — NURSING NOTE
Chief Complaint   Patient presents with     Morning Sickness     getting worse     /70   Pulse 92   Temp 98.8  F (37.1  C) (Tympanic)   Resp 12   Wt 81.2 kg (179 lb)   LMP 12/09/2020   BMI 34.96 kg/m   Estimated body mass index is 34.96 kg/m  as calculated from the following:    Height as of 1/11/21: 1.524 m (5').    Weight as of this encounter: 81.2 kg (179 lb).  Patient presents to the clinic using No DME      Health Maintenance that is potentially due pending provider review:    Health Maintenance Due   Topic Date Due     Pneumococcal Vaccine: Pediatrics (0 to 5 Years) and At-Risk Patients (6 to 64 Years) (1 of 1 - PPSV23) 06/19/2001     HPV IMMUNIZATION (1 - 2-dose series) 06/19/2006     PREVENTIVE CARE VISIT  03/31/2015     DTAP/TDAP/TD IMMUNIZATION (7 - Td) 11/13/2017     PAP  06/13/2020     ASTHMA CONTROL TEST  08/20/2020     INFLUENZA VACCINE (1) 09/01/2020     PHQ-9  10/10/2020        n/a

## 2021-02-01 NOTE — PATIENT INSTRUCTIONS
Nausea/vomiting in pregnancy  Ongoing nausea with some increased vomiting and dry heaving. Weight stable. Zofran not very helpful. Recommend checking labs - will call with results and recommendations. Recommend starting over the counter B6 daily. Follow up with OB on Friday as scheduled.   - Basic metabolic panel  (Ca, Cl, CO2, Creat, Gluc, K, Na, BUN)

## 2021-02-02 ASSESSMENT — ANXIETY QUESTIONNAIRES: GAD7 TOTAL SCORE: 8

## 2021-02-02 ASSESSMENT — ASTHMA QUESTIONNAIRES: ACT_TOTALSCORE: 25

## 2021-02-05 ENCOUNTER — PRENATAL OFFICE VISIT (OUTPATIENT)
Dept: OBGYN | Facility: CLINIC | Age: 26
End: 2021-02-05
Payer: COMMERCIAL

## 2021-02-05 VITALS
HEIGHT: 61 IN | HEART RATE: 68 BPM | WEIGHT: 177 LBS | RESPIRATION RATE: 16 BRPM | SYSTOLIC BLOOD PRESSURE: 106 MMHG | DIASTOLIC BLOOD PRESSURE: 66 MMHG | BODY MASS INDEX: 33.42 KG/M2

## 2021-02-05 DIAGNOSIS — Z23 NEED FOR PROPHYLACTIC VACCINATION AND INOCULATION AGAINST INFLUENZA: ICD-10-CM

## 2021-02-05 DIAGNOSIS — Z34.81 PRENATAL CARE, SUBSEQUENT PREGNANCY IN FIRST TRIMESTER: Primary | ICD-10-CM

## 2021-02-05 DIAGNOSIS — Z11.3 SCREEN FOR STD (SEXUALLY TRANSMITTED DISEASE): ICD-10-CM

## 2021-02-05 LAB
ABO + RH BLD: NORMAL
ABO + RH BLD: NORMAL
ALBUMIN UR-MCNC: NEGATIVE MG/DL
APPEARANCE UR: CLEAR
BILIRUB UR QL STRIP: NEGATIVE
BLD GP AB SCN SERPL QL: NORMAL
BLOOD BANK CMNT PATIENT-IMP: NORMAL
COLOR UR AUTO: YELLOW
ERYTHROCYTE [DISTWIDTH] IN BLOOD BY AUTOMATED COUNT: 15.6 % (ref 10–15)
GLUCOSE UR STRIP-MCNC: NEGATIVE MG/DL
HBV SURFACE AG SERPL QL IA: NONREACTIVE
HCT VFR BLD AUTO: 35.6 % (ref 35–47)
HGB BLD-MCNC: 11.3 G/DL (ref 11.7–15.7)
HGB UR QL STRIP: NEGATIVE
HIV 1+2 AB+HIV1 P24 AG SERPL QL IA: NONREACTIVE
KETONES UR STRIP-MCNC: NEGATIVE MG/DL
LEUKOCYTE ESTERASE UR QL STRIP: NEGATIVE
MCH RBC QN AUTO: 24.7 PG (ref 26.5–33)
MCHC RBC AUTO-ENTMCNC: 31.7 G/DL (ref 31.5–36.5)
MCV RBC AUTO: 78 FL (ref 78–100)
NITRATE UR QL: NEGATIVE
PH UR STRIP: 7 PH (ref 5–7)
PLATELET # BLD AUTO: 374 10E9/L (ref 150–450)
RBC # BLD AUTO: 4.57 10E12/L (ref 3.8–5.2)
SOURCE: NORMAL
SP GR UR STRIP: 1.02 (ref 1–1.03)
SPECIMEN EXP DATE BLD: NORMAL
T PALLIDUM AB SER QL: NONREACTIVE
UROBILINOGEN UR STRIP-ACNC: 0.2 EU/DL (ref 0.2–1)
WBC # BLD AUTO: 9.5 10E9/L (ref 4–11)

## 2021-02-05 PROCEDURE — G0145 SCR C/V CYTO,THINLAYER,RESCR: HCPCS | Performed by: OBSTETRICS & GYNECOLOGY

## 2021-02-05 PROCEDURE — 85027 COMPLETE CBC AUTOMATED: CPT | Performed by: OBSTETRICS & GYNECOLOGY

## 2021-02-05 PROCEDURE — 87086 URINE CULTURE/COLONY COUNT: CPT | Performed by: OBSTETRICS & GYNECOLOGY

## 2021-02-05 PROCEDURE — 99000 SPECIMEN HANDLING OFFICE-LAB: CPT | Performed by: OBSTETRICS & GYNECOLOGY

## 2021-02-05 PROCEDURE — 86901 BLOOD TYPING SEROLOGIC RH(D): CPT | Performed by: OBSTETRICS & GYNECOLOGY

## 2021-02-05 PROCEDURE — 86780 TREPONEMA PALLIDUM: CPT | Mod: 90 | Performed by: OBSTETRICS & GYNECOLOGY

## 2021-02-05 PROCEDURE — 86850 RBC ANTIBODY SCREEN: CPT | Performed by: OBSTETRICS & GYNECOLOGY

## 2021-02-05 PROCEDURE — G0124 SCREEN C/V THIN LAYER BY MD: HCPCS | Performed by: PATHOLOGY

## 2021-02-05 PROCEDURE — 87340 HEPATITIS B SURFACE AG IA: CPT | Performed by: OBSTETRICS & GYNECOLOGY

## 2021-02-05 PROCEDURE — 86762 RUBELLA ANTIBODY: CPT | Performed by: OBSTETRICS & GYNECOLOGY

## 2021-02-05 PROCEDURE — 87389 HIV-1 AG W/HIV-1&-2 AB AG IA: CPT | Performed by: OBSTETRICS & GYNECOLOGY

## 2021-02-05 PROCEDURE — 86900 BLOOD TYPING SEROLOGIC ABO: CPT | Performed by: OBSTETRICS & GYNECOLOGY

## 2021-02-05 PROCEDURE — 81003 URINALYSIS AUTO W/O SCOPE: CPT | Performed by: OBSTETRICS & GYNECOLOGY

## 2021-02-05 PROCEDURE — 87491 CHLMYD TRACH DNA AMP PROBE: CPT | Performed by: OBSTETRICS & GYNECOLOGY

## 2021-02-05 PROCEDURE — 99214 OFFICE O/P EST MOD 30 MIN: CPT | Performed by: OBSTETRICS & GYNECOLOGY

## 2021-02-05 PROCEDURE — 90686 IIV4 VACC NO PRSV 0.5 ML IM: CPT | Performed by: OBSTETRICS & GYNECOLOGY

## 2021-02-05 PROCEDURE — 87591 N.GONORRHOEAE DNA AMP PROB: CPT | Performed by: OBSTETRICS & GYNECOLOGY

## 2021-02-05 PROCEDURE — 90471 IMMUNIZATION ADMIN: CPT | Performed by: OBSTETRICS & GYNECOLOGY

## 2021-02-05 PROCEDURE — 36415 COLL VENOUS BLD VENIPUNCTURE: CPT | Performed by: OBSTETRICS & GYNECOLOGY

## 2021-02-05 PROCEDURE — 76817 TRANSVAGINAL US OBSTETRIC: CPT | Performed by: OBSTETRICS & GYNECOLOGY

## 2021-02-05 RX ORDER — PRENATAL VIT/IRON FUM/FOLIC AC 27MG-0.8MG
1 TABLET ORAL DAILY
COMMUNITY
End: 2021-11-23

## 2021-02-05 ASSESSMENT — MIFFLIN-ST. JEOR: SCORE: 1484.45

## 2021-02-05 NOTE — NURSING NOTE
"Initial /66 (BP Location: Right arm, Patient Position: Chair, Cuff Size: Adult Regular)   Pulse 68   Resp 16   Ht 1.548 m (5' 0.95\")   Wt 80.3 kg (177 lb)   LMP 12/09/2020   BMI 33.50 kg/m   Estimated body mass index is 33.5 kg/m  as calculated from the following:    Height as of this encounter: 1.548 m (5' 0.95\").    Weight as of this encounter: 80.3 kg (177 lb). .      "

## 2021-02-05 NOTE — PROGRESS NOTES
[facility]   OB/GYN Clinic    CC: New OB     Subjective:    Liat is a 25 year old  at 8w2d by Patient's last menstrual period was 2020. who presents for her initial OB visit. This is a planned pregnancy and her and her partner are excited (this is a new FOB from her previous pregnancy). She reports feeling well. Denies any uterine cramping, abdominal pain or vaginal bleeding. Had some nausea, but feeling better the past three days.   Prior to a +UPT, she reports regular monthly periods without contraception use.     OB History    Para Term  AB Living   3 2 2 0 0 2   SAB TAB Ectopic Multiple Live Births   0 0 0 0 2      # Outcome Date GA Lbr Josiah/2nd Weight Sex Delivery Anes PTL Lv   3 Current            2 Term 19 39w1d 04:30 / 00:38 3.07 kg (6 lb 12.3 oz) F Vag-Vacuum EPI N FOSTER      Complications: GBS      Name: Callie      Apgar1: 9  Apgar5: 9   1 Term 10/20/16 40w6d 11:25 / 01:02 3.175 kg (7 lb) M Vag-Spont EPI N FOSTER      Name: omid      Apgar1: 6  Apgar5: 9       Past Medical History:   Diagnosis Date     Accidental overdose     sleep meds, tylenol, opioids     Aspiration pneumonia (H) 2015     Bipolar disorder (H)      Chickenpox      Chlamydia infection affecting pregnancy, antepartum 2018     Depressive disorder      History of recurrent ear infection      Intracranial swelling 2015     Ovarian cysts     ultrasound dx by genoveva     Postpartum depression 2019    mild       Past Surgical History:   Procedure Laterality Date     PE TUBES       TONSILLECTOMY         Current Outpatient Medications   Medication Sig Dispense Refill     ondansetron (ZOFRAN) 4 MG tablet Take 1 tablet (4 mg) by mouth every 6 hours as needed for nausea 30 tablet 1     Prenatal Vit-Fe Fumarate-FA (PRENATAL MULTIVITAMIN W/IRON) 27-0.8 MG tablet Take 1 tablet by mouth daily       pyridOXINE (VITAMIN B6) 100 MG TABS Take 25 mg by mouth daily         Family  "History   Problem Relation Age of Onset     Hypertension Mother      Lipids Mother      Depression Mother      C.A.D. Mother         cardiomyopathy     Heart Disease Mother      Hypertension Maternal Grandfather      Depression Maternal Grandfather      Diabetes Paternal Grandfather      Hypertension Paternal Grandfather      Substance Abuse Father         A&D     Gastrointestinal Disease Maternal Grandmother         ulcer     Depression Maternal Grandmother      Depression Paternal Grandmother        Social History     Tobacco Use     Smoking status: Current Every Day Smoker     Packs/day: 0.50     Types: Cigarettes     Smokeless tobacco: Never Used     Tobacco comment: smoking 10 cig/day with pregnancy   Substance Use Topics     Alcohol use: Not Currently     Comment: occas-quit with pregnancy       ROS: A 10 pt ROS was completed and found to be negative unless mentioned in the HPI.     Objective:  /66 (BP Location: Right arm, Patient Position: Chair, Cuff Size: Adult Regular)   Pulse 68   Resp 16   Ht 1.548 m (5' 0.95\")   Wt 80.3 kg (177 lb)   LMP 12/09/2020   BMI 33.50 kg/m      Estimated body mass index is 33.5 kg/m  as calculated from the following:    Height as of this encounter: 1.548 m (5' 0.95\").    Weight as of this encounter: 80.3 kg (177 lb).    Physical Exam:  Gen: Pleasant, talkative female in no apparent distress   Respiratory: breathing comfortably on room air   Cardiac: Regular rate, warm and well-perfused.   GI: Abd soft and non-tender  : External genitalia is free of lesion. Urethra and bartholin glands normal.  Vaginal mucosa is moist and pink without unusual discharge.  Cervix is without lesions or discharge.  Pap smear and endocervical sampling obtained without incident.  Bimanual exam reveals mobile anteverted uterus without cervical motion tenderness.  Adenexa are mobile and non-tender bilaterally. No appreciable adnexal enlargement. Uterus is consistent with a 8 week " gestation.   MSK: Grossly normal movement of all four extremities  Psych: mood and affect bright   Lower extremity: edema not present     Transvaginal US: Rodriguez intrauterine pregnancy at 8 weeks 2 days gestation consistent with last menstrual period.  Positive cardiac activity was noted 180s per minute.  Right ovary appeared normal but I was unable to visualize the left ovary.  Images have been scanned.    Assessment/Plan:   25 year old  at 8w2d by LMP of Patient's last menstrual period was 2020. who presents for her initial OB visit.   1) Plan to draw new OB lab today (T&S, CBC, HIV, RPR, HepBsAg, Hep B antibody, rubella, GC/Chlam, UC).  2) Discussed routine prenatal care, group practice model at [facility], tertiary support and frequency of visits. Options for  testing for chromosomal and birth defects were discussed with the patient including nuchal translucency/blood marker testing in the first trimester, progenity and quad screening and/or Level 2 ultrasound in the second trimester. We discussed that these are screening tests and not diagnostic tests and that false positives and negatives are a distinct possibility. We discussed that follow up diagnostic testing would include chorionic villus sampling or amniocentesis depending on gestational age. Written information provided. Patient desires quad screen for genetic testing.  3) Plan for dating/viability scan now JOSE ROBERTO 9/15/2021  4) Concerns: none  5) PMH/OBHx problems: tobacco use - working on cutting back, knows risks    6) Medication review: no changes, continue prenatal vitamin   7) Reviewed ectopic and miscarriage precautions (present to ED or call clinic with abdominal pain, vaginal bleeding or fever)   8) Weight gain: discussed weight gain expectations (BMI >30: 11-20lbs)   9) PAP smear: collected today  10) Delivery plan: continue to discuss, breastfeeding, pp contraception, pain management in labor - continue to discuss  11)  Immunizations: flu shot given today, Tdap at 28wks    Return to clinic in 4 weeks.     Kiesha Gibson MD  OB/GYN  2/5/2021

## 2021-02-06 LAB
BACTERIA SPEC CULT: NORMAL
C TRACH DNA SPEC QL NAA+PROBE: NEGATIVE
Lab: NORMAL
N GONORRHOEA DNA SPEC QL NAA+PROBE: NEGATIVE
RUBV IGG SERPL IA-ACNC: 22 IU/ML
SPECIMEN SOURCE: NORMAL

## 2021-02-09 PROBLEM — R87.612 PAPANICOLAOU SMEAR OF CERVIX WITH LOW GRADE SQUAMOUS INTRAEPITHELIAL LESION (LGSIL): Status: ACTIVE | Noted: 2021-02-05

## 2021-02-09 LAB
COPATH REPORT: ABNORMAL
PAP: ABNORMAL

## 2021-02-10 ENCOUNTER — PATIENT OUTREACH (OUTPATIENT)
Dept: OBGYN | Facility: CLINIC | Age: 26
End: 2021-02-10

## 2021-02-10 DIAGNOSIS — R87.612 PAPANICOLAOU SMEAR OF CERVIX WITH LOW GRADE SQUAMOUS INTRAEPITHELIAL LESION (LGSIL): ICD-10-CM

## 2021-02-10 NOTE — LETTER
March 25, 2021      Liat Corcoran  [address] [place] [id number]        Dear ,    At [facility], your health and wellness are our primary concern. That is why we are following up on your most recent abnormal Pap smear.    Please call [phone] to schedule an appointment for your recommended follow-up Colposcopy (this cannot be scheduled through [facility]) at your 20 week OB visit.     If you have completed the appointment outside of the [facility] system, please have the records forwarded to our office. We will update your chart for your provider to review before your next annual wellness visit.     Thank you for choosing [facility]!      Sincerely,    Your [facility] Care Team

## 2021-03-09 ENCOUNTER — APPOINTMENT (OUTPATIENT)
Dept: ULTRASOUND IMAGING | Facility: CLINIC | Age: 26
End: 2021-03-09
Attending: PHYSICIAN ASSISTANT
Payer: COMMERCIAL

## 2021-03-09 ENCOUNTER — HOSPITAL ENCOUNTER (EMERGENCY)
Facility: CLINIC | Age: 26
Discharge: HOME | End: 2021-03-09
Attending: PHYSICIAN ASSISTANT | Admitting: PHYSICIAN ASSISTANT
Payer: COMMERCIAL

## 2021-03-09 VITALS
DIASTOLIC BLOOD PRESSURE: 76 MMHG | WEIGHT: 180 LBS | HEART RATE: 89 BPM | SYSTOLIC BLOOD PRESSURE: 124 MMHG | OXYGEN SATURATION: 100 % | RESPIRATION RATE: 18 BRPM | TEMPERATURE: 98.4 F | BODY MASS INDEX: 34.07 KG/M2

## 2021-03-09 DIAGNOSIS — O20.8 SUBCHORIONIC HEMORRHAGE IN FIRST TRIMESTER: ICD-10-CM

## 2021-03-09 LAB
ALBUMIN SERPL-MCNC: 3.2 G/DL (ref 3.4–5)
ALBUMIN UR-MCNC: NEGATIVE MG/DL
ALP SERPL-CCNC: 56 U/L (ref 40–150)
ALT SERPL W P-5'-P-CCNC: 13 U/L (ref 0–50)
ANION GAP SERPL CALCULATED.3IONS-SCNC: 8 MMOL/L (ref 3–14)
APPEARANCE UR: CLEAR
AST SERPL W P-5'-P-CCNC: 11 U/L (ref 0–45)
B-HCG SERPL-ACNC: ABNORMAL IU/L (ref 0–5)
BACTERIA #/AREA URNS HPF: ABNORMAL /HPF
BASOPHILS # BLD AUTO: 0.1 10E9/L (ref 0–0.2)
BASOPHILS NFR BLD AUTO: 0.4 %
BILIRUB SERPL-MCNC: 0.2 MG/DL (ref 0.2–1.3)
BILIRUB UR QL STRIP: NEGATIVE
BUN SERPL-MCNC: 12 MG/DL (ref 7–30)
CALCIUM SERPL-MCNC: 9 MG/DL (ref 8.5–10.1)
CHLORIDE SERPL-SCNC: 106 MMOL/L (ref 94–109)
CO2 SERPL-SCNC: 22 MMOL/L (ref 20–32)
COLOR UR AUTO: YELLOW
CREAT SERPL-MCNC: 0.39 MG/DL (ref 0.52–1.04)
DIFFERENTIAL METHOD BLD: ABNORMAL
EOSINOPHIL # BLD AUTO: 0.2 10E9/L (ref 0–0.7)
EOSINOPHIL NFR BLD AUTO: 1.2 %
ERYTHROCYTE [DISTWIDTH] IN BLOOD BY AUTOMATED COUNT: 16.1 % (ref 10–15)
GFR SERPL CREATININE-BSD FRML MDRD: >90 ML/MIN/{1.73_M2}
GLUCOSE SERPL-MCNC: 82 MG/DL (ref 70–99)
GLUCOSE UR STRIP-MCNC: NEGATIVE MG/DL
HCT VFR BLD AUTO: 38 % (ref 35–47)
HGB BLD-MCNC: 11.8 G/DL (ref 11.7–15.7)
HGB UR QL STRIP: ABNORMAL
IMM GRANULOCYTES # BLD: 0.2 10E9/L (ref 0–0.4)
IMM GRANULOCYTES NFR BLD: 1.2 %
KETONES UR STRIP-MCNC: NEGATIVE MG/DL
LEUKOCYTE ESTERASE UR QL STRIP: NEGATIVE
LYMPHOCYTES # BLD AUTO: 3.8 10E9/L (ref 0.8–5.3)
LYMPHOCYTES NFR BLD AUTO: 29.4 %
MCH RBC QN AUTO: 25.2 PG (ref 26.5–33)
MCHC RBC AUTO-ENTMCNC: 31.1 G/DL (ref 31.5–36.5)
MCV RBC AUTO: 81 FL (ref 78–100)
MONOCYTES # BLD AUTO: 0.8 10E9/L (ref 0–1.3)
MONOCYTES NFR BLD AUTO: 5.8 %
MUCOUS THREADS #/AREA URNS LPF: PRESENT /LPF
NEUTROPHILS # BLD AUTO: 8 10E9/L (ref 1.6–8.3)
NEUTROPHILS NFR BLD AUTO: 62 %
NITRATE UR QL: NEGATIVE
NRBC # BLD AUTO: 0 10*3/UL
NRBC BLD AUTO-RTO: 0 /100
PH UR STRIP: 7 PH (ref 5–7)
PLATELET # BLD AUTO: 322 10E9/L (ref 150–450)
POTASSIUM SERPL-SCNC: 3.8 MMOL/L (ref 3.4–5.3)
PROT SERPL-MCNC: 7.3 G/DL (ref 6.8–8.8)
RBC # BLD AUTO: 4.68 10E12/L (ref 3.8–5.2)
RBC #/AREA URNS AUTO: 1 /HPF (ref 0–2)
SODIUM SERPL-SCNC: 136 MMOL/L (ref 133–144)
SOURCE: ABNORMAL
SP GR UR STRIP: 1.01 (ref 1–1.03)
SQUAMOUS #/AREA URNS AUTO: 7 /HPF (ref 0–1)
UROBILINOGEN UR STRIP-MCNC: 0 MG/DL (ref 0–2)
WBC # BLD AUTO: 13 10E9/L (ref 4–11)
WBC #/AREA URNS AUTO: 2 /HPF (ref 0–5)

## 2021-03-09 PROCEDURE — 99284 EMERGENCY DEPT VISIT MOD MDM: CPT | Mod: 25 | Performed by: PHYSICIAN ASSISTANT

## 2021-03-09 PROCEDURE — 81001 URINALYSIS AUTO W/SCOPE: CPT | Performed by: PHYSICIAN ASSISTANT

## 2021-03-09 PROCEDURE — 85025 COMPLETE CBC W/AUTO DIFF WBC: CPT | Performed by: PHYSICIAN ASSISTANT

## 2021-03-09 PROCEDURE — 99284 EMERGENCY DEPT VISIT MOD MDM: CPT | Performed by: PHYSICIAN ASSISTANT

## 2021-03-09 PROCEDURE — 84702 CHORIONIC GONADOTROPIN TEST: CPT | Performed by: PHYSICIAN ASSISTANT

## 2021-03-09 PROCEDURE — 76801 OB US < 14 WKS SINGLE FETUS: CPT

## 2021-03-09 PROCEDURE — 80053 COMPREHEN METABOLIC PANEL: CPT | Performed by: PHYSICIAN ASSISTANT

## 2021-03-09 ASSESSMENT — ENCOUNTER SYMPTOMS
VOMITING: 0
DYSURIA: 0
FLANK PAIN: 0
PALPITATIONS: 0
CHILLS: 0
NAUSEA: 1
ABDOMINAL PAIN: 0
DIZZINESS: 0
FATIGUE: 1
COLOR CHANGE: 0
WOUND: 0
CONSTIPATION: 0
FEVER: 0
SHORTNESS OF BREATH: 0
LIGHT-HEADEDNESS: 0
HEMATURIA: 1
DIARRHEA: 0
COUGH: 0

## 2021-03-09 NOTE — Clinical Note
Liat Corcoran was seen and treated in our emergency department on 3/9/2021.    She should rest, avoid activity for the next 48 hours.      Sincerely,     [facility] Emergency Dept
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
## 2021-03-10 NOTE — ED TRIAGE NOTES
"Pt is 13 weeks pregnant and had sudden onset of cramping and \"signficant bleeding\" about an hour and a half prior to arrival.   "

## 2021-03-12 ENCOUNTER — PRENATAL OFFICE VISIT (OUTPATIENT)
Dept: OBGYN | Facility: CLINIC | Age: 26
End: 2021-03-12
Payer: COMMERCIAL

## 2021-03-12 VITALS
OXYGEN SATURATION: 100 % | WEIGHT: 182.8 LBS | HEART RATE: 101 BPM | DIASTOLIC BLOOD PRESSURE: 79 MMHG | BODY MASS INDEX: 34.6 KG/M2 | SYSTOLIC BLOOD PRESSURE: 127 MMHG

## 2021-03-12 DIAGNOSIS — Z34.82 PRENATAL CARE, SUBSEQUENT PREGNANCY IN SECOND TRIMESTER: Primary | ICD-10-CM

## 2021-03-12 PROCEDURE — 99212 OFFICE O/P EST SF 10 MIN: CPT | Performed by: OBSTETRICS & GYNECOLOGY

## 2021-03-12 NOTE — PROGRESS NOTES
[facility]   OB/GYN Clinic     CC: F/U OB      Subjective:     Liat is a 25 year old  at 13w2d by Patient's last menstrual period was 2020. who presents for her initial OB visit. Had sme light cramping and bleeding last week (bleeding with wiping) and US was normal with possible subchorionic hemorrhage. Cramping/bleeding now resolved.      Objective:  /79 (BP Location: Right arm, Cuff Size: Adult Large)   Pulse 101   Wt 82.9 kg (182 lb 12.8 oz)   LMP 2020   SpO2 100%   BMI 34.60 kg/m       Physical Exam:  Gen: Pleasant, talkative female in no apparent distress   Respiratory: breathing comfortably on room air   Cardiac: Regular rate, warm and well-perfused.   GI: Abd soft and non-tender  MSK: Grossly normal movement of all four extremities  Psych: mood and affect bright   Lower extremity: edema not present      See OB flowsheet    Assessment/Plan:   25 year old  at 13w2d by LMP of Patient's last menstrual period was 2020 c/w 8wk US who presents for F/U OB visit.   Normal labs  Plan QUAD at next appt   Tobacco use - working on cutting back, knows risks    Anatomy scan ordered  Immunizations: s/p flu shot, Tdap at 28wks     Return to clinic in 4 weeks.      Kiesha Gibson MD  OB/GYN  3/12/2021

## 2021-04-03 ENCOUNTER — HOSPITAL ENCOUNTER (EMERGENCY)
Facility: CLINIC | Age: 26
Discharge: HOME | End: 2021-04-04
Attending: NURSE PRACTITIONER | Admitting: NURSE PRACTITIONER
Payer: MEDICAID

## 2021-04-03 VITALS
OXYGEN SATURATION: 96 % | RESPIRATION RATE: 18 BRPM | DIASTOLIC BLOOD PRESSURE: 91 MMHG | TEMPERATURE: 98.8 F | SYSTOLIC BLOOD PRESSURE: 126 MMHG | HEART RATE: 107 BPM

## 2021-04-03 DIAGNOSIS — Z33.1 PREGNANT STATE, INCIDENTAL: ICD-10-CM

## 2021-04-03 DIAGNOSIS — K05.00 GINGIVITIS, ACUTE: ICD-10-CM

## 2021-04-03 PROCEDURE — 99284 EMERGENCY DEPT VISIT MOD MDM: CPT | Performed by: NURSE PRACTITIONER

## 2021-04-03 PROCEDURE — 86140 C-REACTIVE PROTEIN: CPT | Performed by: NURSE PRACTITIONER

## 2021-04-03 PROCEDURE — 85652 RBC SED RATE AUTOMATED: CPT | Performed by: NURSE PRACTITIONER

## 2021-04-03 PROCEDURE — 250N000013 HC RX MED GY IP 250 OP 250 PS 637: Performed by: NURSE PRACTITIONER

## 2021-04-03 PROCEDURE — 99283 EMERGENCY DEPT VISIT LOW MDM: CPT | Performed by: NURSE PRACTITIONER

## 2021-04-03 PROCEDURE — 85025 COMPLETE CBC W/AUTO DIFF WBC: CPT | Performed by: NURSE PRACTITIONER

## 2021-04-03 RX ORDER — DIPHENHYDRAMINE HYDROCHLORIDE AND LIDOCAINE HYDROCHLORIDE AND ALUMINUM HYDROXIDE AND MAGNESIUM HYDRO
10 KIT ONCE
Status: COMPLETED | OUTPATIENT
Start: 2021-04-03 | End: 2021-04-03

## 2021-04-03 RX ADMIN — DIPHENHYDRAMINE HYDROCHLORIDE AND LIDOCAINE HYDROCHLORIDE AND ALUMINUM HYDROXIDE AND MAGNESIUM HYDRO 10 ML: KIT at 23:58

## 2021-04-04 LAB
BASOPHILS # BLD AUTO: 0.1 10E9/L (ref 0–0.2)
BASOPHILS NFR BLD AUTO: 0.5 %
CRP SERPL-MCNC: 22.4 MG/L (ref 0–8)
DIFFERENTIAL METHOD BLD: ABNORMAL
EOSINOPHIL # BLD AUTO: 0.2 10E9/L (ref 0–0.7)
EOSINOPHIL NFR BLD AUTO: 1.3 %
ERYTHROCYTE [DISTWIDTH] IN BLOOD BY AUTOMATED COUNT: 15.7 % (ref 10–15)
ERYTHROCYTE [SEDIMENTATION RATE] IN BLOOD BY WESTERGREN METHOD: 45 MM/H (ref 0–20)
HCT VFR BLD AUTO: 35.4 % (ref 35–47)
HGB BLD-MCNC: 11.4 G/DL (ref 11.7–15.7)
IMM GRANULOCYTES # BLD: 0.1 10E9/L (ref 0–0.4)
IMM GRANULOCYTES NFR BLD: 0.4 %
LYMPHOCYTES # BLD AUTO: 3.9 10E9/L (ref 0.8–5.3)
LYMPHOCYTES NFR BLD AUTO: 28.8 %
MCH RBC QN AUTO: 25.6 PG (ref 26.5–33)
MCHC RBC AUTO-ENTMCNC: 32.2 G/DL (ref 31.5–36.5)
MCV RBC AUTO: 80 FL (ref 78–100)
MONOCYTES # BLD AUTO: 1 10E9/L (ref 0–1.3)
MONOCYTES NFR BLD AUTO: 7.2 %
NEUTROPHILS # BLD AUTO: 8.4 10E9/L (ref 1.6–8.3)
NEUTROPHILS NFR BLD AUTO: 61.8 %
NRBC # BLD AUTO: 0 10*3/UL
NRBC BLD AUTO-RTO: 0 /100
PLATELET # BLD AUTO: 319 10E9/L (ref 150–450)
RBC # BLD AUTO: 4.45 10E12/L (ref 3.8–5.2)
WBC # BLD AUTO: 13.5 10E9/L (ref 4–11)

## 2021-04-04 PROCEDURE — 250N000009 HC RX 250: Performed by: NURSE PRACTITIONER

## 2021-04-04 RX ORDER — CHLORHEXIDINE GLUCONATE ORAL RINSE 1.2 MG/ML
15 SOLUTION DENTAL 2 TIMES DAILY
Status: DISCONTINUED | OUTPATIENT
Start: 2021-04-05 | End: 2021-04-04 | Stop reason: HOSPADM

## 2021-04-04 RX ORDER — CHLORHEXIDINE GLUCONATE ORAL RINSE 1.2 MG/ML
15 SOLUTION DENTAL 2 TIMES DAILY
Qty: 473 ML | Refills: 0 | Status: SHIPPED | OUTPATIENT
Start: 2021-04-04 | End: 2021-04-11

## 2021-04-04 RX ORDER — DIPHENHYDRAMINE HYDROCHLORIDE AND LIDOCAINE HYDROCHLORIDE AND ALUMINUM HYDROXIDE AND MAGNESIUM HYDRO
10 KIT EVERY 6 HOURS PRN
Status: DISCONTINUED | OUTPATIENT
Start: 2021-04-04 | End: 2021-04-04 | Stop reason: HOSPADM

## 2021-04-04 RX ORDER — CHLORHEXIDINE GLUCONATE ORAL RINSE 1.2 MG/ML
15 SOLUTION DENTAL 2 TIMES DAILY
Qty: 30 ML | Refills: 0 | Status: SHIPPED | OUTPATIENT
Start: 2021-04-04 | End: 2021-04-04

## 2021-04-04 RX ORDER — DIPHENHYDRAMINE HYDROCHLORIDE AND LIDOCAINE HYDROCHLORIDE AND ALUMINUM HYDROXIDE AND MAGNESIUM HYDRO
10 KIT EVERY 6 HOURS PRN
Qty: 80 ML | Refills: 0 | Status: SHIPPED | OUTPATIENT
Start: 2021-04-04 | End: 2021-04-04

## 2021-04-04 RX ORDER — DIPHENHYDRAMINE HYDROCHLORIDE AND LIDOCAINE HYDROCHLORIDE AND ALUMINUM HYDROXIDE AND MAGNESIUM HYDRO
10 KIT EVERY 6 HOURS PRN
Qty: 237 ML | Refills: 0 | Status: SHIPPED | OUTPATIENT
Start: 2021-04-04 | End: 2021-04-09

## 2021-04-04 RX ORDER — LIDOCAINE HYDROCHLORIDE 20 MG/ML
5 SOLUTION OROPHARYNGEAL ONCE
Status: COMPLETED | OUTPATIENT
Start: 2021-04-04 | End: 2021-04-04

## 2021-04-04 RX ADMIN — LIDOCAINE HYDROCHLORIDE 5 ML: 20 SOLUTION ORAL; TOPICAL at 00:35

## 2021-04-04 ASSESSMENT — ENCOUNTER SYMPTOMS
FEVER: 0
COUGH: 0
VOICE CHANGE: 0
ADENOPATHY: 0
EYE REDNESS: 0
CHILLS: 0
NERVOUS/ANXIOUS: 1
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
## 2021-04-04 NOTE — ED TRIAGE NOTES
Pt presents with concerns of right sided facial pain.  Pt states that about an hour 40 minutes ago she started having sudden pain from the below the nose to the right ear- upper jaw pain.  Tylenol last at 2200.  Pregnant about 16 1/2 weeks.

## 2021-04-09 ENCOUNTER — PRENATAL OFFICE VISIT (OUTPATIENT)
Dept: OBGYN | Facility: CLINIC | Age: 26
End: 2021-04-09
Payer: MEDICAID

## 2021-04-09 VITALS
SYSTOLIC BLOOD PRESSURE: 99 MMHG | HEART RATE: 89 BPM | RESPIRATION RATE: 14 BRPM | BODY MASS INDEX: 35.85 KG/M2 | HEIGHT: 60 IN | TEMPERATURE: 98.7 F | WEIGHT: 182.6 LBS | DIASTOLIC BLOOD PRESSURE: 61 MMHG

## 2021-04-09 DIAGNOSIS — Z34.82 PRENATAL CARE, SUBSEQUENT PREGNANCY IN SECOND TRIMESTER: Primary | ICD-10-CM

## 2021-04-09 PROCEDURE — 36415 COLL VENOUS BLD VENIPUNCTURE: CPT | Performed by: OBSTETRICS & GYNECOLOGY

## 2021-04-09 PROCEDURE — 99212 OFFICE O/P EST SF 10 MIN: CPT | Performed by: OBSTETRICS & GYNECOLOGY

## 2021-04-09 PROCEDURE — 81511 FTL CGEN ABNOR FOUR ANAL: CPT | Mod: 90 | Performed by: OBSTETRICS & GYNECOLOGY

## 2021-04-09 ASSESSMENT — MIFFLIN-ST. JEOR: SCORE: 1494.77

## 2021-04-09 NOTE — NURSING NOTE
Initial BP 99/61 (BP Location: Right arm, Patient Position: Chair, Cuff Size: Adult Regular)   Pulse 89   Temp 98.7  F (37.1  C) (Tympanic)   Resp 14   Ht 1.524 m (5')   Wt 82.8 kg (182 lb 9.6 oz)   LMP 12/09/2020   BMI 35.66 kg/m   Estimated body mass index is 35.66 kg/m  as calculated from the following:    Height as of this encounter: 1.524 m (5').    Weight as of this encounter: 82.8 kg (182 lb 9.6 oz). .

## 2021-04-09 NOTE — PROGRESS NOTES
[facility]   OB/GYN Clinic     CC: F/U OB      Subjective:     Liat is a 25 year old  at 17w2d by Patient's last menstrual period was 2020. who presents for f/u OB visit. Light cramping, but no bleeding since last visit. +FM. Lots of fatigue.      Objective:  BP 99/61 (BP Location: Right arm, Patient Position: Chair, Cuff Size: Adult Regular)   Pulse 89   Temp 98.7  F (37.1  C) (Tympanic)   Resp 14   Ht 1.524 m (5')   Wt 82.8 kg (182 lb 9.6 oz)   LMP 2020   BMI 35.66 kg/m       Physical Exam:  Gen: Pleasant, talkative female in no apparent distress   Respiratory: breathing comfortably on room air   Cardiac: Regular rate, warm and well-perfused.   GI: Abd soft and non-tender  MSK: Grossly normal movement of all four extremities  Psych: mood and affect bright   Lower extremity: edema not present      See OB flowsheet     Assessment/Plan:   25 year old  at 17w2d by LMP of Patient's last menstrual period was 2020 c/w 8wk US who presents for F/U OB visit.   Normal labs  Plan QUAD today  Tobacco use - working on cutting back, knows risks    Anatomy scan scheduled  Immunizations: s/p flu shot, Tdap at 28wks     Return to clinic in 4 weeks.      Kiesha Gibson MD  OB/GYN  2021

## 2021-04-12 LAB
# FETUSES US: NORMAL
# FETUSES: NORMAL
AFP ADJ MOM AMN: 0.8
AFP SERPL-MCNC: 28 NG/ML
AGE - REPORTED: 26.2 YR
CURRENT SMOKER: YES
CURRENT SMOKER: YES
DIABETES STATUS PATIENT: NO
EGG DONOR AGE: NO
FAMILY MEMBER DISEASES HX: NO
FAMILY MEMBER DISEASES HX: NO
GA METHOD: NORMAL
GA METHOD: NORMAL
GA: NORMAL WK
HCG MOM SERPL: 0.73
HCG SERPL-ACNC: NORMAL IU/L
HX OF HEREDITARY DISORDERS: NO
IDDM PATIENT QL: NO
INHIBIN A MOM SERPL: 1.12
INHIBIN A SERPL-MCNC: 212 PG/ML
INTEGRATED SCN PATIENT-IMP: NORMAL
IVF PREGNANCY: NO
LMP START DATE: NORMAL
MONOCHORIONIC TWINS: NO
PATHOLOGY STUDY: NORMAL
PREV FETUS DEFECT: NO
SERVICE CMNT-IMP: NO
SPECIMEN DRAWN SERPL: NORMAL
U ESTRIOL MOM SERPL: 0.98
U ESTRIOL SERPL-MCNC: 1.25 NG/ML
VALPROIC/CARBAMAZEPINE STATUS: NO
WEIGHT UNITS: NORMAL

## 2021-04-16 ENCOUNTER — HOSPITAL ENCOUNTER (OUTPATIENT)
Dept: ULTRASOUND IMAGING | Facility: CLINIC | Age: 26
Discharge: HOME | End: 2021-04-16
Attending: OBSTETRICS & GYNECOLOGY | Admitting: OBSTETRICS & GYNECOLOGY
Payer: MEDICAID

## 2021-04-16 DIAGNOSIS — Z34.82 PRENATAL CARE, SUBSEQUENT PREGNANCY IN SECOND TRIMESTER: ICD-10-CM

## 2021-04-16 PROCEDURE — 76805 OB US >/= 14 WKS SNGL FETUS: CPT

## 2021-05-14 ENCOUNTER — PRENATAL OFFICE VISIT (OUTPATIENT)
Dept: OBGYN | Facility: CLINIC | Age: 26
End: 2021-05-14
Payer: MEDICAID

## 2021-05-14 VITALS
RESPIRATION RATE: 20 BRPM | HEIGHT: 60 IN | TEMPERATURE: 98 F | DIASTOLIC BLOOD PRESSURE: 62 MMHG | HEART RATE: 93 BPM | BODY MASS INDEX: 36.79 KG/M2 | WEIGHT: 187.4 LBS | SYSTOLIC BLOOD PRESSURE: 101 MMHG

## 2021-05-14 DIAGNOSIS — R12 HEARTBURN: ICD-10-CM

## 2021-05-14 DIAGNOSIS — O36.62X0 EXCESSIVE FETAL GROWTH AFFECTING MANAGEMENT OF PREGNANCY IN SECOND TRIMESTER, SINGLE OR UNSPECIFIED FETUS: ICD-10-CM

## 2021-05-14 DIAGNOSIS — Z34.82 PRENATAL CARE, SUBSEQUENT PREGNANCY IN SECOND TRIMESTER: Primary | ICD-10-CM

## 2021-05-14 PROCEDURE — 99212 OFFICE O/P EST SF 10 MIN: CPT | Performed by: OBSTETRICS & GYNECOLOGY

## 2021-05-14 ASSESSMENT — MIFFLIN-ST. JEOR: SCORE: 1516.54

## 2021-05-14 NOTE — PROGRESS NOTES
[facility]   OB/GYN Clinic     CC: F/U OB      Subjective:     Liat is a 25 year old  at 22w2d by Patient's last menstrual period was 2020. who presents for f/u OB visit. Light cramping, but no bleeding since last visit. +FM. Lots of heartburn.      Objective:  /62 (BP Location: Right arm, Cuff Size: Adult Large)   Pulse 93   Temp 98  F (36.7  C)   Resp 20   Ht 1.524 m (5')   Wt 85 kg (187 lb 6.4 oz)   LMP 2020   BMI 36.60 kg/m       Physical Exam:  Gen: Pleasant, talkative female in no apparent distress   Respiratory: breathing comfortably on room air   Cardiac: Regular rate, warm and well-perfused.   GI: Abd soft and non-tender  MSK: Grossly normal movement of all four extremities  Psych: mood and affect bright   Lower extremity: edema not present      See OB flowsheet     Assessment/Plan:   25 year old  at 22w2d by LMP of Patient's last menstrual period was 2020 c/w 8wk US who presents for F/U OB visit.   Normal labs  Normal QUAD   Tobacco use - working on cutting back, knows risks    Anatomy scan nl  Immunizations: s/p flu shot, Tdap at 28wks  S>D: plan serial growth   Instructions given to 3rd tri labs     Return to clinic in 4 weeks.      Kiesha Gibson MD  OB/GYN  2021

## 2021-05-24 ENCOUNTER — PRENATAL OFFICE VISIT (OUTPATIENT)
Dept: OBGYN | Facility: CLINIC | Age: 26
End: 2021-05-24
Payer: MEDICAID

## 2021-05-24 ENCOUNTER — TELEPHONE (OUTPATIENT)
Dept: OBGYN | Facility: CLINIC | Age: 26
End: 2021-05-24

## 2021-05-24 VITALS
HEART RATE: 81 BPM | DIASTOLIC BLOOD PRESSURE: 68 MMHG | SYSTOLIC BLOOD PRESSURE: 111 MMHG | BODY MASS INDEX: 36.87 KG/M2 | HEIGHT: 60 IN | WEIGHT: 187.8 LBS | TEMPERATURE: 98.5 F | RESPIRATION RATE: 18 BRPM

## 2021-05-24 DIAGNOSIS — Z34.82 PRENATAL CARE, SUBSEQUENT PREGNANCY IN SECOND TRIMESTER: Primary | ICD-10-CM

## 2021-05-24 PROCEDURE — 99212 OFFICE O/P EST SF 10 MIN: CPT | Performed by: OBSTETRICS & GYNECOLOGY

## 2021-05-24 ASSESSMENT — MIFFLIN-ST. JEOR: SCORE: 1518.36

## 2021-05-24 NOTE — TELEPHONE ENCOUNTER
S-(situation): increased bilateral swelling from the knees down    B-(background): Patient is 23w5d, and states that the swelling has increased over the last 2 weeks. Patient states that the last couple of days the swelling has increased since. Denies issues like this in with her last pregnancies. Works at [employer] and is on her feet for 8 to 10 hours per day.     A-(assessment): Reports that the her feet and calf have gotten tight and painful. Noticed red blotch's on this morning. States that she will having tightness under her belly which will release and start again. Unable to give measurable time frame of how long the feeling is lasting, but stated yesterday. These do not increase in severity and has not needed to take any OTC for the cramping.     Denies: shortness of breath, dizziness, blurred vision, headaches, bleeding, discharge, no weeping or open skin sores. Does not wear compression stockings.    R-(recommendations): Patient does have an appointment today at 3:45pm. Encouraged patient to elevate feet as much as she can until office visit today, hydrate, avoid salty foods. Monitor for any new symptoms. Instructed patient that if pain become severe, becomes short of breath, she needs to be seen sooner through the ER/Birth center. Talked with provider, and provider verbally agrees with plan.

## 2021-05-24 NOTE — NURSING NOTE
Initial /68 (BP Location: Right arm, Patient Position: Chair, Cuff Size: Adult Regular)   Pulse 81   Temp 98.5  F (36.9  C) (Tympanic)   Resp 18   Ht 1.524 m (5')   Wt 85.2 kg (187 lb 12.8 oz)   LMP 12/09/2020   Breastfeeding No   BMI 36.68 kg/m   Estimated body mass index is 36.68 kg/m  as calculated from the following:    Height as of this encounter: 1.524 m (5').    Weight as of this encounter: 85.2 kg (187 lb 12.8 oz). .    Liz Kendall, CMA

## 2021-05-24 NOTE — PROGRESS NOTES
[facility]   OB/GYN Clinic     CC: F/U OB      Subjective:     Liat is a 25 year old  at 23w5d by Patient's last menstrual period was 2020. who presents for f/u OB visit. She presents as an add-on visit due to very bothersome LE swelling up to her knees. Worsening over the past two weeks. Works at [employer] and her legs hurt after a day working on her feet. Reports red blotches on her skin.      Objective:  /68 (BP Location: Right arm, Patient Position: Chair, Cuff Size: Adult Regular)   Pulse 81   Temp 98.5  F (36.9  C) (Tympanic)   Resp 18   Ht 1.524 m (5')   Wt 85.2 kg (187 lb 12.8 oz)   LMP 2020   Breastfeeding No   BMI 36.68 kg/m       Physical Exam:  Gen: Pleasant, talkative female in no apparent distress   Respiratory: breathing comfortably on room air   Cardiac: Regular rate, warm and well-perfused.   GI: Abd soft and non-tender  MSK: Grossly normal movement of all four extremities  Psych: mood and affect bright   Lower extremity: trace edema, no skin changes appreciated     See OB flowsheet     Assessment/Plan:   25 year old  at 23w5d by LMP of Patient's last menstrual period was 2020 c/w 8wk US who presents for add-on F/U OB visit.   LE swelling: BP normal, trace b/l edema noted, no s/s of VTE, discussed comfort measures including compression stockings, hydration and elevation at night, encouraged frequent breaks at work   Normal labs  Normal QUAD   Tobacco use - working on cutting back, knows risks    Anatomy scan nl  Immunizations: s/p flu shot, Tdap at 28wks  S>D: plan serial growth   Instructions given to 3rd tri labs     Return to clinic in 3 weeks as schedule.      Kiesha Gibson MD  OB/GYN  2021

## 2021-05-24 NOTE — TELEPHONE ENCOUNTER
Reason for call:  Patient reporting a symptom    Symptom or request: Pt is 23 wk Pregnant. She woke up with swelling in her feet and calves. She says she can hardly walk. She says her arms and fingers swollen also. She is suppose to work at 3:00 today. She is constantly on her feet walking at work. She would like to talk to someone about how to get this under control. She is afraid she won't be able to work today.     Duration (how long have symptoms been present): She has had some swelling in the past but this is worse.   Phone Number patient can be reached at:  Home number on file [phone] (home)    Best Time:  She would like a call back ASAP    Can we leave a detailed message on this number:  YES    Call taken on 5/24/2021 at 10:48 AM by Suly Ervin

## 2021-05-25 NOTE — TELEPHONE ENCOUNTER
Hi Dr Gibson,  Pt had an abnormal pap on 2/5/21 and was notified at that time that your recommendation was for pt to have a colpo during pregnancy and post-partum. Pt is almost 24 weeks gestation now.     She has been sent reminders and we have attempted to reach her by phone and left a vm  for her  but she has not had the colpo done yet and has not scheduled it yet. Her next appt with you is 6/18/21 but it does not look like she has a colpo scheduled at that visit.           Thanks!  Yinka Roque, BSN, RN  Pap Tracking Nurse

## 2021-05-25 NOTE — TELEPHONE ENCOUNTER
Message from provider forwarded below.    Ok, I will ask Faustina to extend her 6/18 OB visit with me in [place] to include time for colposcopy.   Are you able to do that Faustina?   Thanks,   Kiesha

## 2021-05-25 NOTE — TELEPHONE ENCOUNTER
I moved appt up to 10:15 (was at 10:30) on 6-18-21 to allow for extra time for colpo.  LM to inform pt of appt time changed - she is unaware that a colpo will be needed at this appt at this time.  FYI to provider.    -Lory Luke  Clinic Station

## 2021-05-27 ENCOUNTER — HOSPITAL ENCOUNTER (OUTPATIENT)
Facility: CLINIC | Age: 26
Discharge: HOME | End: 2021-05-27
Attending: OBSTETRICS & GYNECOLOGY | Admitting: OBSTETRICS & GYNECOLOGY
Payer: MEDICAID

## 2021-05-27 ENCOUNTER — HOSPITAL ENCOUNTER (EMERGENCY)
Facility: CLINIC | Age: 26
End: 2021-05-27
Payer: MEDICAID

## 2021-05-27 ENCOUNTER — TELEPHONE (OUTPATIENT)
Dept: OBGYN | Facility: CLINIC | Age: 26
End: 2021-05-27

## 2021-05-27 ENCOUNTER — HOSPITAL ENCOUNTER (OUTPATIENT)
Facility: CLINIC | Age: 26
End: 2021-05-27
Admitting: OBSTETRICS & GYNECOLOGY
Payer: MEDICAID

## 2021-05-27 VITALS — TEMPERATURE: 97.8 F | SYSTOLIC BLOOD PRESSURE: 111 MMHG | RESPIRATION RATE: 18 BRPM | DIASTOLIC BLOOD PRESSURE: 63 MMHG

## 2021-05-27 PROBLEM — Z34.80 SUPERVISION OF OTHER NORMAL PREGNANCY: Status: ACTIVE | Noted: 2021-05-27

## 2021-05-27 LAB
ALBUMIN UR-MCNC: NEGATIVE MG/DL
APPEARANCE UR: ABNORMAL
BACTERIA #/AREA URNS HPF: ABNORMAL /HPF
BILIRUB UR QL STRIP: NEGATIVE
COLOR UR AUTO: YELLOW
GLUCOSE UR STRIP-MCNC: NEGATIVE MG/DL
HGB UR QL STRIP: ABNORMAL
KETONES UR STRIP-MCNC: NEGATIVE MG/DL
LEUKOCYTE ESTERASE UR QL STRIP: NEGATIVE
MUCOUS THREADS #/AREA URNS LPF: PRESENT /LPF
NITRATE UR QL: NEGATIVE
PH UR STRIP: 7 PH (ref 5–7)
RBC #/AREA URNS AUTO: <1 /HPF (ref 0–2)
SOURCE: ABNORMAL
SP GR UR STRIP: 1.02 (ref 1–1.03)
SQUAMOUS #/AREA URNS AUTO: 8 /HPF (ref 0–1)
UROBILINOGEN UR STRIP-MCNC: 0 MG/DL (ref 0–2)
WBC #/AREA URNS AUTO: 1 /HPF (ref 0–5)

## 2021-05-27 PROCEDURE — 81001 URINALYSIS AUTO W/SCOPE: CPT | Performed by: OBSTETRICS & GYNECOLOGY

## 2021-05-27 PROCEDURE — G0463 HOSPITAL OUTPT CLINIC VISIT: HCPCS

## 2021-05-27 RX ORDER — ONDANSETRON 2 MG/ML
4 INJECTION INTRAMUSCULAR; INTRAVENOUS EVERY 6 HOURS PRN
Status: DISCONTINUED | OUTPATIENT
Start: 2021-05-27 | End: 2021-05-27 | Stop reason: HOSPADM

## 2021-05-27 NOTE — TELEPHONE ENCOUNTER
Should get a UA to make sure no blood/kidney stones.   Could try some Vistaril to help relax the muscles (and treat anxiousness that accompanies her discomfort)   Vistaril 25-50mg po TID prn pain #30  RF x 2   Brenda Tran MD   [facility]    Message text    Call to patient to notify of above.  Patient reports she is already on her way to the ER.  Patient not tearful and sounds more calm and relaxed.  Patient reports she lives one hour away and could not wait.    Jennifer Brown   Ob/Gyn Clinic  RN

## 2021-05-27 NOTE — DISCHARGE INSTRUCTIONS
Discharge Instruction for Undelivered Patients      You were seen for:R/O Labor Assessment  We Consulted:Dr Tavarez  You had fetal monitoring, UA     Diet:   Drink 8 to 12 glasses of liquids (milk, juice, water) every day.     Activity:  Call your doctor or nurse midwife if your baby is moving less than usual.     Call your provider if you notice:  Swelling in your face or increased swelling in your hands or legs.  Headaches that are not relieved by Tylenol (acetaminophen).  Changes in your vision (blurring: seeing spots or stars.)  Nausea (sick to your stomach) and vomiting (throwing up).   Weight gain of 5 pounds or more per week.  Heartburn that doesn't go away.  Signs of bladder infection: pain when you urinate (use the toilet), need to go more often and more urgently.  The bag of hu (rupture of membranes) breaks, or you notice leaking in your underwear.  Bright red blood in your underwear.  Abdominal (lower belly) or stomach pain.  For first baby: Contractions (tightening) less than 5 minutes apart for one hour or more.  Second (plus) baby: Contractions (tightening) less than 10 minutes apart and getting stronger.  *If less than 34 weeks: Contractions (tightenings) more than 6 times in one hour.  Increase or change in vaginal discharge (note the color and amount)  Other:     Follow-up:  As scheduled in the clinic

## 2021-05-27 NOTE — PROGRESS NOTES
Patient states feeling bryant monsalve. No contractions on monitor or irritability, abd soft. Encouraged to drink fluid. Dr. Tavarez viewed UA, may discharge to home. Discharge instructions reviewed states understanding. Discharged to home with FOB ambulatory. Instructed to keep office appointments.

## 2021-05-27 NOTE — TELEPHONE ENCOUNTER
"Received call from patient on the phone, almost in tears, says she has been crying all night due to pain and cannot get comfortable.    S-(situation): Patient reports increased pelvic pressure last night from what she is normally accustomed to. Patient reports she saw Dr. Gibson on Monday and \" everything was fine.\"    Patient reports she went to lay down to see if things would improve and she awoke to \" sharp shooting stabbing pains.\" Patient reports these pains sometime start at the umbilicus and go outward and around towards her hips, sometimes \" the pain begins at my hips and wraps around me.\" Patient rates the pain at its worse is 8/10. The pain at its best is 5/10. Patient reports \" I have been up all night crying because the pain is so bad.\" Patient reports 45 minutes of sleep last night. Patient reports when the pain is 8/10, she gets dizzy and nauseated and has incidences of vomiting. Patient reports that the pain is random and comes and goes without a pattern.    Patient reports when she lays down on her back, the pain is worse. When patient stands the pain is worse. Patient reports sometimes she can \" sit just right\" and the pain is better but that position does not last long. Patient reports earlier in the week she had some swelling issues, now wearing TEDS and these seem to improve the swelling.    Patient reports normal bowel movement last night. Patient denies any fevers or diarrhea. Patient reports normal fetal movement. Patient denies any bright red bleeding or leaking of fluid. Patient does not feel like this pain is contractions. Patient reports she has not had any relief with Tylenol, rest or heat.    B-(background):  at 24w1d  Last office visit 2021  Next office visit 2021    S>D has serial US scheduled    A-(assessment): pelvic/abdominal pain, ? Muscular pain like round ligament pain    R-(recommendations): Reassurance provided. Comfort measures. Further evaluation may be " needed.     Will send to provider on call today to advise.    Thank you.    Jennifer Brown   Ob/Gyn Clinic  RN

## 2021-06-08 ENCOUNTER — HOSPITAL ENCOUNTER (OUTPATIENT)
Dept: ULTRASOUND IMAGING | Facility: CLINIC | Age: 26
Discharge: HOME | End: 2021-06-08
Attending: OBSTETRICS & GYNECOLOGY | Admitting: OBSTETRICS & GYNECOLOGY
Payer: COMMERCIAL

## 2021-06-08 DIAGNOSIS — O36.62X0 EXCESSIVE FETAL GROWTH AFFECTING MANAGEMENT OF PREGNANCY IN SECOND TRIMESTER, SINGLE OR UNSPECIFIED FETUS: ICD-10-CM

## 2021-06-08 PROCEDURE — 76816 OB US FOLLOW-UP PER FETUS: CPT

## 2021-06-25 ENCOUNTER — PRENATAL OFFICE VISIT (OUTPATIENT)
Dept: OBGYN | Facility: CLINIC | Age: 26
End: 2021-06-25
Payer: COMMERCIAL

## 2021-06-25 VITALS
SYSTOLIC BLOOD PRESSURE: 103 MMHG | HEART RATE: 101 BPM | RESPIRATION RATE: 16 BRPM | HEIGHT: 60 IN | DIASTOLIC BLOOD PRESSURE: 69 MMHG | WEIGHT: 190.8 LBS | TEMPERATURE: 97 F | BODY MASS INDEX: 37.46 KG/M2

## 2021-06-25 DIAGNOSIS — Z34.80 SUPERVISION OF OTHER NORMAL PREGNANCY: Primary | ICD-10-CM

## 2021-06-25 DIAGNOSIS — R87.612 PAPANICOLAOU SMEAR OF CERVIX WITH LOW GRADE SQUAMOUS INTRAEPITHELIAL LESION (LGSIL): ICD-10-CM

## 2021-06-25 PROBLEM — A74.9 CHLAMYDIA INFECTION AFFECTING PREGNANCY, ANTEPARTUM: Status: RESOLVED | Noted: 2018-09-18 | Resolved: 2021-06-25

## 2021-06-25 PROBLEM — O99.820 GBS (GROUP B STREPTOCOCCUS CARRIER), +RV CULTURE, CURRENTLY PREGNANT: Status: RESOLVED | Noted: 2019-04-04 | Resolved: 2021-06-25

## 2021-06-25 PROBLEM — O98.819 CHLAMYDIA INFECTION AFFECTING PREGNANCY, ANTEPARTUM: Status: RESOLVED | Noted: 2018-09-18 | Resolved: 2021-06-25

## 2021-06-25 PROCEDURE — 99207 PR PRENATAL VISIT: CPT | Performed by: OBSTETRICS & GYNECOLOGY

## 2021-06-25 PROCEDURE — 57452 EXAM OF CERVIX W/SCOPE: CPT | Performed by: OBSTETRICS & GYNECOLOGY

## 2021-06-25 ASSESSMENT — MIFFLIN-ST. JEOR: SCORE: 1526.96

## 2021-06-25 NOTE — PROGRESS NOTES
CC: Here for routine prenatal visit @ 28w2d   HPI: + FM, no ctx, no LOF, no VB.  No complaints.     PE: /69 (BP Location: Left arm, Cuff Size: Adult Regular)   Pulse 101   Temp 97  F (36.1  C)   Resp 16   Ht 1.524 m (5')   Wt 86.5 kg (190 lb 12.8 oz)   LMP 2020   BMI 37.26 kg/m     See OB flowsheet    O positive  GCT: needs to be scheduled    A/P  @ 28w2d normal pregnancy    1. Routine prenatal care.  COVID restrictions and recommendations reviewed including iron supplementation.  Tdap declined for now--information given to patient.   2. Needs colposcopy (lsil pap): see note below    RTC 4 weeks.      TATYANA Ferrera presents for colposcopy.    Pap hx:  17 NIL pap.  21 LSIL pap. Pt 9 weeks gestation. Plan colp during pregnancy and postpartum per provider.      PMH/PSH/Meds/Allergies reviewed & documented in [facility]  Procedure for colposcopy and biopsy has been explained to the   patient and consent obtained.    PROCEDURE:  Speculum placed in vagina and excellent visualization of cervix achieved, cervix swabbed x 3 with acetic acid solution.    FINDINGS:  Cervix: no visible lesions; SCJ visualized 360 degrees without lesions.    Vaginal inspection: normal without visible lesions.    Vulvar colposcopy: vulvar colposcopy not performed.    Procedure Summary: Patient tolerated procedure well and colposcopy adequate.    Colposcopic Impression: benign    Plan: repeat pap postpartum .    Lizz Delgado M.D.

## 2021-06-28 ENCOUNTER — TELEPHONE (OUTPATIENT)
Dept: OBGYN | Facility: CLINIC | Age: 26
End: 2021-06-28

## 2021-06-28 NOTE — TELEPHONE ENCOUNTER
----- Message from Lizz Delgado MD sent at 6/25/2021  4:39 PM CDT -----  Regarding: GCT  Patient has not done her GCT yet.  We did colpo at her last visit, but did not remind her to do her GCT.  Please remind and assist with scheduling her lab work    Thanks,  Lizz Delgado M.D.

## 2021-06-28 NOTE — TELEPHONE ENCOUNTER
Called and  Informed patient of the information. Gave pt phone number for [facility] to schedule a lab only appointment for GTT.   Liz Kendall CMA

## 2021-06-29 DIAGNOSIS — Z34.82 PRENATAL CARE, SUBSEQUENT PREGNANCY IN SECOND TRIMESTER: ICD-10-CM

## 2021-06-29 LAB
ERYTHROCYTE [DISTWIDTH] IN BLOOD BY AUTOMATED COUNT: 13.8 % (ref 10–15)
GLUCOSE 1H P 50 G GLC PO SERPL-MCNC: 104 MG/DL (ref 60–129)
HCT VFR BLD AUTO: 33.9 % (ref 35–47)
HGB BLD-MCNC: 10.7 G/DL (ref 11.7–15.7)
MCH RBC QN AUTO: 26.4 PG (ref 26.5–33)
MCHC RBC AUTO-ENTMCNC: 31.6 G/DL (ref 31.5–36.5)
MCV RBC AUTO: 84 FL (ref 78–100)
PLATELET # BLD AUTO: 320 10E9/L (ref 150–450)
RBC # BLD AUTO: 4.05 10E12/L (ref 3.8–5.2)
WBC # BLD AUTO: 12.3 10E9/L (ref 4–11)

## 2021-06-29 PROCEDURE — 86780 TREPONEMA PALLIDUM: CPT | Mod: 90 | Performed by: OBSTETRICS & GYNECOLOGY

## 2021-06-29 PROCEDURE — 99000 SPECIMEN HANDLING OFFICE-LAB: CPT | Performed by: OBSTETRICS & GYNECOLOGY

## 2021-06-29 PROCEDURE — 85027 COMPLETE CBC AUTOMATED: CPT | Performed by: OBSTETRICS & GYNECOLOGY

## 2021-06-29 PROCEDURE — 82950 GLUCOSE TEST: CPT | Performed by: OBSTETRICS & GYNECOLOGY

## 2021-06-29 PROCEDURE — 36415 COLL VENOUS BLD VENIPUNCTURE: CPT | Performed by: OBSTETRICS & GYNECOLOGY

## 2021-06-30 LAB — T PALLIDUM AB SER QL: NONREACTIVE

## 2021-07-01 ENCOUNTER — PATIENT OUTREACH (OUTPATIENT)
Dept: OBGYN | Facility: CLINIC | Age: 26
End: 2021-07-01

## 2021-07-01 NOTE — TELEPHONE ENCOUNTER
6/25/21 New Site visually normal; no biopsies taken. Plan cotest at pp visit. Estimated Date of Delivery: Sep 15, 2021

## 2021-07-05 ENCOUNTER — APPOINTMENT (OUTPATIENT)
Dept: OCCUPATIONAL MEDICINE | Facility: CLINIC | Age: 26
End: 2021-07-05

## 2021-07-05 PROCEDURE — 99000 SPECIMEN HANDLING OFFICE-LAB: CPT | Performed by: FAMILY MEDICINE

## 2021-07-05 PROCEDURE — 99499 UNLISTED E&M SERVICE: CPT | Performed by: FAMILY MEDICINE

## 2021-07-06 ENCOUNTER — HOSPITAL ENCOUNTER (OUTPATIENT)
Dept: ULTRASOUND IMAGING | Facility: CLINIC | Age: 26
Discharge: HOME | End: 2021-07-06
Attending: OBSTETRICS & GYNECOLOGY | Admitting: OBSTETRICS & GYNECOLOGY
Payer: COMMERCIAL

## 2021-07-06 DIAGNOSIS — O36.62X0 EXCESSIVE FETAL GROWTH AFFECTING MANAGEMENT OF PREGNANCY IN SECOND TRIMESTER, SINGLE OR UNSPECIFIED FETUS: ICD-10-CM

## 2021-07-06 PROCEDURE — 76816 OB US FOLLOW-UP PER FETUS: CPT

## 2021-07-08 ENCOUNTER — PRENATAL OFFICE VISIT (OUTPATIENT)
Dept: OBGYN | Facility: CLINIC | Age: 26
End: 2021-07-08
Payer: COMMERCIAL

## 2021-07-08 VITALS
BODY MASS INDEX: 38.17 KG/M2 | HEART RATE: 96 BPM | WEIGHT: 194.4 LBS | RESPIRATION RATE: 18 BRPM | DIASTOLIC BLOOD PRESSURE: 71 MMHG | SYSTOLIC BLOOD PRESSURE: 110 MMHG | TEMPERATURE: 97.6 F | HEIGHT: 60 IN

## 2021-07-08 DIAGNOSIS — Z34.80 SUPERVISION OF OTHER NORMAL PREGNANCY: Primary | ICD-10-CM

## 2021-07-08 PROCEDURE — 99207 PR PRENATAL VISIT: CPT | Performed by: OBSTETRICS & GYNECOLOGY

## 2021-07-08 ASSESSMENT — MIFFLIN-ST. JEOR: SCORE: 1543.29

## 2021-07-08 NOTE — NURSING NOTE
Initial /71 (BP Location: Left arm, Patient Position: Chair, Cuff Size: Adult Regular)   Pulse 96   Temp 97.6  F (36.4  C) (Tympanic)   Resp 18   Ht 1.524 m (5')   Wt 88.2 kg (194 lb 6.4 oz)   LMP 12/09/2020   Breastfeeding No   BMI 37.97 kg/m   Estimated body mass index is 37.97 kg/m  as calculated from the following:    Height as of this encounter: 1.524 m (5').    Weight as of this encounter: 88.2 kg (194 lb 6.4 oz). .    Liz Kendall, CMA

## 2021-07-08 NOTE — LETTER
July 8, 2021      RE: Liat Corcoran  [id number] [address] [place] [id number]       To whom it may concern:    Liat Corcoran was seen in our clinic today for her pregnancy.  She may work without restrictions.    Sincerely,      Lizz Delgado MD

## 2021-07-08 NOTE — PROGRESS NOTES
CC: Here for routine prenatal visit @ 30w1d   HPI: + FM, no ctx, no LOF, no VB.  Achy pelvis.     PE: /71 (BP Location: Left arm, Patient Position: Chair, Cuff Size: Adult Regular)   Pulse 96   Temp 97.6  F (36.4  C) (Tympanic)   Resp 18   Ht 1.524 m (5')   Wt 88.2 kg (194 lb 6.4 oz)   LMP 2020   Breastfeeding No   BMI 37.97 kg/m     See OB flowsheet    U/S: breech, EFW 33%ile    A/P  @ 30w1d normal pregnancy    1. Routine prenatal care.  COVID restrictions and recommendations reviewed including iron supplementation.  Reviewed aches/pains of pregnancy.      RTC 2 weeks.      Lizz Delgado M.D.

## 2021-07-22 ENCOUNTER — PRENATAL OFFICE VISIT (OUTPATIENT)
Dept: OBGYN | Facility: CLINIC | Age: 26
End: 2021-07-22
Payer: COMMERCIAL

## 2021-07-22 VITALS
BODY MASS INDEX: 38.58 KG/M2 | WEIGHT: 196.5 LBS | RESPIRATION RATE: 16 BRPM | HEIGHT: 60 IN | TEMPERATURE: 97.5 F | HEART RATE: 100 BPM | DIASTOLIC BLOOD PRESSURE: 75 MMHG | SYSTOLIC BLOOD PRESSURE: 112 MMHG

## 2021-07-22 DIAGNOSIS — Z34.80 SUPERVISION OF OTHER NORMAL PREGNANCY: Primary | ICD-10-CM

## 2021-07-22 PROCEDURE — 99207 PR PRENATAL VISIT: CPT | Performed by: OBSTETRICS & GYNECOLOGY

## 2021-07-22 ASSESSMENT — MIFFLIN-ST. JEOR: SCORE: 1552.82

## 2021-07-22 NOTE — PROGRESS NOTES
CC: Here for routine prenatal visit @ 32w1d   HPI: + FM, no ctx, no LOF, no VB.  No complaints.     PE: /75 (BP Location: Left arm, Cuff Size: Adult Regular)   Pulse 100   Temp 97.5  F (36.4  C)   Resp 16   Ht 1.524 m (5')   Wt 89.1 kg (196 lb 8 oz)   LMP 2020   BMI 38.38 kg/m     See OB flowsheet    A/P  @ 32w1d normal pregnancy    1. Routine prenatal care.  COVID restrictions and recommendations reviewed including iron supplementation.     RTC 2 weeks.      Lizz Delgado M.D.

## 2021-08-01 ENCOUNTER — HOSPITAL ENCOUNTER (OUTPATIENT)
Facility: CLINIC | Age: 26
Discharge: HOME | End: 2021-08-01
Attending: OBSTETRICS & GYNECOLOGY | Admitting: OBSTETRICS & GYNECOLOGY
Payer: COMMERCIAL

## 2021-08-01 ENCOUNTER — NURSE TRIAGE (OUTPATIENT)
Dept: NURSING | Facility: CLINIC | Age: 26
End: 2021-08-01

## 2021-08-01 VITALS
SYSTOLIC BLOOD PRESSURE: 114 MMHG | RESPIRATION RATE: 18 BRPM | TEMPERATURE: 97.8 F | DIASTOLIC BLOOD PRESSURE: 64 MMHG | HEART RATE: 88 BPM

## 2021-08-01 PROBLEM — Z36.89 ENCOUNTER FOR TRIAGE IN PREGNANT PATIENT: Status: ACTIVE | Noted: 2021-08-01

## 2021-08-01 LAB
ALBUMIN UR-MCNC: NEGATIVE MG/DL
APPEARANCE UR: ABNORMAL
BILIRUB UR QL STRIP: NEGATIVE
COLOR UR AUTO: YELLOW
FFN SPECIMEN INTEGRITY: NORMAL
FIBRONECTIN FETAL VAG QL: NEGATIVE
GLUCOSE UR STRIP-MCNC: NEGATIVE MG/DL
HGB UR QL STRIP: NEGATIVE
KETONES UR STRIP-MCNC: NEGATIVE MG/DL
LEUKOCYTE ESTERASE UR QL STRIP: NEGATIVE
MUCOUS THREADS #/AREA URNS LPF: PRESENT /LPF
NITRATE UR QL: NEGATIVE
PH UR STRIP: 7 [PH] (ref 5–7)
RBC URINE: 1 /HPF
SP GR UR STRIP: 1.02 (ref 1–1.03)
SQUAMOUS EPITHELIAL: 2 /HPF
UROBILINOGEN UR STRIP-MCNC: NORMAL MG/DL
WBC URINE: 1 /HPF

## 2021-08-01 PROCEDURE — 59025 FETAL NON-STRESS TEST: CPT

## 2021-08-01 PROCEDURE — 82731 ASSAY OF FETAL FIBRONECTIN: CPT | Performed by: OBSTETRICS & GYNECOLOGY

## 2021-08-01 PROCEDURE — G0463 HOSPITAL OUTPT CLINIC VISIT: HCPCS | Mod: 25

## 2021-08-01 PROCEDURE — 81001 URINALYSIS AUTO W/SCOPE: CPT | Performed by: OBSTETRICS & GYNECOLOGY

## 2021-08-01 PROCEDURE — 99213 OFFICE O/P EST LOW 20 MIN: CPT | Performed by: OBSTETRICS & GYNECOLOGY

## 2021-08-01 RX ORDER — LIDOCAINE 40 MG/G
CREAM TOPICAL
Status: DISCONTINUED | OUTPATIENT
Start: 2021-08-01 | End: 2021-08-01 | Stop reason: HOSPADM

## 2021-08-01 NOTE — DISCHARGE INSTRUCTIONS
"Discharge Instructions for Undelivered Patients  [facility] Phone # [phone]       Birth Prevention    Do these things to help prevent  birth:    Drink 8-10 glasses of liquid every day.    Prevent and treat constipation with high fiber foods and Metamucil if needed.    Empty your bladder frequently.    Decrease stress in your life.    Avoid strenuous activities if they produce contractions.    Stop smoking.    Do not prepare your nipples for breastfeeding by rolling or brisk touching.    Report signs of a bladder infection.    Check daily for contractions and warning signs.    Do not have sexual intercourse. Pelvic Rest    Check Twice Daily for Contractions (30 minutes each time):    Lie on your left side with a pillow behind your back for support.    Place your fingertips on your abdomen.    When your uterus feels tight and hard, then soft, that is a contraction.    Note the time at the start of one tightening to the start of the next tightening.    It is normal to have some contractions during pregnancy. If your feel more that five in an hour, it is too many.     Be Aware of Warning Signs:    Five or more uterine contractions in an hour.    Menstrual-like cramps for an hour.    Dull backache below the waistline for an hour.    Increased pelvic pressure for an hour that does not resolve with rest.    Abdominal cramping for an hour.    Change in vaginal discharge. If discharge is watery or bloody mucous, call your physician immediately!    \"Something just doesn't feel right\" or \"Something feels different\".    Do these things if any Warning Signs occur:    Empty your bladder.    Drink 3-4 glasses of water in half an hour.    Lie down on your left side for one hour, keeping your bladder empty.    Check for contractions. Write down the time that each one starts.    ** If warning signs do not go away in 1 hour, contact your physician.  "

## 2021-08-01 NOTE — TELEPHONE ENCOUNTER
"Pregnant 32 weeks and has strong urge to push with tightness and not sure if contractions or bryant monsalve.  Guidelines recommend 911, but patient said she is on her way to [place].    OB Triage Call      Is patient's OB/Midwife with the [facility] or [facility]? [facility]- Proceed with triage     Reason for call: Feels strong urge to push and is on her way in to [place] [facility]    Assessment: Pressure on pelvic floor and trying very hard not to push.  Having some contractions vs bryant.    Plan: \"I'm on my way to [place] [facility].\"      Patient plans to deliver at [facility]    Patient's primary OB Provider is Lizz Delgado MD.      Is patient's primary OB from [place]? No- Patient's primary OB provider is a Physician.  Labor and delivery at [facility] ([phone]) notified of patient's pending arrival.  Report given to [place] Tori.        33w4d    Estimated Date of Delivery: Sep 15, 2021        OB History    Para Term  AB Living   3 2 2 0 0 2   SAB TAB Ectopic Multiple Live Births   0 0 0 0 2      # Outcome Date GA Lbr Josiah/2nd Weight Sex Delivery Anes PTL Lv   3 Current            2 Term 19 39w1d 04:30 / 00:38 3.07 kg (6 lb 12.3 oz) F Vag-Vacuum EPI N FOSTER      Complications: GBS      Name: Callie      Apgar1: 9  Apgar5: 9   1 Term 10/20/16 40w6d 11:25 / 01:02 3.175 kg (7 lb) M Vag-Spont EPI N FOSTER      Name: omid      Apgar1: 6  Apgar5: 9       Lab Results   Component Value Date    GBS Positive (A) 2019          Carmel Gonzales RN 21 12:25 PM  [facility] Nurse Advisor    Reason for Disposition    Uncontrollable urge to push (i.e., feels like baby is coming out now)    Additional Information    Negative: Passed out (i.e., lost consciousness, collapsed and was not responding)    Negative: Shock suspected (e.g., cold/pale/clammy skin, too weak to stand, low BP, rapid pulse)    Negative: Difficult to awaken or acting confused (e.g., disoriented, slurred " "speech)    Negative: [1] SEVERE abdominal pain (e.g., excruciating) AND [2] constant AND [3] present > 1 hour    Negative: Severe bleeding (e.g., continuous red blood from vagina, or large blood clots)    Negative: Umbilical cord hanging out of the vagina (shiny, white, curled appearance, \"like telephone cord\")    Protocols used: PREGNANCY - LABOR - [id number]      "

## 2021-08-01 NOTE — PROGRESS NOTES
Feeling pelvic pressure and intermittent contractions.   No change in cervix and FFN negative.     FHT reactive, Tier 1  TOCO: none traced    33w4d pelvic pressure,  contractions    Reviewed with patient  labor precautions as well as pregnancy aches/pains.  Reviewed supportive therapies.  Reviewed work restrictions could be implemented depending on patient tolerance of discomfort, but that she should check with employer about the ability to accommodate.     Will discharge home with  labor precautions.      Lizz Delgado M.D.

## 2021-08-01 NOTE — PROGRESS NOTES
S:Patient presents due to  labor evaluation. Patient feeling contractions since last night and urge to push this morning  B:33w4d   Allergies: Amoxicillin and Penicillin g  A:A:, moderate variablility, + accels, no decels, Category I  Mild contractions palpated, patient breathing with them  SVE 1+/mid position, soft and effaced 30%/-3    Dr. TERELL Delgado in department and orders received.  Plan includes; Encourage po fluids, Monitor, observe and reevaluate and Labs . Reviewed with patient and she agrees with plan.        Prenatal Breastfeeding Education Toolkit provided for patient to review,helping her to make an informed decision on a feeding choice for her baby. Patient declined. Questions answered.    Oriented to room and call light.

## 2021-08-01 NOTE — PROGRESS NOTES
S: No cervical change, Ffn negative,Discharge from triage  A: A: moderate variablility, + accels, no decels, Category I  normal uterine activity, mild irregular contractions  Strip reviewed by Paz MOORE RN.  Cervix: 1 cm/ mid position/ effaced 30%/ -3 and soft  Admission on 2021   Component Date Value     Fetal Fibronectin 2021 Negative      FFN Specimen Integrity 2021 Satisfactory Specimen      Color Urine 2021 Yellow      Appearance Urine 2021 Slightly Cloudy*     Glucose Urine 2021 Negative      Bilirubin Urine 2021 Negative      Ketones Urine 2021 Negative      Specific Gravity Urine 2021 1.016      Blood Urine 2021 Negative      pH Urine 2021 7.0      Protein Albumin Urine 2021 Negative      Urobilinogen Urine 2021 Normal      Nitrite Urine 2021 Negative      Leukocyte Esterase Urine 2021 Negative      RBC Urine 2021 1      WBC Urine 2021 1      Squamous Epithelials Uri* 2021 2*     Mucus Urine 2021 Present*     Dr. TERELL Delgado informed of above and discharge order received.   R: Plan includes:  labor instuctions given Fetal kick count instructions given. Follow up clinic appointment: as scheduled. Patient instructed to report any recurrence of above concerns to her primary care provider during clinic hours or [facility] at any other time. Patient verbalized understanding of After Visit Summary, education and agreement with plan. Agrees to call for any problems, questions or concerns.  Discharged undelivered via wheelchair  in stable condition with all belongings. Accompanied by  .

## 2021-08-03 ENCOUNTER — HOSPITAL ENCOUNTER (OUTPATIENT)
Dept: ULTRASOUND IMAGING | Facility: CLINIC | Age: 26
Discharge: HOME | End: 2021-08-03
Attending: OBSTETRICS & GYNECOLOGY | Admitting: OBSTETRICS & GYNECOLOGY
Payer: COMMERCIAL

## 2021-08-03 DIAGNOSIS — O36.62X0 EXCESSIVE FETAL GROWTH AFFECTING MANAGEMENT OF PREGNANCY IN SECOND TRIMESTER, SINGLE OR UNSPECIFIED FETUS: ICD-10-CM

## 2021-08-03 PROCEDURE — 76816 OB US FOLLOW-UP PER FETUS: CPT

## 2021-08-05 ENCOUNTER — PRENATAL OFFICE VISIT (OUTPATIENT)
Dept: OBGYN | Facility: CLINIC | Age: 26
End: 2021-08-05
Payer: COMMERCIAL

## 2021-08-05 VITALS
SYSTOLIC BLOOD PRESSURE: 100 MMHG | BODY MASS INDEX: 39.01 KG/M2 | WEIGHT: 198.7 LBS | HEIGHT: 60 IN | DIASTOLIC BLOOD PRESSURE: 60 MMHG

## 2021-08-05 DIAGNOSIS — Z34.83 ENCOUNTER FOR SUPERVISION OF OTHER NORMAL PREGNANCY IN THIRD TRIMESTER: Primary | ICD-10-CM

## 2021-08-05 LAB
FFN SPECIMEN INTEGRITY: NORMAL
FIBRONECTIN FETAL VAG QL: NEGATIVE

## 2021-08-05 PROCEDURE — 99207 PR PRENATAL VISIT: CPT | Performed by: OBSTETRICS & GYNECOLOGY

## 2021-08-05 PROCEDURE — 96372 THER/PROPH/DIAG INJ SC/IM: CPT | Performed by: OBSTETRICS & GYNECOLOGY

## 2021-08-05 PROCEDURE — 82731 ASSAY OF FETAL FIBRONECTIN: CPT | Performed by: OBSTETRICS & GYNECOLOGY

## 2021-08-05 RX ORDER — BETAMETHASONE SODIUM PHOSPHATE AND BETAMETHASONE ACETATE 3; 3 MG/ML; MG/ML
12 INJECTION, SUSPENSION INTRA-ARTICULAR; INTRALESIONAL; INTRAMUSCULAR; SOFT TISSUE ONCE
Status: CANCELLED | OUTPATIENT
Start: 2021-08-05 | End: 2021-08-05

## 2021-08-05 RX ORDER — BETAMETHASONE SODIUM PHOSPHATE AND BETAMETHASONE ACETATE 3; 3 MG/ML; MG/ML
12 INJECTION, SUSPENSION INTRA-ARTICULAR; INTRALESIONAL; INTRAMUSCULAR; SOFT TISSUE EVERY 24 HOURS
Status: COMPLETED | OUTPATIENT
Start: 2021-08-05 | End: 2021-08-06

## 2021-08-05 RX ADMIN — BETAMETHASONE SODIUM PHOSPHATE AND BETAMETHASONE ACETATE 12 MG: 3; 3 INJECTION, SUSPENSION INTRA-ARTICULAR; INTRALESIONAL; INTRAMUSCULAR; SOFT TISSUE at 10:54

## 2021-08-05 ASSESSMENT — MIFFLIN-ST. JEOR: SCORE: 1562.8

## 2021-08-05 NOTE — PROGRESS NOTES
She is  34 weeks 1 day gestation based on an 8-week 2-day ultrasound which agreed with her last menstrual period dating  Concerns: This is the second visit for  contractions.  She was seen on  () with irregular contractions.  She underwent a evaluation including a negative fetal fibronectin.  Her cervix was not dilated.  She was sent home from work on Monday.  She has been home since that time.  She had a schedule appointment today.  She started marisol 4 AM that woke her from sleep.  She has had some increased mucus discharge no watery discharge no bleeding.  He has good fetal movement.  She has never had a  labor with 2 term deliveries.    No vaginal bleeding, LOF.  Cervix is 1 cm dilated, 40% effaced -2 station with the fetal head ballotable.  Cephalic position confirmed by Leopold maneuvers and cervical exam.  Reportable signs and symptoms discussed.  Discussed kick counts and fetal movement.  Discussed PTL, PROM, and when to call or come in.  (Z34.83) Encounter for supervision of other normal pregnancy in third trimester  (primary encounter diagnosis)  Comment:   Plan: Fetal fibronectin            (P07.37)  , gestational age 34 completed weeks  Comment: Fetus currently ballotable  In light of her 2 visits for evaluation here plus inability to work due to contractions and her prematurity status, we discussed the risks and benefits of betamethasone therapy.  I recommended that she have betamethasone today to follow-up tomorrow and to see us again in 1 week if possible.  Plan: betamethasone acet & sod phos (CELESTONE)         injection 12 mg 2 doses over 24 hours recommended          FFN negative    RTC 1 day for second dose of betamethasone and then 1 week.    Vlad Tavarez MD

## 2021-08-06 ENCOUNTER — ALLIED HEALTH/NURSE VISIT (OUTPATIENT)
Dept: OBGYN | Facility: CLINIC | Age: 26
End: 2021-08-06
Payer: COMMERCIAL

## 2021-08-06 VITALS
WEIGHT: 198 LBS | HEART RATE: 110 BPM | DIASTOLIC BLOOD PRESSURE: 78 MMHG | SYSTOLIC BLOOD PRESSURE: 114 MMHG | BODY MASS INDEX: 38.67 KG/M2 | TEMPERATURE: 97.3 F

## 2021-08-06 DIAGNOSIS — N76.0 BV (BACTERIAL VAGINOSIS): ICD-10-CM

## 2021-08-06 DIAGNOSIS — B96.89 BV (BACTERIAL VAGINOSIS): ICD-10-CM

## 2021-08-06 DIAGNOSIS — Z34.80 SUPERVISION OF OTHER NORMAL PREGNANCY: Primary | ICD-10-CM

## 2021-08-06 LAB
ALBUMIN UR-MCNC: NEGATIVE MG/DL
APPEARANCE UR: CLEAR
BACTERIA #/AREA URNS HPF: ABNORMAL /HPF
BILIRUB UR QL STRIP: NEGATIVE
CLUE CELLS: PRESENT
COLOR UR AUTO: YELLOW
GLUCOSE UR STRIP-MCNC: 100 MG/DL
HGB UR QL STRIP: ABNORMAL
KETONES UR STRIP-MCNC: NEGATIVE MG/DL
LEUKOCYTE ESTERASE UR QL STRIP: ABNORMAL
NITRATE UR QL: NEGATIVE
PH UR STRIP: 6 [PH] (ref 5–7)
RBC #/AREA URNS AUTO: ABNORMAL /HPF
RUPTURE OF FETAL MEMBRANES BY ROM PLUS: NEGATIVE
SP GR UR STRIP: 1.01 (ref 1–1.03)
SQUAMOUS #/AREA URNS AUTO: ABNORMAL /LPF
TRICHOMONAS, WET PREP: ABNORMAL
UROBILINOGEN UR STRIP-ACNC: 0.2 E.U./DL
WBC #/AREA URNS AUTO: ABNORMAL /HPF
WBC'S/HIGH POWER FIELD, WET PREP: ABNORMAL
YEAST, WET PREP: ABNORMAL

## 2021-08-06 PROCEDURE — 81001 URINALYSIS AUTO W/SCOPE: CPT | Performed by: OBSTETRICS & GYNECOLOGY

## 2021-08-06 PROCEDURE — 87086 URINE CULTURE/COLONY COUNT: CPT | Performed by: OBSTETRICS & GYNECOLOGY

## 2021-08-06 PROCEDURE — 96372 THER/PROPH/DIAG INJ SC/IM: CPT | Performed by: OBSTETRICS & GYNECOLOGY

## 2021-08-06 PROCEDURE — 99207 PR PRENATAL VISIT: CPT | Performed by: OBSTETRICS & GYNECOLOGY

## 2021-08-06 PROCEDURE — 87210 SMEAR WET MOUNT SALINE/INK: CPT | Performed by: OBSTETRICS & GYNECOLOGY

## 2021-08-06 PROCEDURE — 84112 EVAL AMNIOTIC FLUID PROTEIN: CPT | Performed by: OBSTETRICS & GYNECOLOGY

## 2021-08-06 RX ORDER — METRONIDAZOLE 500 MG/1
500 TABLET ORAL 2 TIMES DAILY
Qty: 14 TABLET | Refills: 0 | Status: SHIPPED | OUTPATIENT
Start: 2021-08-06 | End: 2021-08-13

## 2021-08-06 RX ADMIN — BETAMETHASONE SODIUM PHOSPHATE AND BETAMETHASONE ACETATE 12 MG: 3; 3 INJECTION, SUSPENSION INTRA-ARTICULAR; INTRALESIONAL; INTRAMUSCULAR; SOFT TISSUE at 10:27

## 2021-08-06 NOTE — PROGRESS NOTES
CC: Here for routine prenatal visit @ 34w2d   HPI: + FM,  Here for second dose of BMZ, ?LOF, continued contractions     PE: /78 (BP Location: Right arm, Patient Position: Chair, Cuff Size: Adult Large)   Pulse 110   Temp 97.3  F (36.3  C) (Tympanic)   Wt 89.8 kg (198 lb)   LMP 2020   Breastfeeding No   BMI 38.67 kg/m     Doptones 140s  SVE 1/50/-2     A/P  @ 34w2d    1. Routine prenatal care.  COVID restrictions and recommendations reviewed including iron supplementation.   2. S/p BMZ yesterday for ctx, SVE unchanged today, no evidence of rupture on speculum exam, ROM+neg, wet prep with clue cells, will treat for BV. Indications for return discussed.       RTC 1 weeks.      Jeannie Giraldo MD   2021 ,12:00 PM

## 2021-08-06 NOTE — PROGRESS NOTES
Clinic Administered Medication Documentation    Administrations This Visit     betamethasone acet & sod phos (CELESTONE) injection 12 mg     Admin Date  08/06/2021 Action  Given Dose  12 mg Route  Intramuscular Site  Right Gluteus Juan Alberto Administered By  Regi Naidu CMA    Ordering Provider: Vlad Tavarez MD    NDC: 6769-0505-29    Lot#: [id number]    : [employer]    Patient Supplied?: No                  Injectable Medication Documentation    Patient was given betamethasone. Prior to medication administration, verified patients identity using patient s name and date of birth. Please see MAR and medication order for additional information. Patient instructed to remain in clinic for 15 minutes.      Was entire vial of medication used? Yes  Vial/Syringe: Single dose vial  Expiration Date:  7/1/2022  Was this medication supplied by the patient? No

## 2021-08-06 NOTE — NURSING NOTE
Initial /78 (BP Location: Right arm, Patient Position: Chair, Cuff Size: Adult Large)   Pulse 110   Temp 97.3  F (36.3  C) (Tympanic)   Wt 89.8 kg (198 lb)   LMP 12/09/2020   Breastfeeding No   BMI 38.67 kg/m   Estimated body mass index is 38.67 kg/m  as calculated from the following:    Height as of 8/5/21: 1.524 m (5').    Weight as of this encounter: 89.8 kg (198 lb). .    Chante Chaudhry MA

## 2021-08-07 ENCOUNTER — HOSPITAL ENCOUNTER (OUTPATIENT)
Facility: CLINIC | Age: 26
Discharge: HOME | End: 2021-08-07
Attending: OBSTETRICS & GYNECOLOGY | Admitting: OBSTETRICS & GYNECOLOGY
Payer: COMMERCIAL

## 2021-08-07 VITALS
TEMPERATURE: 98.3 F | RESPIRATION RATE: 18 BRPM | HEART RATE: 96 BPM | SYSTOLIC BLOOD PRESSURE: 119 MMHG | DIASTOLIC BLOOD PRESSURE: 70 MMHG

## 2021-08-07 PROBLEM — O36.8190 DECREASED FETAL MOVEMENT: Status: ACTIVE | Noted: 2021-08-07

## 2021-08-07 LAB — BACTERIA UR CULT: NORMAL

## 2021-08-07 PROCEDURE — G0463 HOSPITAL OUTPT CLINIC VISIT: HCPCS

## 2021-08-07 PROCEDURE — 59025 FETAL NON-STRESS TEST: CPT

## 2021-08-07 RX ORDER — METOCLOPRAMIDE HYDROCHLORIDE 5 MG/ML
10 INJECTION INTRAMUSCULAR; INTRAVENOUS EVERY 6 HOURS PRN
Status: DISCONTINUED | OUTPATIENT
Start: 2021-08-07 | End: 2021-08-07 | Stop reason: HOSPADM

## 2021-08-07 RX ORDER — METOCLOPRAMIDE 10 MG/1
10 TABLET ORAL EVERY 6 HOURS PRN
Status: DISCONTINUED | OUTPATIENT
Start: 2021-08-07 | End: 2021-08-07 | Stop reason: HOSPADM

## 2021-08-07 RX ORDER — ONDANSETRON 4 MG/1
4 TABLET, ORALLY DISINTEGRATING ORAL EVERY 6 HOURS PRN
Status: DISCONTINUED | OUTPATIENT
Start: 2021-08-07 | End: 2021-08-07 | Stop reason: HOSPADM

## 2021-08-07 RX ORDER — PROCHLORPERAZINE MALEATE 10 MG
10 TABLET ORAL EVERY 6 HOURS PRN
Status: DISCONTINUED | OUTPATIENT
Start: 2021-08-07 | End: 2021-08-07 | Stop reason: HOSPADM

## 2021-08-07 RX ORDER — ONDANSETRON 2 MG/ML
4 INJECTION INTRAMUSCULAR; INTRAVENOUS EVERY 6 HOURS PRN
Status: DISCONTINUED | OUTPATIENT
Start: 2021-08-07 | End: 2021-08-07 | Stop reason: HOSPADM

## 2021-08-07 RX ORDER — PROCHLORPERAZINE 25 MG
25 SUPPOSITORY, RECTAL RECTAL EVERY 12 HOURS PRN
Status: DISCONTINUED | OUTPATIENT
Start: 2021-08-07 | End: 2021-08-07 | Stop reason: HOSPADM

## 2021-08-08 NOTE — PROGRESS NOTES
S: Discharge from triage  A: A:moderate variablility, + accels, no decels, Category I  normal uterine activity  Strip reviewed by Nancy HERNANDEZ.  not examined  No visits with results within 1 Day(s) from this visit.   Latest known visit with results is:   [facility]/Nurse Visit on 2021   Component Date Value     Color Urine 2021 Yellow      Appearance Urine 2021 Clear      Glucose Urine 2021 100 *     Bilirubin Urine 2021 Negative      Ketones Urine 2021 Negative      Specific Gravity Urine 2021 1.010      Blood Urine 2021 Trace*     pH Urine 2021 6.0      Protein Albumin Urine 2021 Negative      Urobilinogen Urine 2021 0.2      Nitrite Urine 2021 Negative      Leukocyte Esterase Urine 2021 Trace*     Culture 2021 10,000-50,000 CFU/mL Mixture of urogenital loretta      Rupture of Fetal Membran* 2021 Negative      Bacteria Urine 2021 Few*     RBC Urine 2021 0-2      WBC Urine 2021 0-5      Squamous Epithelials Uri* 2021 Few*     Trichomonas 2021 Absent      Yeast 2021 Absent      Clue Cells 2021 Present*     WBCs/high power field 2021 2+*     Dr. HILDA Gibson informed of above and discharge order received.   R: Plan includes:  labor instuctions given Fetal kick count instructions given. Patient instructed to report any recurrence of above concerns to her primary care provider during clinic hours or [facility] at any other time. Patient verbalized understanding of After Visit Summary, education and agreement with plan. Agrees to call for any problems, questions or concerns.  Discharged undelivered via ambulatory  in stable condition with all belongings. Accompanied by NST and SO .    Marce Simpson RN 2021 7:59 PM

## 2021-08-08 NOTE — DISCHARGE INSTRUCTIONS
Discharge Instructions for Undelivered Patients  [facility] [phone]    Diet:    Drink 8 to 12 glasses of water every day.    You may eat meals and snacks as before    Activity:    If you are experiencing  labor, rest the pelvic area. No sex. Do not stimulate breasts or nipples.    Rest often as needed.    Count fetal kicks every day. (See handout.)    Call your doctor if your baby is moving less than usual.    Medicines:    My care team has reviewed my medicines with me.    My care team has given me a list of my medicines.    My care team has prescribed a new medicine. They have either sent it home with me or ordered it from the pharmacy.    Call your provider if you notice:    Swelling in your face or increased swelling in your hands or legs.    Headaches that are not relieved by Tylenol (acetaminophen).    Changes in your vision (blurring; seeing spots or stars).    Nausea (sick to your stomach) and vomiting (throwing up).    Weight gain of 5 pounds per week.    Heartburn that doesn't go away.    Signs of bladder infection: pain when you urinate (use the toilet), needing to go more often or more urgently.    The bag of hu (membranes) breaks, or you notice leaking in your underwear.    Bright red blood in your underwear.    Abdominal (lower belly) or stomach pain.    For Second (plus) baby: Contractions (tightenings) less than 10 minutes apart and getting stronger.    Increase or change in vaginal discharge (note the color and amount).  Follow up with your provider as scheduled.

## 2021-08-08 NOTE — PROGRESS NOTES
[facility] at 1900. Pt states that she hasn't felt the baby move like normal since last evening. Pt called at 1242 today. Pt needed to wait for someone to watch her kids. T's cat 1. Will monitor for a reactive NST. Report given to Marce HERNANDEZ.

## 2021-08-08 NOTE — PROGRESS NOTES
Dr Gibson updated on pt status. FHT category 1. Per Dr Gibson pt can discharge home.     Marce Simpson RN 8/7/2021 7:43 PM

## 2021-08-13 ENCOUNTER — PRENATAL OFFICE VISIT (OUTPATIENT)
Dept: OBGYN | Facility: CLINIC | Age: 26
End: 2021-08-13
Payer: COMMERCIAL

## 2021-08-13 VITALS
HEART RATE: 104 BPM | BODY MASS INDEX: 38.68 KG/M2 | DIASTOLIC BLOOD PRESSURE: 70 MMHG | RESPIRATION RATE: 18 BRPM | SYSTOLIC BLOOD PRESSURE: 109 MMHG | HEIGHT: 60 IN | WEIGHT: 197 LBS | TEMPERATURE: 98.8 F

## 2021-08-13 DIAGNOSIS — Z34.83 ENCOUNTER FOR SUPERVISION OF OTHER NORMAL PREGNANCY IN THIRD TRIMESTER: Primary | ICD-10-CM

## 2021-08-13 PROCEDURE — 87081 CULTURE SCREEN ONLY: CPT | Performed by: OBSTETRICS & GYNECOLOGY

## 2021-08-13 PROCEDURE — 87186 SC STD MICRODIL/AGAR DIL: CPT | Performed by: OBSTETRICS & GYNECOLOGY

## 2021-08-13 PROCEDURE — 87077 CULTURE AEROBIC IDENTIFY: CPT | Performed by: OBSTETRICS & GYNECOLOGY

## 2021-08-13 PROCEDURE — 87653 STREP B DNA AMP PROBE: CPT | Performed by: OBSTETRICS & GYNECOLOGY

## 2021-08-13 PROCEDURE — 99207 PR PRENATAL VISIT: CPT | Performed by: OBSTETRICS & GYNECOLOGY

## 2021-08-13 ASSESSMENT — MIFFLIN-ST. JEOR: SCORE: 1555.09

## 2021-08-13 NOTE — PROGRESS NOTES
CC: Here for routine prenatal visit @ 35w2d   HPI:  Continues to have a lot of contractions; denies LOF or bleeding; GBS taken    PE: /70 (BP Location: Right arm, Patient Position: Chair, Cuff Size: Adult Large)   Pulse 104   Temp 98.8  F (37.1  C) (Tympanic)   Resp 18   Ht 1.524 m (5')   Wt 89.4 kg (197 lb)   LMP 12/09/2020   Breastfeeding No   BMI 38.47 kg/m     See OB flowsheet      A:  1. Encounter for supervision of other normal pregnancy in third trimester    - Group B strep PCR    Labor precautions reviewed  Routine prenatal care  RTC 1 weeks.      Brenda Tran M.D.

## 2021-08-13 NOTE — NURSING NOTE
Initial /70 (BP Location: Right arm, Patient Position: Chair, Cuff Size: Adult Large)   Pulse 104   Temp 98.8  F (37.1  C) (Tympanic)   Resp 18   Ht 1.524 m (5')   Wt 89.4 kg (197 lb)   LMP 12/09/2020   Breastfeeding No   BMI 38.47 kg/m   Estimated body mass index is 38.47 kg/m  as calculated from the following:    Height as of this encounter: 1.524 m (5').    Weight as of this encounter: 89.4 kg (197 lb). .

## 2021-08-14 LAB
GP B STREP DNA SPEC QL NAA+PROBE: POSITIVE
PATIENT PENICILLIN, AMOXICILLIN, CEPHALOSPORINS ALLERGY: YES

## 2021-08-15 ENCOUNTER — HOSPITAL ENCOUNTER (EMERGENCY)
Facility: CLINIC | Age: 26
Discharge: HOME | End: 2021-08-15
Attending: EMERGENCY MEDICINE | Admitting: EMERGENCY MEDICINE
Payer: COMMERCIAL

## 2021-08-15 VITALS
SYSTOLIC BLOOD PRESSURE: 112 MMHG | TEMPERATURE: 98.1 F | HEART RATE: 100 BPM | DIASTOLIC BLOOD PRESSURE: 75 MMHG | OXYGEN SATURATION: 96 % | RESPIRATION RATE: 18 BRPM

## 2021-08-15 DIAGNOSIS — T78.40XA ALLERGIC REACTION, INITIAL ENCOUNTER: ICD-10-CM

## 2021-08-15 PROCEDURE — 99283 EMERGENCY DEPT VISIT LOW MDM: CPT | Performed by: EMERGENCY MEDICINE

## 2021-08-15 PROCEDURE — 250N000009 HC RX 250: Performed by: EMERGENCY MEDICINE

## 2021-08-15 PROCEDURE — 99284 EMERGENCY DEPT VISIT MOD MDM: CPT | Performed by: EMERGENCY MEDICINE

## 2021-08-15 PROCEDURE — 94640 AIRWAY INHALATION TREATMENT: CPT | Performed by: EMERGENCY MEDICINE

## 2021-08-15 PROCEDURE — 250N000013 HC RX MED GY IP 250 OP 250 PS 637: Performed by: EMERGENCY MEDICINE

## 2021-08-15 RX ORDER — DIPHENHYDRAMINE HCL 25 MG
25 CAPSULE ORAL ONCE
Status: COMPLETED | OUTPATIENT
Start: 2021-08-15 | End: 2021-08-15

## 2021-08-15 RX ORDER — IPRATROPIUM BROMIDE AND ALBUTEROL SULFATE 2.5; .5 MG/3ML; MG/3ML
3 SOLUTION RESPIRATORY (INHALATION) ONCE
Status: COMPLETED | OUTPATIENT
Start: 2021-08-15 | End: 2021-08-15

## 2021-08-15 RX ORDER — ALBUTEROL SULFATE 90 UG/1
1-2 AEROSOL, METERED RESPIRATORY (INHALATION) EVERY 6 HOURS
Qty: 18 G | Refills: 0 | Status: SHIPPED | OUTPATIENT
Start: 2021-08-15 | End: 2021-11-23

## 2021-08-15 RX ORDER — EPINEPHRINE 0.3 MG/.3ML
0.3 INJECTION SUBCUTANEOUS
Qty: 1 EACH | Refills: 0 | Status: SHIPPED | OUTPATIENT
Start: 2021-08-15 | End: 2021-11-23

## 2021-08-15 RX ADMIN — IPRATROPIUM BROMIDE AND ALBUTEROL SULFATE 3 ML: .5; 3 SOLUTION RESPIRATORY (INHALATION) at 18:35

## 2021-08-15 RX ADMIN — DIPHENHYDRAMINE HYDROCHLORIDE 25 MG: 25 CAPSULE ORAL at 18:35
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
## 2021-08-15 NOTE — ED TRIAGE NOTES
S/P bee sting to right thigh.  Patient became short of breath and light headed and had a syncopal episode after bite.  EMS assessed and recc going to ED.

## 2021-08-16 NOTE — DISCHARGE INSTRUCTIONS
Use benadryl and albuterol as needed.  Epipen was sent to [facility] in [place]  Return to er if new or worsening symptoms  I hope you get better quickly

## 2021-08-17 LAB — BACTERIA SPEC CULT: ABNORMAL

## 2021-08-19 ENCOUNTER — PRENATAL OFFICE VISIT (OUTPATIENT)
Dept: OBGYN | Facility: CLINIC | Age: 26
End: 2021-08-19
Payer: COMMERCIAL

## 2021-08-19 VITALS
TEMPERATURE: 98.4 F | WEIGHT: 197.9 LBS | DIASTOLIC BLOOD PRESSURE: 64 MMHG | HEART RATE: 90 BPM | HEIGHT: 60 IN | RESPIRATION RATE: 18 BRPM | SYSTOLIC BLOOD PRESSURE: 93 MMHG | BODY MASS INDEX: 38.85 KG/M2

## 2021-08-19 DIAGNOSIS — Z34.83 ENCOUNTER FOR SUPERVISION OF OTHER NORMAL PREGNANCY IN THIRD TRIMESTER: Primary | ICD-10-CM

## 2021-08-19 PROCEDURE — 99207 PR PRENATAL VISIT: CPT | Performed by: OBSTETRICS & GYNECOLOGY

## 2021-08-19 RX ORDER — TRANEXAMIC ACID 10 MG/ML
1 INJECTION, SOLUTION INTRAVENOUS EVERY 30 MIN PRN
Status: CANCELLED | OUTPATIENT
Start: 2021-08-19

## 2021-08-19 RX ORDER — KETOROLAC TROMETHAMINE 30 MG/ML
30 INJECTION, SOLUTION INTRAMUSCULAR; INTRAVENOUS
Status: CANCELLED | OUTPATIENT
Start: 2021-08-19 | End: 2021-08-24

## 2021-08-19 RX ORDER — NALOXONE HYDROCHLORIDE 0.4 MG/ML
0.2 INJECTION, SOLUTION INTRAMUSCULAR; INTRAVENOUS; SUBCUTANEOUS
Status: CANCELLED | OUTPATIENT
Start: 2021-08-19

## 2021-08-19 RX ORDER — OXYTOCIN/0.9 % SODIUM CHLORIDE 30/500 ML
100-340 PLASTIC BAG, INJECTION (ML) INTRAVENOUS CONTINUOUS PRN
Status: CANCELLED | OUTPATIENT
Start: 2021-08-19

## 2021-08-19 RX ORDER — MISOPROSTOL 200 UG/1
800 TABLET ORAL
Status: CANCELLED | OUTPATIENT
Start: 2021-08-19

## 2021-08-19 RX ORDER — PROCHLORPERAZINE 25 MG
25 SUPPOSITORY, RECTAL RECTAL EVERY 12 HOURS PRN
Status: CANCELLED | OUTPATIENT
Start: 2021-08-19

## 2021-08-19 RX ORDER — NALOXONE HYDROCHLORIDE 0.4 MG/ML
0.4 INJECTION, SOLUTION INTRAMUSCULAR; INTRAVENOUS; SUBCUTANEOUS
Status: CANCELLED | OUTPATIENT
Start: 2021-08-19

## 2021-08-19 RX ORDER — OXYTOCIN 10 [USP'U]/ML
10 INJECTION, SOLUTION INTRAMUSCULAR; INTRAVENOUS
Status: CANCELLED | OUTPATIENT
Start: 2021-08-19

## 2021-08-19 RX ORDER — ONDANSETRON 2 MG/ML
4 INJECTION INTRAMUSCULAR; INTRAVENOUS EVERY 6 HOURS PRN
Status: CANCELLED | OUTPATIENT
Start: 2021-08-19

## 2021-08-19 RX ORDER — OXYTOCIN/0.9 % SODIUM CHLORIDE 30/500 ML
340 PLASTIC BAG, INJECTION (ML) INTRAVENOUS CONTINUOUS PRN
Status: CANCELLED | OUTPATIENT
Start: 2021-08-19

## 2021-08-19 RX ORDER — PROCHLORPERAZINE MALEATE 10 MG
10 TABLET ORAL EVERY 6 HOURS PRN
Status: CANCELLED | OUTPATIENT
Start: 2021-08-19

## 2021-08-19 RX ORDER — ONDANSETRON 4 MG/1
4 TABLET, ORALLY DISINTEGRATING ORAL EVERY 6 HOURS PRN
Status: CANCELLED | OUTPATIENT
Start: 2021-08-19

## 2021-08-19 RX ORDER — MISOPROSTOL 200 UG/1
400 TABLET ORAL
Status: CANCELLED | OUTPATIENT
Start: 2021-08-19

## 2021-08-19 RX ORDER — METOCLOPRAMIDE HYDROCHLORIDE 5 MG/ML
10 INJECTION INTRAMUSCULAR; INTRAVENOUS EVERY 6 HOURS PRN
Status: CANCELLED | OUTPATIENT
Start: 2021-08-19

## 2021-08-19 RX ORDER — CEFAZOLIN SODIUM 1 G/50ML
20 SOLUTION INTRAVENOUS EVERY 8 HOURS
Status: CANCELLED | OUTPATIENT
Start: 2021-08-19

## 2021-08-19 RX ORDER — CARBOPROST TROMETHAMINE 250 UG/ML
250 INJECTION, SOLUTION INTRAMUSCULAR
Status: CANCELLED | OUTPATIENT
Start: 2021-08-19

## 2021-08-19 RX ORDER — METOCLOPRAMIDE 10 MG/1
10 TABLET ORAL EVERY 6 HOURS PRN
Status: CANCELLED | OUTPATIENT
Start: 2021-08-19

## 2021-08-19 RX ORDER — METHYLERGONOVINE MALEATE 0.2 MG/ML
200 INJECTION INTRAVENOUS
Status: CANCELLED | OUTPATIENT
Start: 2021-08-19

## 2021-08-19 ASSESSMENT — MIFFLIN-ST. JEOR: SCORE: 1559.17

## 2021-08-19 NOTE — PROGRESS NOTES
Concerns: GBS +  .  No vaginal bleeding, LOF, contractions.  No HA, RUQ pain, N/V, visual changes.  Cervix is unchanged from previous exam.  Cephalic position confirmed by Leopold maneuvers and cervical exam.  Discussed signs of labor and when to call or come in.  Reportable signs and symptoms discussed.  GBS done today.  Labor precautions discussed.  RTC 1 week.  Prenatal flowsheet information is reviewed.    Vlad Tavarez MD

## 2021-08-19 NOTE — NURSING NOTE
Initial BP 93/64 (BP Location: Right arm, Patient Position: Chair, Cuff Size: Adult Large)   Pulse 90   Temp 98.4  F (36.9  C) (Tympanic)   Resp 18   Ht 1.524 m (5')   Wt 89.8 kg (197 lb 14.4 oz)   LMP 12/09/2020   Breastfeeding No   BMI 38.65 kg/m   Estimated body mass index is 38.65 kg/m  as calculated from the following:    Height as of this encounter: 1.524 m (5').    Weight as of this encounter: 89.8 kg (197 lb 14.4 oz). .    Liz Kendall, CMA

## 2021-08-22 ENCOUNTER — HOSPITAL ENCOUNTER (OUTPATIENT)
Facility: CLINIC | Age: 26
Discharge: HOME | End: 2021-08-22
Attending: OBSTETRICS & GYNECOLOGY | Admitting: OBSTETRICS & GYNECOLOGY
Payer: COMMERCIAL

## 2021-08-22 VITALS
HEART RATE: 116 BPM | TEMPERATURE: 98.4 F | RESPIRATION RATE: 18 BRPM | DIASTOLIC BLOOD PRESSURE: 78 MMHG | SYSTOLIC BLOOD PRESSURE: 114 MMHG

## 2021-08-22 PROCEDURE — 59025 FETAL NON-STRESS TEST: CPT | Mod: 26 | Performed by: OBSTETRICS & GYNECOLOGY

## 2021-08-22 PROCEDURE — G0463 HOSPITAL OUTPT CLINIC VISIT: HCPCS | Mod: 25

## 2021-08-22 PROCEDURE — 250N000013 HC RX MED GY IP 250 OP 250 PS 637: Performed by: OBSTETRICS & GYNECOLOGY

## 2021-08-22 PROCEDURE — 59025 FETAL NON-STRESS TEST: CPT

## 2021-08-22 RX ORDER — ONDANSETRON 2 MG/ML
4 INJECTION INTRAMUSCULAR; INTRAVENOUS EVERY 6 HOURS PRN
Status: DISCONTINUED | OUTPATIENT
Start: 2021-08-22 | End: 2021-08-22 | Stop reason: HOSPADM

## 2021-08-22 RX ORDER — HYDROXYZINE HYDROCHLORIDE 50 MG/1
50 TABLET, FILM COATED ORAL ONCE
Status: COMPLETED | OUTPATIENT
Start: 2021-08-22 | End: 2021-08-22

## 2021-08-22 RX ORDER — METOCLOPRAMIDE HYDROCHLORIDE 5 MG/ML
10 INJECTION INTRAMUSCULAR; INTRAVENOUS EVERY 6 HOURS PRN
Status: DISCONTINUED | OUTPATIENT
Start: 2021-08-22 | End: 2021-08-22 | Stop reason: HOSPADM

## 2021-08-22 RX ORDER — ONDANSETRON 4 MG/1
4 TABLET, ORALLY DISINTEGRATING ORAL EVERY 6 HOURS PRN
Status: DISCONTINUED | OUTPATIENT
Start: 2021-08-22 | End: 2021-08-22 | Stop reason: HOSPADM

## 2021-08-22 RX ORDER — PROCHLORPERAZINE MALEATE 10 MG
10 TABLET ORAL EVERY 6 HOURS PRN
Status: DISCONTINUED | OUTPATIENT
Start: 2021-08-22 | End: 2021-08-22 | Stop reason: HOSPADM

## 2021-08-22 RX ORDER — PROCHLORPERAZINE 25 MG
25 SUPPOSITORY, RECTAL RECTAL EVERY 12 HOURS PRN
Status: DISCONTINUED | OUTPATIENT
Start: 2021-08-22 | End: 2021-08-22 | Stop reason: HOSPADM

## 2021-08-22 RX ORDER — METOCLOPRAMIDE 10 MG/1
10 TABLET ORAL EVERY 6 HOURS PRN
Status: DISCONTINUED | OUTPATIENT
Start: 2021-08-22 | End: 2021-08-22 | Stop reason: HOSPADM

## 2021-08-22 RX ADMIN — HYDROXYZINE HYDROCHLORIDE 50 MG: 50 TABLET, FILM COATED ORAL at 20:39

## 2021-08-23 NOTE — PLAN OF CARE
S:Patient presents due to  labor evaluation and pelvic pressure.  B:36w4d   Allergies: Amoxicillin and Penicillin g  A:moderate variablility, + accels, no decels, Category I  irritability  50/-2    Dr. DRAKE Tran in department and orders received.  Plan includes; Encourage po fluids and Monitor, observe and reevaluate. Reviewed with patient and she agrees with plan.        Prenatal Breastfeeding Education Toolkit provided for patient to review,helping her to make an informed decision on a feeding choice for her baby. Patient accepted. Questions answered.    Oriented to room and call light.

## 2021-08-23 NOTE — PLAN OF CARE
" S: Discharge from triage  A: fetal heart rate tracin::\"moderate variablility\",\"+ accels\",\"no decels\",\"Category I\"}  Irritability, normal uterine activity  Strip reviewed by Ketty Pace/Leyla Mullins RN.  /2, unchanged  No visits with results within 1 Day(s) from this visit.   Latest known visit with results is:   Prenatal Office Visit on 2021   Component Date Value     Group B Strep PCR 2021 Positive*     Penicillin, amoxicillin,* 2021 Yes      Culture 2021 Streptococcus agalactiae (Group B Streptococcus)*     Dr. DRAKE Tran informed of above and discharge order received.   R: Plan includes: Labor instuctions given Patient instructed to report any recurrence of above concerns to her primary care provider during clinic hours or [facility] at any other time. Patient verbalized understanding of After Visit Summary, education and agreement with plan. Agrees to call for any problems, questions or concerns.  Discharged undelivered via ambulatory  in stable condition with all belongings. Accompanied by Mother and significant other .  "

## 2021-08-23 NOTE — DISCHARGE INSTRUCTIONS
Discharge Instruction for Undelivered Patients      You were seen for: Labor Assessment  We Consulted: Dr. Tran  You had (Test or Medicine): Reactive non-stress test & 50mg Vistaril   Cervical exam 2/50/-2     Diet:   You may eat meals and snacks.     Activity:  Call your doctor or nurse midwife if your baby is moving less than usual.     Call your provider if you notice:  Swelling in your face or increased swelling in your hands or legs.  Headaches that are not relieved by Tylenol (acetaminophen).  Changes in your vision (blurring: seeing spots or stars.)  Nausea (sick to your stomach) and vomiting (throwing up).   Weight gain of 5 pounds or more per week.  Heartburn that doesn't go away.  Signs of bladder infection: pain when you urinate (use the toilet), need to go more often and more urgently.  The bag of hu (rupture of membranes) breaks, or you notice leaking in your underwear.  Bright red blood in your underwear.  Abdominal (lower belly) or stomach pain.  Second (plus) baby: Contractions (tightening) less than 10 minutes apart and getting stronger.    Follow-up:  As scheduled in the clinic

## 2021-08-26 ENCOUNTER — PRENATAL OFFICE VISIT (OUTPATIENT)
Dept: OBGYN | Facility: CLINIC | Age: 26
End: 2021-08-26
Payer: COMMERCIAL

## 2021-08-26 VITALS
TEMPERATURE: 98.1 F | HEART RATE: 99 BPM | DIASTOLIC BLOOD PRESSURE: 70 MMHG | WEIGHT: 201.9 LBS | HEIGHT: 60 IN | RESPIRATION RATE: 18 BRPM | SYSTOLIC BLOOD PRESSURE: 113 MMHG | BODY MASS INDEX: 39.64 KG/M2

## 2021-08-26 DIAGNOSIS — Z34.83 ENCOUNTER FOR SUPERVISION OF OTHER NORMAL PREGNANCY IN THIRD TRIMESTER: Primary | ICD-10-CM

## 2021-08-26 PROCEDURE — 99207 PR PRENATAL VISIT: CPT | Performed by: OBSTETRICS & GYNECOLOGY

## 2021-08-26 ASSESSMENT — MIFFLIN-ST. JEOR: SCORE: 1577.31

## 2021-08-26 NOTE — PROGRESS NOTES
Concerns: marisol ongoing   No  Bleeding or LOF  Good FM  [facility]  without changes  No vaginal bleeding, LOF, contractions.  No HA, RUQ pain, N/V, visual changes.  Cervix is unchanged from previous exam.  Discussed signs of labor and when to call or come in.  Reportable signs and symptoms discussed.  Labor precautions discussed.  RTC 1 week.    Vlad Tavarez MD

## 2021-08-29 ENCOUNTER — HOSPITAL ENCOUNTER (OUTPATIENT)
Facility: CLINIC | Age: 26
Discharge: HOME | End: 2021-08-29
Attending: OBSTETRICS & GYNECOLOGY | Admitting: OBSTETRICS & GYNECOLOGY
Payer: COMMERCIAL

## 2021-08-29 VITALS
HEART RATE: 100 BPM | RESPIRATION RATE: 18 BRPM | TEMPERATURE: 98.1 F | SYSTOLIC BLOOD PRESSURE: 125 MMHG | DIASTOLIC BLOOD PRESSURE: 86 MMHG

## 2021-08-29 PROBLEM — Z34.90 PREGNANT: Status: ACTIVE | Noted: 2021-08-29

## 2021-08-29 LAB
ALBUMIN UR-MCNC: NEGATIVE MG/DL
APPEARANCE UR: ABNORMAL
BACTERIA #/AREA URNS HPF: ABNORMAL /HPF
BILIRUB UR QL STRIP: NEGATIVE
COLOR UR AUTO: YELLOW
GLUCOSE UR STRIP-MCNC: NEGATIVE MG/DL
HGB UR QL STRIP: ABNORMAL
KETONES UR STRIP-MCNC: NEGATIVE MG/DL
LEUKOCYTE ESTERASE UR QL STRIP: NEGATIVE
MUCOUS THREADS #/AREA URNS LPF: PRESENT /LPF
NITRATE UR QL: NEGATIVE
PH UR STRIP: 6 [PH] (ref 5–7)
RBC URINE: 1 /HPF
SP GR UR STRIP: 1.01 (ref 1–1.03)
SQUAMOUS EPITHELIAL: 8 /HPF
UROBILINOGEN UR STRIP-MCNC: NORMAL MG/DL
WBC URINE: 1 /HPF

## 2021-08-29 PROCEDURE — G0463 HOSPITAL OUTPT CLINIC VISIT: HCPCS | Mod: 25

## 2021-08-29 PROCEDURE — 81001 URINALYSIS AUTO W/SCOPE: CPT | Performed by: OBSTETRICS & GYNECOLOGY

## 2021-08-29 PROCEDURE — 59025 FETAL NON-STRESS TEST: CPT | Mod: 76

## 2021-08-29 RX ORDER — MORPHINE SULFATE 10 MG/ML
10 INJECTION, SOLUTION INTRAMUSCULAR; INTRAVENOUS ONCE
Status: DISCONTINUED | OUTPATIENT
Start: 2021-08-29 | End: 2021-08-29 | Stop reason: HOSPADM

## 2021-08-29 RX ORDER — METOCLOPRAMIDE 10 MG/1
10 TABLET ORAL EVERY 6 HOURS PRN
Status: DISCONTINUED | OUTPATIENT
Start: 2021-08-29 | End: 2021-08-29 | Stop reason: HOSPADM

## 2021-08-29 RX ORDER — ONDANSETRON 4 MG/1
4 TABLET, ORALLY DISINTEGRATING ORAL EVERY 6 HOURS PRN
Status: DISCONTINUED | OUTPATIENT
Start: 2021-08-29 | End: 2021-08-29 | Stop reason: HOSPADM

## 2021-08-29 RX ORDER — SODIUM CHLORIDE, SODIUM LACTATE, POTASSIUM CHLORIDE, CALCIUM CHLORIDE 600; 310; 30; 20 MG/100ML; MG/100ML; MG/100ML; MG/100ML
INJECTION, SOLUTION INTRAVENOUS CONTINUOUS
Status: DISCONTINUED | OUTPATIENT
Start: 2021-08-29 | End: 2021-08-29 | Stop reason: HOSPADM

## 2021-08-29 RX ORDER — PROCHLORPERAZINE 25 MG
25 SUPPOSITORY, RECTAL RECTAL EVERY 12 HOURS PRN
Status: DISCONTINUED | OUTPATIENT
Start: 2021-08-29 | End: 2021-08-29 | Stop reason: HOSPADM

## 2021-08-29 RX ORDER — METOCLOPRAMIDE HYDROCHLORIDE 5 MG/ML
10 INJECTION INTRAMUSCULAR; INTRAVENOUS EVERY 6 HOURS PRN
Status: DISCONTINUED | OUTPATIENT
Start: 2021-08-29 | End: 2021-08-29 | Stop reason: HOSPADM

## 2021-08-29 RX ORDER — PROCHLORPERAZINE MALEATE 10 MG
10 TABLET ORAL EVERY 6 HOURS PRN
Status: DISCONTINUED | OUTPATIENT
Start: 2021-08-29 | End: 2021-08-29 | Stop reason: HOSPADM

## 2021-08-29 RX ORDER — HYDROXYZINE HYDROCHLORIDE 50 MG/1
50 TABLET, FILM COATED ORAL ONCE
Status: DISCONTINUED | OUTPATIENT
Start: 2021-08-29 | End: 2021-08-29 | Stop reason: HOSPADM

## 2021-08-29 RX ORDER — ONDANSETRON 2 MG/ML
4 INJECTION INTRAMUSCULAR; INTRAVENOUS EVERY 6 HOURS PRN
Status: DISCONTINUED | OUTPATIENT
Start: 2021-08-29 | End: 2021-08-29 | Stop reason: HOSPADM

## 2021-08-30 ENCOUNTER — HOSPITAL ENCOUNTER (OUTPATIENT)
Facility: CLINIC | Age: 26
Discharge: HOME | End: 2021-08-30
Attending: OBSTETRICS & GYNECOLOGY | Admitting: OBSTETRICS & GYNECOLOGY
Payer: COMMERCIAL

## 2021-08-30 VITALS
BODY MASS INDEX: 39.64 KG/M2 | RESPIRATION RATE: 19 BRPM | WEIGHT: 201.9 LBS | HEIGHT: 60 IN | DIASTOLIC BLOOD PRESSURE: 66 MMHG | SYSTOLIC BLOOD PRESSURE: 112 MMHG

## 2021-08-30 LAB
ALBUMIN UR-MCNC: NEGATIVE MG/DL
APPEARANCE UR: ABNORMAL
BACTERIA #/AREA URNS HPF: ABNORMAL /HPF
BILIRUB UR QL STRIP: NEGATIVE
COLOR UR AUTO: YELLOW
GLUCOSE UR STRIP-MCNC: NEGATIVE MG/DL
HGB UR QL STRIP: ABNORMAL
KETONES UR STRIP-MCNC: NEGATIVE MG/DL
LEUKOCYTE ESTERASE UR QL STRIP: ABNORMAL
MUCOUS THREADS #/AREA URNS LPF: PRESENT /LPF
NITRATE UR QL: NEGATIVE
PH UR STRIP: 7 [PH] (ref 5–7)
RBC URINE: <1 /HPF
SP GR UR STRIP: 1.01 (ref 1–1.03)
SQUAMOUS EPITHELIAL: 14 /HPF
UROBILINOGEN UR STRIP-MCNC: NORMAL MG/DL
WBC URINE: 5 /HPF

## 2021-08-30 PROCEDURE — 81001 URINALYSIS AUTO W/SCOPE: CPT | Performed by: OBSTETRICS & GYNECOLOGY

## 2021-08-30 PROCEDURE — 59025 FETAL NON-STRESS TEST: CPT

## 2021-08-30 PROCEDURE — 258N000003 HC RX IP 258 OP 636: Performed by: OBSTETRICS & GYNECOLOGY

## 2021-08-30 PROCEDURE — G0463 HOSPITAL OUTPT CLINIC VISIT: HCPCS

## 2021-08-30 RX ORDER — LIDOCAINE 40 MG/G
CREAM TOPICAL
Status: DISCONTINUED | OUTPATIENT
Start: 2021-08-30 | End: 2021-08-30 | Stop reason: HOSPADM

## 2021-08-30 RX ORDER — HYDROXYZINE HYDROCHLORIDE 50 MG/1
100 TABLET, FILM COATED ORAL ONCE
Status: DISCONTINUED | OUTPATIENT
Start: 2021-08-30 | End: 2021-08-30

## 2021-08-30 RX ORDER — ACETAMINOPHEN 325 MG/1
325-650 TABLET ORAL ONCE
Status: ON HOLD | COMMUNITY
End: 2021-09-10

## 2021-08-30 RX ORDER — MORPHINE SULFATE 10 MG/ML
5 INJECTION, SOLUTION INTRAMUSCULAR; INTRAVENOUS ONCE
Status: DISCONTINUED | OUTPATIENT
Start: 2021-08-30 | End: 2021-08-30 | Stop reason: HOSPADM

## 2021-08-30 RX ORDER — HYDROXYZINE HYDROCHLORIDE 50 MG/ML
25 INJECTION, SOLUTION INTRAMUSCULAR EVERY 6 HOURS PRN
Status: DISCONTINUED | OUTPATIENT
Start: 2021-08-30 | End: 2021-08-30 | Stop reason: HOSPADM

## 2021-08-30 RX ADMIN — SODIUM CHLORIDE, POTASSIUM CHLORIDE, SODIUM LACTATE AND CALCIUM CHLORIDE 1000 ML: 600; 310; 30; 20 INJECTION, SOLUTION INTRAVENOUS at 17:30

## 2021-08-30 ASSESSMENT — MIFFLIN-ST. JEOR: SCORE: 1577.31

## 2021-08-30 NOTE — DISCHARGE INSTRUCTIONS
Discharge Instructions for Undelivered Patients  [facility] [phone]    Diet:    Drink 8 to 12 glasses of water every day.    You may eat meals and snacks as before    Activity:    If you are experiencing  labor, rest the pelvic area. No sex. Do not stimulate breasts or nipples.    Rest often as needed.    Count fetal kicks every day. (See handout.)    Call your doctor if your baby is moving less than usual.    Medicines:    My care team has reviewed my medicines with me.    My care team has given me a list of my medicines.    Call your provider if you notice:    Swelling in your face or increased swelling in your hands or legs.    Headaches that are not relieved by Tylenol (acetaminophen).    Changes in your vision (blurring; seeing spots or stars).    Nausea (sick to your stomach) and vomiting (throwing up).    Weight gain of 5 pounds per week.    Heartburn that doesn't go away.    Signs of bladder infection: pain when you urinate (use the toilet), needing to go more often or more urgently.    The bag of hu (membranes) breaks, or you notice leaking in your underwear.    Bright red blood in your underwear.    Abdominal (lower belly) or stomach pain.    For Second (plus) baby: Contractions (tightenings) less than 10 minutes apart and getting stronger.    Increase or change in vaginal discharge (note the color and amount).    Follow up with your provider as scheduled.

## 2021-08-30 NOTE — PROGRESS NOTES
"1750 writer to bedside to administer IM Morphine and Vistaril as ordered.  Pt states\" I am not taking medication that is just going to cover up my pain,  Something is wrong, it is not normal to have this much pelvic pain.\"  Writer explained that it is very common to have increasing pain with each child and that medication may take the edge off.  Explained that her cervix is not changing yet with the contractions she is having and it is to soon to induce her for the babies development.  Pt became very angry, raised her voice and stated, \" I'm glad you think you know more about what is going with my body than I do and told me to stop smirking at her.  Writer told her that \"I'm sorry she feels that way.\"  Pt then said\" I can see you smirking at me.\" writer told her that\" I'm sorry she is upset and that I was not smirking at her under my mask.\"  Pt then said,  I can see it in your eyes and requested another nurse.\"  Care transferred to charge RN.  "

## 2021-08-30 NOTE — PROGRESS NOTES
Pt SVE unchanged since last exam. Pt having some contractions, but mostly irritability. Dr Delgado updated, orders for morphine and vistaril placed per MD. Awaiting UA results. Pt offered morphine/vistaril, pt declined. Pt frustrated that she is in pain, but not in labor. Pt frustrated that she has carried baby this long when MDs told her she wouldn't make it to 37 weeks and that she should deliver before 38 weeks. Pt was given time to be heard, emotional support given.

## 2021-08-30 NOTE — PROGRESS NOTES
"This RN followed up with pt after she declined the MS/vistaril IM.  When asked \"what's wrong?.  Pt replied, \"something is not right.  Why am I having so much pain\".  Pt in tears.   Discussed with her all the things it could be.   Discussed baby's position, round ligament pain, 3rd pregnancy, could even be her physically/mechanically.  Pt doesn't seem to accept that.  Offered US.  Offered another SVE to see where the head could be positionally.  Offered her 3 x's to wait and speak with Dr Gibson but pt just replied, \"I wanna go home\".  Removed the IV.  Pt was here just last evening.  They reported that they already have a copy of the undelivered discharge instructions. Encouraged to call if any of those symptoms.  Encouraged ES Tylenol, ice and heat packs, soaking in the tub.  Pt says that none of that has helped.  Pt was placed in a wheel chair and taken to the door by .  F/U is Thursday with Dr Delgado.    "

## 2021-08-30 NOTE — PROGRESS NOTES
UA negative for urinary tract infection. Pt declines pain medications and would like to go home. Discharge orders placed.     Marce Simpson RN 8/29/2021 8:38 PM

## 2021-08-30 NOTE — PROGRESS NOTES
S: Discharge from triage  A: A:moderate variablility, + accels, no decels, Category I  normal uterine activity/irritability  Strip reviewed by Paz BORDEN RN.  mid position and dilated to 2, unchanged since last exam  Admission on 08/29/2021   Component Date Value     Color Urine 08/29/2021 Yellow      Appearance Urine 08/29/2021 Slightly Cloudy*     Glucose Urine 08/29/2021 Negative      Bilirubin Urine 08/29/2021 Negative      Ketones Urine 08/29/2021 Negative      Specific Gravity Urine 08/29/2021 1.011      Blood Urine 08/29/2021 Small*     pH Urine 08/29/2021 6.0      Protein Albumin Urine 08/29/2021 Negative      Urobilinogen Urine 08/29/2021 Normal      Nitrite Urine 08/29/2021 Negative      Leukocyte Esterase Urine 08/29/2021 Negative      Bacteria Urine 08/29/2021 Few*     Mucus Urine 08/29/2021 Present*     RBC Urine 08/29/2021 1      WBC Urine 08/29/2021 1      Squamous Epithelials Uri* 08/29/2021 8*     Dr. TERELL Delgado informed of above and discharge order received.   R: Plan includes: Labor instuctions given Fetal kick count instructions given. Patient instructed to report any recurrence of above concerns to her primary care provider during clinic hours or [facility] at any other time. Patient verbalized understanding of After Visit Summary, education and agreement with plan. Agrees to call for any problems, questions or concerns.  Discharged undelivered via wheelchair  in stable condition with all belongings. Accompanied by staff and family.

## 2021-08-30 NOTE — PROGRESS NOTES
Pt arrives to  with c/o back, abdominal pain and vaginal pain and pressure. Pt denies LOF, bleeding or abnormal discharge. Pt states she is hydrated and has not been overly active today. Pt has not taken plain tylenol or tried any non pharmacologic pain treatments. Pt is visually uncomfortable, unable to find comfortable position in bed. +FM. UA sent. Will evaluate cervix and update Dr pascual when lab results available.     Marce Simposn RN 8/29/2021 7:40 PM

## 2021-09-02 ENCOUNTER — PRENATAL OFFICE VISIT (OUTPATIENT)
Dept: OBGYN | Facility: CLINIC | Age: 26
End: 2021-09-02
Payer: COMMERCIAL

## 2021-09-02 VITALS
HEIGHT: 60 IN | TEMPERATURE: 97.8 F | RESPIRATION RATE: 12 BRPM | HEART RATE: 99 BPM | BODY MASS INDEX: 39.54 KG/M2 | WEIGHT: 201.4 LBS | SYSTOLIC BLOOD PRESSURE: 112 MMHG | DIASTOLIC BLOOD PRESSURE: 78 MMHG

## 2021-09-02 DIAGNOSIS — Z34.80 SUPERVISION OF OTHER NORMAL PREGNANCY: Primary | ICD-10-CM

## 2021-09-02 PROBLEM — Z34.90 PREGNANT: Status: RESOLVED | Noted: 2021-08-29 | Resolved: 2021-09-02

## 2021-09-02 PROBLEM — O36.8190 DECREASED FETAL MOVEMENT: Status: RESOLVED | Noted: 2021-08-07 | Resolved: 2021-09-02

## 2021-09-02 PROBLEM — Z36.89 ENCOUNTER FOR TRIAGE IN PREGNANT PATIENT: Status: RESOLVED | Noted: 2021-08-01 | Resolved: 2021-09-02

## 2021-09-02 PROCEDURE — 59426 ANTEPARTUM CARE ONLY: CPT | Performed by: OBSTETRICS & GYNECOLOGY

## 2021-09-02 PROCEDURE — 99207 PR PRENATAL VISIT: CPT | Performed by: OBSTETRICS & GYNECOLOGY

## 2021-09-02 RX ORDER — KETOROLAC TROMETHAMINE 30 MG/ML
30 INJECTION, SOLUTION INTRAMUSCULAR; INTRAVENOUS
Status: CANCELLED | OUTPATIENT
Start: 2021-09-02 | End: 2021-09-07

## 2021-09-02 RX ORDER — NALOXONE HYDROCHLORIDE 0.4 MG/ML
0.2 INJECTION, SOLUTION INTRAMUSCULAR; INTRAVENOUS; SUBCUTANEOUS
Status: CANCELLED | OUTPATIENT
Start: 2021-09-02

## 2021-09-02 RX ORDER — TRANEXAMIC ACID 10 MG/ML
1 INJECTION, SOLUTION INTRAVENOUS EVERY 30 MIN PRN
Status: CANCELLED | OUTPATIENT
Start: 2021-09-02

## 2021-09-02 RX ORDER — PROCHLORPERAZINE MALEATE 10 MG
10 TABLET ORAL EVERY 6 HOURS PRN
Status: CANCELLED | OUTPATIENT
Start: 2021-09-02

## 2021-09-02 RX ORDER — MISOPROSTOL 200 UG/1
400 TABLET ORAL
Status: CANCELLED | OUTPATIENT
Start: 2021-09-02

## 2021-09-02 RX ORDER — CEFAZOLIN SODIUM 1 G/3ML
1 INJECTION, POWDER, FOR SOLUTION INTRAMUSCULAR; INTRAVENOUS EVERY 8 HOURS
Status: CANCELLED | OUTPATIENT
Start: 2021-09-02

## 2021-09-02 RX ORDER — NALOXONE HYDROCHLORIDE 0.4 MG/ML
0.4 INJECTION, SOLUTION INTRAMUSCULAR; INTRAVENOUS; SUBCUTANEOUS
Status: CANCELLED | OUTPATIENT
Start: 2021-09-02

## 2021-09-02 RX ORDER — OXYTOCIN/0.9 % SODIUM CHLORIDE 30/500 ML
340 PLASTIC BAG, INJECTION (ML) INTRAVENOUS CONTINUOUS PRN
Status: CANCELLED | OUTPATIENT
Start: 2021-09-02

## 2021-09-02 RX ORDER — MISOPROSTOL 200 UG/1
800 TABLET ORAL
Status: CANCELLED | OUTPATIENT
Start: 2021-09-02

## 2021-09-02 RX ORDER — METOCLOPRAMIDE 10 MG/1
10 TABLET ORAL EVERY 6 HOURS PRN
Status: CANCELLED | OUTPATIENT
Start: 2021-09-02

## 2021-09-02 RX ORDER — OXYTOCIN 10 [USP'U]/ML
10 INJECTION, SOLUTION INTRAMUSCULAR; INTRAVENOUS
Status: CANCELLED | OUTPATIENT
Start: 2021-09-02

## 2021-09-02 RX ORDER — OXYTOCIN/0.9 % SODIUM CHLORIDE 30/500 ML
1-24 PLASTIC BAG, INJECTION (ML) INTRAVENOUS CONTINUOUS
Status: CANCELLED | OUTPATIENT
Start: 2021-09-02

## 2021-09-02 RX ORDER — CARBOPROST TROMETHAMINE 250 UG/ML
250 INJECTION, SOLUTION INTRAMUSCULAR
Status: CANCELLED | OUTPATIENT
Start: 2021-09-02

## 2021-09-02 RX ORDER — SODIUM CHLORIDE, SODIUM LACTATE, POTASSIUM CHLORIDE, CALCIUM CHLORIDE 600; 310; 30; 20 MG/100ML; MG/100ML; MG/100ML; MG/100ML
INJECTION, SOLUTION INTRAVENOUS CONTINUOUS
Status: CANCELLED | OUTPATIENT
Start: 2021-09-02

## 2021-09-02 RX ORDER — ONDANSETRON 2 MG/ML
4 INJECTION INTRAMUSCULAR; INTRAVENOUS EVERY 6 HOURS PRN
Status: CANCELLED | OUTPATIENT
Start: 2021-09-02

## 2021-09-02 RX ORDER — ONDANSETRON 4 MG/1
4 TABLET, ORALLY DISINTEGRATING ORAL EVERY 6 HOURS PRN
Status: CANCELLED | OUTPATIENT
Start: 2021-09-02

## 2021-09-02 RX ORDER — METHYLERGONOVINE MALEATE 0.2 MG/ML
200 INJECTION INTRAVENOUS
Status: CANCELLED | OUTPATIENT
Start: 2021-09-02

## 2021-09-02 RX ORDER — LIDOCAINE 40 MG/G
CREAM TOPICAL
Status: CANCELLED | OUTPATIENT
Start: 2021-09-02

## 2021-09-02 RX ORDER — OXYTOCIN/0.9 % SODIUM CHLORIDE 30/500 ML
100-340 PLASTIC BAG, INJECTION (ML) INTRAVENOUS CONTINUOUS PRN
Status: CANCELLED | OUTPATIENT
Start: 2021-09-02

## 2021-09-02 RX ORDER — METOCLOPRAMIDE HYDROCHLORIDE 5 MG/ML
10 INJECTION INTRAMUSCULAR; INTRAVENOUS EVERY 6 HOURS PRN
Status: CANCELLED | OUTPATIENT
Start: 2021-09-02

## 2021-09-02 RX ORDER — CEFAZOLIN SODIUM 2 G/100ML
2 INJECTION, SOLUTION INTRAVENOUS ONCE
Status: CANCELLED | OUTPATIENT
Start: 2021-09-02 | End: 2021-09-02

## 2021-09-02 RX ORDER — PROCHLORPERAZINE 25 MG
25 SUPPOSITORY, RECTAL RECTAL EVERY 12 HOURS PRN
Status: CANCELLED | OUTPATIENT
Start: 2021-09-02

## 2021-09-02 ASSESSMENT — MIFFLIN-ST. JEOR: SCORE: 1575.04

## 2021-09-02 NOTE — NURSING NOTE
Initial /78 (BP Location: Right arm, Patient Position: Sitting, Cuff Size: Adult Large)   Pulse 99   Temp 97.8  F (36.6  C) (Tympanic)   Resp 12   Ht 1.524 m (5')   Wt 91.4 kg (201 lb 6.4 oz)   LMP 12/09/2020   BMI 39.33 kg/m   Estimated body mass index is 39.33 kg/m  as calculated from the following:    Height as of this encounter: 1.524 m (5').    Weight as of this encounter: 91.4 kg (201 lb 6.4 oz). .

## 2021-09-02 NOTE — PROGRESS NOTES
CC: Here for routine prenatal visit @ 38w1d   HPI: + FM, no ctx, no LOF, no VB.  No new complaints. Having a lot of ache/pains    PE: /78 (BP Location: Right arm, Patient Position: Sitting, Cuff Size: Adult Large)   Pulse 99   Temp 97.8  F (36.6  C) (Tympanic)   Resp 12   Ht 1.524 m (5')   Wt 91.4 kg (201 lb 6.4 oz)   LMP 2020   BMI 39.33 kg/m     See OB flowsheet    GBS positive (clinda resistant)    A/P  @ 38w1d normal pregnancy    1. Routine prenatal care.  COVID restrictions and recommendations reviewed including iron supplementation.  Elective IOL set up for     RTC 6 weeks.      Lizz Delgado M.D.

## 2021-09-07 ENCOUNTER — TELEPHONE (OUTPATIENT)
Dept: OBGYN | Facility: CLINIC | Age: 26
End: 2021-09-07

## 2021-09-07 NOTE — TELEPHONE ENCOUNTER
Pt was called to verify date/time of her induction tomorrow, as well as review pre-procedure instructions.  Pt will call in the am to verify that 0700 still works for her induction time.

## 2021-09-08 ENCOUNTER — ANESTHESIA (OUTPATIENT)
Dept: OBGYN | Facility: CLINIC | Age: 26
End: 2021-09-08
Payer: COMMERCIAL

## 2021-09-08 ENCOUNTER — ANESTHESIA EVENT (OUTPATIENT)
Dept: OBGYN | Facility: CLINIC | Age: 26
End: 2021-09-08
Payer: COMMERCIAL

## 2021-09-08 ENCOUNTER — HOSPITAL ENCOUNTER (INPATIENT)
Facility: CLINIC | Age: 26
LOS: 2 days | Discharge: HOME | End: 2021-09-10
Attending: OBSTETRICS & GYNECOLOGY | Admitting: OBSTETRICS & GYNECOLOGY
Payer: COMMERCIAL

## 2021-09-08 PROBLEM — Z34.83 PRENATAL CARE, SUBSEQUENT PREGNANCY IN THIRD TRIMESTER: Status: ACTIVE | Noted: 2021-09-08

## 2021-09-08 LAB
ABO/RH(D): NORMAL
ANTIBODY SCREEN: NEGATIVE
BASOPHILS # BLD AUTO: 0.1 10E3/UL (ref 0–0.2)
BASOPHILS NFR BLD AUTO: 1 %
EOSINOPHIL # BLD AUTO: 0.2 10E3/UL (ref 0–0.7)
EOSINOPHIL NFR BLD AUTO: 1 %
ERYTHROCYTE [DISTWIDTH] IN BLOOD BY AUTOMATED COUNT: 13.9 % (ref 10–15)
HCT VFR BLD AUTO: 35.3 % (ref 35–47)
HGB BLD-MCNC: 11.1 G/DL (ref 11.7–15.7)
IMM GRANULOCYTES # BLD: 0.1 10E3/UL
IMM GRANULOCYTES NFR BLD: 1 %
LYMPHOCYTES # BLD AUTO: 3.5 10E3/UL (ref 0.8–5.3)
LYMPHOCYTES NFR BLD AUTO: 34 %
MCH RBC QN AUTO: 25.2 PG (ref 26.5–33)
MCHC RBC AUTO-ENTMCNC: 31.4 G/DL (ref 31.5–36.5)
MCV RBC AUTO: 80 FL (ref 78–100)
MONOCYTES # BLD AUTO: 0.8 10E3/UL (ref 0–1.3)
MONOCYTES NFR BLD AUTO: 8 %
NEUTROPHILS # BLD AUTO: 5.8 10E3/UL (ref 1.6–8.3)
NEUTROPHILS NFR BLD AUTO: 55 %
NRBC # BLD AUTO: 0 10E3/UL
NRBC BLD AUTO-RTO: 0 /100
PLATELET # BLD AUTO: 298 10E3/UL (ref 150–450)
RBC # BLD AUTO: 4.4 10E6/UL (ref 3.8–5.2)
SARS-COV-2 RNA RESP QL NAA+PROBE: NEGATIVE
SPECIMEN EXPIRATION DATE: NORMAL
T PALLIDUM AB SER QL: NONREACTIVE
WBC # BLD AUTO: 10.4 10E3/UL (ref 4–11)

## 2021-09-08 PROCEDURE — 3E0R3BZ INTRODUCTION OF ANESTHETIC AGENT INTO SPINAL CANAL, PERCUTANEOUS APPROACH: ICD-10-PCS | Performed by: NURSE ANESTHETIST, CERTIFIED REGISTERED

## 2021-09-08 PROCEDURE — 00HU33Z INSERTION OF INFUSION DEVICE INTO SPINAL CANAL, PERCUTANEOUS APPROACH: ICD-10-PCS | Performed by: NURSE ANESTHETIST, CERTIFIED REGISTERED

## 2021-09-08 PROCEDURE — 250N000009 HC RX 250: Performed by: OBSTETRICS & GYNECOLOGY

## 2021-09-08 PROCEDURE — 250N000013 HC RX MED GY IP 250 OP 250 PS 637: Performed by: OBSTETRICS & GYNECOLOGY

## 2021-09-08 PROCEDURE — 120N000001 HC R&B MED SURG/OB

## 2021-09-08 PROCEDURE — 3E033VJ INTRODUCTION OF OTHER HORMONE INTO PERIPHERAL VEIN, PERCUTANEOUS APPROACH: ICD-10-PCS | Performed by: OBSTETRICS & GYNECOLOGY

## 2021-09-08 PROCEDURE — 10907ZC DRAINAGE OF AMNIOTIC FLUID, THERAPEUTIC FROM PRODUCTS OF CONCEPTION, VIA NATURAL OR ARTIFICIAL OPENING: ICD-10-PCS | Performed by: OBSTETRICS & GYNECOLOGY

## 2021-09-08 PROCEDURE — 87635 SARS-COV-2 COVID-19 AMP PRB: CPT | Performed by: OBSTETRICS & GYNECOLOGY

## 2021-09-08 PROCEDURE — 85025 COMPLETE CBC W/AUTO DIFF WBC: CPT | Performed by: OBSTETRICS & GYNECOLOGY

## 2021-09-08 PROCEDURE — 370N000003 HC ANESTHESIA WARD SERVICE

## 2021-09-08 PROCEDURE — 86780 TREPONEMA PALLIDUM: CPT | Performed by: OBSTETRICS & GYNECOLOGY

## 2021-09-08 PROCEDURE — 250N000011 HC RX IP 250 OP 636: Performed by: OBSTETRICS & GYNECOLOGY

## 2021-09-08 PROCEDURE — 258N000003 HC RX IP 258 OP 636: Performed by: OBSTETRICS & GYNECOLOGY

## 2021-09-08 PROCEDURE — 86900 BLOOD TYPING SEROLOGIC ABO: CPT | Performed by: OBSTETRICS & GYNECOLOGY

## 2021-09-08 PROCEDURE — 722N000001 HC LABOR CARE VAGINAL DELIVERY SINGLE

## 2021-09-08 PROCEDURE — 59410 OBSTETRICAL CARE: CPT | Performed by: OBSTETRICS & GYNECOLOGY

## 2021-09-08 RX ORDER — BISACODYL 10 MG
10 SUPPOSITORY, RECTAL RECTAL DAILY PRN
Status: DISCONTINUED | OUTPATIENT
Start: 2021-09-08 | End: 2021-09-10 | Stop reason: HOSPADM

## 2021-09-08 RX ORDER — PROCHLORPERAZINE MALEATE 10 MG
10 TABLET ORAL EVERY 6 HOURS PRN
Status: DISCONTINUED | OUTPATIENT
Start: 2021-09-08 | End: 2021-09-08

## 2021-09-08 RX ORDER — NALBUPHINE HYDROCHLORIDE 10 MG/ML
2.5-5 INJECTION, SOLUTION INTRAMUSCULAR; INTRAVENOUS; SUBCUTANEOUS EVERY 6 HOURS PRN
Status: DISCONTINUED | OUTPATIENT
Start: 2021-09-08 | End: 2021-09-08

## 2021-09-08 RX ORDER — NALOXONE HYDROCHLORIDE 0.4 MG/ML
0.2 INJECTION, SOLUTION INTRAMUSCULAR; INTRAVENOUS; SUBCUTANEOUS
Status: DISCONTINUED | OUTPATIENT
Start: 2021-09-08 | End: 2021-09-08

## 2021-09-08 RX ORDER — LIDOCAINE 40 MG/G
CREAM TOPICAL
Status: DISCONTINUED | OUTPATIENT
Start: 2021-09-08 | End: 2021-09-08

## 2021-09-08 RX ORDER — NALOXONE HYDROCHLORIDE 0.4 MG/ML
0.4 INJECTION, SOLUTION INTRAMUSCULAR; INTRAVENOUS; SUBCUTANEOUS
Status: DISCONTINUED | OUTPATIENT
Start: 2021-09-08 | End: 2021-09-08

## 2021-09-08 RX ORDER — ONDANSETRON 4 MG/1
4 TABLET, ORALLY DISINTEGRATING ORAL EVERY 6 HOURS PRN
Status: DISCONTINUED | OUTPATIENT
Start: 2021-09-08 | End: 2021-09-08

## 2021-09-08 RX ORDER — OXYTOCIN 10 [USP'U]/ML
10 INJECTION, SOLUTION INTRAMUSCULAR; INTRAVENOUS
Status: DISCONTINUED | OUTPATIENT
Start: 2021-09-08 | End: 2021-09-08

## 2021-09-08 RX ORDER — TRANEXAMIC ACID 10 MG/ML
1 INJECTION, SOLUTION INTRAVENOUS EVERY 30 MIN PRN
Status: DISCONTINUED | OUTPATIENT
Start: 2021-09-08 | End: 2021-09-10 | Stop reason: HOSPADM

## 2021-09-08 RX ORDER — METOCLOPRAMIDE HYDROCHLORIDE 5 MG/ML
10 INJECTION INTRAMUSCULAR; INTRAVENOUS EVERY 6 HOURS PRN
Status: DISCONTINUED | OUTPATIENT
Start: 2021-09-08 | End: 2021-09-08

## 2021-09-08 RX ORDER — MISOPROSTOL 200 UG/1
400 TABLET ORAL
Status: DISCONTINUED | OUTPATIENT
Start: 2021-09-08 | End: 2021-09-08

## 2021-09-08 RX ORDER — MISOPROSTOL 200 UG/1
800 TABLET ORAL
Status: DISCONTINUED | OUTPATIENT
Start: 2021-09-08 | End: 2021-09-08

## 2021-09-08 RX ORDER — ACETAMINOPHEN 325 MG/1
650 TABLET ORAL EVERY 4 HOURS PRN
Status: DISCONTINUED | OUTPATIENT
Start: 2021-09-08 | End: 2021-09-10 | Stop reason: HOSPADM

## 2021-09-08 RX ORDER — NALOXONE HYDROCHLORIDE 0.4 MG/ML
0.2 INJECTION, SOLUTION INTRAMUSCULAR; INTRAVENOUS; SUBCUTANEOUS
Status: DISCONTINUED | OUTPATIENT
Start: 2021-09-08 | End: 2021-09-10 | Stop reason: HOSPADM

## 2021-09-08 RX ORDER — METOCLOPRAMIDE 10 MG/1
10 TABLET ORAL EVERY 6 HOURS PRN
Status: DISCONTINUED | OUTPATIENT
Start: 2021-09-08 | End: 2021-09-08

## 2021-09-08 RX ORDER — OXYTOCIN/0.9 % SODIUM CHLORIDE 30/500 ML
340 PLASTIC BAG, INJECTION (ML) INTRAVENOUS CONTINUOUS PRN
Status: DISCONTINUED | OUTPATIENT
Start: 2021-09-08 | End: 2021-09-10 | Stop reason: HOSPADM

## 2021-09-08 RX ORDER — OXYTOCIN/0.9 % SODIUM CHLORIDE 30/500 ML
1-24 PLASTIC BAG, INJECTION (ML) INTRAVENOUS CONTINUOUS
Status: DISCONTINUED | OUTPATIENT
Start: 2021-09-08 | End: 2021-09-08

## 2021-09-08 RX ORDER — METHYLERGONOVINE MALEATE 0.2 MG/ML
200 INJECTION INTRAVENOUS
Status: DISCONTINUED | OUTPATIENT
Start: 2021-09-08 | End: 2021-09-08

## 2021-09-08 RX ORDER — FENTANYL CITRATE 50 UG/ML
50-100 INJECTION, SOLUTION INTRAMUSCULAR; INTRAVENOUS
Status: DISCONTINUED | OUTPATIENT
Start: 2021-09-08 | End: 2021-09-08

## 2021-09-08 RX ORDER — BUPIVACAINE HYDROCHLORIDE 7.5 MG/ML
0.8 INJECTION, SOLUTION EPIDURAL; RETROBULBAR ONCE
Status: DISCONTINUED | OUTPATIENT
Start: 2021-09-08 | End: 2021-09-08

## 2021-09-08 RX ORDER — OXYTOCIN 10 [USP'U]/ML
10 INJECTION, SOLUTION INTRAMUSCULAR; INTRAVENOUS
Status: DISCONTINUED | OUTPATIENT
Start: 2021-09-08 | End: 2021-09-10 | Stop reason: HOSPADM

## 2021-09-08 RX ORDER — EPINEPHRINE 1 MG/ML
0.2 INJECTION, SOLUTION, CONCENTRATE INTRAVENOUS ONCE
Status: DISCONTINUED | OUTPATIENT
Start: 2021-09-08 | End: 2021-09-08

## 2021-09-08 RX ORDER — CEFAZOLIN SODIUM 1 G/50ML
1 INJECTION, SOLUTION INTRAVENOUS EVERY 8 HOURS
Status: DISCONTINUED | OUTPATIENT
Start: 2021-09-08 | End: 2021-09-08

## 2021-09-08 RX ORDER — CEFAZOLIN SODIUM 1 G/50ML
20 SOLUTION INTRAVENOUS EVERY 12 HOURS
Status: DISCONTINUED | OUTPATIENT
Start: 2021-09-08 | End: 2021-09-08

## 2021-09-08 RX ORDER — METHYLERGONOVINE MALEATE 0.2 MG/ML
200 INJECTION INTRAVENOUS
Status: DISCONTINUED | OUTPATIENT
Start: 2021-09-08 | End: 2021-09-10 | Stop reason: HOSPADM

## 2021-09-08 RX ORDER — ONDANSETRON 2 MG/ML
4 INJECTION INTRAMUSCULAR; INTRAVENOUS EVERY 6 HOURS PRN
Status: DISCONTINUED | OUTPATIENT
Start: 2021-09-08 | End: 2021-09-08

## 2021-09-08 RX ORDER — OXYCODONE HYDROCHLORIDE 5 MG/1
5 TABLET ORAL EVERY 4 HOURS PRN
Status: DISCONTINUED | OUTPATIENT
Start: 2021-09-08 | End: 2021-09-10 | Stop reason: HOSPADM

## 2021-09-08 RX ORDER — CARBOPROST TROMETHAMINE 250 UG/ML
250 INJECTION, SOLUTION INTRAMUSCULAR
Status: DISCONTINUED | OUTPATIENT
Start: 2021-09-08 | End: 2021-09-10 | Stop reason: HOSPADM

## 2021-09-08 RX ORDER — SODIUM CHLORIDE, SODIUM LACTATE, POTASSIUM CHLORIDE, CALCIUM CHLORIDE 600; 310; 30; 20 MG/100ML; MG/100ML; MG/100ML; MG/100ML
INJECTION, SOLUTION INTRAVENOUS CONTINUOUS
Status: DISCONTINUED | OUTPATIENT
Start: 2021-09-08 | End: 2021-09-08

## 2021-09-08 RX ORDER — IBUPROFEN 800 MG/1
800 TABLET, FILM COATED ORAL EVERY 6 HOURS PRN
Status: DISCONTINUED | OUTPATIENT
Start: 2021-09-08 | End: 2021-09-10 | Stop reason: HOSPADM

## 2021-09-08 RX ORDER — DOCUSATE SODIUM 100 MG/1
100 CAPSULE, LIQUID FILLED ORAL DAILY
Status: DISCONTINUED | OUTPATIENT
Start: 2021-09-08 | End: 2021-09-10 | Stop reason: HOSPADM

## 2021-09-08 RX ORDER — HYDROCORTISONE 2.5 %
CREAM (GRAM) TOPICAL 3 TIMES DAILY PRN
Status: DISCONTINUED | OUTPATIENT
Start: 2021-09-08 | End: 2021-09-10 | Stop reason: HOSPADM

## 2021-09-08 RX ORDER — PROCHLORPERAZINE 25 MG
25 SUPPOSITORY, RECTAL RECTAL EVERY 12 HOURS PRN
Status: DISCONTINUED | OUTPATIENT
Start: 2021-09-08 | End: 2021-09-08

## 2021-09-08 RX ORDER — TRANEXAMIC ACID 10 MG/ML
1 INJECTION, SOLUTION INTRAVENOUS EVERY 30 MIN PRN
Status: DISCONTINUED | OUTPATIENT
Start: 2021-09-08 | End: 2021-09-08

## 2021-09-08 RX ORDER — NALOXONE HYDROCHLORIDE 0.4 MG/ML
0.4 INJECTION, SOLUTION INTRAMUSCULAR; INTRAVENOUS; SUBCUTANEOUS
Status: DISCONTINUED | OUTPATIENT
Start: 2021-09-08 | End: 2021-09-10 | Stop reason: HOSPADM

## 2021-09-08 RX ORDER — CLINDAMYCIN PHOSPHATE 900 MG/50ML
900 INJECTION, SOLUTION INTRAVENOUS EVERY 8 HOURS
Status: DISCONTINUED | OUTPATIENT
Start: 2021-09-08 | End: 2021-09-08

## 2021-09-08 RX ORDER — MISOPROSTOL 200 UG/1
400 TABLET ORAL
Status: DISCONTINUED | OUTPATIENT
Start: 2021-09-08 | End: 2021-09-10 | Stop reason: HOSPADM

## 2021-09-08 RX ORDER — FENTANYL CITRATE 50 UG/ML
25 INJECTION, SOLUTION INTRAMUSCULAR; INTRAVENOUS ONCE
Status: DISCONTINUED | OUTPATIENT
Start: 2021-09-08 | End: 2021-09-08

## 2021-09-08 RX ORDER — CARBOPROST TROMETHAMINE 250 UG/ML
250 INJECTION, SOLUTION INTRAMUSCULAR
Status: DISCONTINUED | OUTPATIENT
Start: 2021-09-08 | End: 2021-09-08

## 2021-09-08 RX ORDER — OXYTOCIN/0.9 % SODIUM CHLORIDE 30/500 ML
340 PLASTIC BAG, INJECTION (ML) INTRAVENOUS CONTINUOUS PRN
Status: DISCONTINUED | OUTPATIENT
Start: 2021-09-08 | End: 2021-09-08

## 2021-09-08 RX ORDER — MODIFIED LANOLIN
OINTMENT (GRAM) TOPICAL
Status: DISCONTINUED | OUTPATIENT
Start: 2021-09-08 | End: 2021-09-10 | Stop reason: HOSPADM

## 2021-09-08 RX ORDER — MISOPROSTOL 200 UG/1
800 TABLET ORAL
Status: DISCONTINUED | OUTPATIENT
Start: 2021-09-08 | End: 2021-09-10 | Stop reason: HOSPADM

## 2021-09-08 RX ORDER — EPHEDRINE SULFATE 50 MG/ML
5 INJECTION, SOLUTION INTRAMUSCULAR; INTRAVENOUS; SUBCUTANEOUS
Status: DISCONTINUED | OUTPATIENT
Start: 2021-09-08 | End: 2021-09-08

## 2021-09-08 RX ORDER — CEFAZOLIN SODIUM 2 G/100ML
2 INJECTION, SOLUTION INTRAVENOUS ONCE
Status: DISCONTINUED | OUTPATIENT
Start: 2021-09-08 | End: 2021-09-08

## 2021-09-08 RX ADMIN — Medication 2 MILLI-UNITS/MIN: at 09:06

## 2021-09-08 RX ADMIN — SODIUM CHLORIDE, POTASSIUM CHLORIDE, SODIUM LACTATE AND CALCIUM CHLORIDE: 600; 310; 30; 20 INJECTION, SOLUTION INTRAVENOUS at 09:00

## 2021-09-08 RX ADMIN — IBUPROFEN 800 MG: 800 TABLET, FILM COATED ORAL at 18:14

## 2021-09-08 RX ADMIN — CLINDAMYCIN PHOSPHATE 900 MG: 900 INJECTION, SOLUTION INTRAVENOUS at 16:28

## 2021-09-08 RX ADMIN — FENTANYL CITRATE 100 MCG: 50 INJECTION, SOLUTION INTRAMUSCULAR; INTRAVENOUS at 15:13

## 2021-09-08 RX ADMIN — Medication: at 18:15

## 2021-09-08 RX ADMIN — OXYCODONE HYDROCHLORIDE 5 MG: 5 TABLET ORAL at 22:31

## 2021-09-08 RX ADMIN — SODIUM CHLORIDE, POTASSIUM CHLORIDE, SODIUM LACTATE AND CALCIUM CHLORIDE 500 ML: 600; 310; 30; 20 INJECTION, SOLUTION INTRAVENOUS at 08:30

## 2021-09-08 RX ADMIN — CLINDAMYCIN PHOSPHATE 900 MG: 900 INJECTION, SOLUTION INTRAVENOUS at 09:00

## 2021-09-08 RX ADMIN — ACETAMINOPHEN 650 MG: 325 TABLET, FILM COATED ORAL at 22:25

## 2021-09-08 RX ADMIN — ACETAMINOPHEN 650 MG: 325 TABLET, FILM COATED ORAL at 18:14

## 2021-09-08 NOTE — PLAN OF CARE
Patient asking for IV fentanyl. Dose given and nitrous oxide discontinued for now. Patient has great support team with SO and mother. Able to focus through contractions with a lot of guidance.

## 2021-09-08 NOTE — L&D DELIVERY NOTE
"Delivery Summary    Liat Corcoran MRN# [id number]   Age: 26 year old YOB: 1995     ASSESSMENT & PLAN: 27 y/o  admitted at 39w for elective IOL with favorable cervix and known GBS positive status; she received IV Clindamycin >4 hrs from delivery; pitocin was initially utilized for labor, with AROM as well at 4 hours.  She received inhalational nitrous oxide in late stage 1 for analgesia, progressed normally, and pushed effectively with  of liveborn male over intact perineum  Placenta spontaneous  QBL 100cc  \"Brant\"  Brenda Tran MD  [facility]         Mario Corcoran [[id number]]    Labor Event Times    Start pushing date/time: 2021 1746      Labor Events     labor?: No     Antibiotics received during labor?: Yes  Reason for Antibiotics: GBS  Antibiotics received for GBS: Clindamycin  Antibiotics Given (GBS): Greater than 4 hours prior to delivery     Rupture date/time: 21 1300   Rupture type: Artificial Rupture of Membranes  Fluid color: Clear     Induction: Oxytocin  Induction date/time:     Cervical ripening date/time:     Indications for induction: Elective     Augmentation: AROM     Delivery/Placenta Date and Time    Delivery Date: 21 Delivery Time:  5:52 PM   Placenta Date/Time: 2021  5:55 PM  Oxytocin given at the time of delivery: after delivery of baby  Delivering clinician: Brenda Tran MD   Other personnel present at delivery:  Provider Role   Brenda Tran MD Obstetrician         Vaginal Counts     Initial count performed by 2 team members:  Two Team Members   Paz Dumont   RDeCosse       Needles Suture Needles Sponges (RETIRED) Instruments   Initial counts 2  5    Added to count       Relief counts       Final counts 2  5          Placed during labor Accounted for at the end of labor   FSE     IUPC     Cervadil                Final count performed by 2 team members:  Two Team Members   Dr Tran "   RDeCosse      Final count correct?: Yes     Apgars     1 Minute 5 Minute 10 Minute 15 Minute 20 Minute   Skin color: 1  1       Heart rate: 2  2       Reflex irritability: 2  2       Muscle tone: 2  2       Respiratory effort: 2  2       Total: 9  9       Apgars assigned by: SYLVIA PAUL     Cord    Vessels: 3 Vessels    Cord Complications: None               Gases Sent?: No    Delayed cord clamping?: Yes    Cord Clamping Delay (seconds):  seconds    Stem cell collection?: No       Pamplico Resuscitation    Methods: None     Skin to Skin and Feeding Plan    Skin to skin initiation date/time: 1841    Skin to skin with: Mother  Skin to skin end date/time:        Labor Events and Shoulder Dystocia    Fetal Tracing Prior to Delivery: Category 1  Shoulder dystocia present?: Neg     Delivery (Maternal) (Provider to Complete) (815799)    Episiotomy: None  Perineal lacerations: None    Repair suture: None  Genital tract inspection done: Pos     Blood Loss  Mother: Liat Corcoran #[id number]   Start of Mother's Information    Delivery Blood Loss  21 0552 - 21 1809    None           End of Mother's Information  Mother: Liat Corcoran #[id number]          Delivery - Provider to Complete ([id number])    Delivering clinician: Brenda Tran MD  Attempted Delivery Types (Choose all that apply): Spontaneous Vaginal Delivery  Delivery Type (Choose the 1 that will go to the Birth History): Vaginal, Spontaneous                   Other personnel:  Provider Role   Brenda Tran MD Obstetrician                Placenta    Date/Time: 2021  5:55 PM  Removal: Spontaneous  Disposition: Hospital disposal           Anesthesia    Method: Nitrous Oxide                Presentation and Position    Presentation: Vertex    Position: Right Occiput Anterior                 Brenda Tran MD

## 2021-09-08 NOTE — PLAN OF CARE
Liat Corcoran  [id number]  39w0d        Liat Corcoran presents for induction of labor for elective. Patient denies, bleeding or ROM.  Is having contractions    Past Medical History:   Diagnosis Date    Abnormal Pap smear of cervix 2021    Accidental overdose     sleep meds, tylenol, opioids    Aspiration pneumonia (H) 2015    Bipolar disorder (H)     Chickenpox     Chlamydia infection affecting pregnancy, antepartum 2018    Depressive disorder     History of recurrent ear infection     Intracranial swelling 2015    Ovarian cysts     ultrasound dx by genoveva    Postpartum depression 2019    mild       Dr Tran notified of patient's arrival and status.    Plan:  - pitocin was started at 0900 as well as antibiotic for GBS  she was 2.5 cm.

## 2021-09-08 NOTE — PLAN OF CARE
S:Delivery  B:Induced  Labor,39w0d    Lab Results   Component Value Date    GBS Positive (A) 2019    with antibiotic treatment greater than or equal to 4 hours prior to delivery.  A: Patient delivered   none, intact at 1752 with Dr. DRAKE Tran in attendance and baby placed on mother's abdomen for delayed cord clamping. Baby dried and stimulated. Baby placed  skin to skin @ 1754.. Apgars 9/9.Delivery .  IV infusion of Oxytocin  infused. Placenta removal spontaneous. MD does not want placenta sent to pathology.  See Flowsheet for VS and PP checks. Delivery QBL (mL): 100 mL.  Labor care plan goals met, transition now to postpartum care.   R: Expect routine postpartum care. Anticipate first feeding within the hour or whenever infant displays feeding cues. Continue skin to skin. Prior discussion with mother indicates that feeding plan is Breast feeding . Educated mother on importance of exclusive breastfeeding, expected feeding readiness cues and encouraged her to observe for these cues while rooming in. Informed her that breastfeeding assistance would be provided.

## 2021-09-08 NOTE — PLAN OF CARE
Pain med options were discussed and she now would like either nitrous or I.v pain med.  She is afraid of the epidural.  Dr Tran performed AROM at 1300, to augment labor.  she is working well through contractions. Her  mother and S.O. are supportive.  FHR tracing cont Cat 1

## 2021-09-08 NOTE — ANESTHESIA PREPROCEDURE EVALUATION
Anesthesia Pre-Procedure Evaluation    Patient: Liat Corcoran   MRN: [id number] : 1995          Preoperative Diagnosis: * No surgery found *        Past Medical History:   Diagnosis Date     Abnormal Pap smear of cervix 2021     Accidental overdose     sleep meds, tylenol, opioids     Aspiration pneumonia (H) 2015     Bipolar disorder (H)      Chickenpox      Chlamydia infection affecting pregnancy, antepartum 2018     Depressive disorder      History of recurrent ear infection      Intracranial swelling 2015     Ovarian cysts     ultrasound dx by genoveva     Postpartum depression 2019    mild     Past Surgical History:   Procedure Laterality Date     PE TUBES       TONSILLECTOMY  1998       Anesthesia Evaluation     . Pt has had prior anesthetic. Type: Regional.           ROS/MED HX    ENT/Pulmonary:  - neg pulmonary ROS     Neurologic:  - neg neurologic ROS     Cardiovascular:  - neg cardiovascular ROS       METS/Exercise Tolerance:     Hematologic:  - neg hematologic  ROS       Musculoskeletal:         GI/Hepatic:  - neg GI/hepatic ROS       Renal/Genitourinary:         Endo:  - neg endo ROS       Psychiatric:  - neg psychiatric ROS       Infectious Disease:         Malignancy:       Other:                     neg OB ROS.            Physical Exam  Normal systems: cardiovascular, pulmonary and dental    Airway   Mallampati: II  TM distance: > 3 FB  Neck ROM: full  Mouth opening: > 3 cm    Dental     Cardiovascular       Pulmonary             Lab Results   Component Value Date    WBC 10.4 2021    HGB 11.1 (L) 2021    HCT 35.3 2021     2021    CRP 22.4 (H) 2021    SED 45 (H) 2021     2021    POTASSIUM 3.8 2021    CHLORIDE 106 2021    CO2 22 2021    BUN 12 2021    CR 0.39 (L) 2021    GLC 82 2021    ABEL 9.0 2021    ALBUMIN 3.2 (L) 2021    PROTTOTAL 7.3 2021    ALT 13  03/09/2021    AST 11 03/09/2021    ALKPHOS 56 03/09/2021    BILITOTAL 0.2 03/09/2021    LIPASE 67 (L) 07/31/2020    TSH 0.98 10/23/2017    HCG Positive (A) 01/11/2021    HCGS Negative 03/14/2018       Preop Vitals  BP Readings from Last 3 Encounters:   09/08/21 128/68   09/02/21 112/78   08/30/21 112/66    Pulse Readings from Last 3 Encounters:   09/08/21 80   09/02/21 99   08/29/21 100      Resp Readings from Last 3 Encounters:   09/08/21 16   09/02/21 12   08/30/21 19    SpO2 Readings from Last 3 Encounters:   08/15/21 96%   04/03/21 96%   03/12/21 100%      Temp Readings from Last 1 Encounters:   09/08/21 37  C (98.6  F) (Axillary)    Ht Readings from Last 1 Encounters:   09/02/21 1.524 m (5')      Wt Readings from Last 1 Encounters:   09/02/21 91.4 kg (201 lb 6.4 oz)    Estimated body mass index is 39.33 kg/m  as calculated from the following:    Height as of 9/2/21: 1.524 m (5').    Weight as of 9/2/21: 91.4 kg (201 lb 6.4 oz).       Anesthesia Plan     ASA Status:  2       Anesthesia Plan Type Discussed: Spinal      Postoperative Care      Consents  Anesthetic plan, risks, benefits and alternatives discussed with:  Patient..                 ARIEL Wagoner CRNA    Anesthesia Evaluation   Pt has had prior anesthetic. Type: Regional.        ROS/MED HX  ENT/Pulmonary:  - neg pulmonary ROS     Neurologic:  - neg neurologic ROS     Cardiovascular:  - neg cardiovascular ROS     METS/Exercise Tolerance:     Hematologic:  - neg hematologic  ROS     Musculoskeletal:       GI/Hepatic:  - neg GI/hepatic ROS     Renal/Genitourinary:       Endo:  - neg endo ROS     Psychiatric/Substance Use:  - neg psychiatric ROS     Infectious Disease:       Malignancy:       Other:              Physical Exam    Airway        Mallampati: II   TM distance: > 3 FB   Neck ROM: full   Mouth opening: > 3 cm    Respiratory Devices and Support         Dental  no notable dental history         Cardiovascular   cardiovascular exam  normal          Pulmonary   pulmonary exam normal                  Anesthesia Plan    ASA Status:  2      Anesthesia Type: Spinal.              Consents    Anesthesia Plan(s) and associated risks, benefits, and realistic alternatives discussed. Questions answered and patient/representative(s) expressed understanding.     - Discussed with:  Patient         Postoperative Care            Comments:             neg OB ROS.
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
## 2021-09-09 PROCEDURE — 120N000001 HC R&B MED SURG/OB

## 2021-09-09 PROCEDURE — 250N000013 HC RX MED GY IP 250 OP 250 PS 637: Performed by: OBSTETRICS & GYNECOLOGY

## 2021-09-09 RX ADMIN — ACETAMINOPHEN 650 MG: 325 TABLET, FILM COATED ORAL at 07:41

## 2021-09-09 RX ADMIN — OXYCODONE HYDROCHLORIDE 5 MG: 5 TABLET ORAL at 02:59

## 2021-09-09 RX ADMIN — IBUPROFEN 800 MG: 800 TABLET, FILM COATED ORAL at 20:04

## 2021-09-09 RX ADMIN — IBUPROFEN 800 MG: 800 TABLET, FILM COATED ORAL at 00:35

## 2021-09-09 RX ADMIN — ACETAMINOPHEN 650 MG: 325 TABLET, FILM COATED ORAL at 20:03

## 2021-09-09 RX ADMIN — IBUPROFEN 800 MG: 800 TABLET, FILM COATED ORAL at 07:41

## 2021-09-09 RX ADMIN — ACETAMINOPHEN 650 MG: 325 TABLET, FILM COATED ORAL at 02:31

## 2021-09-09 RX ADMIN — IBUPROFEN 800 MG: 800 TABLET, FILM COATED ORAL at 14:04

## 2021-09-09 RX ADMIN — OXYCODONE HYDROCHLORIDE 5 MG: 5 TABLET ORAL at 15:55

## 2021-09-09 RX ADMIN — DOCUSATE SODIUM 100 MG: 100 CAPSULE, LIQUID FILLED ORAL at 07:41

## 2021-09-09 RX ADMIN — ACETAMINOPHEN 650 MG: 325 TABLET, FILM COATED ORAL at 11:54

## 2021-09-09 RX ADMIN — ACETAMINOPHEN 650 MG: 325 TABLET, FILM COATED ORAL at 15:55

## 2021-09-09 RX ADMIN — ACETAMINOPHEN 650 MG: 325 TABLET, FILM COATED ORAL at 23:59

## 2021-09-09 ASSESSMENT — ACTIVITIES OF DAILY LIVING (ADL)
PATIENT_/_FAMILY_COMMUNICATION_STYLE: SPOKEN LANGUAGE (ENGLISH OR BILINGUAL)
WEAR_GLASSES_OR_BLIND: NO
DOING_ERRANDS_INDEPENDENTLY_DIFFICULTY: NO
CONCENTRATING,_REMEMBERING_OR_MAKING_DECISIONS_DIFFICULTY: NO
WALKING_OR_CLIMBING_STAIRS_DIFFICULTY: NO
DRESSING/BATHING_DIFFICULTY: NO
DIFFICULTY_COMMUNICATING: NO
FALL_HISTORY_WITHIN_LAST_SIX_MONTHS: NO
HEARING_DIFFICULTY_OR_DEAF: NO
TOILETING_ISSUES: NO
DIFFICULTY_EATING/SWALLOWING: NO

## 2021-09-09 NOTE — PLAN OF CARE
Up to BR for pericare. Pt was able to void. Mother and baby transferred to postpartum unit at 2015 via ambulatory and bassinet after completion of immediate recovery period. Patient oriented to room and instructed to call for assistance when up to the bathroom the next time. Mother and baby bonding well and in stable condition upon transfer.

## 2021-09-09 NOTE — PLAN OF CARE
"Late entry due to patient care:     1627 -patient reporting pressure in vagina, SVE /-1. Patient requesting use of nitrous but unable to use since IV fentanyl given at 1513. Patient aware nitrous cannot be used at this time. Patient not coping well with labor, calling out and tearful, hitting at bed. Patient's mother at bedside and able to help patient re-focus.  1713-patient using nitrous with some relief, much calmer and more controlled during contractions.   1735-patient states, \"I can't do this anymore\" and requesting ITN for pain management. SVE /0. CRNA notified and IVF bolus started.   1745-patient verbalizing urge to push/poop. Infant +2 station, cervix complete. Dr Tran called for delivery.  1752-delivery of viable  male.     "

## 2021-09-09 NOTE — PROGRESS NOTES
New bands were applied to infant and mother/father because father took his off at home.   Bands were checked with patient, significant other and infant. New band number is [id number].  Hoda Thomas RN on 9/9/2021 at 2:21 PM

## 2021-09-09 NOTE — PLAN OF CARE
Patient is ambulating and voiding independently. Vitals and assessments have been WDL. Fundus is firm with minimal bleeding. Pain is being controlled with PRN Tylenol and Ibuprofen. Breastfeeding education was given to patient. Educated on feeding cues, latching, burping infant, positioning, and hand expressing. Patient verbalized understanding. Patient is loving towards infant and independent with cares. Plan to discharge tomorrow.

## 2021-09-09 NOTE — PLAN OF CARE
Data: Vital signs within normal limits. Postpartum checks within normal limits - see flow record. Patient  is tolerating po intake. Patient is able to empty bladder independently. . Patient ambulating independently..   No apparent signs of infection. perineum healing well. Patient is performing self cares and is able to care for infant. Positive attachment behaviors are observed with infant. Support persons are present.  Action:  Pain plan was discussed. Patient would like pain meds to be brought in when they are due. Patient was medicated during the shift for pain. See MAR.Patient education done about breastfeeding,  cares, postpartum cares, pain management/plan and rest. See flow record.  Response:   Patient reassessed within 1 hour after each medication for pain. Patient stated that pain had improved. Patient stated that she was comfortable. .   Plan: Anticipate discharge on 9/10/21.

## 2021-09-09 NOTE — DISCHARGE SUMMARY
[facility] Discharge Summary    Liat Corcoran MRN# [id number]   Age: 26 year old YOB: 1995     Date of Admission:  2021  Date of Discharge::  9/10/2021   Admitting Physician:  Vlad Tavarez MD  Discharge Physician:  Jeannie Giraldo MD   Home clinic: [facility]          Admission Diagnoses:   Intrauterine pregnancy at 39w0d  weeks gestation          Discharge Diagnosis:   Normal spontaneous vaginal delivery  Intrauterine pregnancy at 39w0d  weeks gestation          Procedures:   Procedure(s): Induction of labor,                 Medications Prior to Admission:     Medications Prior to Admission   Medication Sig Dispense Refill Last Dose     acetaminophen (TYLENOL) 325 MG tablet Take 325-650 mg by mouth once        albuterol (VENTOLIN HFA) 108 (90 Base) MCG/ACT inhaler Inhale 1-2 puffs into the lungs every 6 hours (Patient not taking: Reported on 2021) 18 g 0      EPINEPHrine (ANY BX GENERIC EQUIV) 0.3 MG/0.3ML injection 2-pack Inject 0.3 mLs (0.3 mg) into the muscle once as needed for anaphylaxis (Patient not taking: Reported on 2021) 1 each 0      Ferrous Sulfate (IRON PO)         Prenatal Vit-Fe Fumarate-FA (PRENATAL MULTIVITAMIN W/IRON) 27-0.8 MG tablet Take 1 tablet by mouth daily        [DISCONTINUED] omeprazole (PRILOSEC) 20 MG DR capsule Take 1 capsule (20 mg) by mouth daily (Patient not taking: Reported on 2021) 60 capsule 4      [DISCONTINUED] pyridOXINE (VITAMIN B6) 100 MG TABS Take 25 mg by mouth daily                Discharge Medications:   Same + tylenol,ibuprofen, senna s         Consultations:   No consultations were requested during this admission          Brief History of Labor:   25 y/o  admitted at 39w for elective IOL with favorable cervix and known GBS positive status; she received IV Clindamycin >4 hrs from delivery; pitocin was initially utilized for labor, with AROM as well at 4 hours.  She  "received inhalational nitrous oxide in late stage 1 for analgesia, progressed normally, and pushed effectively with  of liveborn male over intact perineum  Placenta spontaneous  QBL 100cc  \"Brant\"           Hospital Course:   The patient's hospital course was unremarkable.  On discharge, her pain was well controlled. Vaginal bleeding is similar to peak menstrual flow.  Voiding without difficulty.  Ambulating well and tolerating a normal diet.  No fever.  Breastfeeding well.  Infant is stable.   She was discharged on post-partum day #2    Post-partum hemoglobin:   Hemoglobin   Date Value Ref Range Status   2021 11.1 (L) 11.7 - 15.7 g/dL Final   2021 10.7 (L) 11.7 - 15.7 g/dL Final             Discharge Instructions and Follow-Up:   Discharge diet: Regular   Discharge activity: No sex for 6 week(s)   Discharge follow-up: Follow up with OB clinic in 6 weeks   Wound care: Drink plenty of fluids  Ice to area for comfort           Discharge Disposition:   Discharged to home      Attestation:  I have reviewed today's vital signs, notes, medications, labs and imaging.      Jeannie Giraldo MD   9/10/2021 10:16 AM      "

## 2021-09-09 NOTE — PROGRESS NOTES
PPD # 1    S: patient without complaints  O: /64 (BP Location: Right arm)   Pulse 85   Temp 98.5  F (36.9  C) (Oral)   Resp 16   LMP 2020   Breastfeeding Unknown    NAD  Abd: soft, nontender, fundus firm  Ext: calves nontender    A/P PPD # 1 s/p     Routine PP care.    Lizz Delgado M.D.

## 2021-09-10 VITALS
OXYGEN SATURATION: 97 % | DIASTOLIC BLOOD PRESSURE: 69 MMHG | SYSTOLIC BLOOD PRESSURE: 115 MMHG | RESPIRATION RATE: 16 BRPM | TEMPERATURE: 98 F | HEART RATE: 76 BPM

## 2021-09-10 PROCEDURE — 250N000013 HC RX MED GY IP 250 OP 250 PS 637: Performed by: OBSTETRICS & GYNECOLOGY

## 2021-09-10 RX ORDER — SENNA AND DOCUSATE SODIUM 50; 8.6 MG/1; MG/1
1-2 TABLET, FILM COATED ORAL 2 TIMES DAILY PRN
Qty: 30 TABLET | Refills: 0 | Status: SHIPPED | OUTPATIENT
Start: 2021-09-10 | End: 2021-11-23

## 2021-09-10 RX ORDER — ACETAMINOPHEN 325 MG/1
325-650 TABLET ORAL EVERY 6 HOURS PRN
Qty: 30 TABLET | Refills: 0 | Status: SHIPPED | OUTPATIENT
Start: 2021-09-10 | End: 2022-07-14

## 2021-09-10 RX ORDER — IBUPROFEN 600 MG/1
600 TABLET, FILM COATED ORAL EVERY 6 HOURS PRN
Qty: 30 TABLET | Refills: 0 | Status: SHIPPED | OUTPATIENT
Start: 2021-09-10 | End: 2022-07-14

## 2021-09-10 RX ADMIN — DOCUSATE SODIUM 100 MG: 100 CAPSULE, LIQUID FILLED ORAL at 08:37

## 2021-09-10 RX ADMIN — ACETAMINOPHEN 650 MG: 325 TABLET, FILM COATED ORAL at 04:04

## 2021-09-10 RX ADMIN — IBUPROFEN 800 MG: 800 TABLET, FILM COATED ORAL at 08:37

## 2021-09-10 RX ADMIN — IBUPROFEN 800 MG: 800 TABLET, FILM COATED ORAL at 02:10

## 2021-09-10 RX ADMIN — ACETAMINOPHEN 650 MG: 325 TABLET, FILM COATED ORAL at 08:37

## 2021-09-10 RX ADMIN — IBUPROFEN 800 MG: 800 TABLET, FILM COATED ORAL at 14:28

## 2021-09-10 RX ADMIN — ACETAMINOPHEN 650 MG: 325 TABLET, FILM COATED ORAL at 12:30

## 2021-09-10 NOTE — PLAN OF CARE
Patient discharged per ambulatory with infant in car seat. Mother verified that her band matches her infant's band by comparing the infant's  band#.  Discharge instructions given. Encouraged to call for any problems, questions or concerns. RXs at [facility].

## 2021-09-10 NOTE — PLAN OF CARE
VSS, post partum assessment WNL-see flow sheet. Voiding spontaneously. Independent with self and baby cares. Breast feeding without issues. Declines lactation visit. Plan for discharge around 1500 today. Enc pt to call for any needs.

## 2021-09-10 NOTE — DISCHARGE INSTRUCTIONS
Postpartum Vaginal Delivery Instructions  Follow up with OBGYN in 6 weeks for check up. Call clinic to schedule an appointment  Activity       Ask family and friends for help when you need it.    Do not place anything in your vagina for 6 weeks.    You are not restricted on other activities, but take it easy for a few weeks to allow your body to recover from delivery.  You are able to do any activities you feel up to that point.    No driving until you have stopped taking your pain medications (usually two weeks after delivery).     Call your health care provider if you have any of these symptoms:       Increased pain, swelling, redness, or fluid around your stiches from an episiotomy or perineal tear.    A fever above 100.4 F (38 C) with or without chills when placing a thermometer under your tongue.    You soak a sanitary pad with blood within 1 hour, or you see blood clots larger than a golf ball.    Bleeding that lasts more than 6 weeks.    Vaginal discharge that smells bad.    Severe pain, cramping or tenderness in your lower belly area.    A need to urinate more frequently (use the toilet more often), more urgently (use the toilet very quickly), or it burns when you urinate.    Nausea and vomiting.    Redness, swelling or pain around a vein in your leg.    Problems breastfeeding or a red or painful area on your breast.    Chest pain and cough or are gasping for air.    Problems coping with sadness, anxiety, or depression.  If you have any concerns about hurting yourself or the baby, call your provider immediately.     You have questions or concerns after you return home.     Keep your hands clean:  Always wash your hands before touching your perineal area and stitches.  This helps reduce your risk of infection.  If your hands aren't dirty, you may use an alcohol hand-rub to clean your hands. Keep your nails clean and short.      If you you feel sad, have difficulty sleeping, feel overwhelmed, anxious or have  troubling thoughts you may call your clinic, or the HelpLine for Pregnancy & Postpartum Support [place]. (PPS HelpLine 013-050-7516) or online resource list  @ www.ppsupport[place].org    For problems or questions with breastfeeding after discharge call: [phone] at the Peds clinic.  After hours you may leave a message and your call will returned the next morning. You may also call the Birthplace to answer questions, [phone].

## 2021-09-10 NOTE — PLAN OF CARE
Pt is PPD # 2 from a vaginal delivery. Up IND; voiding adequate amounts w/o difficulty. VSS. FF mid at U/U; scant  lochia noted. Pain has been managed with motrin, tylenol, and heat packs.Pt has been resting and sleeping in between cares overnight. Discharge pending for later today, 9/10. Will continue to monitor and reassess.

## 2021-09-11 DIAGNOSIS — Z71.89 OTHER SPECIFIED COUNSELING: ICD-10-CM

## 2021-09-12 ENCOUNTER — PATIENT OUTREACH (OUTPATIENT)
Dept: CARE COORDINATION | Facility: CLINIC | Age: 26
End: 2021-09-12

## 2021-09-12 NOTE — PROGRESS NOTES
"Clinic Care Coordination Contact  [facility]: Post-Discharge Note  SITUATION                                                      Admission:    Admission Date: 09/08/21   Reason for Admission: Normal spontaneous vaginal delivery, Intrauterine pregnancy at 39w0d  weeks gestation  Discharge:   Discharge Date: 09/10/21  Discharge Diagnosis: Normal spontaneous vaginal delivery, Intrauterine pregnancy at 39w0d  weeks gestation    BACKGROUND                                                      Liat Corcoran is a 26 year old female who is 39w0d pregnant and being admitted for elective induction of labor; she is known GBS positive, h/o anaphylaxis with PCN.    ASSESSMENT           Discharge Assessment  How are you doing now that you are home?: \"I can barely move, a lot of back, stomach, and hip pain\"  How are your symptoms? (Red Flag symptoms escalate to triage hotline per guidelines): New  Do you feel your condition is stable enough to be safe at home until your provider visit?: Yes  Does the patient have their discharge instructions? : Yes  Does the patient have questions regarding their discharge instructions? : No  Were you started on any new medications or were there changes to any of your previous medications? : Yes  Does the patient have all of their medications?: Yes  Do you have questions regarding any of your medications? : No  Do you have all of your needed medical supplies or equipment (DME)?  (i.e. oxygen tank, CPAP, cane, etc.): Yes  Discharge follow-up appointment scheduled within 14 calendar days? : Yes  Discharge Follow Up Appointment Date: 09/13/21  Discharge Follow Up Appointment Scheduled with?: Primary Care Provider    Post-op ([facility] Only)  If the patient had a surgery or procedure, do they have any questions for a nurse?: No      PLAN                                                      Outpatient Plan:    Discharge diet: Regular   Discharge activity: No sex for 6 week(s)   Discharge " follow-up: Follow up with OB clinic in 6 weeks   Wound care: Drink plenty of fluids  Ice to area for comfort         No future appointments.      For any urgent concerns, please contact our 24 hour nurse triage line: [phone] ([phone])         SERGEY Murguia  [phone]  [facility]

## 2021-09-18 ENCOUNTER — HEALTH MAINTENANCE LETTER (OUTPATIENT)
Age: 26
End: 2021-09-18

## 2021-10-12 ENCOUNTER — PATIENT OUTREACH (OUTPATIENT)
Dept: FAMILY MEDICINE | Facility: CLINIC | Age: 26
End: 2021-10-12
Payer: COMMERCIAL

## 2021-10-12 DIAGNOSIS — R87.612 PAPANICOLAOU SMEAR OF CERVIX WITH LOW GRADE SQUAMOUS INTRAEPITHELIAL LESION (LGSIL): ICD-10-CM

## 2021-11-23 ENCOUNTER — VIRTUAL VISIT (OUTPATIENT)
Dept: FAMILY MEDICINE | Facility: CLINIC | Age: 26
End: 2021-11-23
Payer: COMMERCIAL

## 2021-11-23 PROCEDURE — 99213 OFFICE O/P EST LOW 20 MIN: CPT | Mod: 95 | Performed by: NURSE PRACTITIONER

## 2021-11-23 PROCEDURE — 96127 BRIEF EMOTIONAL/BEHAV ASSMT: CPT | Mod: 59 | Performed by: NURSE PRACTITIONER

## 2021-11-23 RX ORDER — SERTRALINE HYDROCHLORIDE 25 MG/1
TABLET, FILM COATED ORAL
Qty: 27 TABLET | Refills: 0 | Status: SHIPPED | OUTPATIENT
Start: 2021-11-23 | End: 2021-12-22

## 2021-11-23 ASSESSMENT — ANXIETY QUESTIONNAIRES
7. FEELING AFRAID AS IF SOMETHING AWFUL MIGHT HAPPEN: NEARLY EVERY DAY
6. BECOMING EASILY ANNOYED OR IRRITABLE: NEARLY EVERY DAY
1. FEELING NERVOUS, ANXIOUS, OR ON EDGE: NEARLY EVERY DAY
4. TROUBLE RELAXING: NEARLY EVERY DAY
GAD7 TOTAL SCORE: 21
8. IF YOU CHECKED OFF ANY PROBLEMS, HOW DIFFICULT HAVE THESE MADE IT FOR YOU TO DO YOUR WORK, TAKE CARE OF THINGS AT HOME, OR GET ALONG WITH OTHER PEOPLE?: SOMEWHAT DIFFICULT
2. NOT BEING ABLE TO STOP OR CONTROL WORRYING: NEARLY EVERY DAY
3. WORRYING TOO MUCH ABOUT DIFFERENT THINGS: NEARLY EVERY DAY
GAD7 TOTAL SCORE: 21
GAD7 TOTAL SCORE: 21
5. BEING SO RESTLESS THAT IT IS HARD TO SIT STILL: NEARLY EVERY DAY
7. FEELING AFRAID AS IF SOMETHING AWFUL MIGHT HAPPEN: NEARLY EVERY DAY

## 2021-11-23 ASSESSMENT — PATIENT HEALTH QUESTIONNAIRE - PHQ9
10. IF YOU CHECKED OFF ANY PROBLEMS, HOW DIFFICULT HAVE THESE PROBLEMS MADE IT FOR YOU TO DO YOUR WORK, TAKE CARE OF THINGS AT HOME, OR GET ALONG WITH OTHER PEOPLE: NOT DIFFICULT AT ALL
SUM OF ALL RESPONSES TO PHQ QUESTIONS 1-9: 21
SUM OF ALL RESPONSES TO PHQ QUESTIONS 1-9: 21

## 2021-11-23 NOTE — PROGRESS NOTES
Liat is a 26 year old who is being evaluated via a billable video visit.      How would you like to obtain your AVS? MyChart  If the video visit is dropped, the invitation should be resent by: Text to cell phone: [phone]  Will anyone else be joining your video visit? No      Video Start Time: 1:44 PM    Assessment & Plan     Postpartum depression  Patient to start sertraline.  She is unsure if she had side effects with sertraline in the past.  She is breast feeding.  If she is having side effects- consider paroxetine or citalopram if not pregnant.  She will do a home test tomorrow.  - sertraline (ZOLOFT) 25 MG tablet; Take 0.5 tablets (12.5 mg) by mouth daily for 7 days, THEN 1 tablet (25 mg) daily for 23 days.    Prescription drug management         BMI:   Estimated body mass index is 39.33 kg/m  as calculated from the following:    Height as of 9/2/21: 1.524 m (5').    Weight as of 9/2/21: 91.4 kg (201 lb 6.4 oz).       Depression Screening Follow Up    PHQ 11/23/2021   PHQ-9 Total Score 21   Q9: Thoughts of better off dead/self-harm past 2 weeks Not at all         Follow Up Actions Taken  Crisis resource information provided in After Visit Summary  Follow up recommended: start meds, follow-up in 2 weeks         Return in about 2 weeks (around 12/7/2021) for Medication Recheck- sertraline.    ARIEL Ordaz CNP  [facility]    Fatuma Josue is a 26 year old who presents for the following health issues     HPI     Abnormal Mood Symptoms  Onset/Duration: One month ago  Description: Post partum  Depression (if yes, do PHQ-9): YES  Anxiety (if yes, do LIDIA-7): YES  Accompanying Signs & Symptoms:  Still participating in activities that you used to enjoy: no  Fatigue: YES  Irritability: YES  Difficulty concentrating: YES  Changes in appetite: YES  Problems with sleep: YES  Heart racing/beating fast: YES  Abnormally elevated, expansive, or irritable mood: YES  Persistently  increased activity or energy: no  Thoughts of hurting yourself or others: no  History:  Recent stress or major life event: YES, recent birth of a baby and possibly pregnancy. Family members diagnosed with cancer. LMP 10/08/2021.  Prior depression or anxiety: Yes  Family history of depression or anxiety: YES  Alcohol/drug use: no  Difficulty sleeping: YES  Precipitating or alleviating factors: None  Therapies tried and outcome: Has been on medication in the past unsure of what works well or what she can take she may possibly be Pregnant  PHQ 4/10/2020 2/1/2021 11/23/2021   PHQ-9 Total Score 17 3 21   Q9: Thoughts of better off dead/self-harm past 2 weeks Not at all Not at all Not at all     LIDIA-7 SCORE 4/10/2020 2/1/2021 11/23/2021   Total Score - - -   Total Score - - 21 (severe anxiety)   Total Score 21 8 21     Patient denies hallucinations.    Review of Systems   Constitutional, HEENT, cardiovascular, pulmonary, gi and gu systems are negative, except as otherwise noted.      Objective           Vitals:  No vitals were obtained today due to virtual visit.    Physical Exam   GENERAL: Healthy, alert and no distress  EYES: Eyes grossly normal to inspection.  No discharge or erythema, or obvious scleral/conjunctival abnormalities.  RESP: No audible wheeze, cough, or visible cyanosis.  No visible retractions or increased work of breathing.    SKIN: Visible skin clear. No significant rash, abnormal pigmentation or lesions.  NEURO: Cranial nerves grossly intact.  Mentation and speech appropriate for age.  PSYCH: mentation appears normal, affect flat, judgement and insight intact and appearance well groomed                Video-Visit Details    Type of service:  Video Visit    Video End Time:1:57 PM    Originating Location (pt. Location): Home    Distant Location (provider location):  [facility]     Platform used for Video Visit: [employer]

## 2021-11-24 ASSESSMENT — PATIENT HEALTH QUESTIONNAIRE - PHQ9: SUM OF ALL RESPONSES TO PHQ QUESTIONS 1-9: 21

## 2021-11-24 ASSESSMENT — ANXIETY QUESTIONNAIRES: GAD7 TOTAL SCORE: 21

## 2021-12-07 NOTE — TELEPHONE ENCOUNTER
FYI to provider - Patient is lost to pap tracking follow-up. Attempts to contact pt have been made per reminder process and there has been no reply and/or no appt scheduled.       6/13/17 NIL pap.  2/5/21 LSIL pap. Pt 9 weeks gestation. Plan colp during pregnancy and postpartum per provider.   6/25/21 [place] visually normal; no biopsies taken. Plan cotest at pp visit. Estimated Date of Delivery: Sep 15, 2021   9/8/21 Delivery  10/12/21 Reminder mychart  11/10/21 Reminder call - spoke to pt  12/7/21 lost to follow up

## 2021-12-08 ENCOUNTER — VIRTUAL VISIT (OUTPATIENT)
Dept: FAMILY MEDICINE | Facility: CLINIC | Age: 26
End: 2021-12-08
Payer: COMMERCIAL

## 2021-12-08 DIAGNOSIS — Z91.199 NO-SHOW FOR APPOINTMENT: Primary | ICD-10-CM

## 2021-12-08 RX ORDER — ALBUTEROL SULFATE 90 UG/1
2 AEROSOL, METERED RESPIRATORY (INHALATION) EVERY 6 HOURS
Qty: 18 G | Refills: 1 | Status: CANCELLED | OUTPATIENT
Start: 2021-12-08

## 2021-12-08 ASSESSMENT — ANXIETY QUESTIONNAIRES
8. IF YOU CHECKED OFF ANY PROBLEMS, HOW DIFFICULT HAVE THESE MADE IT FOR YOU TO DO YOUR WORK, TAKE CARE OF THINGS AT HOME, OR GET ALONG WITH OTHER PEOPLE?: NOT DIFFICULT AT ALL
GAD7 TOTAL SCORE: 11
GAD7 TOTAL SCORE: 11
1. FEELING NERVOUS, ANXIOUS, OR ON EDGE: MORE THAN HALF THE DAYS
5. BEING SO RESTLESS THAT IT IS HARD TO SIT STILL: NEARLY EVERY DAY
6. BECOMING EASILY ANNOYED OR IRRITABLE: SEVERAL DAYS
3. WORRYING TOO MUCH ABOUT DIFFERENT THINGS: SEVERAL DAYS
7. FEELING AFRAID AS IF SOMETHING AWFUL MIGHT HAPPEN: SEVERAL DAYS
4. TROUBLE RELAXING: MORE THAN HALF THE DAYS
7. FEELING AFRAID AS IF SOMETHING AWFUL MIGHT HAPPEN: SEVERAL DAYS
GAD7 TOTAL SCORE: 11
2. NOT BEING ABLE TO STOP OR CONTROL WORRYING: SEVERAL DAYS

## 2021-12-08 ASSESSMENT — PATIENT HEALTH QUESTIONNAIRE - PHQ9
SUM OF ALL RESPONSES TO PHQ QUESTIONS 1-9: 15
SUM OF ALL RESPONSES TO PHQ QUESTIONS 1-9: 15
10. IF YOU CHECKED OFF ANY PROBLEMS, HOW DIFFICULT HAVE THESE PROBLEMS MADE IT FOR YOU TO DO YOUR WORK, TAKE CARE OF THINGS AT HOME, OR GET ALONG WITH OTHER PEOPLE: NOT DIFFICULT AT ALL

## 2021-12-08 NOTE — PATIENT INSTRUCTIONS
Your clinic record indicates that you are due for:   Pap and physical exam  Please schedule an appointment.

## 2021-12-08 NOTE — PROGRESS NOTES
Video Start Time: 11:39 AM/and called at 11:45/2nd attempt -11:50 AM  - unable to reach patient       This patient was a no show for this scheduled appointment.  
today

## 2021-12-09 ASSESSMENT — PATIENT HEALTH QUESTIONNAIRE - PHQ9: SUM OF ALL RESPONSES TO PHQ QUESTIONS 1-9: 15

## 2021-12-09 ASSESSMENT — ASTHMA QUESTIONNAIRES: ACT_TOTALSCORE: 25

## 2021-12-09 ASSESSMENT — ANXIETY QUESTIONNAIRES: GAD7 TOTAL SCORE: 11

## 2021-12-22 ENCOUNTER — MYC REFILL (OUTPATIENT)
Dept: FAMILY MEDICINE | Facility: CLINIC | Age: 26
End: 2021-12-22
Payer: COMMERCIAL

## 2021-12-24 RX ORDER — SERTRALINE HYDROCHLORIDE 25 MG/1
25 TABLET, FILM COATED ORAL DAILY
Qty: 30 TABLET | Refills: 0 | Status: SHIPPED | OUTPATIENT
Start: 2021-12-24 | End: 2022-01-19

## 2021-12-24 NOTE — TELEPHONE ENCOUNTER
Routing refill request to provider for review/approval because:  For postpartum depression.   Was supposed to have a f/u after 2 weeks  Was a titrating dose  Please approve or deny a refill and send to team for an appointment if needed      Leidy Jackson RN  [facility]

## 2022-01-02 ENCOUNTER — HEALTH MAINTENANCE LETTER (OUTPATIENT)
Age: 27
End: 2022-01-02

## 2022-01-19 ENCOUNTER — MYC MEDICAL ADVICE (OUTPATIENT)
Dept: FAMILY MEDICINE | Facility: CLINIC | Age: 27
End: 2022-01-19

## 2022-01-19 ENCOUNTER — VIRTUAL VISIT (OUTPATIENT)
Dept: FAMILY MEDICINE | Facility: CLINIC | Age: 27
End: 2022-01-19
Payer: COMMERCIAL

## 2022-01-19 PROCEDURE — 99214 OFFICE O/P EST MOD 30 MIN: CPT | Mod: 95 | Performed by: NURSE PRACTITIONER

## 2022-01-19 ASSESSMENT — ANXIETY QUESTIONNAIRES
1. FEELING NERVOUS, ANXIOUS, OR ON EDGE: SEVERAL DAYS
2. NOT BEING ABLE TO STOP OR CONTROL WORRYING: SEVERAL DAYS
6. BECOMING EASILY ANNOYED OR IRRITABLE: NOT AT ALL
3. WORRYING TOO MUCH ABOUT DIFFERENT THINGS: SEVERAL DAYS
GAD7 TOTAL SCORE: 9
5. BEING SO RESTLESS THAT IT IS HARD TO SIT STILL: NEARLY EVERY DAY
IF YOU CHECKED OFF ANY PROBLEMS ON THIS QUESTIONNAIRE, HOW DIFFICULT HAVE THESE PROBLEMS MADE IT FOR YOU TO DO YOUR WORK, TAKE CARE OF THINGS AT HOME, OR GET ALONG WITH OTHER PEOPLE: SOMEWHAT DIFFICULT
7. FEELING AFRAID AS IF SOMETHING AWFUL MIGHT HAPPEN: NOT AT ALL

## 2022-01-19 ASSESSMENT — PATIENT HEALTH QUESTIONNAIRE - PHQ9
5. POOR APPETITE OR OVEREATING: NEARLY EVERY DAY
SUM OF ALL RESPONSES TO PHQ QUESTIONS 1-9: 14

## 2022-01-19 NOTE — PROGRESS NOTES
Liat is a 26 year old who is being evaluated via a billable video visit.      How would you like to obtain your AVS? MyChart  If the video visit is dropped, the invitation should be resent by: Text to cell phone: [phone]  Will anyone else be joining your video visit? No      Video Start Time: 6:33 PM    Assessment & Plan     Postpartum depression  Slightly worsening depression in the last few days, not feeling good about herself.  Lacks motivation.  Feels is tolerating sertraline well and is definitely working for moods.  Discussed increasing dosage, patient would like to proceed.  Will increase to 50 mg daily.  Patient to [facility] message provider in the next 2 to 3 weeks with an update and/or a refill.  - sertraline (ZOLOFT) 50 MG tablet; Take 1 tablet (50 mg) by mouth daily           Tobacco Cessation:   reports that she has been smoking cigarettes. She has been smoking about 0.25 packs per day. She has never used smokeless tobacco.  Tobacco Cessation Action Plan: Information offered: Patient not interested at this time    BMI:   Estimated body mass index is 39.33 kg/m  as calculated from the following:    Height as of 9/2/21: 1.524 m (5').    Weight as of 9/2/21: 91.4 kg (201 lb 6.4 oz).   Weight management plan: Discussed healthy diet and exercise guidelines    Depression Screening Follow Up    PHQ 1/19/2022   PHQ-9 Total Score 14   Q9: Thoughts of better off dead/self-harm past 2 weeks Not at all         See Patient Instructions    Return in about 2 weeks (around 2/2/2022) for Check in.    Alicia Graham, COLLIN, APRN-CNP   [facility]    Subjective   Liat is a 26 year old who presents for the following health issues:    HPI     Depression and Anxiety Follow-Up    How are you doing with your depression since your last visit? Worsened gaining weight, feels like she needs an increase in dosing     How are you doing with your anxiety since your last visit?  No change    Are  you having other symptoms that might be associated with depression or anxiety? Yes:  weight gain     Have you had a significant life event? No     Do you have any concerns with your use of alcohol or other drugs? No    Feels like the medication is definitely working   In the last few days feeling worse   Not feeling good about herself   On 25 mg dosage of Sertraline     Social History     Tobacco Use     Smoking status: Current Every Day Smoker     Packs/day: 0.25     Types: Cigarettes     Smokeless tobacco: Never Used     Tobacco comment: smoking 10 cig/day with pregnancy   Vaping Use     Vaping Use: Never used   Substance Use Topics     Alcohol use: Not Currently     Comment: occas-quit with pregnancy     Drug use: No     PHQ 11/23/2021 12/8/2021 1/19/2022   PHQ-9 Total Score 21 15 14   Q9: Thoughts of better off dead/self-harm past 2 weeks Not at all Not at all Not at all     LIDIA-7 SCORE 11/23/2021 12/8/2021 1/19/2022   Total Score - - -   Total Score 21 (severe anxiety) 11 (moderate anxiety) -   Total Score 21 11 9     Last PHQ-9 1/19/2022   1.  Little interest or pleasure in doing things 0   2.  Feeling down, depressed, or hopeless 3   3.  Trouble falling or staying asleep, or sleeping too much 1   4.  Feeling tired or having little energy 1   5.  Poor appetite or overeating 2   6.  Feeling bad about yourself 1   7.  Trouble concentrating 3   8.  Moving slowly or restless 3   Q9: Thoughts of better off dead/self-harm past 2 weeks 0   PHQ-9 Total Score 14   Difficulty at work, home, or with people -     LIDIA-7  1/19/2022   1. Feeling nervous, anxious, or on edge 1   2. Not being able to stop or control worrying 1   3. Worrying too much about different things 1   4. Trouble relaxing 3   5. Being so restless that it is hard to sit still 3   6. Becoming easily annoyed or irritable 0   7. Feeling afraid, as if something awful might happen 0   LIDIA-7 Total Score 9   If you checked any problems, how difficult have they  made it for you to do your work, take care of things at home, or get along with other people? Somewhat difficult       Suicide Assessment Five-step Evaluation and Treatment (SAFE-T)        Review of Systems   Constitutional, HEENT, cardiovascular, pulmonary, gi and gu systems are negative, except as otherwise noted.      Objective           Vitals:  No vitals were obtained today due to virtual visit.    Physical Exam   GENERAL: Healthy, alert and no distress  EYES: Eyes grossly normal to inspection.  No discharge or erythema, or obvious scleral/conjunctival abnormalities.  RESP: No audible wheeze, cough, or visible cyanosis.  No visible retractions or increased work of breathing.    SKIN: Visible skin clear. No significant rash, abnormal pigmentation or lesions.  NEURO: Cranial nerves grossly intact.  Mentation and speech appropriate for age.  PSYCH: Mentation appears normal, affect normal/bright, judgement and insight intact, normal speech and appearance well-groomed.    Diagnostic Test Results:  none            Video-Visit Details    Type of service:  Video Visit    Video End Time:6:46 PM    Originating Location (pt. Location): Home    Distant Location (provider location):  [facility]     Platform used for Video Visit: [employer]     Chart documentation with Dragon Voice recognition Software. Although reviewed after completion, some words and grammatical errors may remain.

## 2022-01-20 ASSESSMENT — ANXIETY QUESTIONNAIRES: GAD7 TOTAL SCORE: 9

## 2022-01-29 NOTE — PATIENT INSTRUCTIONS
Postpartum depression  Slightly worsening depression in the last few days, not feeling good about herself.  Lacks motivation.  Feels is tolerating sertraline well and is definitely working for moods.  Discussed increasing dosage, patient would like to proceed.  Will increase to 50 mg daily.  [facility] provider in the next 2 to 3 weeks with an update and/or a refill.  - sertraline (ZOLOFT) 50 MG tablet; Take 1 tablet (50 mg) by mouth daily
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
## 2022-04-12 ENCOUNTER — TELEPHONE (OUTPATIENT)
Dept: FAMILY MEDICINE | Facility: CLINIC | Age: 27
End: 2022-04-12

## 2022-04-12 ENCOUNTER — VIRTUAL VISIT (OUTPATIENT)
Dept: FAMILY MEDICINE | Facility: CLINIC | Age: 27
End: 2022-04-12
Payer: COMMERCIAL

## 2022-04-12 DIAGNOSIS — F31.32 BIPOLAR AFFECTIVE DISORDER, CURRENTLY DEPRESSED, MODERATE (H): Primary | ICD-10-CM

## 2022-04-12 DIAGNOSIS — F31.32 BIPOLAR AFFECTIVE DISORDER, CURRENTLY DEPRESSED, MODERATE (H): ICD-10-CM

## 2022-04-12 PROCEDURE — 99214 OFFICE O/P EST MOD 30 MIN: CPT | Mod: 95 | Performed by: NURSE PRACTITIONER

## 2022-04-12 RX ORDER — SERTRALINE HYDROCHLORIDE 25 MG/1
TABLET, FILM COATED ORAL
Qty: 43 TABLET | Refills: 0 | Status: SHIPPED | OUTPATIENT
Start: 2022-04-12 | End: 2022-06-24

## 2022-04-12 RX ORDER — SERTRALINE HYDROCHLORIDE 25 MG/1
TABLET, FILM COATED ORAL
Qty: 43 TABLET | Refills: 0 | Status: SHIPPED | OUTPATIENT
Start: 2022-04-12 | End: 2022-04-12

## 2022-04-12 ASSESSMENT — ANXIETY QUESTIONNAIRES
7. FEELING AFRAID AS IF SOMETHING AWFUL MIGHT HAPPEN: SEVERAL DAYS
GAD7 TOTAL SCORE: 19
IF YOU CHECKED OFF ANY PROBLEMS ON THIS QUESTIONNAIRE, HOW DIFFICULT HAVE THESE PROBLEMS MADE IT FOR YOU TO DO YOUR WORK, TAKE CARE OF THINGS AT HOME, OR GET ALONG WITH OTHER PEOPLE: SOMEWHAT DIFFICULT
5. BEING SO RESTLESS THAT IT IS HARD TO SIT STILL: NEARLY EVERY DAY
3. WORRYING TOO MUCH ABOUT DIFFERENT THINGS: NEARLY EVERY DAY
1. FEELING NERVOUS, ANXIOUS, OR ON EDGE: NEARLY EVERY DAY
6. BECOMING EASILY ANNOYED OR IRRITABLE: NEARLY EVERY DAY
2. NOT BEING ABLE TO STOP OR CONTROL WORRYING: NEARLY EVERY DAY

## 2022-04-12 ASSESSMENT — PATIENT HEALTH QUESTIONNAIRE - PHQ9
5. POOR APPETITE OR OVEREATING: NEARLY EVERY DAY
SUM OF ALL RESPONSES TO PHQ QUESTIONS 1-9: 21
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
## 2022-04-12 NOTE — PATIENT INSTRUCTIONS
Bipolar affective disorder, currently depressed, moderate (H)  History of bipolar disorder with depression as well as most recently postpartum depression.  Started on sertraline and did well, increase dose to 50 mg patient had increased jitteriness on.  Taking medications approximately 3 weeks ago.  Depression is significantly worsened since also complicated by significant amount of stressors at home.  Patient denies any suicidal thoughts.  Discussed counseling and patient is open to start.  Referral placed through [facility], patient should be receiving a phone call to schedule.  Other counseling resources also provided and patient instructions for patient to look into.  Will restart sertraline, tapering up only a half of a tab after 1 week and patient to follow-up in approximately 1 month for recheck.  - Adult Mental Health  Referral; Future  - sertraline (ZOLOFT) 25 MG tablet; Take 1 tablet (25 mg) by mouth daily for 7 days, THEN 1.5 tablets (37.5 mg) daily for 24 days.    Postpartum depression  Postpartum depression complicating underlying depressive bipolar as above.  - Adult Mental Health  Referral; Future  - sertraline (ZOLOFT) 25 MG tablet; Take 1 tablet (25 mg) by mouth daily for 7 days, THEN 1.5 tablets (37.5 mg) daily for 24 days.      Other counseling options  [facility] ([place])-- [phone]  [facility] ([place]) -- [phone]  [facility] ([facility]) -- [phone]  [facility] ([place]) -- [phone]  [facility] ([place], other [facility] as well) -- [phone]  [facility] ([place]) -- [phone]  [facility] ([place], multiple locations) -- [phone]  [facility] ([facility], [facility]) -- [phone]

## 2022-04-12 NOTE — TELEPHONE ENCOUNTER
Pharmacy asking for direction clarification on the Sertraline .    Is this 37.5 mg or 1/1/2 tabs?    Sig - Route: Take 1 tablet (25 mg) by mouth daily for 7 days, THEN 1.5 tablets (37.5 mg) daily for 24 days.

## 2022-04-13 ASSESSMENT — ANXIETY QUESTIONNAIRES: GAD7 TOTAL SCORE: 19

## 2022-05-06 ENCOUNTER — MEDICAL CORRESPONDENCE (OUTPATIENT)
Dept: HEALTH INFORMATION MANAGEMENT | Facility: CLINIC | Age: 27
End: 2022-05-06
Payer: COMMERCIAL

## 2022-06-24 ENCOUNTER — MYC REFILL (OUTPATIENT)
Dept: FAMILY MEDICINE | Facility: CLINIC | Age: 27
End: 2022-06-24

## 2022-06-24 DIAGNOSIS — F31.32 BIPOLAR AFFECTIVE DISORDER, CURRENTLY DEPRESSED, MODERATE (H): ICD-10-CM

## 2022-06-24 RX ORDER — SERTRALINE HYDROCHLORIDE 25 MG/1
TABLET, FILM COATED ORAL
Qty: 43 TABLET | Refills: 0 | Status: SHIPPED | OUTPATIENT
Start: 2022-06-24 | End: 2022-08-25

## 2022-06-24 ASSESSMENT — PATIENT HEALTH QUESTIONNAIRE - PHQ9: SUM OF ALL RESPONSES TO PHQ QUESTIONS 1-9: 7

## 2022-06-28 ENCOUNTER — TELEPHONE (OUTPATIENT)
Dept: FAMILY MEDICINE | Facility: CLINIC | Age: 27
End: 2022-06-28

## 2022-06-28 NOTE — TELEPHONE ENCOUNTER
This stated last Monday, she has clotting and brown discharge for a few days, now she just has spotting. Her regular period is now due in 2 days. Advised to see what happens over the next month, if she has another abnormal cycle she will call to update her PCP. If bleeding becomes heavy and or she has large clots, she should be seen in clinic or ED

## 2022-06-28 NOTE — TELEPHONE ENCOUNTER
Reason for call:  Patient reporting a symptom    Symptom or request: Pt got her period 2 weeks early and has been bleeding x 8 days.  Pt is passing some quarter sized clots.  No weakness or light headedness, but pt does feel bloated.  Please call patient and advise.      Duration (how long have symptoms been present): 2 weeks    Have you been treated for this before? No    Additional comments:     Phone Number patient can be reached at:  Home number on file [phone] (home)    Best Time:  any    Can we leave a detailed message on this number:  YES    Call taken on 6/28/2022 at 8:20 AM by Cristina Brannon

## 2022-07-12 ENCOUNTER — NURSE TRIAGE (OUTPATIENT)
Dept: NURSING | Facility: CLINIC | Age: 27
End: 2022-07-12

## 2022-07-12 NOTE — TELEPHONE ENCOUNTER
"Spoke with PCP who agreed with triage advise to go to ER. Called patient. She states she cannot go to ER as she has nobody to watch her children. Explained her situation sounds serious, it would be awful if she passed out. She got upset with this nurse and stated she didn't appreciate being made to feel guilty. Writer expressed that was not my intention. Suggested she bring someone with to ER to sit with her children. She states her only support system is her elderly grandparents who would not appreciate sitting in ER. Her significant other is gone at work for the railroad and cannot \"just up and leave\". She states, \"I've been dealing wit this non-stop and I know the risks\". Has clinic appt scheduled for 07/14/22.  Nicole BRUNO RN    "

## 2022-07-12 NOTE — TELEPHONE ENCOUNTER
Pt calling with concerns about;    'Something is going on with my body'  Heavy period with a lot of blood clots - lower back pain  9 days of heavy bleeding (has never been more than 5 days)  Now I am having constant tingling & numbness in lips, arms, hands, fingers, and down to her waist  When she moves her head  Feels like she's in a cloud, forgetting what she was doing  Exhausted, feeling very bloated  Feet are swollen  Headaches every day X 4 days - pressure feeling from top of her head down to her eyes  Urinating more frequently X 2 weeks  Urine is notably cloudy and more odorous at times  Pink on toilet paper    Pt Denies;  Fever  Vomiting  Difficulty breathing    According to the protocol, patient should Go to ED/UCC or office with PCP approval.  Care advice given. Patient verbalizes understanding and states she is unable to go anywhere because she is home with 3 kids under 5 and nursing her baby.    Message routed to PCP/care team to advise.    Viola Tao RN, Nurse Advisor 4:29 PM 7/12/2022  COVID 19 Nurse Triage Plan/Patient Instructions    Please be aware that novel coronavirus (COVID-19) may be circulating in the community. If you develop symptoms such as fever, cough, or SOB or if you have concerns about the presence of another infection including coronavirus (COVID-19), please contact your health care provider or visit [email or web address].     Disposition/Instructions    In-Person Visit with provider recommended. Reference Visit Selection Guide.    Thank you for taking steps to prevent the spread of this virus.  o Limit your contact with others.  o Wear a simple mask to cover your cough.  o Wash your hands well and often.    Reason for Disposition    Blood in urine (red, pink, or tea-colored)    Additional Information    Negative: Passed out (i.e., fainted, collapsed and was not responding)    Negative: Shock suspected (e.g., cold/pale/clammy skin, too weak to stand, low BP, rapid pulse)     Negative: Sounds like a life-threatening emergency to the triager    Negative: Major injury to the back (e.g., MVA, fall > 10 feet or 3 meters, penetrating injury, etc.)    Negative: Pain in the upper back over the ribs (rib cage) that radiates (travels) into the chest    Negative: Pain in the upper back over the ribs (rib cage) and worsened by coughing (or clearly increases with breathing)    Negative: SEVERE back pain of sudden onset and age > 60    Negative: SEVERE abdominal pain (e.g., excruciating)    Negative: Abdominal pain and age > 60    Negative: Unable to urinate (or only a few drops) and bladder feels very full    Negative: Loss of bladder or bowel control (urine or bowel incontinence; wetting self, leaking stool) of new onset    Negative: Numbness (loss of sensation) in groin or rectal area    Negative: Pain radiates into groin, scrotum    Protocols used: BACK PAIN-A-OH

## 2022-07-14 ENCOUNTER — OFFICE VISIT (OUTPATIENT)
Dept: FAMILY MEDICINE | Facility: CLINIC | Age: 27
End: 2022-07-14
Payer: COMMERCIAL

## 2022-07-14 VITALS
RESPIRATION RATE: 18 BRPM | WEIGHT: 209 LBS | HEIGHT: 60 IN | TEMPERATURE: 98.8 F | BODY MASS INDEX: 41.03 KG/M2 | HEART RATE: 72 BPM | DIASTOLIC BLOOD PRESSURE: 70 MMHG | SYSTOLIC BLOOD PRESSURE: 118 MMHG

## 2022-07-14 DIAGNOSIS — N93.9 ABNORMAL VAGINAL BLEEDING: Primary | ICD-10-CM

## 2022-07-14 DIAGNOSIS — E66.01 MORBID OBESITY (H): ICD-10-CM

## 2022-07-14 DIAGNOSIS — D64.9 ANEMIA, UNSPECIFIED TYPE: ICD-10-CM

## 2022-07-14 DIAGNOSIS — R39.89 URINARY PROBLEM: ICD-10-CM

## 2022-07-14 DIAGNOSIS — R53.83 FATIGUE, UNSPECIFIED TYPE: ICD-10-CM

## 2022-07-14 LAB
ALBUMIN SERPL-MCNC: 3.5 G/DL (ref 3.4–5)
ALBUMIN UR-MCNC: NEGATIVE MG/DL
ALP SERPL-CCNC: 84 U/L (ref 40–150)
ALT SERPL W P-5'-P-CCNC: 19 U/L (ref 0–50)
ANION GAP SERPL CALCULATED.3IONS-SCNC: 4 MMOL/L (ref 3–14)
APPEARANCE UR: CLEAR
AST SERPL W P-5'-P-CCNC: 13 U/L (ref 0–45)
BASOPHILS # BLD AUTO: 0.1 10E3/UL (ref 0–0.2)
BASOPHILS NFR BLD AUTO: 1 %
BILIRUB SERPL-MCNC: 0.2 MG/DL (ref 0.2–1.3)
BILIRUB UR QL STRIP: NEGATIVE
BUN SERPL-MCNC: 13 MG/DL (ref 7–30)
CALCIUM SERPL-MCNC: 9.1 MG/DL (ref 8.5–10.1)
CHLORIDE BLD-SCNC: 105 MMOL/L (ref 94–109)
CO2 SERPL-SCNC: 28 MMOL/L (ref 20–32)
COLOR UR AUTO: YELLOW
CREAT SERPL-MCNC: 0.64 MG/DL (ref 0.52–1.04)
EOSINOPHIL # BLD AUTO: 0.2 10E3/UL (ref 0–0.7)
EOSINOPHIL NFR BLD AUTO: 2 %
ERYTHROCYTE [DISTWIDTH] IN BLOOD BY AUTOMATED COUNT: 13.4 % (ref 10–15)
FERRITIN SERPL-MCNC: 6 NG/ML (ref 12–150)
GFR SERPL CREATININE-BSD FRML MDRD: >90 ML/MIN/1.73M2
GLUCOSE BLD-MCNC: 89 MG/DL (ref 70–99)
GLUCOSE UR STRIP-MCNC: NEGATIVE MG/DL
HCG UR QL: NEGATIVE
HCT VFR BLD AUTO: 36.6 % (ref 35–47)
HGB BLD-MCNC: 11.3 G/DL (ref 11.7–15.7)
HGB UR QL STRIP: ABNORMAL
IMM GRANULOCYTES # BLD: 0 10E3/UL
IMM GRANULOCYTES NFR BLD: 0 %
IRON SATN MFR SERPL: 6 % (ref 15–46)
IRON SERPL-MCNC: 26 UG/DL (ref 35–180)
KETONES UR STRIP-MCNC: NEGATIVE MG/DL
LEUKOCYTE ESTERASE UR QL STRIP: NEGATIVE
LYMPHOCYTES # BLD AUTO: 3.2 10E3/UL (ref 0.8–5.3)
LYMPHOCYTES NFR BLD AUTO: 35 %
MCH RBC QN AUTO: 25.1 PG (ref 26.5–33)
MCHC RBC AUTO-ENTMCNC: 30.9 G/DL (ref 31.5–36.5)
MCV RBC AUTO: 81 FL (ref 78–100)
MONOCYTES # BLD AUTO: 0.6 10E3/UL (ref 0–1.3)
MONOCYTES NFR BLD AUTO: 7 %
NEUTROPHILS # BLD AUTO: 5 10E3/UL (ref 1.6–8.3)
NEUTROPHILS NFR BLD AUTO: 55 %
NITRATE UR QL: NEGATIVE
PH UR STRIP: 7 [PH] (ref 5–7)
PLATELET # BLD AUTO: 403 10E3/UL (ref 150–450)
POTASSIUM BLD-SCNC: 4.5 MMOL/L (ref 3.4–5.3)
PROT SERPL-MCNC: 7.8 G/DL (ref 6.8–8.8)
RBC # BLD AUTO: 4.5 10E6/UL (ref 3.8–5.2)
RBC #/AREA URNS AUTO: ABNORMAL /HPF
SODIUM SERPL-SCNC: 137 MMOL/L (ref 133–144)
SP GR UR STRIP: 1.02 (ref 1–1.03)
SQUAMOUS #/AREA URNS AUTO: ABNORMAL /LPF
TIBC SERPL-MCNC: 438 UG/DL (ref 240–430)
TSH SERPL DL<=0.005 MIU/L-ACNC: 1.67 MU/L (ref 0.4–4)
UROBILINOGEN UR STRIP-ACNC: 0.2 E.U./DL
VIT B12 SERPL-MCNC: 799 PG/ML (ref 232–1245)
WBC # BLD AUTO: 9 10E3/UL (ref 4–11)
WBC #/AREA URNS AUTO: ABNORMAL /HPF

## 2022-07-14 PROCEDURE — 82607 VITAMIN B-12: CPT | Performed by: NURSE PRACTITIONER

## 2022-07-14 PROCEDURE — 99214 OFFICE O/P EST MOD 30 MIN: CPT | Performed by: NURSE PRACTITIONER

## 2022-07-14 PROCEDURE — 80050 GENERAL HEALTH PANEL: CPT | Performed by: NURSE PRACTITIONER

## 2022-07-14 PROCEDURE — 83550 IRON BINDING TEST: CPT | Performed by: NURSE PRACTITIONER

## 2022-07-14 PROCEDURE — 36415 COLL VENOUS BLD VENIPUNCTURE: CPT | Performed by: NURSE PRACTITIONER

## 2022-07-14 PROCEDURE — 81025 URINE PREGNANCY TEST: CPT | Performed by: NURSE PRACTITIONER

## 2022-07-14 PROCEDURE — 82728 ASSAY OF FERRITIN: CPT | Performed by: NURSE PRACTITIONER

## 2022-07-14 PROCEDURE — 81001 URINALYSIS AUTO W/SCOPE: CPT | Performed by: NURSE PRACTITIONER

## 2022-07-14 NOTE — PROGRESS NOTES
Assessment & Plan     Abnormal vaginal bleeding    - HCG Qual, Urine ([id number])  - US Pelvic Complete with Transvaginal  - CBC with platelets and differential  - Comprehensive metabolic panel (BMP + Alb, Alk Phos, ALT, AST, Total. Bili, TP)  - TSH with free T4 reflex  - Iron and iron binding capacity      Urinary problem    - UA macro with reflex to Microscopic and Culture - Clinc Collect  - Urine Microscopic    Fatigue, unspecified type    - Iron and iron binding capacity  - TSH with free T4 reflex  - Comprehensive metabolic panel (BMP + Alb, Alk Phos, ALT, AST, Total. Bili, TP)  - CBC with platelets and differential      Anemia, unspecified type  - Ferritin  - Vitamin B12    Follow-up after lab and ultrasound with GYN to discuss treatment strategies for ongoing dysfunctional uterine bleeding  Immunization update offered and declined      Call or return to the clinic with any worsening of symptoms or no resolution. Patient/Parent verbalized understanding and is in agreement. Medication side effects reviewed.   Current Outpatient Medications   Medication Sig Dispense Refill     sertraline (ZOLOFT) 25 MG tablet Take 1 tablet (25 mg) by mouth daily for 7 days, THEN 1.5 tablets (37.5 mg) daily for 24 days. 43 tablet 0     Chart documentation with Dragon Voice recognition Software. Although reviewed after completion, some words and grammatical errors may remain.    ARIEL Caldera CNP  [facility]    Fatuma Josue is a 27 year old, presenting for the following health issues:  Urinary Problem and Abnormal Bleeding Problem      HPI     Vaginal Bleeding (Dysmenorrhea)  Onset: two days    Description:   Duration of bleeding episodes: 5 daus  Frequency between periods:  One month  Describe bleeding/flow:   Clots: YES  Number of pads/hour: 1-2  Cramping: moderate    Accompanying Signs & Symptoms:  Weakness: YES  Lightheadedness: YES  Hot flashes: YES  Nosebleeds/Easy bruising:  no  Vaginal Discharge: no    History:  Patient's last menstrual period was 07/12/2022.  Previous normal periods: YES  Contraceptive use: NO  Possibility of Pregnancy: YES  Any bleeding after intercourse: no  Age of first period (menarche):   Abnormal PAP Smears: YES    Precipitating factors:       Alleviating factors:      Therapies Tried and outcome:           Review of Systems   Constitutional, HEENT, cardiovascular, pulmonary, GI, , musculoskeletal, neuro, skin, endocrine and psych systems are negative, except as otherwise noted.      Objective    /70 (BP Location: Right arm, Cuff Size: Adult Large)   Pulse 72   Temp 98.8  F (37.1  C) (Tympanic)   Resp 18   Ht 1.524 m (5')   Wt 94.8 kg (209 lb)   LMP 07/12/2022   BMI 40.82 kg/m    Body mass index is 40.82 kg/m .  Physical Exam   GENERAL: healthy, alert and no distress  EYES: Eyes grossly normal to inspection, PERRL and conjunctivae and sclerae normal  HENT: ear canals and TM's normal, nose and mouth without ulcers or lesions  NECK: no adenopathy, no asymmetry, masses, or scars and thyroid normal to palpation  RESP: lungs clear to auscultation - no rales, rhonchi or wheezes  CV: regular rate and rhythm, normal S1 S2, no S3 or S4, no murmur, click or rub, no peripheral edema and peripheral pulses strong  ABDOMEN: soft, nontender, no hepatosplenomegaly, no masses and bowel sounds normal  MS: no gross musculoskeletal defects noted, no edema  SKIN: no suspicious lesions or rashes  NEURO: Normal strength and tone, mentation intact and speech normal  PSYCH: mentation appears normal, affect normal/bright    Results for orders placed or performed in visit on 07/14/22   UA macro with reflex to Microscopic and Culture - Clinc Collect     Status: Abnormal    Specimen: Urine, Clean Catch   Result Value Ref Range    Color Urine Yellow Colorless, Straw, Light Yellow, Yellow    Appearance Urine Clear Clear    Glucose Urine Negative Negative mg/dL    Bilirubin  Urine Negative Negative    Ketones Urine Negative Negative mg/dL    Specific Gravity Urine 1.020 1.003 - 1.035    Blood Urine Moderate (A) Negative    pH Urine 7.0 5.0 - 7.0    Protein Albumin Urine Negative Negative mg/dL    Urobilinogen Urine 0.2 0.2, 1.0 E.U./dL    Nitrite Urine Negative Negative    Leukocyte Esterase Urine Negative Negative   HCG Qual, Urine ([id number])     Status: Normal   Result Value Ref Range    hCG Urine Qualitative Negative Negative   Urine Microscopic     Status: Abnormal   Result Value Ref Range    RBC Urine 25-50 (A) 0-2 /HPF /HPF    WBC Urine None Seen 0-5 /HPF /HPF    Squamous Epithelials Urine Few (A) None Seen /LPF    Narrative    Urine Culture not indicated   Iron and iron binding capacity     Status: Abnormal   Result Value Ref Range    Iron 26 (L) 35 - 180 ug/dL    Iron Binding Capacity 438 (H) 240 - 430 ug/dL    Iron Sat Index 6 (L) 15 - 46 %   TSH with free T4 reflex     Status: Normal   Result Value Ref Range    TSH 1.67 0.40 - 4.00 mU/L   Comprehensive metabolic panel (BMP + Alb, Alk Phos, ALT, AST, Total. Bili, TP)     Status: Normal   Result Value Ref Range    Sodium 137 133 - 144 mmol/L    Potassium 4.5 3.4 - 5.3 mmol/L    Chloride 105 94 - 109 mmol/L    Carbon Dioxide (CO2) 28 20 - 32 mmol/L    Anion Gap 4 3 - 14 mmol/L    Urea Nitrogen 13 7 - 30 mg/dL    Creatinine 0.64 0.52 - 1.04 mg/dL    Calcium 9.1 8.5 - 10.1 mg/dL    Glucose 89 70 - 99 mg/dL    Alkaline Phosphatase 84 40 - 150 U/L    AST 13 0 - 45 U/L    ALT 19 0 - 50 U/L    Protein Total 7.8 6.8 - 8.8 g/dL    Albumin 3.5 3.4 - 5.0 g/dL    Bilirubin Total 0.2 0.2 - 1.3 mg/dL    GFR Estimate >90 >60 mL/min/1.73m2   CBC with platelets and differential     Status: Abnormal   Result Value Ref Range    WBC Count 9.0 4.0 - 11.0 10e3/uL    RBC Count 4.50 3.80 - 5.20 10e6/uL    Hemoglobin 11.3 (L) 11.7 - 15.7 g/dL    Hematocrit 36.6 35.0 - 47.0 %    MCV 81 78 - 100 fL    MCH 25.1 (L) 26.5 - 33.0 pg    MCHC 30.9 (L) 31.5 -  36.5 g/dL    RDW 13.4 10.0 - 15.0 %    Platelet Count 403 150 - 450 10e3/uL    % Neutrophils 55 %    % Lymphocytes 35 %    % Monocytes 7 %    % Eosinophils 2 %    % Basophils 1 %    % Immature Granulocytes 0 %    Absolute Neutrophils 5.0 1.6 - 8.3 10e3/uL    Absolute Lymphocytes 3.2 0.8 - 5.3 10e3/uL    Absolute Monocytes 0.6 0.0 - 1.3 10e3/uL    Absolute Eosinophils 0.2 0.0 - 0.7 10e3/uL    Absolute Basophils 0.1 0.0 - 0.2 10e3/uL    Absolute Immature Granulocytes 0.0 <=0.4 10e3/uL   Ferritin     Status: Abnormal   Result Value Ref Range    Ferritin 6 (L) 12 - 150 ng/mL   Vitamin B12     Status: Normal   Result Value Ref Range    Vitamin B12 799 232 - 1,245 pg/mL   CBC with platelets and differential     Status: Abnormal    Narrative    The following orders were created for panel order CBC with platelets and differential.  Procedure                               Abnormality         Status                     ---------                               -----------         ------                     CBC with platelets and d...[[id number]]  Abnormal            Final result                 Please view results for these tests on the individual orders.                   .  ..

## 2022-08-25 ENCOUNTER — TELEPHONE (OUTPATIENT)
Dept: FAMILY MEDICINE | Facility: CLINIC | Age: 27
End: 2022-08-25

## 2022-08-25 ENCOUNTER — PRENATAL OFFICE VISIT (OUTPATIENT)
Dept: OBGYN | Facility: CLINIC | Age: 27
End: 2022-08-25
Payer: COMMERCIAL

## 2022-08-25 DIAGNOSIS — Z53.9 ERRONEOUS ENCOUNTER--DISREGARD: Primary | ICD-10-CM

## 2022-08-25 RX ORDER — SERTRALINE HYDROCHLORIDE 25 MG/1
25 TABLET, FILM COATED ORAL DAILY
COMMUNITY
End: 2022-08-31

## 2022-08-25 RX ORDER — QUINIDINE GLUCONATE 324 MG
1 TABLET, EXTENDED RELEASE ORAL DAILY
COMMUNITY
End: 2022-09-01

## 2022-08-25 NOTE — TELEPHONE ENCOUNTER
Alicia Graham:    Patient was called, see note below, says she has been off her Sertraline for 3 days now due to finding out she is pregnant and is more irritable and says she is on edge, denies thoughts of harm to herself/others, wondering what you advise. Says she has appointments for her children tomorrow with you so can ask you then, please keerthi.      MARY Maddox

## 2022-08-25 NOTE — PROGRESS NOTES
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  Past Medical History  Have you ever recieved care for your mental health? : (!) Yes  Have you ever been in a major accident or suffered serious trauma?: No  Within the last year, has anyone hit, slapped, kicked or otherwise hurt you?: No  In the last year, has anyone forced you to have sex when you didn't want to?: No    Past Medical History 2   Have you ever received a blood transfusion?: No  Would you accept a blood transfusion if was medically recommended?: Yes  Does anyone in your home smoke?: No   Is your blood type Rh negative?: No  Have you ever ?: (!) Yes  Have you been hospitalized for a nonsurgical reason excluding normal delivery?: (!) Yes (2018- accidental overdose)  Have you ever had an abnormal pap smear?: No    Past Medical History (Continued)  Do you have a history of abnormalities of the uterus?: No  Did your mother take DIANE or any other hormones when she was pregnant with you?: No  Do you have any other problems we have not asked about which you feel may be important to this pregnancy?: No

## 2022-08-25 NOTE — TELEPHONE ENCOUNTER
Please do ACT-I sent chaya    Patient recently found out she is pregnant, she stopped taking her Sertraline 3 days ago because she didn't know if could take during pregnancy. Patient is feeling irritable and would like advise.

## 2022-08-29 NOTE — TELEPHONE ENCOUNTER
Please notify patient it is okay to start back up on her sertraline at the previous dose.    Thanks,  Alicia Graham, DNP, APRN-CNP

## 2022-08-31 ENCOUNTER — MYC MEDICAL ADVICE (OUTPATIENT)
Dept: FAMILY MEDICINE | Facility: CLINIC | Age: 27
End: 2022-08-31

## 2022-08-31 DIAGNOSIS — F31.32 BIPOLAR AFFECTIVE DISORDER, CURRENTLY DEPRESSED, MODERATE (H): Primary | ICD-10-CM

## 2022-08-31 LAB
ABO/RH(D): NORMAL
ANTIBODY SCREEN: NEGATIVE
SPECIMEN EXPIRATION DATE: NORMAL

## 2022-08-31 RX ORDER — SERTRALINE HYDROCHLORIDE 25 MG/1
37.5 TABLET, FILM COATED ORAL DAILY
Qty: 135 TABLET | Refills: 0 | Status: SHIPPED | OUTPATIENT
Start: 2022-08-31 | End: 2022-11-25

## 2022-08-31 NOTE — TELEPHONE ENCOUNTER
Attempted to reach patient, line still fast busy signal. Will send My chart message.  Nicole BRUNO RN

## 2022-08-31 NOTE — TELEPHONE ENCOUNTER
Last office visit: 7/22/2022 with Alicia Graham  Future Office Visit:   Next 5 appointments (look out 90 days)    Sep 01, 2022  9:00 AM  New Prenatal with Vlad Tavarez MD, [facility] 1  [facility] ([facility] [place] ) [address] [address]  [phone]         Routing refill request to provider for review/approval because:  Medication is reported/historical  Nicole BRUNO RN

## 2022-09-01 ENCOUNTER — PRENATAL OFFICE VISIT (OUTPATIENT)
Dept: OBGYN | Facility: CLINIC | Age: 27
End: 2022-09-01
Payer: COMMERCIAL

## 2022-09-01 VITALS
TEMPERATURE: 99 F | SYSTOLIC BLOOD PRESSURE: 109 MMHG | BODY MASS INDEX: 42.53 KG/M2 | HEART RATE: 102 BPM | DIASTOLIC BLOOD PRESSURE: 94 MMHG | HEIGHT: 60 IN | RESPIRATION RATE: 16 BRPM | WEIGHT: 216.6 LBS

## 2022-09-01 DIAGNOSIS — F31.32 BIPOLAR AFFECTIVE DISORDER, CURRENTLY DEPRESSED, MODERATE (H): ICD-10-CM

## 2022-09-01 DIAGNOSIS — R87.612 PAPANICOLAOU SMEAR OF CERVIX WITH LOW GRADE SQUAMOUS INTRAEPITHELIAL LESION (LGSIL): ICD-10-CM

## 2022-09-01 DIAGNOSIS — Z34.81 PRENATAL CARE, SUBSEQUENT PREGNANCY IN FIRST TRIMESTER: Primary | ICD-10-CM

## 2022-09-01 LAB
ALBUMIN UR-MCNC: NEGATIVE MG/DL
APPEARANCE UR: ABNORMAL
BACTERIA #/AREA URNS HPF: ABNORMAL /HPF
BILIRUB UR QL STRIP: NEGATIVE
COLOR UR AUTO: YELLOW
ERYTHROCYTE [DISTWIDTH] IN BLOOD BY AUTOMATED COUNT: 15.1 % (ref 10–15)
GLUCOSE UR STRIP-MCNC: NEGATIVE MG/DL
HBV SURFACE AG SERPL QL IA: NONREACTIVE
HCT VFR BLD AUTO: 36.4 % (ref 35–47)
HCV AB SERPL QL IA: NONREACTIVE
HGB BLD-MCNC: 11 G/DL (ref 11.7–15.7)
HGB UR QL STRIP: ABNORMAL
HIV 1+2 AB+HIV1 P24 AG SERPL QL IA: NONREACTIVE
KETONES UR STRIP-MCNC: NEGATIVE MG/DL
LEUKOCYTE ESTERASE UR QL STRIP: ABNORMAL
MCH RBC QN AUTO: 24.6 PG (ref 26.5–33)
MCHC RBC AUTO-ENTMCNC: 30.2 G/DL (ref 31.5–36.5)
MCV RBC AUTO: 81 FL (ref 78–100)
MUCOUS THREADS #/AREA URNS LPF: PRESENT /LPF
NITRATE UR QL: NEGATIVE
PH UR STRIP: 7 [PH] (ref 5–7)
PLATELET # BLD AUTO: 409 10E3/UL (ref 150–450)
RBC # BLD AUTO: 4.47 10E6/UL (ref 3.8–5.2)
RBC #/AREA URNS AUTO: ABNORMAL /HPF
RUBV IGG SERPL QL IA: 2.29 INDEX
RUBV IGG SERPL QL IA: POSITIVE
SP GR UR STRIP: 1.01 (ref 1–1.03)
SQUAMOUS #/AREA URNS AUTO: ABNORMAL /LPF
T PALLIDUM AB SER QL: NONREACTIVE
UROBILINOGEN UR STRIP-ACNC: 0.2 E.U./DL
WBC # BLD AUTO: 9.2 10E3/UL (ref 4–11)
WBC #/AREA URNS AUTO: ABNORMAL /HPF

## 2022-09-01 PROCEDURE — 87624 HPV HI-RISK TYP POOLED RSLT: CPT | Performed by: OBSTETRICS & GYNECOLOGY

## 2022-09-01 PROCEDURE — 86900 BLOOD TYPING SEROLOGIC ABO: CPT | Performed by: OBSTETRICS & GYNECOLOGY

## 2022-09-01 PROCEDURE — 85027 COMPLETE CBC AUTOMATED: CPT | Performed by: OBSTETRICS & GYNECOLOGY

## 2022-09-01 PROCEDURE — 86803 HEPATITIS C AB TEST: CPT | Performed by: OBSTETRICS & GYNECOLOGY

## 2022-09-01 PROCEDURE — 99207 PR FIRST OB VISIT: CPT | Performed by: OBSTETRICS & GYNECOLOGY

## 2022-09-01 PROCEDURE — 36415 COLL VENOUS BLD VENIPUNCTURE: CPT | Performed by: OBSTETRICS & GYNECOLOGY

## 2022-09-01 PROCEDURE — 87389 HIV-1 AG W/HIV-1&-2 AB AG IA: CPT | Performed by: OBSTETRICS & GYNECOLOGY

## 2022-09-01 PROCEDURE — 86780 TREPONEMA PALLIDUM: CPT | Performed by: OBSTETRICS & GYNECOLOGY

## 2022-09-01 PROCEDURE — 86762 RUBELLA ANTIBODY: CPT | Performed by: OBSTETRICS & GYNECOLOGY

## 2022-09-01 PROCEDURE — 88142 CYTOPATH C/V THIN LAYER: CPT | Performed by: OBSTETRICS & GYNECOLOGY

## 2022-09-01 PROCEDURE — 87491 CHLMYD TRACH DNA AMP PROBE: CPT | Performed by: OBSTETRICS & GYNECOLOGY

## 2022-09-01 PROCEDURE — 87086 URINE CULTURE/COLONY COUNT: CPT | Performed by: OBSTETRICS & GYNECOLOGY

## 2022-09-01 PROCEDURE — 81001 URINALYSIS AUTO W/SCOPE: CPT | Performed by: OBSTETRICS & GYNECOLOGY

## 2022-09-01 PROCEDURE — 87340 HEPATITIS B SURFACE AG IA: CPT | Performed by: OBSTETRICS & GYNECOLOGY

## 2022-09-01 PROCEDURE — 87591 N.GONORRHOEAE DNA AMP PROB: CPT | Performed by: OBSTETRICS & GYNECOLOGY

## 2022-09-01 PROCEDURE — 86850 RBC ANTIBODY SCREEN: CPT | Performed by: OBSTETRICS & GYNECOLOGY

## 2022-09-01 PROCEDURE — 86901 BLOOD TYPING SEROLOGIC RH(D): CPT | Performed by: OBSTETRICS & GYNECOLOGY

## 2022-09-01 RX ORDER — FERROUS SULFATE 325(65) MG
325 TABLET, DELAYED RELEASE (ENTERIC COATED) ORAL DAILY
COMMUNITY
End: 2022-10-25

## 2022-09-01 NOTE — PROGRESS NOTES
Liat Corcoran is a 27 year old single   who presents to the clinic for an new ob visit.    Estimated Date of Delivery: Data Unavailable is calculated from No LMP recorded (lmp unknown). Patient is pregnant.   She has not had bleeding since her LMP.   She has had mild nausea. Weigh loss has not occurred.   This was a planned pregnancy.   OVIDIO is involved,  Rosalio   OTHER CONCERNS: unusual period in   INFECTION HISTORY  HIV: no  Hepatitis B: no  Hepatitis C: no  Syphilis:  no  Tuberculosis: no   PPD- no  Herpes self: no  Herpes partner:  no  Chlamydia:  no  Gonorrhea:  no  HPV: no  BV:  no  Trichomonis:  no  Chicken Pox:  YES  ====================================================  PERSONAL/SOCIAL HISTORY  Lives lives with their family.  Employment: Full time.  Her job involves light activity and moderate activity .  Additional items: None  =====================================================   REVIEW OF SYSTEMS  CONSTITUTIONAL: NEGATIVE for fever, chills  INTEGUMENTARY/SKIN: NEGATIVE for worrisome rashes, moles or lesions  EYES: NEGATIVE for vision changes   ENT/MOUTH: NEGATIVE for ear, mouth and throat problems  RESP: NEGATIVE for significant cough or SOB  BREAST: NEGATIVE for masses, tenderness or discharge  CV: NEGATIVE for chest pain, palpitations   GI: NEGATIVE for nausea, abdominal pain, heartburn, or change in bowel habits  : NEGATIVE for frequency, dysuria, or hematuria  MUSCULOSKELETAL: NEGATIVE for significant arthralgias or myalgia  NEURO: NEGATIVE for weakness, dizziness or paresthesias or headache  ENDOCRINE: NEGATIVE for temperature intolerance, skin/hair changes  HEME: NEGATIVE for bleeding problems  HEME/ALLERGY/IMMUNE: POSITIVE  for anemia  ====================================================  PHYSICAL EXAM:  LMP  (LMP Unknown)   BMI- There is no height or weight on file to calculate BMI.,     RECOMMENDED WEIGHT GAIN: 15-25 lbs.  GENERAL:  Pleasant pregnant female, alert, well  groomed.  SKIN:  Warm and dry, without lesions or rashes  HEAD: Symmetrical features.  EYES:  PERRLA,   MOUTH:  Buccal mucosa pink, moist without lesions.    NECK:  Thyroid without enlargement and nodules.  Lymph nodes not palpable.   LUNGS:  Clear to auscultation.  BREAST:  Symmetrical.  No dominant, fixed or suspicious masses are noted.  No skin or nipple changes or axillary nodes.  Self exam is taught and encouraged.  Nipples everted.      HEART:  RRR without murmur.  ABDOMEN: Soft without masses , tenderness or organomegaly.  No CVA tenderness. No scars noted.. FHT n/a  MUSCULOSKELETAL:  Full range of motion  EXTREMITIES:  No edema. No significant varicosities.   GENITALIA:  BUS WNL, no lesions noted   VAGINA:  Pink, normal rugae and discharge normal and physiologic,   CERVIX:  smooth, without discharge or CMT and parous os,   firm/ closed 4 cm long.  UTERUS: Anteverted, nontender 6 weeks in size.  ADNEXA: Without masses or tenderness  PELVIS:  Arch; wide . Sacrum; deep. Spines;blunt.  Side walls; straight. Type; gynecoid  PELVIS:   Adequate, Pelvis proven to 7 pounds 9 ounces.  RECTAL:  Normal appearance.  Digital exam deferred.  WET PREP:Not done  gonorrhea  =========================================  ASSESSMENT:  (Z34.81) Prenatal care, subsequent pregnancy in first trimester  (primary encounter diagnosis)  Comment: suspect early pregnancy around 5 weeks  Plan: US OB <14 Weeks w Transvaginal Single, ABO/Rh         type and screen, CBC with platelets, Hepatitis         B surface antigen, Rubella Antibody IgG, HIV         Antigen Antibody Combo, Treponema Abs w Reflex         to RPR and Titer, Hepatitis C Screen Reflex to         HCV RNA Quant and Genotype, UA with         Microscopic, Urine Culture, Chlamydia         trachomatis/Neisseria gonorrhoeae by PCR, Urine        Microscopic Exam            (R87.612) Papanicolaou smear of cervix with low grade squamous intraepithelial lesion (LGSIL)  Comment: hx of  LSIL  Plan: Pap diagnostic with HPV            PREGNANCY AT RISK? yes  ==========================================  PLAN:  Instructed on use of triage nurse line and contacting the on call CNM after hours for an urgent need such as fever, vagina bleeding, bladder or vaginal infection, rupture of membranes,  or term labor.    Discussed the indications, uses for and false positives for quad screen, nuchal translucency and fetal survey ultrasound at 18-20 weeks gestation. Will inform us at the next visit if she wished to avail herself of these screens.  Instructed on best evidence for: weight gain for her BMI for pregnancy; healthy diet and foods to avoid; exercise and activity during pregnancy;avoiding exposure to toxoplasmosis; and maintenance of a generally healthy lifestyle.   Discussed the harms, benefits, side effects and alternative therapies for current prescribed and OTC medications.    Vlad Tavarez MD

## 2022-09-01 NOTE — TELEPHONE ENCOUNTER
"Requested Prescriptions   Pending Prescriptions Disp Refills     sertraline (ZOLOFT) 25 MG tablet [Pharmacy Med Name: Sertraline HCl 25 MG Oral Tablet] 15 tablet 0     Sig: TAKE 1/2 (ONE-HALF) TABLET BY MOUTH ONCE DAILY ALONG  WITH  1/2  (ONE-HALF)  TABLET  OF THE 50MG FOR A TOTAL DAILY DOSE OF 37.5MG.       SSRIs Protocol Failed - 9/1/2022  1:25 PM        Failed - PHQ-9 score less than 5 in past 6 months     Please review last PHQ-9 score.           Failed - No active pregnancy on record        Passed - Medication is active on med list        Passed - Patient is age 18 or older        Passed - No positive pregnancy test in last 12 months        Passed - Recent (6 mo) or future (30 days) visit within the authorizing provider's specialty     Patient had office visit in the last 6 months or has a visit in the next 30 days with authorizing provider or within the authorizing provider's specialty.  See \"Patient Info\" tab in inbasket, or \"Choose Columns\" in Meds & Orders section of the refill encounter.               Last Written Prescription Date:  8/31/22  Last Fill Quantity: 135,  # refills: 0   Last office visit: 7/22/2022 with prescribing provider:     Future Office Visit:   Next 5 appointments (look out 90 days)    Sep 07, 2022  2:30 PM  ESTABLISHED PRENATAL with Vlad Tavarez MD, [facility] 2  [facility] ([facility] [place] ) [address] [address] [phone]                 "

## 2022-09-02 LAB
C TRACH DNA SPEC QL PROBE+SIG AMP: NEGATIVE
N GONORRHOEA DNA SPEC QL NAA+PROBE: NEGATIVE

## 2022-09-02 RX ORDER — SERTRALINE HYDROCHLORIDE 25 MG/1
TABLET, FILM COATED ORAL
Qty: 15 TABLET | Refills: 0 | OUTPATIENT
Start: 2022-09-02

## 2022-09-03 LAB — BACTERIA UR CULT: NORMAL

## 2022-09-06 ENCOUNTER — TELEPHONE (OUTPATIENT)
Dept: OBGYN | Facility: CLINIC | Age: 27
End: 2022-09-06

## 2022-09-06 LAB
BKR LAB AP GYN ADEQUACY: NORMAL
BKR LAB AP GYN INTERPRETATION: NORMAL
BKR LAB AP HPV REFLEX: NORMAL
BKR LAB AP PREVIOUS ABNL DX: NORMAL
BKR LAB AP PREVIOUS ABNORMAL: NORMAL
PATH REPORT.COMMENTS IMP SPEC: NORMAL
PATH REPORT.COMMENTS IMP SPEC: NORMAL
PATH REPORT.RELEVANT HX SPEC: NORMAL

## 2022-09-06 NOTE — TELEPHONE ENCOUNTER
Pt had to cancel follow up appt for tomorrow due to son's school schedule. Would like to reschedule something for next week, but there is no one available. Please advise for scheduling    Pt's cell phone currently not available. Ok to contact her via [facility]

## 2022-09-07 ENCOUNTER — TELEPHONE (OUTPATIENT)
Dept: FAMILY MEDICINE | Facility: CLINIC | Age: 27
End: 2022-09-07

## 2022-09-07 ENCOUNTER — TELEPHONE (OUTPATIENT)
Dept: OBGYN | Facility: CLINIC | Age: 27
End: 2022-09-07

## 2022-09-07 DIAGNOSIS — Z34.81 PRENATAL CARE, SUBSEQUENT PREGNANCY IN FIRST TRIMESTER: Primary | ICD-10-CM

## 2022-09-07 NOTE — TELEPHONE ENCOUNTER
Symptoms    Describe your symptoms: Headache, pressure, eye pain, making pt nauseous.    Any pain: Yes    How long have you been having symptoms: 3  days    Have you been seen for this:  No      Home remedies tried: Cold pack, Tylenol, nothing is working. Pt is pregnant so not sure what else she can take. Wondering what she can do.    Preferred Pharmacy:   [facility] [address] [place] [address] [address] [facility]  [address] [facility] [address]  Phone: [phone] Fax: [phone]      Could we send this information to you in [facility] or would you prefer to receive a phone call?:   Patient would prefer a phone call   Okay to leave a detailed message?: Yes at Home number on file [phone] (home)

## 2022-09-07 NOTE — TELEPHONE ENCOUNTER
General Call      Reason for Call:  Pt calling and is wondering if Alicia can prescribe pt anything to help with her nausea as pt it pregnant and has not been feeling well.      Could we send this information to you in [place] or would you prefer to receive a phone call?:   Patient would prefer a phone call   Okay to leave a detailed message?: Yes at Home number on file [phone] (home)

## 2022-09-07 NOTE — TELEPHONE ENCOUNTER
Please notify patient would recommend starting with Pyridoxine (vitamin B6) over-the-counter. 10 to 25 mg orally every six to eight hours; the maximum treatment dose suggested for pregnant individuals is 200 mg/day.  She has follow-up with OB/GYN on the 13th, if not improving with this would discuss this at her appointment.    Thanks,  Alicia Graham, DNP, APRN-CNP

## 2022-09-07 NOTE — TELEPHONE ENCOUNTER
Spoke with Liat, advised continue to do what she is doing, if headache is intolerable she will need to be seen in the ED. She will call her OB/GYN or this office back with further questions or concerns

## 2022-09-07 NOTE — TELEPHONE ENCOUNTER
Spoke to patient regarding a follow up ultrasound, I got the patient scheduled for a follow up ultrasound next Tuesday at 10:15 before her clinical appointment with Dr. Jackson. I notified patient and she is aware of arrival time.

## 2022-09-08 LAB
HUMAN PAPILLOMA VIRUS 16 DNA: NEGATIVE
HUMAN PAPILLOMA VIRUS 18 DNA: NEGATIVE
HUMAN PAPILLOMA VIRUS FINAL DIAGNOSIS: NORMAL
HUMAN PAPILLOMA VIRUS OTHER HR: NEGATIVE

## 2022-09-13 ENCOUNTER — PRENATAL OFFICE VISIT (OUTPATIENT)
Dept: OBGYN | Facility: CLINIC | Age: 27
End: 2022-09-13
Payer: COMMERCIAL

## 2022-09-13 ENCOUNTER — HOSPITAL ENCOUNTER (OUTPATIENT)
Dept: ULTRASOUND IMAGING | Facility: CLINIC | Age: 27
Discharge: HOME | End: 2022-09-13
Attending: OBSTETRICS & GYNECOLOGY | Admitting: OBSTETRICS & GYNECOLOGY
Payer: COMMERCIAL

## 2022-09-13 VITALS
TEMPERATURE: 98.4 F | SYSTOLIC BLOOD PRESSURE: 114 MMHG | HEART RATE: 72 BPM | DIASTOLIC BLOOD PRESSURE: 81 MMHG | RESPIRATION RATE: 14 BRPM | WEIGHT: 216.6 LBS | BODY MASS INDEX: 42.53 KG/M2 | HEIGHT: 60 IN

## 2022-09-13 DIAGNOSIS — Z34.81 PRENATAL CARE, SUBSEQUENT PREGNANCY IN FIRST TRIMESTER: ICD-10-CM

## 2022-09-13 DIAGNOSIS — Z34.81 PRENATAL CARE, SUBSEQUENT PREGNANCY IN FIRST TRIMESTER: Primary | ICD-10-CM

## 2022-09-13 PROCEDURE — 99207 PR PRENATAL VISIT: CPT | Performed by: OBSTETRICS & GYNECOLOGY

## 2022-09-13 PROCEDURE — 76801 OB US < 14 WKS SINGLE FETUS: CPT

## 2022-09-13 NOTE — NURSING NOTE
Initial /81 (BP Location: Right arm, Patient Position: Sitting, Cuff Size: Adult Large)   Pulse 72   Temp 98.4  F (36.9  C) (Tympanic)   Resp 14   Ht 1.524 m (5')   Wt 98.2 kg (216 lb 9.6 oz)   LMP 06/20/2022   BMI 42.30 kg/m   Estimated body mass index is 42.3 kg/m  as calculated from the following:    Height as of this encounter: 1.524 m (5').    Weight as of this encounter: 98.2 kg (216 lb 9.6 oz). .

## 2022-09-13 NOTE — PROGRESS NOTES
[facility]    Return OB Note    CC: Return OB     Subjective:  Liat is a 27 year old  at 8w1d by US today.  Denies vaginal bleeding, loss of fluid, or pelvic cramping. No fetal movement.  Complaints today: Fatigue     Objective:  /81 (BP Location: Right arm, Patient Position: Sitting, Cuff Size: Adult Large)   Pulse 72   Temp 98.4  F (36.9  C) (Tympanic)   Resp 14   Ht 1.524 m (5')   Wt 98.2 kg (216 lb 9.6 oz)   LMP 2022   BMI 42.30 kg/m      US performed in radiology today, confirms viable IUP at 8w1d.    Assessment/Plan:   Encounter Diagnosis   Name Primary?     Prenatal care, subsequent pregnancy in first trimester Yes       IUP at 8w1d  -Dating by US today  -NOB labs obtained at last visit and normal  -Genetic screening: Wants NIPT at next appointment. Plan for gender in an envelope        RTC 4 weeks    Argenis Jackson DO

## 2022-10-11 ENCOUNTER — TELEPHONE (OUTPATIENT)
Dept: OBGYN | Facility: CLINIC | Age: 27
End: 2022-10-11

## 2022-10-11 ENCOUNTER — TELEPHONE (OUTPATIENT)
Dept: FAMILY MEDICINE | Facility: CLINIC | Age: 27
End: 2022-10-11

## 2022-10-11 NOTE — TELEPHONE ENCOUNTER
S-(situation): Patient asking what she can take for symptoms besides Tylenol?    B-(background):  at 12w1d  Estimated Date of Delivery: 2023      A-(assessment): Symptoms started yesterday -    Body aches   Congestion  Headache  Fever yesterday 102.3  Patient feels hot and sweating today and cold and shivering today  Has not checked temperature today  No cough   No sore throat   Patient denies difficulty breathing,    Patient denies bright red bleeding  No abnormal discharge  Patient reports some abdominal cramping yesterday with low back    Patient taking Tylenol per package directions.      R-(recommendations): Recommended patient test for COVID as symptoms are representative of COVID virus. 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    Reviewed with patient COVID risks in pregnancy can be greater than in a non-pregnant patient so would recommend checking. Paxlovid would be recommended if patient is COVID positive. Reviewed with patient medications she can take for symptoms she is experiencing. Reference is Medications in Pregnancy available in the New OB patient handout. Continue to monitor for worsening or non-improving symptoms. Call back with further questions or concerns. Rest as able and push fluids to stay hydrated as fevers can be dehydrating. Signs and symptoms of dehydration need further evaluation in the Er. Reviewed with patient.    Pt in agreement and reports understanding.    Jennifer Brown   Ob/Gyn Clinic  RN

## 2022-10-11 NOTE — TELEPHONE ENCOUNTER
Reason for Call:  12 Weeks Pregnant and med question    Detailed comments: Body Aches, Nasal Congestion, Headache, Temp 102.3 yesterday. Pt has been taking tylneol is there something else she can take.  Pt is 12 weeks Pregnant      Phone Number Patient can be reached at: Home number on file [phone] (home)    Best Time: Any Time      Can we leave a detailed message on this number? YES    Call taken on 10/11/2022 at 11:04 AM by Stefany Bond

## 2022-10-14 NOTE — TELEPHONE ENCOUNTER
See 10-11-22 [facility] OB/ gyn triage note with recommendations.  MICKIE Chahal RN

## 2022-10-25 ENCOUNTER — PRENATAL OFFICE VISIT (OUTPATIENT)
Dept: OBGYN | Facility: CLINIC | Age: 27
End: 2022-10-25
Payer: COMMERCIAL

## 2022-10-25 VITALS
DIASTOLIC BLOOD PRESSURE: 65 MMHG | WEIGHT: 221.1 LBS | HEART RATE: 97 BPM | RESPIRATION RATE: 16 BRPM | HEIGHT: 60 IN | TEMPERATURE: 98.3 F | BODY MASS INDEX: 43.41 KG/M2 | SYSTOLIC BLOOD PRESSURE: 102 MMHG

## 2022-10-25 DIAGNOSIS — Z34.82 ENCOUNTER FOR SUPERVISION OF OTHER NORMAL PREGNANCY IN SECOND TRIMESTER: ICD-10-CM

## 2022-10-25 DIAGNOSIS — Z34.81 PRENATAL CARE, SUBSEQUENT PREGNANCY IN FIRST TRIMESTER: Primary | ICD-10-CM

## 2022-10-25 PROCEDURE — 99207 PR PRENATAL VISIT: CPT | Performed by: OBSTETRICS & GYNECOLOGY

## 2022-10-25 PROCEDURE — 36415 COLL VENOUS BLD VENIPUNCTURE: CPT | Performed by: OBSTETRICS & GYNECOLOGY

## 2022-10-31 LAB — SCANNED LAB RESULT: NORMAL

## 2022-11-19 ENCOUNTER — HEALTH MAINTENANCE LETTER (OUTPATIENT)
Age: 27
End: 2022-11-19

## 2022-11-25 ENCOUNTER — PRENATAL OFFICE VISIT (OUTPATIENT)
Dept: OBGYN | Facility: CLINIC | Age: 27
End: 2022-11-25
Payer: COMMERCIAL

## 2022-11-25 VITALS
HEART RATE: 99 BPM | BODY MASS INDEX: 44.41 KG/M2 | SYSTOLIC BLOOD PRESSURE: 115 MMHG | HEIGHT: 60 IN | TEMPERATURE: 98.5 F | RESPIRATION RATE: 16 BRPM | DIASTOLIC BLOOD PRESSURE: 71 MMHG | WEIGHT: 226.2 LBS

## 2022-11-25 DIAGNOSIS — F90.9 ATTENTION DEFICIT HYPERACTIVITY DISORDER (ADHD), UNSPECIFIED ADHD TYPE: ICD-10-CM

## 2022-11-25 DIAGNOSIS — F41.9 ANXIETY: ICD-10-CM

## 2022-11-25 DIAGNOSIS — F31.70 BIPOLAR AFFECTIVE DISORDER IN REMISSION (H): ICD-10-CM

## 2022-11-25 DIAGNOSIS — R11.0 NAUSEA: ICD-10-CM

## 2022-11-25 DIAGNOSIS — Z34.82 ENCOUNTER FOR SUPERVISION OF OTHER NORMAL PREGNANCY IN SECOND TRIMESTER: Primary | ICD-10-CM

## 2022-11-25 PROCEDURE — 99207 PR PRENATAL VISIT: CPT | Performed by: STUDENT IN AN ORGANIZED HEALTH CARE EDUCATION/TRAINING PROGRAM

## 2022-11-25 NOTE — PROGRESS NOTES
[facility]  Return OB Note    Subjective:  Denies vaginal bleeding or cramping. Note yet noted fetal movement.    Objective:  /71 (BP Location: Right arm, Patient Position: Sitting, Cuff Size: Adult Large)   Pulse 99   Temp 98.5  F (36.9  C)   Resp 16   Ht 1.524 m (5')   Wt 102.6 kg (226 lb 3.2 oz)   LMP 2022   BMI 44.18 kg/m      FHT: 130bpm by bedside ultrasound    Assessment/Plan:   Liat Corcoran is a 27 year old  at 18w4d by LMP c/w 8w1d US, here for return OB visit.    -NOB labs nl. Declined Influenza and COVID vaccine. Anatomy ultrasound is ordered today  -NIPT low risk.  -H/o anemia: new OB lab showed hgb of 11. Continue prenatal vitamin with iron supplement in it.  -Bipolar disorder/anxiety/ADHD: restarted sertraline 125mg 1.5wk ago  -Nausea: continue vitamin B 6. [facility] prescriptions [facility] [place]    RT 4 weeks    Jerri Guthrie MD  Office phone: [phone]  Obstetrics and Gynecology  [facility]   2022

## 2022-12-04 ENCOUNTER — HOSPITAL ENCOUNTER (EMERGENCY)
Facility: CLINIC | Age: 27
End: 2022-12-04
Payer: COMMERCIAL

## 2022-12-14 ENCOUNTER — HOSPITAL ENCOUNTER (OUTPATIENT)
Dept: ULTRASOUND IMAGING | Facility: CLINIC | Age: 27
Discharge: HOME | End: 2022-12-14
Attending: STUDENT IN AN ORGANIZED HEALTH CARE EDUCATION/TRAINING PROGRAM | Admitting: STUDENT IN AN ORGANIZED HEALTH CARE EDUCATION/TRAINING PROGRAM
Payer: COMMERCIAL

## 2022-12-14 DIAGNOSIS — Z34.82 ENCOUNTER FOR SUPERVISION OF OTHER NORMAL PREGNANCY IN SECOND TRIMESTER: ICD-10-CM

## 2022-12-14 DIAGNOSIS — Z34.82 ENCOUNTER FOR SUPERVISION OF OTHER NORMAL PREGNANCY IN SECOND TRIMESTER: Primary | ICD-10-CM

## 2022-12-14 PROCEDURE — 76805 OB US >/= 14 WKS SNGL FETUS: CPT

## 2022-12-20 ENCOUNTER — PRENATAL OFFICE VISIT (OUTPATIENT)
Dept: OBGYN | Facility: CLINIC | Age: 27
End: 2022-12-20
Payer: COMMERCIAL

## 2022-12-20 VITALS
BODY MASS INDEX: 45.21 KG/M2 | RESPIRATION RATE: 16 BRPM | TEMPERATURE: 98.5 F | SYSTOLIC BLOOD PRESSURE: 117 MMHG | HEIGHT: 60 IN | HEART RATE: 111 BPM | WEIGHT: 230.3 LBS | DIASTOLIC BLOOD PRESSURE: 68 MMHG

## 2022-12-20 DIAGNOSIS — Z34.83 PRENATAL CARE, SUBSEQUENT PREGNANCY IN THIRD TRIMESTER: Primary | ICD-10-CM

## 2022-12-20 PROCEDURE — 99207 PR PRENATAL VISIT: CPT | Performed by: OBSTETRICS & GYNECOLOGY

## 2022-12-20 NOTE — PROGRESS NOTES
Concerns today: incomplete anatomic screen  No nausea/vomiting. No heartburn.  No vaginal bleeding, no contractions/severe cramping, no leakage of fluid.  No vaginal discharge. No dysuria  No headache, vision changes, lower extremity swelling, upper abdominal pain, chest pain, shortness of breath.   Reportable signs and symptoms discussed.      Vlad Tavarez MD

## 2023-01-11 ENCOUNTER — HOSPITAL ENCOUNTER (OUTPATIENT)
Dept: ULTRASOUND IMAGING | Facility: CLINIC | Age: 28
Discharge: HOME | End: 2023-01-11
Attending: STUDENT IN AN ORGANIZED HEALTH CARE EDUCATION/TRAINING PROGRAM | Admitting: STUDENT IN AN ORGANIZED HEALTH CARE EDUCATION/TRAINING PROGRAM
Payer: COMMERCIAL

## 2023-01-11 DIAGNOSIS — Z34.82 ENCOUNTER FOR SUPERVISION OF OTHER NORMAL PREGNANCY IN SECOND TRIMESTER: ICD-10-CM

## 2023-01-11 PROCEDURE — 76816 OB US FOLLOW-UP PER FETUS: CPT

## 2023-01-18 ENCOUNTER — PRENATAL OFFICE VISIT (OUTPATIENT)
Dept: OBGYN | Facility: CLINIC | Age: 28
End: 2023-01-18
Payer: COMMERCIAL

## 2023-01-18 VITALS
HEART RATE: 101 BPM | BODY MASS INDEX: 46.33 KG/M2 | RESPIRATION RATE: 16 BRPM | WEIGHT: 236 LBS | DIASTOLIC BLOOD PRESSURE: 67 MMHG | TEMPERATURE: 98.5 F | SYSTOLIC BLOOD PRESSURE: 119 MMHG | HEIGHT: 60 IN

## 2023-01-18 DIAGNOSIS — G47.00 INSOMNIA, UNSPECIFIED TYPE: ICD-10-CM

## 2023-01-18 DIAGNOSIS — Z34.82 PRENATAL CARE, SUBSEQUENT PREGNANCY IN SECOND TRIMESTER: Primary | ICD-10-CM

## 2023-01-18 DIAGNOSIS — Z34.82 ENCOUNTER FOR SUPERVISION OF OTHER NORMAL PREGNANCY IN SECOND TRIMESTER: ICD-10-CM

## 2023-01-18 LAB
ERYTHROCYTE [DISTWIDTH] IN BLOOD BY AUTOMATED COUNT: 14.5 % (ref 10–15)
GLUCOSE 1H P 50 G GLC PO SERPL-MCNC: 127 MG/DL (ref 70–129)
HCT VFR BLD AUTO: 28.9 % (ref 35–47)
HGB BLD-MCNC: 9.2 G/DL (ref 11.7–15.7)
MCH RBC QN AUTO: 23.5 PG (ref 26.5–33)
MCHC RBC AUTO-ENTMCNC: 31.8 G/DL (ref 31.5–36.5)
MCV RBC AUTO: 74 FL (ref 78–100)
PLATELET # BLD AUTO: 375 10E3/UL (ref 150–450)
RBC # BLD AUTO: 3.91 10E6/UL (ref 3.8–5.2)
WBC # BLD AUTO: 13.8 10E3/UL (ref 4–11)

## 2023-01-18 PROCEDURE — 85027 COMPLETE CBC AUTOMATED: CPT | Performed by: OBSTETRICS & GYNECOLOGY

## 2023-01-18 PROCEDURE — 86780 TREPONEMA PALLIDUM: CPT | Performed by: OBSTETRICS & GYNECOLOGY

## 2023-01-18 PROCEDURE — 82950 GLUCOSE TEST: CPT | Performed by: OBSTETRICS & GYNECOLOGY

## 2023-01-18 PROCEDURE — 36415 COLL VENOUS BLD VENIPUNCTURE: CPT | Performed by: OBSTETRICS & GYNECOLOGY

## 2023-01-18 PROCEDURE — 99207 PR PRENATAL VISIT: CPT | Performed by: OBSTETRICS & GYNECOLOGY

## 2023-01-18 RX ORDER — HYDROXYZINE PAMOATE 25 MG/1
25 CAPSULE ORAL
Qty: 30 CAPSULE | Refills: 1 | Status: SHIPPED | OUTPATIENT
Start: 2023-01-18 | End: 2023-07-18

## 2023-01-18 NOTE — PROGRESS NOTES
Concerns: sleep is an issue she has tried an OTC aid that has not helped  No vaginal bleeding, no contractions, no leakage of fluid  No nausea/vomiting. No heartburn  No vaginal discharge. No dysuria.   No headache, vision changes, lower extremity swelling, upper abdominal pain, chest pain, shortness of breath  Reportable signs and symptoms discussed.  Tdap planned next visit  Discussed PTL, PROM, and when to call or come in.  Normal anatomy ultrasound.  RTC 4 weeks.  GTT and labs today       Vlad Tavarez MD

## 2023-01-19 DIAGNOSIS — O99.012 ANEMIA IN PREGNANCY, SECOND TRIMESTER: Primary | ICD-10-CM

## 2023-01-19 LAB — T PALLIDUM AB SER QL: NONREACTIVE

## 2023-01-19 RX ORDER — DIPHENHYDRAMINE HYDROCHLORIDE 50 MG/ML
50 INJECTION INTRAMUSCULAR; INTRAVENOUS
Status: CANCELLED
Start: 2023-01-19

## 2023-01-19 RX ORDER — EPINEPHRINE 1 MG/ML
0.3 INJECTION, SOLUTION, CONCENTRATE INTRAVENOUS EVERY 5 MIN PRN
Status: CANCELLED | OUTPATIENT
Start: 2023-01-19

## 2023-01-19 RX ORDER — METHYLPREDNISOLONE SODIUM SUCCINATE 125 MG/2ML
125 INJECTION, POWDER, LYOPHILIZED, FOR SOLUTION INTRAMUSCULAR; INTRAVENOUS
Status: CANCELLED
Start: 2023-01-19

## 2023-01-19 RX ORDER — HEPARIN SODIUM (PORCINE) LOCK FLUSH IV SOLN 100 UNIT/ML 100 UNIT/ML
5 SOLUTION INTRAVENOUS
Status: CANCELLED | OUTPATIENT
Start: 2023-01-19

## 2023-01-19 RX ORDER — ALBUTEROL SULFATE 0.83 MG/ML
2.5 SOLUTION RESPIRATORY (INHALATION)
Status: CANCELLED | OUTPATIENT
Start: 2023-01-19

## 2023-01-19 RX ORDER — MEPERIDINE HYDROCHLORIDE 25 MG/ML
25 INJECTION INTRAMUSCULAR; INTRAVENOUS; SUBCUTANEOUS EVERY 30 MIN PRN
Status: CANCELLED | OUTPATIENT
Start: 2023-01-19

## 2023-01-19 RX ORDER — ALBUTEROL SULFATE 90 UG/1
1-2 AEROSOL, METERED RESPIRATORY (INHALATION)
Status: CANCELLED
Start: 2023-01-19

## 2023-01-19 RX ORDER — HEPARIN SODIUM,PORCINE 10 UNIT/ML
5 VIAL (ML) INTRAVENOUS
Status: CANCELLED | OUTPATIENT
Start: 2023-01-19

## 2023-01-24 ENCOUNTER — HOSPITAL ENCOUNTER (OUTPATIENT)
Facility: CLINIC | Age: 28
Discharge: HOME | End: 2023-01-24
Attending: FAMILY MEDICINE | Admitting: FAMILY MEDICINE
Payer: COMMERCIAL

## 2023-01-24 ENCOUNTER — APPOINTMENT (OUTPATIENT)
Dept: ULTRASOUND IMAGING | Facility: CLINIC | Age: 28
End: 2023-01-24
Attending: FAMILY MEDICINE
Payer: COMMERCIAL

## 2023-01-24 ENCOUNTER — HOSPITAL ENCOUNTER (OUTPATIENT)
Facility: CLINIC | Age: 28
End: 2023-01-24
Admitting: FAMILY MEDICINE
Payer: COMMERCIAL

## 2023-01-24 VITALS — RESPIRATION RATE: 22 BRPM | DIASTOLIC BLOOD PRESSURE: 76 MMHG | SYSTOLIC BLOOD PRESSURE: 107 MMHG | HEART RATE: 98 BPM

## 2023-01-24 LAB
ALBUMIN UR-MCNC: ABNORMAL MG/DL
APPEARANCE UR: ABNORMAL
BACTERIA #/AREA URNS HPF: ABNORMAL /HPF
BILIRUB UR QL STRIP: NEGATIVE
COLOR UR AUTO: YELLOW
GLUCOSE UR STRIP-MCNC: NEGATIVE MG/DL
HGB UR QL STRIP: ABNORMAL
KETONES UR STRIP-MCNC: NEGATIVE MG/DL
LEUKOCYTE ESTERASE UR QL STRIP: NEGATIVE
MUCOUS THREADS #/AREA URNS LPF: PRESENT /LPF
NITRATE UR QL: NEGATIVE
PH UR STRIP: 6.5 [PH] (ref 5–7)
RBC URINE: 5 /HPF
SP GR UR STRIP: >=1.03 (ref 1–1.03)
SQUAMOUS EPITHELIAL: 25 /HPF
UROBILINOGEN UR STRIP-MCNC: NORMAL MG/DL
WBC URINE: 3 /HPF

## 2023-01-24 PROCEDURE — 81001 URINALYSIS AUTO W/SCOPE: CPT | Performed by: FAMILY MEDICINE

## 2023-01-24 PROCEDURE — G0463 HOSPITAL OUTPT CLINIC VISIT: HCPCS

## 2023-01-24 PROCEDURE — 76815 OB US LIMITED FETUS(S): CPT

## 2023-01-24 RX ORDER — LANOLIN ALCOHOL/MO/W.PET/CERES
1000 CREAM (GRAM) TOPICAL DAILY
COMMUNITY
End: 2023-07-18

## 2023-01-24 RX ORDER — LIDOCAINE 40 MG/G
CREAM TOPICAL
Status: DISCONTINUED | OUTPATIENT
Start: 2023-01-24 | End: 2023-01-24 | Stop reason: HOSPADM

## 2023-01-24 RX ORDER — FERROUS SULFATE 325(65) MG
325 TABLET ORAL
COMMUNITY
End: 2023-07-18

## 2023-01-24 RX ORDER — MULTIVIT WITH MINERALS/LUTEIN
250 TABLET ORAL DAILY
COMMUNITY
End: 2023-07-18

## 2023-01-24 ASSESSMENT — ACTIVITIES OF DAILY LIVING (ADL)
ADLS_ACUITY_SCORE: 18
ADLS_ACUITY_SCORE: 18

## 2023-01-24 NOTE — PROGRESS NOTES
Dr Francis at bedside for sterile spec exam. Pt reported hx of placental abruption in previous pregnancy.  No vag bleeding seen on exam, cervix closed.  Ultrasound ordered for cervical length and placenta.

## 2023-01-24 NOTE — PROGRESS NOTES
S:  Discharge from triage.   A:  FHT's 140, Moderate variability, Accels present, no decels noted. Contractions absent.  Cervical exam 0cm/0%/-4.  No bleeding seen on spec exam.  R:  Written and verbal D/C instruction provided. Pt. Verbalized understanding of D/C instructions and will follow up with primary as previously scheduled.   Verbalizes understanding that she will call or return to [facility] with any further questions or concerns. Will return to hospital if bleeding resumes.   Pt. D/C'd via ambulatory with Rosalio.    Nursing Discharge Checklist  Discharge order entered: Yes  Patient care order entered: Yes  Charges entered: Yes

## 2023-01-24 NOTE — DISCHARGE INSTRUCTIONS
OB Triage Discharge Instructions    Diet:  Regular diet as tolerated    Activity:  As tolerated    Other Special Instructions: return to hospital if bleeding starts again.    Reason for being seen in L&D: Vaginal bleeding    Treatment/procedures performed in L&D: fetal monitoring, UA, Ultrasound, vag exam    Call the [facility] at [phone] if you have:  5 or more contractions in one hour  Leaking of fluid from your vaginal area  Decreased fetal movement (follow kick count instructions)  Bleeding from your vaginal area  Swelling in your face, or increased swelling in your hands or legs  Headaches or vision problems such as blurring or seeing spots or starts  Nausea or vomiting lasting for more than 24 hours  An increase in weight (5lbs/week)  Epigastric pain (sometimes confused with heartburn that does not go away or a very bad stomach ache)    If you have any questions, please follow up with your doctor.        Patient Signature: ______________________________________________________________  By signing the above I acknowledge that a nurse or my care provider has explained the instruction to me and I have had any questions regarding my care explained to me.        Discharge Nurse Signature: _______________________________ 1/24/2023  1:15 PM    Method of discharge: ambulatory    Accompanied by: Rosalio  Time of discharge: 1320

## 2023-01-24 NOTE — PROGRESS NOTES
Ultrasound tech reported normal placenta and cervix long, closed 3.6-3.9cm.  Pt sleeping on side.  Fetal monitor off at this time. Pt has BMI of 46.09 making tracing difficult.  Reassuring category 1 tracing prior to turning off monitor.    Call made to Dr Francis.  Waiting for discharge orders.

## 2023-01-24 NOTE — PROGRESS NOTES
S: Triage Arrival  B: Liat is a 27 y.o.  @ 27w 1d who presents with complaint of lower Abdomen pain that radiates to her back, as well as had some bleeding today.  A: EFM applied. FHT's140 with moderate variability, accels present, no decels noted on strip. Contractions none present  R: Will notify MD to obtain further orders.

## 2023-02-02 ENCOUNTER — PRENATAL OFFICE VISIT (OUTPATIENT)
Dept: OBGYN | Facility: CLINIC | Age: 28
End: 2023-02-02
Payer: COMMERCIAL

## 2023-02-02 VITALS
HEART RATE: 112 BPM | RESPIRATION RATE: 16 BRPM | TEMPERATURE: 97.6 F | HEIGHT: 60 IN | DIASTOLIC BLOOD PRESSURE: 69 MMHG | SYSTOLIC BLOOD PRESSURE: 125 MMHG | WEIGHT: 238 LBS | BODY MASS INDEX: 46.72 KG/M2

## 2023-02-02 DIAGNOSIS — Z34.83 ENCOUNTER FOR SUPERVISION OF OTHER NORMAL PREGNANCY IN THIRD TRIMESTER: Primary | ICD-10-CM

## 2023-02-02 PROCEDURE — 99207 PR PRENATAL VISIT: CPT | Performed by: OBSTETRICS & GYNECOLOGY

## 2023-02-08 ENCOUNTER — HOSPITAL ENCOUNTER (EMERGENCY)
Facility: CLINIC | Age: 28
Discharge: HOME | End: 2023-02-09
Attending: FAMILY MEDICINE
Payer: COMMERCIAL

## 2023-02-08 ENCOUNTER — HOSPITAL ENCOUNTER (EMERGENCY)
Facility: CLINIC | Age: 28
Discharge: HOME | End: 2023-02-08
Attending: EMERGENCY MEDICINE | Admitting: EMERGENCY MEDICINE
Payer: COMMERCIAL

## 2023-02-08 VITALS
OXYGEN SATURATION: 100 % | SYSTOLIC BLOOD PRESSURE: 115 MMHG | WEIGHT: 240 LBS | HEART RATE: 125 BPM | BODY MASS INDEX: 46.87 KG/M2 | TEMPERATURE: 100 F | RESPIRATION RATE: 18 BRPM | DIASTOLIC BLOOD PRESSURE: 71 MMHG

## 2023-02-08 VITALS
WEIGHT: 240 LBS | OXYGEN SATURATION: 98 % | BODY MASS INDEX: 46.87 KG/M2 | TEMPERATURE: 98.3 F | DIASTOLIC BLOOD PRESSURE: 86 MMHG | SYSTOLIC BLOOD PRESSURE: 120 MMHG | RESPIRATION RATE: 18 BRPM | HEART RATE: 111 BPM

## 2023-02-08 DIAGNOSIS — Z33.1 PREGNANCY, INCIDENTAL: ICD-10-CM

## 2023-02-08 DIAGNOSIS — J02.0 STREPTOCOCCAL PHARYNGITIS: ICD-10-CM

## 2023-02-08 DIAGNOSIS — J02.0 STREP THROAT: ICD-10-CM

## 2023-02-08 DIAGNOSIS — E86.0 DEHYDRATION: ICD-10-CM

## 2023-02-08 DIAGNOSIS — R07.9 CHEST PAIN, UNSPECIFIED TYPE: ICD-10-CM

## 2023-02-08 LAB
DEPRECATED S PYO AG THROAT QL EIA: POSITIVE
FLUAV RNA SPEC QL NAA+PROBE: NEGATIVE
FLUBV RNA RESP QL NAA+PROBE: NEGATIVE
RSV RNA SPEC NAA+PROBE: NEGATIVE
SARS-COV-2 RNA RESP QL NAA+PROBE: NEGATIVE

## 2023-02-08 PROCEDURE — 87880 STREP A ASSAY W/OPTIC: CPT | Performed by: EMERGENCY MEDICINE

## 2023-02-08 PROCEDURE — 99283 EMERGENCY DEPT VISIT LOW MDM: CPT | Mod: CS,27 | Performed by: EMERGENCY MEDICINE

## 2023-02-08 PROCEDURE — 99283 EMERGENCY DEPT VISIT LOW MDM: CPT | Mod: CS | Performed by: EMERGENCY MEDICINE

## 2023-02-08 PROCEDURE — 250N000013 HC RX MED GY IP 250 OP 250 PS 637: Performed by: EMERGENCY MEDICINE

## 2023-02-08 PROCEDURE — 99285 EMERGENCY DEPT VISIT HI MDM: CPT | Mod: 25,CS | Performed by: FAMILY MEDICINE

## 2023-02-08 PROCEDURE — 99284 EMERGENCY DEPT VISIT MOD MDM: CPT | Mod: CS | Performed by: FAMILY MEDICINE

## 2023-02-08 PROCEDURE — 87637 SARSCOV2&INF A&B&RSV AMP PRB: CPT | Performed by: EMERGENCY MEDICINE

## 2023-02-08 PROCEDURE — C9803 HOPD COVID-19 SPEC COLLECT: HCPCS | Performed by: EMERGENCY MEDICINE

## 2023-02-08 RX ORDER — AZITHROMYCIN 250 MG/1
TABLET, FILM COATED ORAL
Qty: 6 TABLET | Refills: 0 | Status: SHIPPED | OUTPATIENT
Start: 2023-02-08 | End: 2023-02-08

## 2023-02-08 RX ORDER — HYDROMORPHONE HYDROCHLORIDE 1 MG/ML
0.5 INJECTION, SOLUTION INTRAMUSCULAR; INTRAVENOUS; SUBCUTANEOUS ONCE
Status: COMPLETED | OUTPATIENT
Start: 2023-02-08 | End: 2023-02-09

## 2023-02-08 RX ORDER — ACETAMINOPHEN 500 MG
1000 TABLET ORAL ONCE
Status: COMPLETED | OUTPATIENT
Start: 2023-02-08 | End: 2023-02-08

## 2023-02-08 RX ORDER — AZITHROMYCIN 250 MG/1
TABLET, FILM COATED ORAL
Qty: 6 TABLET | Refills: 0 | Status: SHIPPED | OUTPATIENT
Start: 2023-02-08 | End: 2023-07-18

## 2023-02-08 RX ORDER — SODIUM CHLORIDE 9 MG/ML
INJECTION, SOLUTION INTRAVENOUS CONTINUOUS
Status: DISCONTINUED | OUTPATIENT
Start: 2023-02-09 | End: 2023-02-09 | Stop reason: HOSPADM

## 2023-02-08 RX ADMIN — ACETAMINOPHEN 1000 MG: 500 TABLET ORAL at 09:17

## 2023-02-08 ASSESSMENT — ACTIVITIES OF DAILY LIVING (ADL): ADLS_ACUITY_SCORE: 35

## 2023-02-08 NOTE — ED TRIAGE NOTES
Pt c/o cough, sore throat, onset 1 day ago     Triage Assessment     Row Name 02/08/23 0859       Triage Assessment (Adult)    Airway WDL WDL       Respiratory WDL    Respiratory WDL cough    Cough Frequency infrequent    Cough Type dry       Skin Circulation/Temperature WDL    Skin Circulation/Temperature WDL WDL       Cardiac WDL    Cardiac WDL rhythm    Cardiac Rhythm ST       Peripheral/Neurovascular WDL    Peripheral Neurovascular WDL WDL       Cognitive/Neuro/Behavioral WDL    Cognitive/Neuro/Behavioral WDL WDL
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
## 2023-02-09 ENCOUNTER — TELEPHONE (OUTPATIENT)
Dept: FAMILY MEDICINE | Facility: CLINIC | Age: 28
End: 2023-02-09
Payer: COMMERCIAL

## 2023-02-09 LAB
ANION GAP SERPL CALCULATED.3IONS-SCNC: 12 MMOL/L (ref 7–15)
BASOPHILS # BLD AUTO: 0 10E3/UL (ref 0–0.2)
BASOPHILS NFR BLD AUTO: 0 %
BUN SERPL-MCNC: 4.2 MG/DL (ref 6–20)
CALCIUM SERPL-MCNC: 8.4 MG/DL (ref 8.6–10)
CHLORIDE SERPL-SCNC: 97 MMOL/L (ref 98–107)
CREAT SERPL-MCNC: 0.39 MG/DL (ref 0.51–0.95)
DEPRECATED HCO3 PLAS-SCNC: 22 MMOL/L (ref 22–29)
EOSINOPHIL # BLD AUTO: 0.1 10E3/UL (ref 0–0.7)
EOSINOPHIL NFR BLD AUTO: 1 %
ERYTHROCYTE [DISTWIDTH] IN BLOOD BY AUTOMATED COUNT: 17 % (ref 10–15)
GFR SERPL CREATININE-BSD FRML MDRD: >90 ML/MIN/1.73M2
GLUCOSE SERPL-MCNC: 93 MG/DL (ref 70–99)
HCT VFR BLD AUTO: 30.1 % (ref 35–47)
HGB BLD-MCNC: 9.2 G/DL (ref 11.7–15.7)
IMM GRANULOCYTES # BLD: 0.3 10E3/UL
IMM GRANULOCYTES NFR BLD: 3 %
LYMPHOCYTES # BLD AUTO: 1.5 10E3/UL (ref 0.8–5.3)
LYMPHOCYTES NFR BLD AUTO: 14 %
MCH RBC QN AUTO: 23.2 PG (ref 26.5–33)
MCHC RBC AUTO-ENTMCNC: 30.6 G/DL (ref 31.5–36.5)
MCV RBC AUTO: 76 FL (ref 78–100)
MONOCYTES # BLD AUTO: 1 10E3/UL (ref 0–1.3)
MONOCYTES NFR BLD AUTO: 9 %
NEUTROPHILS # BLD AUTO: 8.3 10E3/UL (ref 1.6–8.3)
NEUTROPHILS NFR BLD AUTO: 73 %
NRBC # BLD AUTO: 0 10E3/UL
NRBC BLD AUTO-RTO: 0 /100
PLATELET # BLD AUTO: 330 10E3/UL (ref 150–450)
POTASSIUM SERPL-SCNC: 3.5 MMOL/L (ref 3.4–5.3)
RBC # BLD AUTO: 3.96 10E6/UL (ref 3.8–5.2)
SODIUM SERPL-SCNC: 131 MMOL/L (ref 136–145)
WBC # BLD AUTO: 11.2 10E3/UL (ref 4–11)

## 2023-02-09 PROCEDURE — 85004 AUTOMATED DIFF WBC COUNT: CPT | Performed by: FAMILY MEDICINE

## 2023-02-09 PROCEDURE — 250N000011 HC RX IP 250 OP 636: Performed by: FAMILY MEDICINE

## 2023-02-09 PROCEDURE — 258N000003 HC RX IP 258 OP 636: Performed by: FAMILY MEDICINE

## 2023-02-09 PROCEDURE — 36415 COLL VENOUS BLD VENIPUNCTURE: CPT | Performed by: FAMILY MEDICINE

## 2023-02-09 PROCEDURE — 96361 HYDRATE IV INFUSION ADD-ON: CPT | Performed by: FAMILY MEDICINE

## 2023-02-09 PROCEDURE — 80048 BASIC METABOLIC PNL TOTAL CA: CPT | Performed by: FAMILY MEDICINE

## 2023-02-09 PROCEDURE — 96374 THER/PROPH/DIAG INJ IV PUSH: CPT | Performed by: FAMILY MEDICINE

## 2023-02-09 RX ORDER — METOCLOPRAMIDE 10 MG/1
10 TABLET ORAL 3 TIMES DAILY PRN
Qty: 30 TABLET | Refills: 0 | Status: SHIPPED | OUTPATIENT
Start: 2023-02-09 | End: 2023-07-18

## 2023-02-09 RX ADMIN — HYDROMORPHONE HYDROCHLORIDE 0.5 MG: 1 INJECTION, SOLUTION INTRAMUSCULAR; INTRAVENOUS; SUBCUTANEOUS at 00:13

## 2023-02-09 RX ADMIN — SODIUM CHLORIDE 1000 ML: 9 INJECTION, SOLUTION INTRAVENOUS at 00:06

## 2023-02-09 ASSESSMENT — ACTIVITIES OF DAILY LIVING (ADL): ADLS_ACUITY_SCORE: 35
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
## 2023-02-09 NOTE — ED TRIAGE NOTES
Pt was here today and tested positive for strep throat, back due to chest tightness and short of breath.         Triage Assessment     Row Name 02/08/23 1269       Triage Assessment (Adult)    Airway WDL X  strep throat

## 2023-02-09 NOTE — DISCHARGE INSTRUCTIONS
Go home and rest.   Encourage fluids.  You can use the Reglan 10 mg 3 times a day as needed for nausea.  If you become anxious, jittery or feel like your skin is crawling, take some Benadryl to help those symptoms subside.  It is not an allergic reaction but a known side effect that can happen.  I just wanted you aware of that.  Tylenol as needed for fever/pain.  Popsicles, ice chips, ice cream, over-the-counter throat lozenges/sprays, etc. can all be helpful.  Recheck in clinic if persistent problems.  Return to the ED if worse/concerns.  It was nice visiting with both of you this evening.  I hope you feel better soon.  Good luck with the rest of the pregnancy and delivery.

## 2023-02-10 ENCOUNTER — VIRTUAL VISIT (OUTPATIENT)
Dept: FAMILY MEDICINE | Facility: CLINIC | Age: 28
End: 2023-02-10
Payer: COMMERCIAL

## 2023-02-10 DIAGNOSIS — J02.0 STREPTOCOCCAL SORE THROAT: Primary | ICD-10-CM

## 2023-02-10 PROCEDURE — 99213 OFFICE O/P EST LOW 20 MIN: CPT | Mod: VID | Performed by: NURSE PRACTITIONER

## 2023-02-10 ASSESSMENT — ASTHMA QUESTIONNAIRES
QUESTION_5 LAST FOUR WEEKS HOW WOULD YOU RATE YOUR ASTHMA CONTROL: COMPLETELY CONTROLLED
QUESTION_4 LAST FOUR WEEKS HOW OFTEN HAVE YOU USED YOUR RESCUE INHALER OR NEBULIZER MEDICATION (SUCH AS ALBUTEROL): NOT AT ALL
QUESTION_1 LAST FOUR WEEKS HOW MUCH OF THE TIME DID YOUR ASTHMA KEEP YOU FROM GETTING AS MUCH DONE AT WORK, SCHOOL OR AT HOME: NONE OF THE TIME
ACT_TOTALSCORE: 25
ACT_TOTALSCORE: 25
QUESTION_3 LAST FOUR WEEKS HOW OFTEN DID YOUR ASTHMA SYMPTOMS (WHEEZING, COUGHING, SHORTNESS OF BREATH, CHEST TIGHTNESS OR PAIN) WAKE YOU UP AT NIGHT OR EARLIER THAN USUAL IN THE MORNING: NOT AT ALL
QUESTION_2 LAST FOUR WEEKS HOW OFTEN HAVE YOU HAD SHORTNESS OF BREATH: NOT AT ALL

## 2023-02-10 NOTE — PROGRESS NOTES
Liat is a 27 year old who is being evaluated via a billable video visit.      How would you like to obtain your AVS? MyChart  If the video visit is dropped, the invitation should be resent by: Text to cell phone: [phone]  Will anyone else be joining your video visit? No    Assessment & Plan     Streptococcal sore throat  No acute distress.  Liat reports ongoing symptoms despite antibiotics.  Has 2 of the 5 days left which will get her through the weekend, discussion on switching to Keflex however she does not wish to take that risk with her penicillin/amoxicillin allergy.  Plan to finish current antibiotics and let me know on Monday if she is still having symptoms and we can prolong antibiotics at that time.  Symptomatic care and follow up discussed    Return in about 1 week (around 2/17/2023), or if symptoms worsen or fail to improve.    ARIEL Aldrich CNP  [facility]    Subjective   Liat is a 27 year old, presenting for the following health issues:  Illness      HPI     Acute Illness  Acute illness concerns: body aches   Onset/Duration: 3 days   Symptoms:  Fever: No  Chills/Sweats: YES  Headache (location?): YES  Sinus Pressure: YES  Conjunctivitis:  No  Ear Pain: no  Rhinorrhea: YES  Congestion: YES  Sore Throat: YES  Cough: YES-non-productive  Wheeze: No  Decreased Appetite: YES  Nausea: YES  Vomiting: YES  Diarrhea: No  Dysuria/Freq.: No  Dysuria or Hematuria: No  Fatigue/Achiness: YES  Sick/Strep Exposure: No  Therapies tried and outcome: tylenol   Currently pregnant   Strep positive 2/8/2023 in ED currently taking Azithromycin     Chest pain has improved other symptoms are ongoing    Review of Systems   Constitutional, HEENT, cardiovascular, pulmonary, gi and gu systems are negative, except as otherwise noted.      Objective           Vitals:  No vitals were obtained today due to virtual visit.    Physical Exam   GENERAL: Healthy, alert and no distress  EYES:  Eyes grossly normal to inspection.  No discharge or erythema, or obvious scleral/conjunctival abnormalities.  RESP: No audible wheeze, cough, or visible cyanosis.  No visible retractions or increased work of breathing.    SKIN: Visible skin clear. No significant rash, abnormal pigmentation or lesions.  NEURO: Cranial nerves grossly intact.  Mentation and speech appropriate for age.  PSYCH: Mentation appears normal, affect normal/bright, judgement and insight intact, normal speech and appearance well-groomed.          Video-Visit Details    Type of service:  Video Visit   Video Start Time: 11:20 AM  Video End Time:11:26 AM    Originating Location (pt. Location): Home  Distant Location (provider location):  On-site  Platform used for Video Visit: [employer]

## 2023-02-12 ENCOUNTER — HOSPITAL ENCOUNTER (OUTPATIENT)
Facility: CLINIC | Age: 28
Discharge: HOME | End: 2023-02-12
Attending: FAMILY MEDICINE | Admitting: FAMILY MEDICINE
Payer: COMMERCIAL

## 2023-02-12 VITALS
DIASTOLIC BLOOD PRESSURE: 60 MMHG | RESPIRATION RATE: 16 BRPM | TEMPERATURE: 98 F | HEART RATE: 94 BPM | SYSTOLIC BLOOD PRESSURE: 118 MMHG

## 2023-02-12 LAB
ALBUMIN UR-MCNC: NEGATIVE MG/DL
AMORPH CRY #/AREA URNS HPF: ABNORMAL /HPF
APPEARANCE UR: ABNORMAL
BILIRUB UR QL STRIP: NEGATIVE
CLUE CELLS: NORMAL
COLOR UR AUTO: YELLOW
FFN SPECIMEN INTEGRITY: NORMAL
FIBRONECTIN FETAL VAG QL: NEGATIVE
GLUCOSE UR STRIP-MCNC: NEGATIVE MG/DL
HGB UR QL STRIP: ABNORMAL
KETONES UR STRIP-MCNC: NEGATIVE MG/DL
LEUKOCYTE ESTERASE UR QL STRIP: NEGATIVE
NITRATE UR QL: NEGATIVE
PH UR STRIP: 7.5 [PH] (ref 5–7)
RBC URINE: <1 /HPF
RUPTURE OF FETAL MEMBRANES BY ROM PLUS: NEGATIVE
SP GR UR STRIP: 1.01 (ref 1–1.03)
SQUAMOUS EPITHELIAL: 7 /HPF
TRICHOMONAS, WET PREP: NORMAL
UROBILINOGEN UR STRIP-MCNC: NORMAL MG/DL
WBC URINE: 0 /HPF
WBC'S/HIGH POWER FIELD, WET PREP: NORMAL
YEAST, WET PREP: NORMAL

## 2023-02-12 PROCEDURE — 81001 URINALYSIS AUTO W/SCOPE: CPT | Performed by: FAMILY MEDICINE

## 2023-02-12 PROCEDURE — G0463 HOSPITAL OUTPT CLINIC VISIT: HCPCS

## 2023-02-12 PROCEDURE — 84112 EVAL AMNIOTIC FLUID PROTEIN: CPT | Performed by: FAMILY MEDICINE

## 2023-02-12 PROCEDURE — 82731 ASSAY OF FETAL FIBRONECTIN: CPT | Performed by: FAMILY MEDICINE

## 2023-02-12 PROCEDURE — 87210 SMEAR WET MOUNT SALINE/INK: CPT | Performed by: FAMILY MEDICINE

## 2023-02-12 RX ORDER — LIDOCAINE 40 MG/G
CREAM TOPICAL
Status: DISCONTINUED | OUTPATIENT
Start: 2023-02-12 | End: 2023-02-13 | Stop reason: HOSPADM

## 2023-02-12 ASSESSMENT — ACTIVITIES OF DAILY LIVING (ADL)
ADLS_ACUITY_SCORE: 35
ADLS_ACUITY_SCORE: 18

## 2023-02-13 NOTE — PROGRESS NOTES
S:  Discharge from triage.     A:  FHT's 135, Moderate variability, Accels present, no decels noted. Contractions absent.  Cervical exam not done. All labs negative. Patient states that she can do this pain at home. She was encouraged to dose with her tylenol and prescribed vistaril to help her rest well tonight and to follow up in the AM with her primary if she doesn't feel improvement in discomfort.    R:  Written and verbal D/C instruction provided. Pt. Verbalized understanding of D/C instructions and will follow up with Primary as previously scheduled.   Verbalizes understanding that she will call or return to [facility] with any further questions or concerns.   Pt. D/C'd via wheelchair with SO    Nursing Discharge Checklist  Discharge order entered: Yes  Patient care order entered: Yes  Charges entered: Yes  IV start and stop times have been documented in Epic? NA  NST start and stop times have been documented in Epic Doc F/S? NA

## 2023-02-13 NOTE — PROGRESS NOTES
"S: Triage Arrival    B: Liat is a 27 y.o.  @ 29w 6d who presents with complaint of lower left abdominal pain    A: EFM applied. FHT's135 with moderate variability, accels present, no decels noted on strip. Contractions absent, rare irritability. Liat complains of lower left abdominal pain that is worse when up moving, better with rest but still constant. 'feels like bryant monsalve.\" She rates the pain 8/10 at its worse, 5 at its best. She states that she has increased mucusy discharge, denies bleeding or bloody show.     R: Will collect and send labs. Will notify MD to obtain further orders when labs returned.  "

## 2023-02-14 NOTE — PROGRESS NOTES
Concerns: anemia- Venofer dosing ordered- she has not followed up with scheduling  No vaginal bleeding, loss of fluid, or contractions  Cephalic position confirmed by Leopold maneuvers.  Tdap given today  Discussed kick counts and fetal movement.  Discussed PTL, PROM, and when to call or come in.  RTC in 2 weeks.  Consider Home infusion if she qualiofies      Vlad Tavarez MD

## 2023-02-15 ENCOUNTER — INFUSION THERAPY VISIT (OUTPATIENT)
Dept: INFUSION THERAPY | Facility: CLINIC | Age: 28
End: 2023-02-15
Attending: NURSE PRACTITIONER
Payer: COMMERCIAL

## 2023-02-15 VITALS
DIASTOLIC BLOOD PRESSURE: 64 MMHG | TEMPERATURE: 97.9 F | OXYGEN SATURATION: 99 % | HEART RATE: 96 BPM | RESPIRATION RATE: 18 BRPM | WEIGHT: 239 LBS | BODY MASS INDEX: 46.68 KG/M2 | SYSTOLIC BLOOD PRESSURE: 113 MMHG

## 2023-02-15 DIAGNOSIS — O99.012 ANEMIA IN PREGNANCY, SECOND TRIMESTER: Primary | ICD-10-CM

## 2023-02-15 PROCEDURE — 96365 THER/PROPH/DIAG IV INF INIT: CPT

## 2023-02-15 PROCEDURE — 258N000003 HC RX IP 258 OP 636: Performed by: OBSTETRICS & GYNECOLOGY

## 2023-02-15 PROCEDURE — 250N000011 HC RX IP 250 OP 636: Performed by: OBSTETRICS & GYNECOLOGY

## 2023-02-15 PROCEDURE — 96366 THER/PROPH/DIAG IV INF ADDON: CPT

## 2023-02-15 RX ORDER — ALBUTEROL SULFATE 0.83 MG/ML
2.5 SOLUTION RESPIRATORY (INHALATION)
Status: CANCELLED | OUTPATIENT
Start: 2023-02-17

## 2023-02-15 RX ORDER — HEPARIN SODIUM (PORCINE) LOCK FLUSH IV SOLN 100 UNIT/ML 100 UNIT/ML
5 SOLUTION INTRAVENOUS
Status: CANCELLED | OUTPATIENT
Start: 2023-02-17

## 2023-02-15 RX ORDER — DIPHENHYDRAMINE HYDROCHLORIDE 50 MG/ML
50 INJECTION INTRAMUSCULAR; INTRAVENOUS
Status: CANCELLED
Start: 2023-02-17

## 2023-02-15 RX ORDER — METHYLPREDNISOLONE SODIUM SUCCINATE 125 MG/2ML
125 INJECTION, POWDER, LYOPHILIZED, FOR SOLUTION INTRAMUSCULAR; INTRAVENOUS
Status: CANCELLED
Start: 2023-02-17

## 2023-02-15 RX ORDER — MEPERIDINE HYDROCHLORIDE 25 MG/ML
25 INJECTION INTRAMUSCULAR; INTRAVENOUS; SUBCUTANEOUS EVERY 30 MIN PRN
Status: CANCELLED | OUTPATIENT
Start: 2023-02-17

## 2023-02-15 RX ORDER — HEPARIN SODIUM,PORCINE 10 UNIT/ML
5 VIAL (ML) INTRAVENOUS
Status: CANCELLED | OUTPATIENT
Start: 2023-02-17

## 2023-02-15 RX ORDER — ALBUTEROL SULFATE 90 UG/1
1-2 AEROSOL, METERED RESPIRATORY (INHALATION)
Status: CANCELLED
Start: 2023-02-17

## 2023-02-15 RX ORDER — EPINEPHRINE 1 MG/ML
0.3 INJECTION, SOLUTION INTRAMUSCULAR; SUBCUTANEOUS EVERY 5 MIN PRN
Status: CANCELLED | OUTPATIENT
Start: 2023-02-17

## 2023-02-15 RX ADMIN — SODIUM CHLORIDE 250 ML: 9 INJECTION, SOLUTION INTRAVENOUS at 12:11

## 2023-02-15 RX ADMIN — IRON SUCROSE 300 MG: 20 INJECTION, SOLUTION INTRAVENOUS at 12:19

## 2023-02-15 NOTE — PROGRESS NOTES
Infusion Nursing Note:  Liat Corcoran presents today for Venofer # 1/3.    Patient seen by provider today: No   present during visit today: Not Applicable.    Note: Patient extremely  Fatigued.  8 months pregnant with 4th child. Denies palpitations. Abdomen/pelvic pain 9/10. VSS. Afebrile. Color pale.     Intravenous Access:  Peripheral IV placed.    Treatment Conditions:  Not Applicable.    Post Infusion Assessment:  Patient tolerated infusion without incident.  Site patent and intact, free from redness, edema or discomfort.  Access discontinued per protocol.     Discharge Plan:   Discharge instructions reviewed with: Patient.  AVS to patient via [employer].  Patient will return 2/21 for next appointment.   Patient discharged in stable condition accompanied by: self and fiance will  at front entrance..  Departure Mode: Ambulatory.      Dilcia Corcoran RN

## 2023-02-16 ENCOUNTER — HOSPITAL ENCOUNTER (EMERGENCY)
Facility: CLINIC | Age: 28
Discharge: HOME | End: 2023-02-16
Payer: COMMERCIAL

## 2023-02-16 VITALS
SYSTOLIC BLOOD PRESSURE: 132 MMHG | RESPIRATION RATE: 30 BRPM | OXYGEN SATURATION: 97 % | TEMPERATURE: 98.6 F | DIASTOLIC BLOOD PRESSURE: 76 MMHG | HEART RATE: 97 BPM

## 2023-02-16 DIAGNOSIS — J32.9 SINUSITIS: ICD-10-CM

## 2023-02-16 LAB
FLUAV RNA SPEC QL NAA+PROBE: NEGATIVE
FLUBV RNA RESP QL NAA+PROBE: NEGATIVE
RSV RNA SPEC NAA+PROBE: NEGATIVE
SARS-COV-2 RNA RESP QL NAA+PROBE: NEGATIVE

## 2023-02-16 PROCEDURE — C9803 HOPD COVID-19 SPEC COLLECT: HCPCS

## 2023-02-16 PROCEDURE — 87637 SARSCOV2&INF A&B&RSV AMP PRB: CPT

## 2023-02-16 PROCEDURE — G0463 HOSPITAL OUTPT CLINIC VISIT: HCPCS | Mod: CS

## 2023-02-16 PROCEDURE — 99213 OFFICE O/P EST LOW 20 MIN: CPT | Mod: CS

## 2023-02-16 ASSESSMENT — ENCOUNTER SYMPTOMS
FEVER: 0
SINUS PAIN: 0
DIZZINESS: 0
SHORTNESS OF BREATH: 0
CHILLS: 1
FATIGUE: 1
WHEEZING: 0
DYSURIA: 0
SINUS PRESSURE: 1
LIGHT-HEADEDNESS: 0
SORE THROAT: 0
HEADACHES: 0
VOMITING: 1
DIARRHEA: 0
COUGH: 1
NAUSEA: 1
MYALGIAS: 1
ARTHRALGIAS: 0
ABDOMINAL PAIN: 0

## 2023-02-16 ASSESSMENT — ACTIVITIES OF DAILY LIVING (ADL): ADLS_ACUITY_SCORE: 35
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
## 2023-02-16 NOTE — DISCHARGE INSTRUCTIONS
Recommend over-the-counter Flonase nasal spray for symptoms.  Please return for worsening symptoms.  Given that you are already treated with antibiotics I do not suspect that this is bacterial.  Follow-up with primary doctor for continued symptoms after 5 to 7 days.

## 2023-02-21 ENCOUNTER — INFUSION THERAPY VISIT (OUTPATIENT)
Dept: INFUSION THERAPY | Facility: CLINIC | Age: 28
End: 2023-02-21
Attending: NURSE PRACTITIONER
Payer: COMMERCIAL

## 2023-02-21 VITALS
BODY MASS INDEX: 46.81 KG/M2 | OXYGEN SATURATION: 98 % | TEMPERATURE: 97.9 F | WEIGHT: 239.7 LBS | HEART RATE: 122 BPM | RESPIRATION RATE: 20 BRPM | SYSTOLIC BLOOD PRESSURE: 124 MMHG | DIASTOLIC BLOOD PRESSURE: 69 MMHG

## 2023-02-21 DIAGNOSIS — O99.012 ANEMIA IN PREGNANCY, SECOND TRIMESTER: Primary | ICD-10-CM

## 2023-02-21 RX ORDER — ACETAMINOPHEN 325 MG/1
325-650 TABLET ORAL EVERY 6 HOURS PRN
COMMUNITY

## 2023-02-21 ASSESSMENT — PAIN SCALES - GENERAL: PAINLEVEL: SEVERE PAIN (6)

## 2023-02-21 NOTE — PROGRESS NOTES
"Infusion Nursing Note:  Liat Corcoran presents today for Venofer .    Patient seen by provider today: No   present during visit today: Not Applicable.    Note: Pt very specific w/ location of IV placement. \"I'll pass out if you put it there\", indicating not to use lower forearms.  Obtained vascular access w/ initial attempt but was unable to advance catheter and it was removed.  Attempted a 2nd time and pt immediately c/o discomfort \"take it out, take it out.  You're done, I'm leaving\".  I informed pt that I can get a different nurse to try but she replied \"I'm leaving, you poked me twice and I'm done\". I asked pt if she would like to reschedule but she stated that she has another appointment next week.  I assured pt that she would be assigned a different nurse.  She walked out without response.     Intravenous Access:  No Intravenous access/labs at this visit.    Treatment Conditions:  Not Applicable.    Post Infusion Assessment:  Pt refused to stay for treatment.     Discharge Plan:   Patient discharged in stable condition accompanied by: self.  Departure Mode: Ambulatory.      Rosalva Mike RN                    "

## 2023-02-22 ENCOUNTER — PRENATAL OFFICE VISIT (OUTPATIENT)
Dept: OBGYN | Facility: CLINIC | Age: 28
End: 2023-02-22
Payer: COMMERCIAL

## 2023-02-22 VITALS
WEIGHT: 240 LBS | TEMPERATURE: 97.8 F | SYSTOLIC BLOOD PRESSURE: 126 MMHG | HEIGHT: 60 IN | BODY MASS INDEX: 47.12 KG/M2 | HEART RATE: 111 BPM | RESPIRATION RATE: 20 BRPM | DIASTOLIC BLOOD PRESSURE: 83 MMHG

## 2023-02-22 DIAGNOSIS — Z3A.31 31 WEEKS GESTATION OF PREGNANCY: Primary | ICD-10-CM

## 2023-02-22 DIAGNOSIS — O99.012 ANEMIA IN PREGNANCY, SECOND TRIMESTER: ICD-10-CM

## 2023-02-22 DIAGNOSIS — R10.2 PELVIC PAIN: ICD-10-CM

## 2023-02-22 LAB
ALBUMIN UR-MCNC: NEGATIVE MG/DL
APPEARANCE UR: CLEAR
BACTERIA #/AREA URNS HPF: ABNORMAL /HPF
BILIRUB UR QL STRIP: NEGATIVE
CLUE CELLS: ABNORMAL
COLOR UR AUTO: YELLOW
GLUCOSE UR STRIP-MCNC: NEGATIVE MG/DL
HGB UR QL STRIP: ABNORMAL
KETONES UR STRIP-MCNC: NEGATIVE MG/DL
LEUKOCYTE ESTERASE UR QL STRIP: NEGATIVE
MUCOUS THREADS #/AREA URNS LPF: PRESENT /LPF
NITRATE UR QL: NEGATIVE
PH UR STRIP: 6.5 [PH] (ref 5–7)
RBC #/AREA URNS AUTO: ABNORMAL /HPF
SP GR UR STRIP: 1.01 (ref 1–1.03)
SQUAMOUS #/AREA URNS AUTO: ABNORMAL /LPF
TRICHOMONAS, WET PREP: ABNORMAL
UROBILINOGEN UR STRIP-ACNC: 0.2 E.U./DL
WBC #/AREA URNS AUTO: ABNORMAL /HPF
WBC'S/HIGH POWER FIELD, WET PREP: ABNORMAL
YEAST, WET PREP: ABNORMAL

## 2023-02-22 PROCEDURE — 87491 CHLMYD TRACH DNA AMP PROBE: CPT | Performed by: OBSTETRICS & GYNECOLOGY

## 2023-02-22 PROCEDURE — 87591 N.GONORRHOEAE DNA AMP PROB: CPT | Performed by: OBSTETRICS & GYNECOLOGY

## 2023-02-22 PROCEDURE — 87088 URINE BACTERIA CULTURE: CPT | Performed by: OBSTETRICS & GYNECOLOGY

## 2023-02-22 PROCEDURE — 87210 SMEAR WET MOUNT SALINE/INK: CPT | Performed by: OBSTETRICS & GYNECOLOGY

## 2023-02-22 PROCEDURE — 99207 PR PRENATAL VISIT: CPT | Performed by: OBSTETRICS & GYNECOLOGY

## 2023-02-22 PROCEDURE — 81001 URINALYSIS AUTO W/SCOPE: CPT | Performed by: OBSTETRICS & GYNECOLOGY

## 2023-02-22 PROCEDURE — 87086 URINE CULTURE/COLONY COUNT: CPT | Performed by: OBSTETRICS & GYNECOLOGY

## 2023-02-22 NOTE — RESULT ENCOUNTER NOTE
The attached results were normal. Please follow any recommendations discussed in clinic.    Hoa Mckinley MD          2/22/2023 2:58 PM

## 2023-02-22 NOTE — PROGRESS NOTES
"[facility]    Return OB Note    CC: Return OB     Subjective:  Liat is a 27 year old  at 31w2d   Denies vaginal bleeding, loss of fluid, or regular contractions. Pos fetal movement. No headache, visual disturbance or RUQ pain.  Complaints today: severe pelvic pain and pressure.  The pain is \"under\" on the pelvic floor. The pressure starts at her rectum and radiates to the front.  It is so severe that she can't do anything, can't leave the house on her own.  Tylenol isn't helping.  She has difficulty sleeping at night.  She has gone to the ER multiple times and they keep telling her she is not marisol.  The pain is worse with walking.  She is wearing a belly band and that doesn't help.  She states if she had this much pain with her last pregnancy, it would have been her last.    Objective:  /83 (BP Location: Right arm, Patient Position: Chair, Cuff Size: Adult Large)   Pulse 111   Temp 97.8  F (36.6  C) (Tympanic)   Resp 20   Ht 1.524 m (5')   Wt 108.9 kg (240 lb)   LMP 2022   Breastfeeding No   BMI 46.87 kg/m      See flowsheet  Abd: soft, NT, gravid  SSE: cervix appears normal, physiologic discharge noted. Bluish tinge in the vagina suggestive of vascular congestion  SVE: LTC    Assessment/Plan:       27 year old year old  female with IUP at 31w2d  Encounter Diagnoses   Name Primary?     31 weeks gestation of pregnancy Yes     Anemia in pregnancy, second trimester         -NOB labs nl. Declined Influenza and COVID vaccine. Anatomy ultrasound is ordered today  -NIPT low risk.  -H/o anemia: new OB lab showed hgb of 11. Continue prenatal vitamin with iron supplement in it.  -Bipolar disorder/anxiety/ADHD:  sertraline 125mg  -anemia - venofer ordered s/p 2 infusions  -Pelvic floor pain.  She has tried a belly band and tylenol.  Will try physical therapy.  UA and vaginal swabs sent today.  -has discussed sterilization, but has not planned the route " yet.  Doesn't think she signed MA paperwork.  Paperwork signed today.  She will discuss further with Dr. Tavarez at her next visit.    Return precautions given  Discussed  labor and preeclampsia precautions.  Discussed fetal movement precautions.    RTC 2 weeks    Hoa Mckinley MD

## 2023-02-23 LAB
C TRACH DNA SPEC QL PROBE+SIG AMP: NEGATIVE
N GONORRHOEA DNA SPEC QL NAA+PROBE: NEGATIVE

## 2023-02-24 LAB
BACTERIA UR CULT: ABNORMAL
BACTERIA UR CULT: ABNORMAL

## 2023-03-01 ENCOUNTER — HOSPITAL ENCOUNTER (OUTPATIENT)
Facility: CLINIC | Age: 28
Discharge: HOME | End: 2023-03-02
Attending: FAMILY MEDICINE | Admitting: FAMILY MEDICINE
Payer: COMMERCIAL

## 2023-03-01 LAB
ALBUMIN UR-MCNC: NEGATIVE MG/DL
APPEARANCE UR: ABNORMAL
BACTERIA #/AREA URNS HPF: ABNORMAL /HPF
BILIRUB UR QL STRIP: NEGATIVE
COLOR UR AUTO: YELLOW
GLUCOSE UR STRIP-MCNC: 50 MG/DL
HGB UR QL STRIP: ABNORMAL
KETONES UR STRIP-MCNC: NEGATIVE MG/DL
LEUKOCYTE ESTERASE UR QL STRIP: NEGATIVE
MUCOUS THREADS #/AREA URNS LPF: PRESENT /LPF
NITRATE UR QL: NEGATIVE
PH UR STRIP: 6 [PH] (ref 5–7)
RBC URINE: 1 /HPF
SP GR UR STRIP: 1.02 (ref 1–1.03)
SQUAMOUS EPITHELIAL: 4 /HPF
UROBILINOGEN UR STRIP-MCNC: NORMAL MG/DL
WBC URINE: 1 /HPF

## 2023-03-01 PROCEDURE — 81001 URINALYSIS AUTO W/SCOPE: CPT | Performed by: FAMILY MEDICINE

## 2023-03-01 PROCEDURE — G0463 HOSPITAL OUTPT CLINIC VISIT: HCPCS

## 2023-03-01 RX ORDER — LIDOCAINE 40 MG/G
CREAM TOPICAL
Status: DISCONTINUED | OUTPATIENT
Start: 2023-03-01 | End: 2023-03-02 | Stop reason: HOSPADM

## 2023-03-02 VITALS
DIASTOLIC BLOOD PRESSURE: 64 MMHG | RESPIRATION RATE: 18 BRPM | TEMPERATURE: 98.4 F | HEART RATE: 107 BPM | SYSTOLIC BLOOD PRESSURE: 112 MMHG

## 2023-03-02 LAB
CLUE CELLS: ABNORMAL
FFN SPECIMEN INTEGRITY: NORMAL
FIBRONECTIN FETAL VAG QL: NEGATIVE
TRICHOMONAS, WET PREP: ABNORMAL
WBC'S/HIGH POWER FIELD, WET PREP: ABNORMAL
YEAST, WET PREP: ABNORMAL

## 2023-03-02 PROCEDURE — 250N000013 HC RX MED GY IP 250 OP 250 PS 637: Performed by: FAMILY MEDICINE

## 2023-03-02 PROCEDURE — G0463 HOSPITAL OUTPT CLINIC VISIT: HCPCS

## 2023-03-02 PROCEDURE — 87210 SMEAR WET MOUNT SALINE/INK: CPT | Performed by: FAMILY MEDICINE

## 2023-03-02 PROCEDURE — 82731 ASSAY OF FETAL FIBRONECTIN: CPT | Performed by: FAMILY MEDICINE

## 2023-03-02 RX ORDER — ACETAMINOPHEN 325 MG/1
975 TABLET ORAL ONCE
Status: COMPLETED | OUTPATIENT
Start: 2023-03-02 | End: 2023-03-02

## 2023-03-02 RX ORDER — HYDROXYZINE HYDROCHLORIDE 50 MG/1
50 TABLET, FILM COATED ORAL EVERY 6 HOURS PRN
Status: DISCONTINUED | OUTPATIENT
Start: 2023-03-02 | End: 2023-03-02 | Stop reason: HOSPADM

## 2023-03-02 RX ADMIN — ACETAMINOPHEN 975 MG: 325 TABLET ORAL at 00:36

## 2023-03-02 RX ADMIN — HYDROXYZINE HYDROCHLORIDE 50 MG: 50 TABLET, FILM COATED ORAL at 00:36

## 2023-03-02 ASSESSMENT — ACTIVITIES OF DAILY LIVING (ADL)
ADLS_ACUITY_SCORE: 18
ADLS_ACUITY_SCORE: 18

## 2023-03-02 NOTE — PROGRESS NOTES
S:  Discharge from triage.   A:  FHT's 120, Moderate variability, Accels present, no decels noted. Uterine irritability. fFn pending, patient requesting discharge. Discharge orders received. Provided patient with Fetal Fibronectin information sheet, she will get results in her MYChart.  R:  Written and verbal D/C instruction provided. Pt. Verbalized understanding of D/C instructions and will follow up with primary provider.   Verbalizes understanding that she will call or return to the [facility] with any further questions or concerns.   Pt. D/C'd via ambulation with spouse    Nursing Discharge Checklist  Discharge order entered: Yes  Patient care order entered: Yes  Charges entered: Yes

## 2023-03-02 NOTE — PROGRESS NOTES
"Dr. Henderson notified of patient complaint of contractions, monitoring reveals irritability with occasional contraction, patient can continue to talk and laugh through contractions, she is rating her discomfort a \"7\" initially and now says they are not as intense \"maybe a 5\" membranes are intact, FHT's category #1.  @ 32w3d gestation, previous deliveries all full term. Orders received for Wet Prep, fFn, Tylenol and vistaril, also encourage to drink water. Wet Prep and fFn specimens sent to lab, Tylenol and Vistaril given and liter of water at bedside. Will notify MD with lab results.  "

## 2023-03-02 NOTE — DISCHARGE INSTRUCTIONS
OB Triage Discharge Instructions    Diet:  Regular diet as tolerated  Drink 8-10 glasses of water and / or juice every day    Activity:  As tolerated    Other Special Instructions: follow up with primary provider as scheduled    Reason for being seen in L&D: Pre-Term Contractions    Treatment/procedures performed in L&D: External Fetal and Uterine Monitoring, Urinalysis, Wet Prep and Fetal Fibronectin    Call the [facility] at [phone] if you have:  5 or more contractions in one hour  Leaking of fluid from your vaginal area  Decreased fetal movement (follow kick count instructions)  Bleeding from your vaginal area  Swelling in your face, or increased swelling in your hands or legs  Headaches or vision problems such as blurring or seeing spots or starts  Nausea or vomiting lasting for more than 24 hours  An increase in weight (5lbs/week)  Epigastric pain (sometimes confused with heartburn that does not go away or a very bad stomach ache)    If you have any questions, please follow up with your doctor.        Patient Signature: ______________________________________________________________  By signing the above I acknowledge that a nurse or my care provider has explained the instruction to me and I have had any questions regarding my care explained to me.        Discharge Nurse Signature: _______________________________ 3/2/2023  1:54 AM    Method of discharge: ambulatory    Accompanied by: spouse  Time of discharge: 0200

## 2023-03-02 NOTE — PROGRESS NOTES
"S: Triage Arrival  B: Liat is a 27 y.o.  @ 32w 2d who presents with complaint of contractions over the past 2 days. Around 1700 contractions were about every 20\" lasting about 30 seconds around 2100 they were every 10\" lasting about 60 seconds, so she decided she should come in to be seen.   A: EFM applied. FHT's135 with moderate variability, accels present, no decels noted on strip. So far have only noted uterine irritability. Patient reports that she has to stop and breath through contractions when they come. Reports frequent urination. Urine sent to lab for analysis.  R: Will notify MD to obtain further orders.  "

## 2023-03-09 ENCOUNTER — PRENATAL OFFICE VISIT (OUTPATIENT)
Dept: OBGYN | Facility: CLINIC | Age: 28
End: 2023-03-09
Payer: COMMERCIAL

## 2023-03-09 VITALS
TEMPERATURE: 97.4 F | HEART RATE: 106 BPM | DIASTOLIC BLOOD PRESSURE: 73 MMHG | HEIGHT: 60 IN | WEIGHT: 240.1 LBS | RESPIRATION RATE: 18 BRPM | SYSTOLIC BLOOD PRESSURE: 117 MMHG | BODY MASS INDEX: 47.14 KG/M2

## 2023-03-09 DIAGNOSIS — Z34.83 ENCOUNTER FOR SUPERVISION OF OTHER NORMAL PREGNANCY IN THIRD TRIMESTER: Primary | ICD-10-CM

## 2023-03-09 PROCEDURE — 99207 PR PRENATAL VISIT: CPT | Performed by: OBSTETRICS & GYNECOLOGY

## 2023-03-09 NOTE — NURSING NOTE
Initial /73 (BP Location: Left arm, Patient Position: Chair, Cuff Size: Adult Large)   Pulse 106   Temp 97.4  F (36.3  C) (Tympanic)   Resp 18   Ht 1.524 m (5')   Wt 108.9 kg (240 lb 1.6 oz)   LMP 06/20/2022   BMI 46.89 kg/m   Estimated body mass index is 46.89 kg/m  as calculated from the following:    Height as of this encounter: 1.524 m (5').    Weight as of this encounter: 108.9 kg (240 lb 1.6 oz). .    Liz Kendall, CMA

## 2023-03-09 NOTE — PROGRESS NOTES
Concerns: pelvic pressure  She was seen in [place] twice in the last month - work-up negative  No vaginal bleeding, LOF.  No contractions.  Reportable signs and symptoms discussed.  Discussed kick counts and fetal movement.  Discussed PTL, PROM, and when to call or come in.  PPTL Planned FTCF signed  RTC 2 weeks.    Vlad Tavarez MD

## 2023-03-21 ENCOUNTER — PRENATAL OFFICE VISIT (OUTPATIENT)
Dept: OBGYN | Facility: CLINIC | Age: 28
End: 2023-03-21
Payer: COMMERCIAL

## 2023-03-21 VITALS
HEART RATE: 114 BPM | BODY MASS INDEX: 47.94 KG/M2 | DIASTOLIC BLOOD PRESSURE: 70 MMHG | TEMPERATURE: 98.5 F | RESPIRATION RATE: 16 BRPM | SYSTOLIC BLOOD PRESSURE: 114 MMHG | HEIGHT: 60 IN | WEIGHT: 244.2 LBS

## 2023-03-21 DIAGNOSIS — Z34.83 ENCOUNTER FOR SUPERVISION OF OTHER NORMAL PREGNANCY IN THIRD TRIMESTER: Primary | ICD-10-CM

## 2023-03-21 PROCEDURE — 87653 STREP B DNA AMP PROBE: CPT | Performed by: OBSTETRICS & GYNECOLOGY

## 2023-03-21 PROCEDURE — 99207 PR PRENATAL VISIT: CPT | Performed by: OBSTETRICS & GYNECOLOGY

## 2023-03-21 PROCEDURE — 87077 CULTURE AEROBIC IDENTIFY: CPT | Performed by: OBSTETRICS & GYNECOLOGY

## 2023-03-21 PROCEDURE — 87186 SC STD MICRODIL/AGAR DIL: CPT | Performed by: OBSTETRICS & GYNECOLOGY

## 2023-03-21 NOTE — PROGRESS NOTES
Concerns:   Doing well.  No concerns today.  No vaginal bleeding, LOF.  No contractions.  Reportable signs and symptoms discussed.  Discussed kick counts and fetal movement.  Discussed PTL, PROM, and when to call or come in.  GBS performed today  RTC weekly    Vlad Tavarez MD

## 2023-03-22 LAB — GP B STREP DNA SPEC QL NAA+PROBE: POSITIVE

## 2023-03-25 LAB — BACTERIA SPEC CULT: ABNORMAL

## 2023-03-28 ENCOUNTER — PRENATAL OFFICE VISIT (OUTPATIENT)
Dept: OBGYN | Facility: CLINIC | Age: 28
End: 2023-03-28
Payer: COMMERCIAL

## 2023-03-28 VITALS
BODY MASS INDEX: 48.28 KG/M2 | TEMPERATURE: 98.2 F | DIASTOLIC BLOOD PRESSURE: 71 MMHG | SYSTOLIC BLOOD PRESSURE: 119 MMHG | HEIGHT: 60 IN | RESPIRATION RATE: 18 BRPM | HEART RATE: 106 BPM | WEIGHT: 245.9 LBS

## 2023-03-28 DIAGNOSIS — Z34.83 ENCOUNTER FOR SUPERVISION OF OTHER NORMAL PREGNANCY IN THIRD TRIMESTER: Primary | ICD-10-CM

## 2023-03-28 PROCEDURE — 99207 PR PRENATAL VISIT: CPT | Performed by: OBSTETRICS & GYNECOLOGY

## 2023-03-28 RX ORDER — CEFAZOLIN SODIUM 2 G/100ML
2 INJECTION, SOLUTION INTRAVENOUS ONCE
Status: CANCELLED | OUTPATIENT
Start: 2023-03-28 | End: 2023-03-28

## 2023-03-28 RX ORDER — OXYTOCIN 10 [USP'U]/ML
10 INJECTION, SOLUTION INTRAMUSCULAR; INTRAVENOUS
Status: CANCELLED | OUTPATIENT
Start: 2023-03-28

## 2023-03-28 RX ORDER — TRANEXAMIC ACID 10 MG/ML
1 INJECTION, SOLUTION INTRAVENOUS EVERY 30 MIN PRN
Status: CANCELLED | OUTPATIENT
Start: 2023-03-28

## 2023-03-28 RX ORDER — NALOXONE HYDROCHLORIDE 0.4 MG/ML
0.4 INJECTION, SOLUTION INTRAMUSCULAR; INTRAVENOUS; SUBCUTANEOUS
Status: CANCELLED | OUTPATIENT
Start: 2023-03-28

## 2023-03-28 RX ORDER — CARBOPROST TROMETHAMINE 250 UG/ML
250 INJECTION, SOLUTION INTRAMUSCULAR
Status: CANCELLED | OUTPATIENT
Start: 2023-03-28

## 2023-03-28 RX ORDER — IBUPROFEN 200 MG
800 TABLET ORAL
Status: CANCELLED | OUTPATIENT
Start: 2023-03-28 | End: 2023-04-02

## 2023-03-28 RX ORDER — MISOPROSTOL 200 UG/1
400 TABLET ORAL
Status: CANCELLED | OUTPATIENT
Start: 2023-03-28

## 2023-03-28 RX ORDER — OXYTOCIN/0.9 % SODIUM CHLORIDE 30/500 ML
340 PLASTIC BAG, INJECTION (ML) INTRAVENOUS CONTINUOUS PRN
Status: CANCELLED | OUTPATIENT
Start: 2023-03-28

## 2023-03-28 RX ORDER — METHYLERGONOVINE MALEATE 0.2 MG/ML
200 INJECTION INTRAVENOUS
Status: CANCELLED | OUTPATIENT
Start: 2023-03-28

## 2023-03-28 RX ORDER — METOCLOPRAMIDE HYDROCHLORIDE 5 MG/ML
10 INJECTION INTRAMUSCULAR; INTRAVENOUS EVERY 6 HOURS PRN
Status: CANCELLED | OUTPATIENT
Start: 2023-03-28

## 2023-03-28 RX ORDER — NALOXONE HYDROCHLORIDE 0.4 MG/ML
0.2 INJECTION, SOLUTION INTRAMUSCULAR; INTRAVENOUS; SUBCUTANEOUS
Status: CANCELLED | OUTPATIENT
Start: 2023-03-28

## 2023-03-28 RX ORDER — MISOPROSTOL 200 UG/1
800 TABLET ORAL
Status: CANCELLED | OUTPATIENT
Start: 2023-03-28

## 2023-03-28 RX ORDER — OXYTOCIN/0.9 % SODIUM CHLORIDE 30/500 ML
100-340 PLASTIC BAG, INJECTION (ML) INTRAVENOUS CONTINUOUS PRN
Status: CANCELLED | OUTPATIENT
Start: 2023-03-28

## 2023-03-28 RX ORDER — METOCLOPRAMIDE 10 MG/1
10 TABLET ORAL EVERY 6 HOURS PRN
Status: CANCELLED | OUTPATIENT
Start: 2023-03-28

## 2023-03-28 RX ORDER — KETOROLAC TROMETHAMINE 30 MG/ML
30 INJECTION, SOLUTION INTRAMUSCULAR; INTRAVENOUS
Status: CANCELLED | OUTPATIENT
Start: 2023-03-28 | End: 2023-04-02

## 2023-03-28 RX ORDER — ONDANSETRON 4 MG/1
4 TABLET, ORALLY DISINTEGRATING ORAL EVERY 6 HOURS PRN
Status: CANCELLED | OUTPATIENT
Start: 2023-03-28

## 2023-03-28 RX ORDER — CITRIC ACID/SODIUM CITRATE 334-500MG
30 SOLUTION, ORAL ORAL
Status: CANCELLED | OUTPATIENT
Start: 2023-03-28

## 2023-03-28 RX ORDER — PROCHLORPERAZINE 25 MG
25 SUPPOSITORY, RECTAL RECTAL EVERY 12 HOURS PRN
Status: CANCELLED | OUTPATIENT
Start: 2023-03-28

## 2023-03-28 RX ORDER — CEFAZOLIN SODIUM 1 G/3ML
1 INJECTION, POWDER, FOR SOLUTION INTRAMUSCULAR; INTRAVENOUS EVERY 8 HOURS
Status: CANCELLED | OUTPATIENT
Start: 2023-03-29

## 2023-03-28 RX ORDER — ONDANSETRON 2 MG/ML
4 INJECTION INTRAMUSCULAR; INTRAVENOUS EVERY 6 HOURS PRN
Status: CANCELLED | OUTPATIENT
Start: 2023-03-28

## 2023-03-28 RX ORDER — PROCHLORPERAZINE MALEATE 10 MG
10 TABLET ORAL EVERY 6 HOURS PRN
Status: CANCELLED | OUTPATIENT
Start: 2023-03-28

## 2023-03-28 NOTE — PROGRESS NOTES
Concerns: increased contractions    GBS + Has tolerated Cefazolin in the past  No vaginal bleeding, LOF, contractions.  No HA, RUQ pain, N/V, visual changes..  Labor precautions discussed.  RTC 1 week.  Prenatal flowsheet information is reviewed.    Vlad Tavarez MD

## 2023-03-30 ENCOUNTER — HOSPITAL ENCOUNTER (OUTPATIENT)
Facility: CLINIC | Age: 28
Discharge: HOME | End: 2023-03-30
Attending: FAMILY MEDICINE | Admitting: FAMILY MEDICINE
Payer: COMMERCIAL

## 2023-03-30 VITALS
RESPIRATION RATE: 16 BRPM | HEART RATE: 90 BPM | TEMPERATURE: 98 F | SYSTOLIC BLOOD PRESSURE: 141 MMHG | DIASTOLIC BLOOD PRESSURE: 80 MMHG

## 2023-03-30 PROCEDURE — G0463 HOSPITAL OUTPT CLINIC VISIT: HCPCS

## 2023-03-30 ASSESSMENT — ACTIVITIES OF DAILY LIVING (ADL): ADLS_ACUITY_SCORE: 31

## 2023-03-30 NOTE — PROGRESS NOTES
S: Triage Arrival  B: Liat is a 27 y.o.  @ 36w 3d who presents with complaint of contractions more regular for the last couple days  A: EFM applied. FHT's 150 with moderate variability, accels present, no decels noted on strip. Contractions irregular  R: Will notify MD to obtain further orders.

## 2023-03-30 NOTE — PROGRESS NOTES
Cervix was noted to be 1.5cm, 50%, -3, Dr. Hewitt saw pt, gave orders to discharge to home. Pt has no questions.

## 2023-03-30 NOTE — DISCHARGE INSTRUCTIONS
OB Triage Discharge Instructions    Diet:  Regular diet as tolerated  Drink 8-10 glasses of water and / or juice every day    Activity:  As tolerated    Other Special Instructions: Follow up as previously scheduled    Reason for being seen in L&D: contractions    Treatment/procedures performed in L&D: fetal monitoring & cervical exam    Call the [facility] at [phone] if you have:  5 or more contractions in one hour  Leaking of fluid from your vaginal area  Decreased fetal movement (follow kick count instructions)  Bleeding from your vaginal area  Swelling in your face, or increased swelling in your hands or legs  Headaches or vision problems such as blurring or seeing spots or starts  Nausea or vomiting lasting for more than 24 hours  An increase in weight (5lbs/week)  Epigastric pain (sometimes confused with heartburn that does not go away or a very bad stomach ache)    If you have any questions, please follow up with your doctor.        Patient Signature: ______________________________________________________________  By signing the above I acknowledge that a nurse or my care provider has explained the instruction to me and I have had any questions regarding my care explained to me.        Discharge Nurse Signature: _______________________________ 3/30/2023  1:42 PM    Method of discharge: amb    Accompanied by: SO & mother  Time of discharge: 2187

## 2023-03-30 NOTE — PROGRESS NOTES
S:  Discharge from triage.   A:  FHT's 145, Moderate variability, Accels present, no decels noted. Contractions irregular.  Cervical exam 1.5cm, 50%, -3  R:  Written and verbal D/C instruction provided. Pt. Verbalized understanding of D/C instructions and will follow up with primary MD as previously scheduled.   Verbalizes understanding that she will call or return to the [facility] with any further questions or concerns.   Pt. D/C'd via amb with SO    Nursing Discharge Checklist  Discharge order entered: Yes  Patient care order entered: Yes  Charges entered: Yes  IV start and stop times have been documented in Epic? n/a  NST start and stop times have been documented in Epic Doc F/S? n/a

## 2023-04-02 ENCOUNTER — HOSPITAL ENCOUNTER (OUTPATIENT)
Facility: CLINIC | Age: 28
Discharge: HOME | End: 2023-04-02
Attending: FAMILY MEDICINE | Admitting: FAMILY MEDICINE
Payer: COMMERCIAL

## 2023-04-02 ENCOUNTER — HOSPITAL ENCOUNTER (OUTPATIENT)
Facility: CLINIC | Age: 28
End: 2023-04-02
Admitting: FAMILY MEDICINE
Payer: COMMERCIAL

## 2023-04-02 VITALS
RESPIRATION RATE: 17 BRPM | DIASTOLIC BLOOD PRESSURE: 72 MMHG | BODY MASS INDEX: 48.1 KG/M2 | TEMPERATURE: 97.9 F | HEIGHT: 60 IN | SYSTOLIC BLOOD PRESSURE: 136 MMHG | WEIGHT: 245 LBS | HEART RATE: 98 BPM

## 2023-04-02 PROBLEM — Z36.89 ENCOUNTER FOR TRIAGE IN PREGNANT PATIENT: Status: ACTIVE | Noted: 2023-04-02

## 2023-04-02 LAB — CRYSTALS AMN MICRO: NORMAL

## 2023-04-02 PROCEDURE — G0463 HOSPITAL OUTPT CLINIC VISIT: HCPCS

## 2023-04-02 RX ORDER — LIDOCAINE 40 MG/G
CREAM TOPICAL
Status: DISCONTINUED | OUTPATIENT
Start: 2023-04-02 | End: 2023-04-02 | Stop reason: HOSPADM

## 2023-04-02 ASSESSMENT — ACTIVITIES OF DAILY LIVING (ADL): ADLS_ACUITY_SCORE: 31

## 2023-04-03 NOTE — PROGRESS NOTES
"S: Triage Arrival  B: Liat is a 27 y.o.  @ 36w 6d who presents with complaint of contractions since yesterday, worse today. Tried Tylenol for HA at 2:30pm, that did nothing for painful contractions. Lost mucous plug about a week ago. Unsure if leaking fluids or if it's \"weak bladder.\" Denies vaginal bleeding.   A: EFM applied. FHT's155 with moderate variability, accels present, no decels noted on strip. Contractions every few minutes, mild to moderate palpation. Rates them 6/10 pain.  R: Will notify MD to obtain further orders.  "

## 2023-04-03 NOTE — PROGRESS NOTES
S:  Discharge from triage  A:  FHT's 135bpm, Moderate variability, Accels present, no decels noted. Contractions Q2-5. Cervical exam 1.5cm/50%/high. Ferning negative.  R:  Written and verbal D/C instruction provided. Pt. verbalized understanding of D/C instructions and will follow up with Dr. Tavarez as previously scheduled on 4/5.   Verbalizes understanding that she will call or return to the [facility] with any further questions or concerns.  Pt. D/C'd via ambulation     Nursing Discharge Checklist  Discharge order entered: Yes  Patient care order entered: Yes  Charges entered: Yes  IV start and stop times have been documented in Epic? N/A  NST start and stop times have been documented in Epic Doc F/S? N/A     Sun Kohler RN on 4/2/2023 at 9:45 PM

## 2023-04-03 NOTE — PROGRESS NOTES
Dr Francis called about patient complaints of contractions since yesterday, worse today. She is unsure if she is leaking or her weak bladder. Updated on Category I, contractions every few minutes rated 6/10 pain and palpated mild/moderate.     Orders for Ferning, spec exam, then SVE and update.

## 2023-04-05 ENCOUNTER — PRENATAL OFFICE VISIT (OUTPATIENT)
Dept: OBGYN | Facility: CLINIC | Age: 28
End: 2023-04-05
Payer: COMMERCIAL

## 2023-04-05 VITALS
SYSTOLIC BLOOD PRESSURE: 120 MMHG | BODY MASS INDEX: 49.02 KG/M2 | TEMPERATURE: 98.6 F | WEIGHT: 249.7 LBS | RESPIRATION RATE: 16 BRPM | HEIGHT: 60 IN | DIASTOLIC BLOOD PRESSURE: 64 MMHG

## 2023-04-05 DIAGNOSIS — Z34.83 ENCOUNTER FOR SUPERVISION OF OTHER NORMAL PREGNANCY IN THIRD TRIMESTER: Primary | ICD-10-CM

## 2023-04-05 PROCEDURE — 99207 PR PRENATAL VISIT: CPT | Performed by: OBSTETRICS & GYNECOLOGY

## 2023-04-05 NOTE — PROGRESS NOTES
Concerns: ruled out @ [place] last week    No vaginal bleeding, LOF, contractions.  No HA, RUQ pain, N/V, visual changes.  Cervix is 2/70/-1 Bishops score of 7.  Cephalic position confirmed by Leopold maneuvers and cervical exam.  Reportable signs and symptoms discussed.  Labor precautions discussed.  IOL on 4/18/23  RTC 1 week.    Vlad Tavarez MD

## 2023-04-09 ENCOUNTER — HEALTH MAINTENANCE LETTER (OUTPATIENT)
Age: 28
End: 2023-04-09

## 2023-04-14 ENCOUNTER — PRENATAL OFFICE VISIT (OUTPATIENT)
Dept: OBGYN | Facility: CLINIC | Age: 28
End: 2023-04-14
Payer: COMMERCIAL

## 2023-04-14 VITALS
WEIGHT: 247 LBS | TEMPERATURE: 98.5 F | SYSTOLIC BLOOD PRESSURE: 125 MMHG | HEIGHT: 60 IN | RESPIRATION RATE: 18 BRPM | HEART RATE: 114 BPM | BODY MASS INDEX: 48.49 KG/M2 | DIASTOLIC BLOOD PRESSURE: 80 MMHG

## 2023-04-14 DIAGNOSIS — B95.1 POSITIVE GBS TEST: ICD-10-CM

## 2023-04-14 DIAGNOSIS — F90.9 ATTENTION DEFICIT HYPERACTIVITY DISORDER (ADHD), UNSPECIFIED ADHD TYPE: ICD-10-CM

## 2023-04-14 DIAGNOSIS — Z34.83 ENCOUNTER FOR SUPERVISION OF OTHER NORMAL PREGNANCY IN THIRD TRIMESTER: Primary | ICD-10-CM

## 2023-04-14 DIAGNOSIS — Z30.09 STERILIZATION CONSULT: ICD-10-CM

## 2023-04-14 DIAGNOSIS — M79.89 SWELLING OF LIMB: ICD-10-CM

## 2023-04-14 DIAGNOSIS — F31.70 BIPOLAR AFFECTIVE DISORDER IN REMISSION (H): ICD-10-CM

## 2023-04-14 PROCEDURE — 99207 PR PRENATAL VISIT: CPT | Performed by: STUDENT IN AN ORGANIZED HEALTH CARE EDUCATION/TRAINING PROGRAM

## 2023-04-14 NOTE — PROGRESS NOTES
[facility]  Return OB Note    Subjective:  Denies vaginal bleeding, loss of fluid, or regular contractions. Noted fetal movement.  Complaints today: Worsening swelling of both hands and both feet. Denies headache, vision changes, chest pain, SOB, epigastric/RUQ pain.    Objective:  /80 (BP Location: Left arm, Patient Position: Chair, Cuff Size: Adult Regular)   Pulse 114   Temp 98.5  F (36.9  C) (Tympanic)   Resp 18   Ht 1.524 m (5')   Wt 112 kg (247 lb)   LMP 2022   BMI 48.24 kg/m      Fundal height: 41cm  FHT: 130bpm  SVE: 3/50/2    Assessment/Plan:   Liat Corcoran is a 27 year old  at 38w4d by LMP c/w 8w1d US, here for return OB visit.     -NOB labs nl. Midtrimester labs noted for anemia.  -NIPT low risk.  -Anatomy ultrasound   -Declined Influenza and COVID vaccine.   -Anemia: S/p iron infusion x2  -Bipolar disorder/anxiety/ADHD: continue sertraline 125mg   -GBS positive: ancef ordered placed for during labor due to PCN allergy  -Desire for tubal ligation: federal tubal paperwork signed on     IOL scheduled for  7pm with pitocin.    Jerri Guthrie MD  Obstetrics and Gynecology  [facility]   2023

## 2023-04-14 NOTE — NURSING NOTE
Initial /80 (BP Location: Left arm, Patient Position: Chair, Cuff Size: Adult Regular)   Pulse 114   Temp 98.5  F (36.9  C) (Tympanic)   Resp 18   Ht 1.524 m (5')   Wt 112 kg (247 lb)   LMP 06/20/2022   BMI 48.24 kg/m   Estimated body mass index is 48.24 kg/m  as calculated from the following:    Height as of this encounter: 1.524 m (5').    Weight as of this encounter: 112 kg (247 lb). .

## 2023-04-18 ENCOUNTER — ANESTHESIA EVENT (OUTPATIENT)
Dept: OBGYN | Facility: CLINIC | Age: 28
End: 2023-04-18
Payer: COMMERCIAL

## 2023-04-18 ENCOUNTER — HOSPITAL ENCOUNTER (INPATIENT)
Facility: CLINIC | Age: 28
LOS: 2 days | Discharge: HOME | End: 2023-04-20
Attending: OBSTETRICS & GYNECOLOGY | Admitting: OBSTETRICS & GYNECOLOGY
Payer: COMMERCIAL

## 2023-04-18 ENCOUNTER — ANESTHESIA (OUTPATIENT)
Dept: OBGYN | Facility: CLINIC | Age: 28
End: 2023-04-18
Payer: COMMERCIAL

## 2023-04-18 PROBLEM — Z34.83 ENCOUNTER FOR SUPERVISION OF OTHER NORMAL PREGNANCY IN THIRD TRIMESTER: Status: ACTIVE | Noted: 2021-09-08

## 2023-04-18 LAB
ABO/RH(D): NORMAL
ANTIBODY SCREEN: NEGATIVE
BASOPHILS # BLD AUTO: 0.1 10E3/UL (ref 0–0.2)
BASOPHILS NFR BLD AUTO: 1 %
EOSINOPHIL # BLD AUTO: 0.2 10E3/UL (ref 0–0.7)
EOSINOPHIL NFR BLD AUTO: 2 %
ERYTHROCYTE [DISTWIDTH] IN BLOOD BY AUTOMATED COUNT: 18.5 % (ref 10–15)
HCT VFR BLD AUTO: 31.6 % (ref 35–47)
HGB BLD-MCNC: 9.4 G/DL (ref 11.7–15.7)
IMM GRANULOCYTES # BLD: 0.1 10E3/UL
IMM GRANULOCYTES NFR BLD: 1 %
LYMPHOCYTES # BLD AUTO: 2.6 10E3/UL (ref 0.8–5.3)
LYMPHOCYTES NFR BLD AUTO: 26 %
MCH RBC QN AUTO: 21.9 PG (ref 26.5–33)
MCHC RBC AUTO-ENTMCNC: 29.7 G/DL (ref 31.5–36.5)
MCV RBC AUTO: 74 FL (ref 78–100)
MONOCYTES # BLD AUTO: 0.7 10E3/UL (ref 0–1.3)
MONOCYTES NFR BLD AUTO: 7 %
NEUTROPHILS # BLD AUTO: 6.4 10E3/UL (ref 1.6–8.3)
NEUTROPHILS NFR BLD AUTO: 63 %
NRBC # BLD AUTO: 0 10E3/UL
NRBC BLD AUTO-RTO: 0 /100
PLATELET # BLD AUTO: 337 10E3/UL (ref 150–450)
RBC # BLD AUTO: 4.29 10E6/UL (ref 3.8–5.2)
SPECIMEN EXPIRATION DATE: NORMAL
T PALLIDUM AB SER QL: NONREACTIVE
WBC # BLD AUTO: 10 10E3/UL (ref 4–11)

## 2023-04-18 PROCEDURE — 10907ZC DRAINAGE OF AMNIOTIC FLUID, THERAPEUTIC FROM PRODUCTS OF CONCEPTION, VIA NATURAL OR ARTIFICIAL OPENING: ICD-10-PCS | Performed by: OBSTETRICS & GYNECOLOGY

## 2023-04-18 PROCEDURE — 370N000003 HC ANESTHESIA WARD SERVICE: Performed by: NURSE ANESTHETIST, CERTIFIED REGISTERED

## 2023-04-18 PROCEDURE — 85004 AUTOMATED DIFF WBC COUNT: CPT | Performed by: OBSTETRICS & GYNECOLOGY

## 2023-04-18 PROCEDURE — 86850 RBC ANTIBODY SCREEN: CPT | Performed by: OBSTETRICS & GYNECOLOGY

## 2023-04-18 PROCEDURE — 120N000001 HC R&B MED SURG/OB

## 2023-04-18 PROCEDURE — 250N000009 HC RX 250: Performed by: NURSE ANESTHETIST, CERTIFIED REGISTERED

## 2023-04-18 PROCEDURE — 86780 TREPONEMA PALLIDUM: CPT | Performed by: OBSTETRICS & GYNECOLOGY

## 2023-04-18 PROCEDURE — 250N000011 HC RX IP 250 OP 636: Performed by: OBSTETRICS & GYNECOLOGY

## 2023-04-18 PROCEDURE — 00HU33Z INSERTION OF INFUSION DEVICE INTO SPINAL CANAL, PERCUTANEOUS APPROACH: ICD-10-PCS | Performed by: NURSE ANESTHETIST, CERTIFIED REGISTERED

## 2023-04-18 PROCEDURE — 258N000003 HC RX IP 258 OP 636: Performed by: OBSTETRICS & GYNECOLOGY

## 2023-04-18 PROCEDURE — 250N000013 HC RX MED GY IP 250 OP 250 PS 637: Performed by: OBSTETRICS & GYNECOLOGY

## 2023-04-18 PROCEDURE — 3E033VJ INTRODUCTION OF OTHER HORMONE INTO PERIPHERAL VEIN, PERCUTANEOUS APPROACH: ICD-10-PCS | Performed by: OBSTETRICS & GYNECOLOGY

## 2023-04-18 PROCEDURE — 250N000011 HC RX IP 250 OP 636: Performed by: NURSE ANESTHETIST, CERTIFIED REGISTERED

## 2023-04-18 PROCEDURE — 250N000009 HC RX 250: Performed by: OBSTETRICS & GYNECOLOGY

## 2023-04-18 RX ORDER — CEFAZOLIN SODIUM 1 G/3ML
1 INJECTION, POWDER, FOR SOLUTION INTRAMUSCULAR; INTRAVENOUS EVERY 8 HOURS
Status: DISCONTINUED | OUTPATIENT
Start: 2023-04-18 | End: 2023-04-19

## 2023-04-18 RX ORDER — CEFAZOLIN SODIUM 2 G/100ML
2 INJECTION, SOLUTION INTRAVENOUS ONCE
Status: COMPLETED | OUTPATIENT
Start: 2023-04-18 | End: 2023-04-18

## 2023-04-18 RX ORDER — ONDANSETRON 2 MG/ML
4 INJECTION INTRAMUSCULAR; INTRAVENOUS EVERY 6 HOURS PRN
Status: DISCONTINUED | OUTPATIENT
Start: 2023-04-18 | End: 2023-04-19

## 2023-04-18 RX ORDER — NALBUPHINE HYDROCHLORIDE 10 MG/ML
2.5-5 INJECTION, SOLUTION INTRAMUSCULAR; INTRAVENOUS; SUBCUTANEOUS EVERY 6 HOURS PRN
Status: DISCONTINUED | OUTPATIENT
Start: 2023-04-18 | End: 2023-04-19

## 2023-04-18 RX ORDER — ONDANSETRON 4 MG/1
4 TABLET, ORALLY DISINTEGRATING ORAL EVERY 6 HOURS PRN
Status: DISCONTINUED | OUTPATIENT
Start: 2023-04-18 | End: 2023-04-19

## 2023-04-18 RX ORDER — KETOROLAC TROMETHAMINE 30 MG/ML
30 INJECTION, SOLUTION INTRAMUSCULAR; INTRAVENOUS
Status: DISCONTINUED | OUTPATIENT
Start: 2023-04-18 | End: 2023-04-19

## 2023-04-18 RX ORDER — TRANEXAMIC ACID 10 MG/ML
1 INJECTION, SOLUTION INTRAVENOUS EVERY 30 MIN PRN
Status: DISCONTINUED | OUTPATIENT
Start: 2023-04-18 | End: 2023-04-19

## 2023-04-18 RX ORDER — FENTANYL CITRATE-0.9 % NACL/PF 10 MCG/ML
100 PLASTIC BAG, INJECTION (ML) INTRAVENOUS EVERY 5 MIN PRN
Status: DISCONTINUED | OUTPATIENT
Start: 2023-04-18 | End: 2023-04-19

## 2023-04-18 RX ORDER — MISOPROSTOL 200 UG/1
400 TABLET ORAL
Status: DISCONTINUED | OUTPATIENT
Start: 2023-04-18 | End: 2023-04-19

## 2023-04-18 RX ORDER — MISOPROSTOL 200 UG/1
800 TABLET ORAL
Status: DISCONTINUED | OUTPATIENT
Start: 2023-04-18 | End: 2023-04-19

## 2023-04-18 RX ORDER — ONDANSETRON 4 MG/1
4 TABLET, ORALLY DISINTEGRATING ORAL EVERY 6 HOURS PRN
Status: DISCONTINUED | OUTPATIENT
Start: 2023-04-18 | End: 2023-04-18

## 2023-04-18 RX ORDER — SODIUM CHLORIDE, SODIUM LACTATE, POTASSIUM CHLORIDE, CALCIUM CHLORIDE 600; 310; 30; 20 MG/100ML; MG/100ML; MG/100ML; MG/100ML
INJECTION, SOLUTION INTRAVENOUS CONTINUOUS PRN
Status: DISCONTINUED | OUTPATIENT
Start: 2023-04-18 | End: 2023-04-19

## 2023-04-18 RX ORDER — OXYTOCIN/0.9 % SODIUM CHLORIDE 30/500 ML
100-340 PLASTIC BAG, INJECTION (ML) INTRAVENOUS CONTINUOUS PRN
Status: DISCONTINUED | OUTPATIENT
Start: 2023-04-18 | End: 2023-04-19

## 2023-04-18 RX ORDER — NALOXONE HYDROCHLORIDE 0.4 MG/ML
0.2 INJECTION, SOLUTION INTRAMUSCULAR; INTRAVENOUS; SUBCUTANEOUS
Status: DISCONTINUED | OUTPATIENT
Start: 2023-04-18 | End: 2023-04-19

## 2023-04-18 RX ORDER — OXYTOCIN 10 [USP'U]/ML
10 INJECTION, SOLUTION INTRAMUSCULAR; INTRAVENOUS
Status: DISCONTINUED | OUTPATIENT
Start: 2023-04-18 | End: 2023-04-19

## 2023-04-18 RX ORDER — PROCHLORPERAZINE 25 MG
25 SUPPOSITORY, RECTAL RECTAL EVERY 12 HOURS PRN
Status: DISCONTINUED | OUTPATIENT
Start: 2023-04-18 | End: 2023-04-19

## 2023-04-18 RX ORDER — CALCIUM CARBONATE 500 MG/1
1000 TABLET, CHEWABLE ORAL DAILY PRN
Status: DISCONTINUED | OUTPATIENT
Start: 2023-04-18 | End: 2023-04-20 | Stop reason: HOSPADM

## 2023-04-18 RX ORDER — PROCHLORPERAZINE MALEATE 10 MG
10 TABLET ORAL EVERY 6 HOURS PRN
Status: DISCONTINUED | OUTPATIENT
Start: 2023-04-18 | End: 2023-04-19

## 2023-04-18 RX ORDER — OXYTOCIN/0.9 % SODIUM CHLORIDE 30/500 ML
340 PLASTIC BAG, INJECTION (ML) INTRAVENOUS CONTINUOUS PRN
Status: DISCONTINUED | OUTPATIENT
Start: 2023-04-18 | End: 2023-04-19

## 2023-04-18 RX ORDER — LIDOCAINE HYDROCHLORIDE AND EPINEPHRINE 15; 5 MG/ML; UG/ML
INJECTION, SOLUTION EPIDURAL PRN
Status: DISCONTINUED | OUTPATIENT
Start: 2023-04-18 | End: 2023-04-19

## 2023-04-18 RX ORDER — NALOXONE HYDROCHLORIDE 0.4 MG/ML
0.4 INJECTION, SOLUTION INTRAMUSCULAR; INTRAVENOUS; SUBCUTANEOUS
Status: DISCONTINUED | OUTPATIENT
Start: 2023-04-18 | End: 2023-04-19

## 2023-04-18 RX ORDER — METOCLOPRAMIDE HYDROCHLORIDE 5 MG/ML
10 INJECTION INTRAMUSCULAR; INTRAVENOUS EVERY 6 HOURS PRN
Status: DISCONTINUED | OUTPATIENT
Start: 2023-04-18 | End: 2023-04-19

## 2023-04-18 RX ORDER — OXYTOCIN/0.9 % SODIUM CHLORIDE 30/500 ML
1-24 PLASTIC BAG, INJECTION (ML) INTRAVENOUS CONTINUOUS
Status: DISCONTINUED | OUTPATIENT
Start: 2023-04-18 | End: 2023-04-19

## 2023-04-18 RX ORDER — IBUPROFEN 800 MG/1
800 TABLET, FILM COATED ORAL
Status: DISCONTINUED | OUTPATIENT
Start: 2023-04-18 | End: 2023-04-19

## 2023-04-18 RX ORDER — CEFAZOLIN SODIUM 1 G/3ML
1 INJECTION, POWDER, FOR SOLUTION INTRAMUSCULAR; INTRAVENOUS EVERY 8 HOURS
Status: DISCONTINUED | OUTPATIENT
Start: 2023-04-18 | End: 2023-04-18

## 2023-04-18 RX ORDER — ONDANSETRON 2 MG/ML
4 INJECTION INTRAMUSCULAR; INTRAVENOUS EVERY 6 HOURS PRN
Status: DISCONTINUED | OUTPATIENT
Start: 2023-04-18 | End: 2023-04-18

## 2023-04-18 RX ORDER — CEFAZOLIN SODIUM/WATER 2 G/20 ML
2 SYRINGE (ML) INTRAVENOUS ONCE
Status: DISCONTINUED | OUTPATIENT
Start: 2023-04-18 | End: 2023-04-18

## 2023-04-18 RX ORDER — METHYLERGONOVINE MALEATE 0.2 MG/ML
200 INJECTION INTRAVENOUS
Status: DISCONTINUED | OUTPATIENT
Start: 2023-04-18 | End: 2023-04-19

## 2023-04-18 RX ORDER — CITRIC ACID/SODIUM CITRATE 334-500MG
30 SOLUTION, ORAL ORAL
Status: DISCONTINUED | OUTPATIENT
Start: 2023-04-18 | End: 2023-04-19

## 2023-04-18 RX ORDER — LIDOCAINE HYDROCHLORIDE AND EPINEPHRINE 15; 5 MG/ML; UG/ML
3 INJECTION, SOLUTION EPIDURAL
Status: DISCONTINUED | OUTPATIENT
Start: 2023-04-18 | End: 2023-04-19

## 2023-04-18 RX ORDER — LIDOCAINE 40 MG/G
CREAM TOPICAL
Status: DISCONTINUED | OUTPATIENT
Start: 2023-04-18 | End: 2023-04-19

## 2023-04-18 RX ORDER — TERBUTALINE SULFATE 1 MG/ML
0.25 INJECTION, SOLUTION SUBCUTANEOUS
Status: DISCONTINUED | OUTPATIENT
Start: 2023-04-18 | End: 2023-04-19

## 2023-04-18 RX ORDER — CARBOPROST TROMETHAMINE 250 UG/ML
250 INJECTION, SOLUTION INTRAMUSCULAR
Status: DISCONTINUED | OUTPATIENT
Start: 2023-04-18 | End: 2023-04-19

## 2023-04-18 RX ORDER — METOCLOPRAMIDE 10 MG/1
10 TABLET ORAL EVERY 6 HOURS PRN
Status: DISCONTINUED | OUTPATIENT
Start: 2023-04-18 | End: 2023-04-19

## 2023-04-18 RX ADMIN — Medication 10 ML/HR: at 21:14

## 2023-04-18 RX ADMIN — Medication: at 21:12

## 2023-04-18 RX ADMIN — CALCIUM CARBONATE (ANTACID) CHEW TAB 500 MG 1000 MG: 500 CHEW TAB at 19:34

## 2023-04-18 RX ADMIN — Medication 8 ML: at 21:14

## 2023-04-18 RX ADMIN — SODIUM CHLORIDE, POTASSIUM CHLORIDE, SODIUM LACTATE AND CALCIUM CHLORIDE: 600; 310; 30; 20 INJECTION, SOLUTION INTRAVENOUS at 16:39

## 2023-04-18 RX ADMIN — LIDOCAINE HYDROCHLORIDE AND EPINEPHRINE 5 ML: 15; 5 INJECTION, SOLUTION EPIDURAL at 21:12

## 2023-04-18 RX ADMIN — Medication 2 MILLI-UNITS/MIN: at 16:40

## 2023-04-18 RX ADMIN — CEFAZOLIN SODIUM 2 G: 2 INJECTION, SOLUTION INTRAVENOUS at 09:19

## 2023-04-18 RX ADMIN — CEFAZOLIN 1 G: 1 INJECTION, POWDER, FOR SOLUTION INTRAMUSCULAR; INTRAVENOUS at 16:39

## 2023-04-18 ASSESSMENT — ACTIVITIES OF DAILY LIVING (ADL)
ADLS_ACUITY_SCORE: 18
HEARING_DIFFICULTY_OR_DEAF: NO
ADLS_ACUITY_SCORE: 18
DOING_ERRANDS_INDEPENDENTLY_DIFFICULTY: NO
TOILETING_ISSUES: NO
CHANGE_IN_FUNCTIONAL_STATUS_SINCE_ONSET_OF_CURRENT_ILLNESS/INJURY: NO
ADLS_ACUITY_SCORE: 18
CONCENTRATING,_REMEMBERING_OR_MAKING_DECISIONS_DIFFICULTY: NO
WALKING_OR_CLIMBING_STAIRS_DIFFICULTY: NO
ADLS_ACUITY_SCORE: 18
DIFFICULTY_EATING/SWALLOWING: NO
DIFFICULTY_COMMUNICATING: NO
DRESSING/BATHING_DIFFICULTY: NO
ADLS_ACUITY_SCORE: 18
ADLS_ACUITY_SCORE: 35
ADLS_ACUITY_SCORE: 18
FALL_HISTORY_WITHIN_LAST_SIX_MONTHS: NO
WEAR_GLASSES_OR_BLIND: NO
ADLS_ACUITY_SCORE: 18
ADLS_ACUITY_SCORE: 18

## 2023-04-18 ASSESSMENT — LIFESTYLE VARIABLES: TOBACCO_USE: 1

## 2023-04-18 NOTE — PLAN OF CARE
CRNA was called to place epidural catheter.  While talking with CRNA patient decided she wanted to wait until she was more uncomfortable to get it place because she was so anxious about it.   was called and she performed AROM for clear fluids  patient understands  pain med options including risks and benefits.  Will start pitocin augmentation if contractions dont start.  Patient agrees with plan  SO and Mother supportive and helpful to patient      Plan of Care Reviewed With: patient, spouse, parent

## 2023-04-18 NOTE — ANESTHESIA PREPROCEDURE EVALUATION
Anesthesia Pre-Procedure Evaluation    Patient: Liat Corcoran   MRN: [id number] : 1995        Procedure :           Past Medical History:   Diagnosis Date     Abnormal Pap smear of cervix 2021     Accidental overdose     sleep meds, tylenol, opioids     Anemia      Aspiration pneumonia (H) 2015     Bipolar disorder (H)      Chickenpox      Chlamydia infection affecting pregnancy, antepartum 2018     Depressive disorder      History of recurrent ear infection      Intracranial swelling 2015     Ovarian cysts     ultrasound dx by genoveva     Postpartum depression 2019    also in       Past Surgical History:   Procedure Laterality Date     PE TUBES       TONSILLECTOMY  1998      Allergies   Allergen Reactions     Amoxicillin Shortness Of Breath     Penicillin G Hives      Social History     Tobacco Use     Smoking status: Former     Packs/day: 0.25     Types: Cigarettes     Quit date: 2022     Years since quittin.1     Smokeless tobacco: Never     Tobacco comments:     smoking 10 cig/day with pregnancy   Vaping Use     Vaping status: Never Used   Substance Use Topics     Alcohol use: Not Currently     Comment: occas-quit with pregnancy      Wt Readings from Last 1 Encounters:   23 112 kg (247 lb)        Anesthesia Evaluation            ROS/MED HX  ENT/Pulmonary:     (+) tobacco use, Past use,     Neurologic:  - neg neurologic ROS     Cardiovascular:  - neg cardiovascular ROS     METS/Exercise Tolerance: >4 METS    Hematologic:     (+) anemia,     Musculoskeletal:  - neg musculoskeletal ROS     GI/Hepatic:  - neg GI/hepatic ROS     Renal/Genitourinary:  - neg Renal ROS     Endo:     (+) Obesity,     Psychiatric/Substance Use:     (+) psychiatric history depression, bipolar and anxiety     Infectious Disease:  - neg infectious disease ROS     Malignancy:  - neg malignancy ROS     Other:  - neg other ROS          Physical Exam    Airway  airway exam normal       Mallampati: II   TM distance: > 3 FB   Neck ROM: full   Mouth opening: > 3 cm    Respiratory Devices and Support         Dental       (+) Completely normal teeth      Cardiovascular   cardiovascular exam normal          Pulmonary   pulmonary exam normal        breath sounds clear to auscultation           OUTSIDE LABS:  CBC:   Lab Results   Component Value Date    WBC 10.0 04/18/2023    WBC 11.2 (H) 02/09/2023    HGB 9.4 (L) 04/18/2023    HGB 9.2 (L) 02/09/2023    HCT 31.6 (L) 04/18/2023    HCT 30.1 (L) 02/09/2023     04/18/2023     02/09/2023     BMP:   Lab Results   Component Value Date     (L) 02/09/2023     07/14/2022    POTASSIUM 3.5 02/09/2023    POTASSIUM 4.5 07/14/2022    CHLORIDE 97 (L) 02/09/2023    CHLORIDE 105 07/14/2022    CO2 22 02/09/2023    CO2 28 07/14/2022    BUN 4.2 (L) 02/09/2023    BUN 13 07/14/2022    CR 0.39 (L) 02/09/2023    CR 0.64 07/14/2022    GLC 93 02/09/2023    GLC 89 07/14/2022     COAGS: No results found for: PTT, INR, FIBR  POC:   Lab Results   Component Value Date    BGM 74 09/01/2020    HCG Negative 07/14/2022    HCGS Negative 03/14/2018     HEPATIC:   Lab Results   Component Value Date    ALBUMIN 3.5 07/14/2022    PROTTOTAL 7.8 07/14/2022    ALT 19 07/14/2022    AST 13 07/14/2022    ALKPHOS 84 07/14/2022    BILITOTAL 0.2 07/14/2022     OTHER:   Lab Results   Component Value Date    ABEL 8.4 (L) 02/09/2023    LIPASE 67 (L) 07/31/2020    TSH 1.67 07/14/2022    CRP 22.4 (H) 04/03/2021    SED 45 (H) 04/03/2021       Anesthesia Plan    ASA Status:  3      Anesthesia Type: Epidural.              Consents    Anesthesia Plan(s) and associated risks, benefits, and realistic alternatives discussed. Questions answered and patient/representative(s) expressed understanding.     - Discussed: Risks, Benefits and Alternatives for the PROCEDURE were discussed     - Discussed with:  Patient      - Specific Concerns: Infection, bleeding, nerve damage, toxicity,  hypotenstion, difficulty breathing, headaches, nausea.        Postoperative Care            Comments:    Other Comments: Dura Puncture Epidural technique discussed             ARIEL Gabriel CRNA

## 2023-04-18 NOTE — PLAN OF CARE
Pitocin was started at 1705  she was 3-4 cm.  She is beginning to have more discomfort by 1800.  She may or may not want an epidural.  SO and mother are supportive and helpful for her  FHR tracing is cat 1      Plan of Care Reviewed With: patient, spouse, parent

## 2023-04-18 NOTE — PLAN OF CARE
Patient difficult to monitor at times, she prefers to be in high fowlers. Will continue to monitor as needed

## 2023-04-18 NOTE — H&P
[facility] OB/GYN Department    History and Physical Note    Liat Corcoran  1995  [id number]    HPI: Liat Corcoran is a 27 year old  at 39w1d by 8w1d US, who presents for scheduled elected induction of laobr. Reports good fetal movement. No feeling regular contractions, no loss of fluid, no vaginal bleeding.     Pregnancy notable for:  Obesity  GBS positive  Anemia     OBHX:   OB History    Para Term  AB Living   4 3 3 0 0 3   SAB IAB Ectopic Multiple Live Births   0 0 0 0 3      # Outcome Date GA Lbr Josiah/2nd Weight Sex Delivery Anes PTL Lv   4 Current            3 Term 21 39w0d 02:45 / 00:07 3.43 kg (7 lb 9 oz) M Vag-Spont Nitrous, IV N FOSTER      Name: Brant      Apgar1: 9  Apgar5: 9   2 Term 19 39w1d 04:30 / 00:38 3.07 kg (6 lb 12.3 oz) F Vag-Vacuum EPI N FOSTER      Complications: GBS      Name: Callie      Apgar1: 9  Apgar5: 9   1 Term 10/20/16 40w6d 11:25 / 01:02 3.175 kg (7 lb) M Vag-Spont EPI N FOSTER      Name: omid      Apgar1: 6  Apgar5: 9       MedicalHX:   Past Medical History:   Diagnosis Date     Abnormal Pap smear of cervix 2021     Accidental overdose     sleep meds, tylenol, opioids     Anemia      Aspiration pneumonia (H) 2015     Bipolar disorder (H)      Chickenpox      Chlamydia infection affecting pregnancy, antepartum 2018     Depressive disorder      History of recurrent ear infection      Intracranial swelling 2015     Ovarian cysts     ultrasound dx by genoveva     Postpartum depression 2019    also in        SurgicalHX:   Past Surgical History:   Procedure Laterality Date     PE TUBES       TONSILLECTOMY         Medications:   No current facility-administered medications on file prior to encounter.  acetaminophen (TYLENOL) 325 MG tablet, Take 325-650 mg by mouth every 6 hours as needed for mild pain  azithromycin (ZITHROMAX Z-CHEIKH) 250 MG tablet, Two tablets on the first day, then one tablet daily for the  next 4 days (Patient not taking: Reported on 3/9/2023)  cyanocobalamin (VITAMIN B-12) 1000 MCG tablet, Take 1,000 mcg by mouth daily (Patient not taking: Reported on 3/9/2023)  ferrous sulfate (FEROSUL) 325 (65 Fe) MG tablet, Take 325 mg by mouth daily (with breakfast) (Patient not taking: Reported on 3/9/2023)  hydrOXYzine (VISTARIL) 25 MG capsule, Take 1 capsule (25 mg) by mouth every evening as needed for other (sleep) (Patient not taking: Reported on 3/9/2023)  metoclopramide (REGLAN) 10 MG tablet, Take 1 tablet (10 mg) by mouth 3 times daily as needed (Patient not taking: Reported on 3/9/2023)  Prenatal Vit-Fe Fumarate-FA (PRENATAL VITAMINS PO), Iron in prenatal (Patient not taking: Reported on 3/9/2023)  vitamin C (ASCORBIC ACID) 250 MG tablet, Take 250 mg by mouth daily (Patient not taking: Reported on 3/9/2023)        Allergies:  Allergies   Allergen Reactions     Amoxicillin Shortness Of Breath     Penicillin G Hives       FamilyHX:  Family History   Problem Relation Age of Onset     Hypertension Mother      Lipids Mother      Depression Mother      C.A.D. Mother         cardiomyopathy     Heart Disease Mother      Hypertension Maternal Grandfather      Depression Maternal Grandfather      Diabetes Paternal Grandfather      Hypertension Paternal Grandfather      Substance Abuse Father         A&D     Gastrointestinal Disease Maternal Grandmother         ulcer     Depression Maternal Grandmother      Depression Paternal Grandmother        SocialHX:   Social History     Socioeconomic History     Marital status: Single     Spouse name: None     Number of children: 3     Years of education: None     Highest education level: None   Tobacco Use     Smoking status: Former     Packs/day: 0.25     Types: Cigarettes     Quit date: 2022     Years since quittin.1     Smokeless tobacco: Never     Tobacco comments:     smoking 10 cig/day with pregnancy   Vaping Use     Vaping status: Never Used   Substance and  Sexual Activity     Alcohol use: Not Currently     Comment: occas-quit with pregnancy     Drug use: No     Sexual activity: Yes     Partners: Male     Birth control/protection: None   Other Topics Concern     Parent/sibling w/ CABG, MI or angioplasty before 65F 55M? No       ROS: 10-point ROS negative except as in HPI     Physical Exam:  Vitals:    23 0904 23 1119 23 1328   BP: (!) 140/89 (!) 144/96 124/76   BP Location: Right arm     Patient Position: Chair     Resp: 18     Temp: 98.2  F (36.8  C)     TempSrc: Oral       GEN: AOA x3  CV: No heaves or thrills. No peripheral varicosities  PULM: Equal expansion bilaterally, no accessory muscle use  ABD: soft, gravid, non-tender, non-distended  EXT: non pitting edema, non-tender to palpation  SVE: 3+/70/-2  Presentation: cephalic by SVE  Membranes: AROM with blood tinged fluid at 15:45    NST:  FHT: baseline 140 bpm, moderate variability, + accelerations, no decelerations   TOCO: none    Labs:        Lab Results   Component Value Date    ABO O 2021    RH Pos 2021    AS Negative 2023    HEPBANG Nonreactive 2022    CHPCRT Negative 2021    GCPCRT Negative 2021    TREPAB Negative 2016    HGB 9.4 (L) 2023       GBS Status: positive        A/P: Liat Corcoran is a 27 year old female  at 39w1d, here for elected induction of labor.    Admit to L&D. Place PIV. Admission labs.   Labor: AROM performed, will start Pitocin per protocol as needed  FHT: Category 1 FHT.  Continue EFM and toco.  Pain: Analgesia PRN  GBS:   Positive, treated with Ancef        Argenis Barabash, DO

## 2023-04-19 PROBLEM — Z36.89 ENCOUNTER FOR TRIAGE IN PREGNANT PATIENT: Status: RESOLVED | Noted: 2023-04-02 | Resolved: 2023-04-19

## 2023-04-19 PROBLEM — Z34.83 PRENATAL CARE, SUBSEQUENT PREGNANCY IN THIRD TRIMESTER: Status: RESOLVED | Noted: 2021-09-08 | Resolved: 2023-04-19

## 2023-04-19 PROBLEM — Z34.83 ENCOUNTER FOR SUPERVISION OF OTHER NORMAL PREGNANCY IN THIRD TRIMESTER: Status: RESOLVED | Noted: 2021-09-08 | Resolved: 2023-04-19

## 2023-04-19 PROBLEM — Z34.80 SUPERVISION OF OTHER NORMAL PREGNANCY: Status: RESOLVED | Noted: 2021-05-27 | Resolved: 2023-04-19

## 2023-04-19 PROBLEM — O99.013 ANEMIA AFFECTING PREGNANCY IN THIRD TRIMESTER: Status: ACTIVE | Noted: 2023-01-19

## 2023-04-19 PROBLEM — E66.01 MORBID OBESITY (H): Status: ACTIVE | Noted: 2022-07-14

## 2023-04-19 LAB
ALBUMIN SERPL BCG-MCNC: 3 G/DL (ref 3.5–5.2)
ALP SERPL-CCNC: 146 U/L (ref 35–104)
ALT SERPL W P-5'-P-CCNC: 10 U/L (ref 10–35)
ANION GAP SERPL CALCULATED.3IONS-SCNC: 14 MMOL/L (ref 7–15)
AST SERPL W P-5'-P-CCNC: 18 U/L (ref 10–35)
BILIRUB SERPL-MCNC: 0.3 MG/DL
BUN SERPL-MCNC: 10.5 MG/DL (ref 6–20)
CALCIUM SERPL-MCNC: 9 MG/DL (ref 8.6–10)
CHLORIDE SERPL-SCNC: 104 MMOL/L (ref 98–107)
CREAT SERPL-MCNC: 0.52 MG/DL (ref 0.51–0.95)
DEPRECATED HCO3 PLAS-SCNC: 17 MMOL/L (ref 22–29)
ERYTHROCYTE [DISTWIDTH] IN BLOOD BY AUTOMATED COUNT: 18.5 % (ref 10–15)
GFR SERPL CREATININE-BSD FRML MDRD: >90 ML/MIN/1.73M2
GLUCOSE SERPL-MCNC: 101 MG/DL (ref 70–99)
HCT VFR BLD AUTO: 29.4 % (ref 35–47)
HGB BLD-MCNC: 8.7 G/DL (ref 11.7–15.7)
MCH RBC QN AUTO: 21.8 PG (ref 26.5–33)
MCHC RBC AUTO-ENTMCNC: 29.6 G/DL (ref 31.5–36.5)
MCV RBC AUTO: 74 FL (ref 78–100)
PLATELET # BLD AUTO: 315 10E3/UL (ref 150–450)
POTASSIUM SERPL-SCNC: 4.2 MMOL/L (ref 3.4–5.3)
PROT SERPL-MCNC: 5.9 G/DL (ref 6.4–8.3)
RBC # BLD AUTO: 4 10E6/UL (ref 3.8–5.2)
SODIUM SERPL-SCNC: 135 MMOL/L (ref 136–145)
WBC # BLD AUTO: 20.1 10E3/UL (ref 4–11)

## 2023-04-19 PROCEDURE — 250N000013 HC RX MED GY IP 250 OP 250 PS 637: Performed by: OBSTETRICS & GYNECOLOGY

## 2023-04-19 PROCEDURE — 120N000001 HC R&B MED SURG/OB

## 2023-04-19 PROCEDURE — 59400 OBSTETRICAL CARE: CPT | Performed by: OBSTETRICS & GYNECOLOGY

## 2023-04-19 PROCEDURE — 722N000001 HC LABOR CARE VAGINAL DELIVERY SINGLE

## 2023-04-19 PROCEDURE — 85027 COMPLETE CBC AUTOMATED: CPT | Performed by: OBSTETRICS & GYNECOLOGY

## 2023-04-19 PROCEDURE — 80053 COMPREHEN METABOLIC PANEL: CPT | Performed by: OBSTETRICS & GYNECOLOGY

## 2023-04-19 PROCEDURE — 36415 COLL VENOUS BLD VENIPUNCTURE: CPT | Performed by: OBSTETRICS & GYNECOLOGY

## 2023-04-19 RX ORDER — MISOPROSTOL 200 UG/1
400 TABLET ORAL
Status: DISCONTINUED | OUTPATIENT
Start: 2023-04-19 | End: 2023-04-20 | Stop reason: HOSPADM

## 2023-04-19 RX ORDER — NALOXONE HYDROCHLORIDE 0.4 MG/ML
0.2 INJECTION, SOLUTION INTRAMUSCULAR; INTRAVENOUS; SUBCUTANEOUS
Status: DISCONTINUED | OUTPATIENT
Start: 2023-04-19 | End: 2023-04-20 | Stop reason: HOSPADM

## 2023-04-19 RX ORDER — MISOPROSTOL 200 UG/1
800 TABLET ORAL
Status: DISCONTINUED | OUTPATIENT
Start: 2023-04-19 | End: 2023-04-20 | Stop reason: HOSPADM

## 2023-04-19 RX ORDER — NALOXONE HYDROCHLORIDE 0.4 MG/ML
0.4 INJECTION, SOLUTION INTRAMUSCULAR; INTRAVENOUS; SUBCUTANEOUS
Status: DISCONTINUED | OUTPATIENT
Start: 2023-04-19 | End: 2023-04-20 | Stop reason: HOSPADM

## 2023-04-19 RX ORDER — OXYCODONE HYDROCHLORIDE 5 MG/1
5 TABLET ORAL EVERY 4 HOURS PRN
Status: DISCONTINUED | OUTPATIENT
Start: 2023-04-19 | End: 2023-04-20 | Stop reason: HOSPADM

## 2023-04-19 RX ORDER — IBUPROFEN 800 MG/1
800 TABLET, FILM COATED ORAL EVERY 6 HOURS PRN
Status: DISCONTINUED | OUTPATIENT
Start: 2023-04-19 | End: 2023-04-20 | Stop reason: HOSPADM

## 2023-04-19 RX ORDER — METHYLERGONOVINE MALEATE 0.2 MG/ML
200 INJECTION INTRAVENOUS
Status: DISCONTINUED | OUTPATIENT
Start: 2023-04-19 | End: 2023-04-20 | Stop reason: HOSPADM

## 2023-04-19 RX ORDER — HYDROCORTISONE 25 MG/G
CREAM TOPICAL 3 TIMES DAILY PRN
Status: DISCONTINUED | OUTPATIENT
Start: 2023-04-19 | End: 2023-04-20 | Stop reason: HOSPADM

## 2023-04-19 RX ORDER — MODIFIED LANOLIN
OINTMENT (GRAM) TOPICAL
Status: DISCONTINUED | OUTPATIENT
Start: 2023-04-19 | End: 2023-04-20 | Stop reason: HOSPADM

## 2023-04-19 RX ORDER — OXYTOCIN 10 [USP'U]/ML
10 INJECTION, SOLUTION INTRAMUSCULAR; INTRAVENOUS
Status: DISCONTINUED | OUTPATIENT
Start: 2023-04-19 | End: 2023-04-20 | Stop reason: HOSPADM

## 2023-04-19 RX ORDER — ACETAMINOPHEN 325 MG/1
650 TABLET ORAL EVERY 4 HOURS PRN
Status: DISCONTINUED | OUTPATIENT
Start: 2023-04-19 | End: 2023-04-20 | Stop reason: HOSPADM

## 2023-04-19 RX ORDER — ENOXAPARIN SODIUM 100 MG/ML
40 INJECTION SUBCUTANEOUS EVERY 12 HOURS
Status: DISCONTINUED | OUTPATIENT
Start: 2023-04-19 | End: 2023-04-19

## 2023-04-19 RX ORDER — TRANEXAMIC ACID 10 MG/ML
1 INJECTION, SOLUTION INTRAVENOUS EVERY 30 MIN PRN
Status: DISCONTINUED | OUTPATIENT
Start: 2023-04-19 | End: 2023-04-20 | Stop reason: HOSPADM

## 2023-04-19 RX ORDER — CARBOPROST TROMETHAMINE 250 UG/ML
250 INJECTION, SOLUTION INTRAMUSCULAR
Status: DISCONTINUED | OUTPATIENT
Start: 2023-04-19 | End: 2023-04-20 | Stop reason: HOSPADM

## 2023-04-19 RX ORDER — PRENATAL VIT/IRON FUM/FOLIC AC 27MG-0.8MG
1 TABLET ORAL DAILY
Status: DISCONTINUED | OUTPATIENT
Start: 2023-04-20 | End: 2023-04-20 | Stop reason: HOSPADM

## 2023-04-19 RX ORDER — DOCUSATE SODIUM 100 MG/1
100 CAPSULE, LIQUID FILLED ORAL DAILY
Status: DISCONTINUED | OUTPATIENT
Start: 2023-04-19 | End: 2023-04-20 | Stop reason: HOSPADM

## 2023-04-19 RX ORDER — OXYTOCIN/0.9 % SODIUM CHLORIDE 30/500 ML
340 PLASTIC BAG, INJECTION (ML) INTRAVENOUS CONTINUOUS PRN
Status: DISCONTINUED | OUTPATIENT
Start: 2023-04-19 | End: 2023-04-20 | Stop reason: HOSPADM

## 2023-04-19 RX ORDER — BISACODYL 10 MG
10 SUPPOSITORY, RECTAL RECTAL DAILY PRN
Status: DISCONTINUED | OUTPATIENT
Start: 2023-04-19 | End: 2023-04-20 | Stop reason: HOSPADM

## 2023-04-19 RX ADMIN — OXYCODONE HYDROCHLORIDE 5 MG: 5 TABLET ORAL at 20:34

## 2023-04-19 RX ADMIN — IBUPROFEN 800 MG: 800 TABLET ORAL at 14:02

## 2023-04-19 RX ADMIN — ACETAMINOPHEN 650 MG: 325 TABLET ORAL at 22:15

## 2023-04-19 RX ADMIN — IBUPROFEN 800 MG: 800 TABLET ORAL at 01:40

## 2023-04-19 RX ADMIN — ACETAMINOPHEN 650 MG: 325 TABLET ORAL at 17:45

## 2023-04-19 RX ADMIN — ACETAMINOPHEN 650 MG: 325 TABLET ORAL at 01:40

## 2023-04-19 RX ADMIN — ACETAMINOPHEN 650 MG: 325 TABLET ORAL at 14:02

## 2023-04-19 RX ADMIN — IBUPROFEN 800 MG: 800 TABLET ORAL at 20:34

## 2023-04-19 RX ADMIN — DOCUSATE SODIUM 100 MG: 100 CAPSULE, LIQUID FILLED ORAL at 07:41

## 2023-04-19 RX ADMIN — ACETAMINOPHEN 650 MG: 325 TABLET ORAL at 10:21

## 2023-04-19 RX ADMIN — ACETAMINOPHEN 650 MG: 325 TABLET ORAL at 06:08

## 2023-04-19 RX ADMIN — IBUPROFEN 800 MG: 800 TABLET ORAL at 07:41

## 2023-04-19 ASSESSMENT — ACTIVITIES OF DAILY LIVING (ADL)
ADLS_ACUITY_SCORE: 18
ADLS_ACUITY_SCORE: 22
ADLS_ACUITY_SCORE: 18
ADLS_ACUITY_SCORE: 22
ADLS_ACUITY_SCORE: 18
ADLS_ACUITY_SCORE: 22
ADLS_ACUITY_SCORE: 22

## 2023-04-19 NOTE — PLAN OF CARE
Report received from MARY Fernandez. KARL. EMILE 4.5/70 per provider. Pt breathing slightly but also talking through contractions.

## 2023-04-19 NOTE — ANESTHESIA PROCEDURE NOTES
"Epidural catheter Procedure Note    Pre-Procedure   Staff -        CRNA: Radha Caputo APRN CRNA       Performed By: CRNA       Location: OB       Procedure Start/Stop Times: 4/18/2023 8:51 PM and 4/18/2023 9:20 PM       Pre-Anesthestic Checklist: patient identified, IV checked, risks and benefits discussed, informed consent, monitors and equipment checked, pre-op evaluation and at physician/surgeon's request  Timeout:       Correct Patient: Yes        Correct Procedure: Yes        Correct Site: Yes        Correct Position: Yes   Procedure Documentation  Procedure: epidural catheter       Patient Position: sitting       Patient Prep/Sterile Barriers: sterile gloves, mask, patient draped       Skin prep: DuraPrep       Local skin infiltrated with mL of 1% lidocaine.        Insertion Site: L3-4. (midline approach).       Technique: LORT saline        Needle Type: CrossFiber needle       Needle Gauge: 17.        Needle Length (Inches): 3.5        Catheter: 19 G.          Catheter threaded easily.         4 cm epidural space.         Threaded 8 cm at skin.         # of attempts: 1 and  # of redirects:  0    Assessment/Narrative         Paresthesias: No.       Test dose of 5 mL lidocaine 1.5% w/ 1:200,000 epinephrine at 21:14 CDT.         Test dose negative, 3 minutes after injection, for signs of intravascular, subdural, or intrathecal injection.       Insertion/Infusion Method: LORT saline       Aspiration negative for Heme or CSF via Epidural Catheter.       Sensory Level Left: T8.       Sensory Level Right: T8.    Medication(s) Administered   Medication Administration Time: 4/18/2023 8:51 PM     Comments:  Pt. Tolerated well, FHR stable.      FOR [facility] ([facility]) ONLY:   Pain Team Contact information: please page the Pain Team Via [employer]. Search \"Pain\". During daytime hours, please page the attending first. At night please page the resident first.      "

## 2023-04-19 NOTE — L&D DELIVERY NOTE
Delivery Summary    iLat Corcoran MRN# [id number]   Age: 27 year old YOB: 1995     ASSESSMENT & PLAN:       Liat Corcoran is a 27 year old  presented at 39 weeks 2 days who was admitted to L&D for scheduled elective induction of labor. Her pregnancy was complicated by morbid obesity, GBS positive, anemia. She was started on Pitocin and AROM performed for induction of labor. She progressed through labor until complete dilation. Patient did not labor down. She pushed effectively and delivered a viable female infant. Turtling noted with delivery of fetal head. Shoulder dystocia diagnosed immediately with lack of delivery of anterior shoulder. Called for help. Patient placed in McRobert's position and suprapubic pressure applied without successful delivery. The posterior arm was grasped via the axilla and rotated anteriorly using the shoulder shrug maneuver with successful delivery.  brought to the warmer immediately for pediatric evaluation.  Apgars of 7/8. Weight is 8#12oz.  Placenta delivered via active management and was noted to be intact with a three vessel cord. Perineum was intact, no other lacerations present.    QBL 150ml. Sponge and needle counts correct. Mom and baby were transported to postpartum in stable condition.          Mario Corcoran [[id number]]    Labor Event Times    Latent labor onset date/time: 2023 1545    Active labor onset date: 23 Onset time:  9:19 PM CDT   Dilation complete date: 23 Complete time: 12:04 AM   Start pushing date/time: 2023 0005      Labor Length    1st Stage (hrs): 2 (min): 45   2nd Stage (hrs): 0 (min): 51   3rd Stage (hrs): 0 (min): 3      Labor Events     labor?: No   steroids: None  Labor Type: Induction/Cervical ripening  Predominate monitoring during 1st stage: continuous electronic fetal monitoring     Antibiotics received during labor?: Yes  Reason for Antibiotics: GBS  Antibiotics  "received for GBS: Cefazolin  Antibiotics Given (GBS): Greater than 4 hours prior to delivery     Rupture date/time: 23 1545   Rupture type: Artificial Rupture of Membranes  Fluid color: Clear  Fluid odor: Normal     Induction: AROM, Oxytocin  Induction date/time:      Cervical ripening date/time:      Indications for induction: Elective     Delivery/Placenta Date and Time    Delivery Date: 23 Delivery Time: 12:55 AM   Placenta Date/Time: 2023 12:59 AM  Oxytocin given at the time of delivery: after delivery of placenta  Delivering clinician: Argenis Jackson,    Other personnel present at delivery:  Provider Role   Arlyn Slater RN Delivery Nurse   Elizabeth Piedra RN Charge Nurse         Vaginal Counts     Initial count performed by 2 team members:  Two Team Members   Ketty WILSON       Needles Suture Needles Sponges (RETIRED) Instruments   Initial counts 2  5    Added to count       Relief counts       Final counts 2  5          Placed during labor Accounted for at the end of labor   FSE No    IUPC No    Cervidil No               Final count performed by 2 team members:  Two Team Members   Argenis Piedra      Final count correct?: Yes  Post-Birth Team Debrief: Complete     Apgars    Living status: Living   1 Minute 5 Minute 10 Minute 15 Minute 20 Minute   Skin color: 1  1       Heart rate: 2  2       Reflex irritability: 2  2       Muscle tone: 1  1       Respiratory effort: 1  2       Total: 7  8       Apgars assigned by: SYLVIA DEL ANGEL RN     Cord    Vessels: 3 Vessels    Cord Complications: None   Cord Blood Disposition: Lab      Gases Sent?: Yes      Delayed cord clamping?: No      Stem cell collection?: No                      Resuscitation    Methods: Oximetry  Output in Delivery Room: Stool     [place] Measurements    Weight: 8 lb 12.4 oz Length: 1' 10\"   Head circumference: 14 cm    Output in delivery room: Stool     Skin to Skin and Feeding Plan "    Skin to skin initiation date/time: 1/4/1841    Skin to skin with: Mother  Skin to skin end date/time:        Labor Events and Shoulder Dystocia    Fetal Tracing Prior to Delivery: Category 1, Category 2  Fetal Tracing Comments: intermittent variable decelerations with pushing  Shoulder dystocia present?: Pos  Anterior shoulder: left Time body delivered: 0055 CDT   Time head delivered: 0054 CDT    Time recognized: 4/19/2023 0054    Time help called: 4/19/2023 0054    Gentle attempt at traction, assisted by maternal expulsive forces?: Yes       First Maneuver: Adalid maneuver, Suprapubic pressure    Time performed: 4/19/2023 0054    Performed by: Dr. Jackson       Second Maneuver:   Second maneuver: Woods screw maneuver  Time performed: 4/19/2023 0055    Performed by: Dr. Jackson       Delivery (Maternal) (Provider to Complete) ([id number])    Episiotomy: None  Perineal lacerations: None    Repair suture: None  Genital tract inspection done: Pos     Blood Loss  Mother: Liat Corcoran #[id number]   Start of Mother's Information    Delivery Blood Loss  04/18/23 2119 - 04/19/23 0720    Delivery QBL (mL) Hospital Encounter 150 mL    Postpartum QBL (mL) Hospital Encounter 188 mL    Total  338 mL         End of Mother's Information  Mother: Liat Corcoran #[id number]          Delivery - Provider to Complete ([id number])    Delivering clinician: Argenis Jackson DO  Delivery Type (Choose the 1 that will go to the Birth History): Vaginal, Spontaneous    Other personnel:  Provider Role   Arlyn Slater RN Delivery Nurse   Elizabeth Piedra RN Charge Nurse                               Placenta    Date/Time: 4/19/2023 12:59 AM  Removal: Spontaneous  Disposition: Hospital disposal           Anesthesia    Method: Epidural  Cervical dilation at placement: 4-7                Presentation and Position    Presentation: Vertex    Position: Right Occiput Anterior                 Argenis Jackson DO

## 2023-04-19 NOTE — PLAN OF CARE
Goal Outcome Evaluation:      Plan of Care Reviewed With: patient    Overall Patient Progress: improving    Data: Vital signs within normal limits. Postpartum checks within normal limits - see flow record. Patient  is tolerating po intake. Patient is able to empty bladder independently. . Patient instructed to call for assist when up..   No apparent signs of infection. perineum healing well. Patient is performing self cares and is able to care for infant. Positive attachment behaviors are observed with infant. Support persons are present.  Action:  Pain plan was discussed. Patient would like pain meds to be brought in when they are due. Patient was medicated during the shift for pain. See MAR.Patient education done about rest. See flow record.  Response:   Patient reassessed within 1 hour after each medication for pain. Patient stated that pain had improved. Patient stated that she was comfortable. .   Plan: Anticipate discharge on 4/21/2023.

## 2023-04-19 NOTE — ANESTHESIA POSTPROCEDURE EVALUATION
Patient: Liat Corcoran    Procedure: * No procedures listed *       Anesthesia Type:  Epidural    Note:  Disposition: Inpatient   Postop Pain Control: Uneventful            Sign Out: Well controlled pain   PONV: No   Neuro/Psych: Uneventful            Sign Out: Acceptable/Baseline neuro status   Airway/Respiratory: Uneventful            Sign Out: Acceptable/Baseline resp. status   CV/Hemodynamics: Uneventful            Sign Out: Acceptable CV status; No obvious hypovolemia; No obvious fluid overload   Other NRE: NONE   DID A NON-ROUTINE EVENT OCCUR? No           Last vitals:  Vitals:    04/19/23 0242 04/19/23 0308 04/19/23 0735   BP: 133/76 135/78 117/67   Pulse:   72   Resp:  18 16   Temp:  37.1  C (98.8  F) 36.6  C (97.9  F)   SpO2:          Electronically Signed By: ARIEL Escalera CRNA  April 19, 2023  9:24 AM

## 2023-04-19 NOTE — PLAN OF CARE
S:Delivery  B:Induced  Labor,39w2d    Lab Results   Component Value Date    GBS Positive (A) 2019    with antibiotic treatment greater than or equal to 4 hours prior to delivery.  A: Patient delivered   none, intact at 0055 with Dr. HILDA Jackson in attendance and baby placed on mother's abdomen for immediate cord clamping. Baby to warmer for assessment/resusitative efforts.. Placed skin to skin @ 0110.. Apgars 7/8.Delivery .  IV infusion of Oxytocin  infused. Placenta removal spontaneous. MD does not want placenta sent to pathology.  See Flowsheet for VS and PP checks. Delivery QBL (mL): 150 mL.  Labor care plan goals met, transition now to postpartum care. Postpartum QBL TBD  R: Expect routine postpartum care. Anticipate first feeding within the hour or whenever infant displays feeding cues. Continue skin to skin. Prior discussion with mother indicates that feeding plan is Breast feeding . Educated mother on importance of exclusive breastfeeding, expected feeding readiness cues and encouraged her to observe for these cues while rooming in. Informed her that breastfeeding assistance would be provided.

## 2023-04-20 VITALS
HEART RATE: 72 BPM | TEMPERATURE: 97.8 F | OXYGEN SATURATION: 100 % | SYSTOLIC BLOOD PRESSURE: 113 MMHG | DIASTOLIC BLOOD PRESSURE: 70 MMHG | RESPIRATION RATE: 15 BRPM

## 2023-04-20 PROCEDURE — 250N000013 HC RX MED GY IP 250 OP 250 PS 637: Performed by: OBSTETRICS & GYNECOLOGY

## 2023-04-20 RX ADMIN — ACETAMINOPHEN 650 MG: 325 TABLET ORAL at 14:47

## 2023-04-20 RX ADMIN — OXYCODONE HYDROCHLORIDE 5 MG: 5 TABLET ORAL at 14:47

## 2023-04-20 RX ADMIN — IBUPROFEN 800 MG: 800 TABLET ORAL at 02:48

## 2023-04-20 RX ADMIN — OXYCODONE HYDROCHLORIDE 5 MG: 5 TABLET ORAL at 00:39

## 2023-04-20 RX ADMIN — IBUPROFEN 800 MG: 800 TABLET ORAL at 08:40

## 2023-04-20 RX ADMIN — OXYCODONE HYDROCHLORIDE 5 MG: 5 TABLET ORAL at 11:01

## 2023-04-20 RX ADMIN — ACETAMINOPHEN 650 MG: 325 TABLET ORAL at 11:01

## 2023-04-20 RX ADMIN — IBUPROFEN 800 MG: 800 TABLET ORAL at 14:47

## 2023-04-20 RX ADMIN — PRENATAL VITAMINS-IRON FUMARATE 27 MG IRON-FOLIC ACID 0.8 MG TABLET 1 TABLET: at 08:21

## 2023-04-20 RX ADMIN — ACETAMINOPHEN 650 MG: 325 TABLET ORAL at 02:48

## 2023-04-20 RX ADMIN — OXYCODONE HYDROCHLORIDE 5 MG: 5 TABLET ORAL at 06:47

## 2023-04-20 RX ADMIN — DOCUSATE SODIUM 100 MG: 100 CAPSULE, LIQUID FILLED ORAL at 08:21

## 2023-04-20 RX ADMIN — ACETAMINOPHEN 650 MG: 325 TABLET ORAL at 06:48

## 2023-04-20 ASSESSMENT — ACTIVITIES OF DAILY LIVING (ADL)
ADLS_ACUITY_SCORE: 22

## 2023-04-20 NOTE — DISCHARGE SUMMARY
Admit date: 2023     Discharge date: 2023     Admit Dx:   - 27 year old y/o   at 39w2d   - chronic anemia  -morbid obesity    Discharge Dx:  - Same as above, s/p   -shoulder dystocia not present on admission  -acute anemia secondary to blood loss, on top of chronic anemia present on admission    Procedures:  - induction of labor  -       Admit HPI:  Ms. Liat Corcoran is a 27 year old  at 39w2d  who was admitted on 2023 for induction of labor.  Please see her admit H&P for full details of her PMH, PSH, Meds, Allergies and exam on admit.    Hospital summary:  Liat Corcoran is a 27 year old  presented at 39 weeks 2 days who was admitted to L&D for scheduled elective induction of labor. Her pregnancy was complicated by morbid obesity, GBS positive, anemia. She was started on Pitocin and AROM performed for induction of labor. She progressed through labor until complete dilation. Patient did not labor down. She pushed effectively and delivered a viable female infant. Turtling noted with delivery of fetal head. Shoulder dystocia diagnosed immediately with lack of delivery of anterior shoulder. Called for help. Patient placed in McRobert's position and suprapubic pressure applied without successful delivery. The posterior arm was grasped via the axilla and rotated anteriorly using the shoulder shrug maneuver with successful delivery.  brought to the warmer immediately for pediatric evaluation.  Apgars of 7/8. Weight is 8#12oz.  Placenta delivered via active management and was noted to be intact with a three vessel cord. Perineum was intact, no other lacerations present.    QBL 150ml. Sponge and needle counts correct. Mom and baby were transported to postpartum in stable condition.     Her postpartum course was uncomplicated. On PPD#1, she was meeting all of her postpartum goals and deemed stable for discharge. She was voiding without difficulty, tolerating a regular  diet without nausea and vomiting, her pain was well controlled on oral pain medicines and her lochia was appropriate. Her hemoglobin after delivery was 8.7. Her Rh status was pos and Rhogam was not indicated. At the time of discharge, she was breast feeding her infant.     Physical exam on the day of discharge:  .Vital signs:  Temp: 97.8  F (36.6  C) Temp src: Oral BP: 113/70 Pulse: 72   Resp: 15   O2 Device: None (Room air)        Estimated body mass index is 48.24 kg/m  as calculated from the following:    Height as of 4/14/23: 1.524 m (5').    Weight as of 4/14/23: 112 kg (247 lb).        General: sitting up, alert and cooperative  Heart: reg rate, well perfused  Lungs: no increased work of breathing  Abd: soft, non-distended, non-tender. Fundus firm, nontender, below umbilicus.   Extremities: calves nontender, trace edema of lower extremities bilaterally    Lab Results   Component Value Date    HGB 8.7 04/19/2023    HGB 9.4 04/18/2023    HGB 10.7 06/29/2021    HGB 11.4 04/03/2021     Blood type:   Lab Results   Component Value Date    RH Pos 02/05/2021       Discharge/Disposition:  Liat Corcoran was discharged to home in stable condition with the following instructions/medications:  1) Call for temperature > 100.4, foul smelling vaginal discharge, bleeding > 1 pad per hour x 2 hrs, pain not controlled by oral pain meds, severe constipation or severe nausea or vomiting.  2) She was instructed to follow-up with her primary OB in 6 weeks for a routine postpartum visit.  3) She was instructed to continue her PNV on discharge if she wished to breast feed her infant.  4) She was discharged home with the following medications: she has Ibuprofen, tylenol, colace, iron, vitamin C and Vitamin B12.  No additional prescriptions requested.    Hoa Mckinley MD   4/20/2023 11:37 AM

## 2023-04-20 NOTE — PLAN OF CARE
Data: Vital signs within normal limits. Postpartum checks within normal limits - see flow record. Patient  is tolerating po intake. Patient is able to empty bladder independently. Patient ambulating independently. No apparent signs of infection. perineum healing well. Patient is performing self cares and is able to care for infant. Positive attachment behaviors are observed with infant. Support persons are present.  Action:  Pain plan was discussed. Patient would like pain meds to be brought in when they are due. Patient was medicated during the shift for pain and cramping. See MAR.Patient education done about breastfeeding,  cares, pain management/plan, and rest. See flow record.  Response:   Patient reassessed within 1 hour after each medication for pain. Patient stated that pain had improved. Patient stated that she was comfortable. .   Plan: Anticipate discharge on .

## 2023-04-20 NOTE — PLAN OF CARE
Goal Outcome Evaluation:      Plan of Care Reviewed With: patient, spouse    Overall Patient Progress: improvingOverall Patient Progress: improving       Patient discharged per ambulatory with infant in car seat. Mother verified that her band matches her infant's band by comparing the infant's  MR#.  Discharge instructions given. Encouraged to call for any problems, questions or concerns. RXs filled and sent with patient.

## 2023-04-20 NOTE — DISCHARGE INSTRUCTIONS
Postpartum Vaginal Delivery Instructions  Follow up with OB provider in 6 weeks    Activity     Ask family and friends for help when you need it.  Do not place anything in your vagina for 6 weeks.  You are not restricted on other activities, but take it easy for a few weeks to allow your body to recover from delivery.  You are able to do any activities you feel up to that point.  No driving until you have stopped taking your pain medications (usually two weeks after delivery).     Call your health care provider if you have any of these symptoms:     Increased pain, swelling, redness, or fluid around your stiches from an episiotomy or perineal tear.  A fever above 100.4 F (38 C) with or without chills when placing a thermometer under your tongue.  You soak a sanitary pad with blood within 1 hour, or you see blood clots larger than a golf ball.  Bleeding that lasts more than 6 weeks.  Vaginal discharge that smells bad.  Severe pain, cramping or tenderness in your lower belly area.  A need to urinate more frequently (use the toilet more often), more urgently (use the toilet very quickly), or it burns when you urinate.  Nausea and vomiting.  Redness, swelling or pain around a vein in your leg.  Problems breastfeeding or a red or painful area on your breast.  Chest pain and cough or are gasping for air.  Problems coping with sadness, anxiety, or depression.  If you have any concerns about hurting yourself or the baby, call your provider immediately.   You have questions or concerns after you return home.     Keep your hands clean:  Always wash your hands before touching your perineal area and stitches.  This helps reduce your risk of infection.  If your hands aren't dirty, you may use an alcohol hand-rub to clean your hands. Keep your nails clean and short.      If you you feel sad, have difficulty sleeping, feel overwhelmed, anxious or have troubling thoughts you may call your clinic, or the HelpLine for Pregnancy &  Postpartum Support [place]. ([facility] HelpLine 570-705-0680) or online resource list  @ www.ppsupport[place].org     Warning Signs after Having a Baby    Keep this paper on your fridge or somewhere else where you can see it.    Call your provider if you have any of these symptoms up to 12 weeks after having your baby.    Thoughts of hurting yourself or your baby  Pain in your chest or trouble breathing  Severe headache not helped by pain medicine  Eyesight concerns (blurry vision, seeing spots or flashes of light, other changes to eyesight)  Fainting, shaking or other signs of a seizure    Call 9-1-1 if you feel that it is an emergency.     The symptoms below can happen to anyone after giving birth. They can be very serious. Call your provider if you have any of these warning signs.    My provider s phone number: _______________________    Losing too much blood (hemorrhage)    Call your provider if you soak through a pad in less than an hour or pass blood clots bigger than a golf ball. These may be signs that you are bleeding too much.    Blood clots in the legs or lungs    After you give birth, your body naturally clots its blood to help prevent blood loss. Sometimes this increased clotting can happen in other areas of the body, like the legs or lungs. This can block your blood flow and be very dangerous.     Call your provider if you:  Have a red, swollen spot on the back of your leg that is warm or painful when you touch it.   Are coughing up blood.     Infection    Call your provider if you have any of these symptoms:  Fever of 100.4 F (38 C) or higher.  Pain or redness around your stitches if you had an incision.   Any yellow, white, or green fluid coming from places where you had stitches or surgery.    Mood Problems (postpartum depression)    Many people feel sad or have mood changes after having a baby. But for some people, these mood swings are worse.     Call your provider right away if you feel so anxious or nervous  that you can't care for yourself or your baby.    Preeclampsia (high blood pressure)    Even if you didn't have high blood pressure when you were pregnant, you are at risk for the high blood pressure disease called preeclampsia. This risk can last up to 12 weeks after giving birth.     Call your provider if you have:   Pain on your right side under your rib cage  Sudden swelling in the hands and face    Remember: You know your body. If something doesn't feel right, get medical help.     For informational purposes only. Not to replace the advice of your health care provider. Copyright 2020 [employer]. All rights reserved. Clinically reviewed by Bethany Lock, RNC-OB, MSN. [employer] [id number] - Rev 02/23.

## 2023-04-21 ENCOUNTER — TELEPHONE (OUTPATIENT)
Dept: OBGYN | Facility: CLINIC | Age: 28
End: 2023-04-21
Payer: COMMERCIAL

## 2023-04-21 ENCOUNTER — PATIENT OUTREACH (OUTPATIENT)
Dept: CARE COORDINATION | Facility: CLINIC | Age: 28
End: 2023-04-21
Payer: COMMERCIAL

## 2023-04-21 DIAGNOSIS — R52 POSTPARTUM PAIN: Primary | ICD-10-CM

## 2023-04-21 RX ORDER — HYDROCODONE BITARTRATE AND ACETAMINOPHEN 5; 325 MG/1; MG/1
1 TABLET ORAL EVERY 6 HOURS PRN
Qty: 10 TABLET | Refills: 0 | Status: SHIPPED | OUTPATIENT
Start: 2023-04-21 | End: 2023-04-24

## 2023-04-21 NOTE — TELEPHONE ENCOUNTER
"Reason for call:  Patient reporting a symptom    Symptom or request: Pt delivered [birth year] vaginal delivery.  Pt requesting a refill of Oxycodone - or whatever she was getting in the hospital.  \"I'm not sleeping, I can't move!\"  Alternating Tylenol and Ibuprofen and it is not helping.     Pharmacy: [facility] [place] [facility]    Duration (how long have symptoms been present):     Have you been treated for this before? Yes    Additional comments:     Phone Number patient can be reached at:  Home number on file [phone] (home)    Best Time:      Can we leave a detailed message on this number:  YES    Call taken on 4/21/2023 at 2:30 PM by Lory Luke    "

## 2023-04-21 NOTE — TELEPHONE ENCOUNTER
"S-(situation): patient requesting pain medication\" like they gave me in the hospital\"    B-(background): 2023 , shoulder dystocia     A-(assessment): \" I am in a lot of pain. My hips, pelvis, back and abdomen all hurt. It is post birth pain.\" There is a sharp pain hat comes and goes and makes my pain a 10/10. Patient reports otherwise, pain is a 8/10.     Patient reports \" this all started right after delivery.\" Patient reports it is worse since getting home. Patient has an 18 month old, a 4 year year old and a 6 1/2 year old also to care for.     Patient reports lochia is not concerning, seems more than when in hospital but not heavy. Patient is not passing any clots. Patient does not have any fevers. Patient denies nausea or vomiting. Patient reports decreased appetite. No concerns for UTI. Perineum feels \" tender.\"   Patient taking ibuprofen and Tylenol as allowed but this is not helping.    R-(recommendations): Reassurance provided. Difficult to speak with patient. Connectiion was not the best, difficult to hear patient speak. Patient seemed busy caring for other children and not focused on conversation. May try ice/heat for comfort. Try to rest as able. ? Help for other children.     Patient would like prescription for Norco for pain.    Please review and advise    Thanks    Jennifer HERNANDEZ   [facility]       "

## 2023-04-21 NOTE — PROGRESS NOTES
[facility]    Background: Transitional Care Management program identified per system criteria and reviewed by [facility] team for possible outreach.    Assessment: Upon chart review, CCR Team member will not proceed with patient outreach related to this episode of Transitional Care Management program due to reason below:    Patient has active communication with a nurse, provider or care team for reason of post-hospital follow up plan.  Outreach call by CCR team not indicated to minimize duplicative efforts.     Plan: Transitional Care Management episode addressed appropriately per reason noted above.      Mariah Benitez MA  [facility] Care Resource [place], [facility]    *Connected Care Resource Team does NOT follow patient ongoing. Referrals are identified based on internal discharge reports and the outreach is to ensure patient has an understanding of their discharge instructions.

## 2023-04-23 ENCOUNTER — MEDICAL CORRESPONDENCE (OUTPATIENT)
Dept: HEALTH INFORMATION MANAGEMENT | Facility: CLINIC | Age: 28
End: 2023-04-23

## 2023-05-24 ENCOUNTER — MEDICAL CORRESPONDENCE (OUTPATIENT)
Dept: HEALTH INFORMATION MANAGEMENT | Facility: CLINIC | Age: 28
End: 2023-05-24
Payer: COMMERCIAL

## 2023-05-28 NOTE — PROGRESS NOTES
A (GUIDEWIRE 150CM 3MM RDS J CRV .035IN VASC PTFE FX CORE LF) guidewire was introduced. Patient unable to cooperate with first attempt by CRNA to place epidural due to pain and fear.  Emotional support given and patient decided to have CRNA come back for placement and was able to tolerate procedure.  Bolus started at 1450 followed by continuous infusion.

## 2023-06-01 NOTE — NURSING NOTE
Initial /83 (BP Location: Right arm, Patient Position: Chair, Cuff Size: Adult Large)   Pulse 111   Temp 97.8  F (36.6  C) (Tympanic)   Resp 20   Ht 1.524 m (5')   Wt 108.9 kg (240 lb)   LMP 06/20/2022   Breastfeeding No   BMI 46.87 kg/m   Estimated body mass index is 46.87 kg/m  as calculated from the following:    Height as of this encounter: 1.524 m (5').    Weight as of this encounter: 108.9 kg (240 lb). .    Liz Kendall, CMA     None

## 2023-06-06 ENCOUNTER — HOSPITAL ENCOUNTER (EMERGENCY)
Facility: CLINIC | Age: 28
Discharge: LEFT BEFORE DISCHARGE | End: 2023-06-06
Admitting: FAMILY MEDICINE
Payer: COMMERCIAL

## 2023-06-06 VITALS
OXYGEN SATURATION: 96 % | SYSTOLIC BLOOD PRESSURE: 120 MMHG | RESPIRATION RATE: 14 BRPM | DIASTOLIC BLOOD PRESSURE: 87 MMHG | TEMPERATURE: 98.5 F | HEART RATE: 118 BPM

## 2023-06-06 PROCEDURE — 99281 EMR DPT VST MAYX REQ PHY/QHP: CPT | Performed by: FAMILY MEDICINE

## 2023-06-06 NOTE — ED TRIAGE NOTES
Presents with nausea and vomiting that started this AM     Triage Assessment     Row Name 06/06/23 9724       Triage Assessment (Adult)    Airway WDL WDL       Respiratory WDL    Respiratory WDL WDL       Skin Circulation/Temperature WDL    Skin Circulation/Temperature WDL WDL       Cardiac WDL    Cardiac WDL rhythm    Pulse Rate & Regularity tachycardic       Peripheral/Neurovascular WDL    Peripheral Neurovascular WDL WDL       Cognitive/Neuro/Behavioral WDL    Cognitive/Neuro/Behavioral WDL WDL

## 2023-06-06 NOTE — ED TRIAGE NOTES
Triage Assessment     Row Name 06/06/23 1829       Triage Assessment (Adult)    Airway WDL WDL       Respiratory WDL    Respiratory WDL WDL       Skin Circulation/Temperature WDL    Skin Circulation/Temperature WDL WDL       Cardiac WDL    Cardiac WDL rhythm    Pulse Rate & Regularity tachycardic       Peripheral/Neurovascular WDL    Peripheral Neurovascular WDL WDL       Cognitive/Neuro/Behavioral WDL    Cognitive/Neuro/Behavioral WDL WDL

## 2023-06-26 DIAGNOSIS — Z30.2 REQUEST FOR STERILIZATION: Primary | ICD-10-CM

## 2023-07-06 ENCOUNTER — TELEPHONE (OUTPATIENT)
Dept: OBGYN | Facility: CLINIC | Age: 28
End: 2023-07-06
Payer: COMMERCIAL

## 2023-07-06 ENCOUNTER — TELEPHONE (OUTPATIENT)
Dept: FAMILY MEDICINE | Facility: CLINIC | Age: 28
End: 2023-07-06
Payer: COMMERCIAL

## 2023-07-06 DIAGNOSIS — Z30.2 ENCOUNTER FOR STERILIZATION: Primary | ICD-10-CM

## 2023-07-06 DIAGNOSIS — Z64.1 MULTIPARITY: ICD-10-CM

## 2023-07-06 NOTE — TELEPHONE ENCOUNTER
No surgery orders - will send to Dr. Tavarez to advise.    -Lory Luke  [facility] [place]

## 2023-07-06 NOTE — TELEPHONE ENCOUNTER
Left a message for patient to call back to pick a date for surgery.  MA sterilization consent signed 2/22/23.    -Lory BRUNO [facility]   [facility]  [phone] opt #2, 1, 2

## 2023-07-06 NOTE — TELEPHONE ENCOUNTER
Reason for Call:  Appointment Request    Patient requesting this type of appt: Pre-op    Requested provider: Alicia Graham    Reason patient unable to be scheduled: Not within requested timeframe    When does patient want to be seen/preferred time: Before Surgery on 7/24/23    Comments: Patient needing PreOp appt before 7/24/23; Patient is going to [place] to get her tubes tied. Patient also clarified that if she does not answer her phone to leave her a voicemail and she will call back.    Could we send this information to you in Posterous or would you prefer to receive a phone call?:   Patient would prefer a phone call   Okay to leave a detailed message?: Yes at Cell number on file:    Telephone Information:   Mobile [phone]       Call taken on 7/6/2023 at 2:10 PM by Josephine Espinoza

## 2023-07-06 NOTE — TELEPHONE ENCOUNTER
Reason for call:  Other   Patient called regarding (reason for call): call back  Additional comments: Pt requesting to schedule Lap BS. States she had the consult already and signed consent. Please call to schedule!    Phone number to reach patient:  Home number on file [phone] (home)    Best Time:  n/a    Can we leave a detailed message on this number?  YES    Travel screening: Not Applicable

## 2023-07-06 NOTE — TELEPHONE ENCOUNTER
"[id number]  Liat M Nilo    You are now scheduled for surgery at [facility].  Below are the details for your surgery.  Please read the \"Preparing for Your Surgery\" instructions and let us know if you have any questions.    Type of surgery: Laparoscopic Tubal Sterilization (Bilateral)     Surgeon:  Vlad Tavarez MD  Location of surgery: [facility] OR    Date of surgery: 7/24/23    Time: 10:30am   Arrival Time: 9:30am    Time can change, to be confirmed a couple of days prior by pre-op surgery nurse.    Pre-Op Appt Date: Patient to schedule with a PCP or Family Practice Provider within 30 days to the surgery.  Post-Op Appt Date:    Packet sent out: Yes  Pre-cert/Authorization completed:  TBD by Financial Securing Office.   MA Sterilization/Hysterectomy Acknowledgment Consent signed: Yes 2/22/23    [facility]  [phone]    Fax: [phone]  Same Day Surgery [phone]  Fax: [phone]  [facility] [phone]    "

## 2023-07-19 ENCOUNTER — OFFICE VISIT (OUTPATIENT)
Dept: FAMILY MEDICINE | Facility: CLINIC | Age: 28
End: 2023-07-19
Payer: COMMERCIAL

## 2023-07-19 VITALS
HEART RATE: 93 BPM | HEIGHT: 61 IN | OXYGEN SATURATION: 98 % | BODY MASS INDEX: 41.91 KG/M2 | RESPIRATION RATE: 18 BRPM | TEMPERATURE: 98.8 F | SYSTOLIC BLOOD PRESSURE: 110 MMHG | WEIGHT: 222 LBS | DIASTOLIC BLOOD PRESSURE: 62 MMHG

## 2023-07-19 DIAGNOSIS — D64.9 ANEMIA, UNSPECIFIED TYPE: ICD-10-CM

## 2023-07-19 DIAGNOSIS — Z30.2 REQUEST FOR STERILIZATION: ICD-10-CM

## 2023-07-19 DIAGNOSIS — E66.01 MORBID OBESITY (H): ICD-10-CM

## 2023-07-19 DIAGNOSIS — Z01.818 PREOP GENERAL PHYSICAL EXAM: Primary | ICD-10-CM

## 2023-07-19 LAB
ANION GAP SERPL CALCULATED.3IONS-SCNC: 10 MMOL/L (ref 7–15)
BUN SERPL-MCNC: 14.3 MG/DL (ref 6–20)
CALCIUM SERPL-MCNC: 10.2 MG/DL (ref 8.6–10)
CHLORIDE SERPL-SCNC: 101 MMOL/L (ref 98–107)
CREAT SERPL-MCNC: 0.58 MG/DL (ref 0.51–0.95)
DEPRECATED HCO3 PLAS-SCNC: 27 MMOL/L (ref 22–29)
ERYTHROCYTE [DISTWIDTH] IN BLOOD BY AUTOMATED COUNT: 16.3 % (ref 10–15)
GFR SERPL CREATININE-BSD FRML MDRD: >90 ML/MIN/1.73M2
GLUCOSE SERPL-MCNC: 82 MG/DL (ref 70–99)
HCG UR QL: NEGATIVE
HCT VFR BLD AUTO: 38.1 % (ref 35–47)
HGB BLD-MCNC: 11.6 G/DL (ref 11.7–15.7)
MCH RBC QN AUTO: 24.3 PG (ref 26.5–33)
MCHC RBC AUTO-ENTMCNC: 30.4 G/DL (ref 31.5–36.5)
MCV RBC AUTO: 80 FL (ref 78–100)
PLATELET # BLD AUTO: 388 10E3/UL (ref 150–450)
POTASSIUM SERPL-SCNC: 4.4 MMOL/L (ref 3.4–5.3)
RBC # BLD AUTO: 4.78 10E6/UL (ref 3.8–5.2)
SODIUM SERPL-SCNC: 138 MMOL/L (ref 136–145)
T4 FREE SERPL-MCNC: 1.22 NG/DL (ref 0.9–1.7)
TSH SERPL DL<=0.005 MIU/L-ACNC: 0.11 UIU/ML (ref 0.3–4.2)
WBC # BLD AUTO: 8.7 10E3/UL (ref 4–11)

## 2023-07-19 PROCEDURE — 81025 URINE PREGNANCY TEST: CPT | Performed by: NURSE PRACTITIONER

## 2023-07-19 PROCEDURE — 84443 ASSAY THYROID STIM HORMONE: CPT | Performed by: NURSE PRACTITIONER

## 2023-07-19 PROCEDURE — 80048 BASIC METABOLIC PNL TOTAL CA: CPT | Performed by: NURSE PRACTITIONER

## 2023-07-19 PROCEDURE — 84439 ASSAY OF FREE THYROXINE: CPT | Performed by: NURSE PRACTITIONER

## 2023-07-19 PROCEDURE — 85027 COMPLETE CBC AUTOMATED: CPT | Performed by: NURSE PRACTITIONER

## 2023-07-19 PROCEDURE — 99214 OFFICE O/P EST MOD 30 MIN: CPT | Performed by: NURSE PRACTITIONER

## 2023-07-19 PROCEDURE — 36415 COLL VENOUS BLD VENIPUNCTURE: CPT | Performed by: NURSE PRACTITIONER

## 2023-07-19 ASSESSMENT — PAIN SCALES - GENERAL: PAINLEVEL: NO PAIN (0)
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
## 2023-07-19 NOTE — PATIENT INSTRUCTIONS
For informational purposes only. Not to replace the advice of your health care provider. Copyright   2003,  [facility]. All rights reserved. Clinically reviewed by Bel Vargas MD. [facility] [id number] - REV .  Preparing for Your Surgery  Getting started  A nurse will call you to review your health history and instructions. They will give you an arrival time based on your scheduled surgery time. Please be ready to share:  Your doctor's clinic name and phone number  Your medical, surgical, and anesthesia history  A list of allergies and sensitivities  A list of medicines, including herbal treatments and over-the-counter drugs  Whether the patient has a legal guardian (ask how to send us the papers in advance)  Please tell us if you're pregnant--or if there's any chance you might be pregnant. Some surgeries may injure a fetus (unborn baby), so they require a pregnancy test. Surgeries that are safe for a fetus don't always need a test, and you can choose whether to have one.   If you have a child who's having surgery, please ask for a copy of Preparing for Your Child's Surgery.    Preparing for surgery  Within 10 to 30 days of surgery: Have a pre-op exam (sometimes called an H&P, or History and Physical). This can be done at a clinic or pre-operative center.  If you're having a , you may not need this exam. Talk to your care team.  At your pre-op exam, talk to your care team about all medicines you take. If you need to stop any medicines before surgery, ask when to start taking them again.  We do this for your safety. Many medicines can make you bleed too much during surgery. Some change how well surgery (anesthesia) drugs work.  Call your insurance company to let them know you're having surgery. (If you don't have insurance, call [phone].)  Call your clinic if there's any change in your health. This includes signs of a cold or flu (sore throat, runny nose, cough, rash, fever). It  also includes a scrape or scratch near the surgery site.  If you have questions on the day of surgery, call your hospital or surgery center.  Eating and drinking guidelines  For your safety: Unless your surgeon tells you otherwise, follow the guidelines below.  Eat and drink as usual until 8 hours before you arrive for surgery. After that, no food or milk.  Drink clear liquids until 2 hours before you arrive. These are liquids you can see through, like water, Gatorade, and Propel Water. They also include plain black coffee and tea (no cream or milk), candy, and breath mints. You can spit out gum when you arrive.  If you drink alcohol: Stop drinking it the night before surgery.  If your care team tells you to take medicine on the morning of surgery, it's okay to take it with a sip of water.  Preventing infection  Shower or bathe the night before and morning of your surgery. Follow the instructions your clinic gave you. (If no instructions, use regular soap.)  Don't shave or clip hair near your surgery site. We'll remove the hair if needed.  Don't smoke or vape the morning of surgery. You may chew nicotine gum up to 2 hours before surgery. A nicotine patch is okay.  Note: Some surgeries require you to completely quit smoking and nicotine. Check with your surgeon.  Your care team will make every effort to keep you safe from infection. We will:  Clean our hands often with soap and water (or an alcohol-based hand rub).  Clean the skin at your surgery site with a special soap that kills germs.  Give you a special gown to keep you warm. (Cold raises the risk of infection.)  Wear special hair covers, masks, gowns and gloves during surgery.  Give antibiotic medicine, if prescribed. Not all surgeries need antibiotics.  What to bring on the day of surgery  Photo ID and insurance card  Copy of your health care directive, if you have one  Glasses and hearing aids (bring cases)  You can't wear contacts during surgery  Inhaler and  eye drops, if you use them (tell us about these when you arrive)  CPAP machine or breathing device, if you use them  A few personal items, if spending the night  If you have . . .  A pacemaker, ICD (cardiac defibrillator) or other implant: Bring the ID card.  An implanted stimulator: Bring the remote control.  A legal guardian: Bring a copy of the certified (court-stamped) guardianship papers.  Please remove any jewelry, including body piercings. Leave jewelry and other valuables at home.  If you're going home the day of surgery  You must have a responsible adult drive you home. They should stay with you overnight as well.  If you don't have someone to stay with you, and you aren't safe to go home alone, we may keep you overnight. Insurance often won't pay for this.  After surgery  If it's hard to control your pain or you need more pain medicine, please call your surgeon's office.  Questions?   If you have any questions for your care team, list them here: _________________________________________________________________________________________________________________________________________________________________________ ____________________________________ ____________________________________ ____________________________________    How to Take Your Medication Before Surgery  - not taking any medication

## 2023-07-20 DIAGNOSIS — R79.89 LOW TSH LEVEL: Primary | ICD-10-CM

## 2023-07-21 ENCOUNTER — ANESTHESIA EVENT (OUTPATIENT)
Dept: SURGERY | Facility: CLINIC | Age: 28
End: 2023-07-21
Payer: COMMERCIAL

## 2023-07-21 ASSESSMENT — LIFESTYLE VARIABLES: TOBACCO_USE: 1

## 2023-07-21 NOTE — ANESTHESIA PREPROCEDURE EVALUATION
Anesthesia Pre-Procedure Evaluation    Patient: Liat Corcoran   MRN: [id number] : 1995        Procedure : Procedure(s):  Laparoscopic Tubal Sterilization          Past Medical History:   Diagnosis Date     Abnormal Pap smear of cervix 2021     Accidental overdose     sleep meds, tylenol, opioids     Anemia      Aspiration pneumonia (H) 2015     Bipolar disorder (H)      Chickenpox      Chlamydia infection affecting pregnancy, antepartum 2018     Depressive disorder      History of recurrent ear infection      Intracranial swelling 2015     Ovarian cysts     ultrasound dx by genoveva     Postpartum depression 2019    also in       Past Surgical History:   Procedure Laterality Date     PE TUBES       TONSILLECTOMY  1998      Allergies   Allergen Reactions     Amoxicillin Shortness Of Breath     Penicillin G Hives      Social History     Tobacco Use     Smoking status: Former     Packs/day: 0.25     Types: Cigarettes     Quit date: 2022     Years since quittin.4     Smokeless tobacco: Never     Tobacco comments:     smoking 10 cig/day with pregnancy   Substance Use Topics     Alcohol use: Not Currently     Comment: occas-quit with pregnancy      Wt Readings from Last 1 Encounters:   23 100.7 kg (222 lb)        Anesthesia Evaluation   Pt has had prior anesthetic. Type: General.        ROS/MED HX  ENT/Pulmonary:     (+) tobacco use, Past use,     Neurologic:       Cardiovascular:     (+) -----Previous cardiac testing (3/14)   Echo: Date: Results:  Interpretation Summary  The visual ejection fraction is estimated at 55-60%. The right ventricle is   normal in structure, function and size. Normal valvular function  PatientHeight: 60 in  PatientWeight: 207 lbs  SystolicPressure: 110 mmHg  DiastolicPressure: 70 mmHg  HeartRate: 96 bpm  BSA 1.9 m^2        Left Ventricle  The left ventricle is normal in structure, function and size.  There is normal left ventricular wall  thickness.  Left ventricular systolic function is normal.  The visual ejection fraction is estimated at 55-60%.  No regional wall motion abnormalities noted.     Right Ventricle  The right ventricle is normal in structure, function and size.     Atria  The left atrium is not well visualized.  Right atrial size is normal.  There is no atrial shunt seen.     Mitral Valve  The mitral valve is normal in structure and function.  There is no mitral regurgitation noted.     Tricuspid Valve  The tricuspid valve is not well visualized, but is grossly normal.  There is trace tricuspid regurgitation.     Aortic Valve  The aortic valve is normal in structure and function.  No aortic regurgitation is present.     Pulmonic Valve  Normal pulmonic valve.  There is trace pulmonic valvular regurgitation.     Vessels  Normal size aorta.  The IVC is normal in size and reactivity with respiration, suggesting normal   central venous pressure.     Pericardium  The pericardium appears normal.  There is no pleural effusion.     Rhythm  The rhythm was normal sinus.     Stress Test: Date: Results:    ECG Reviewed: Date: 9/20 Results:  Sinus Rhythm  Cath: Date: Results:      METS/Exercise Tolerance:     Hematologic:       Musculoskeletal:       GI/Hepatic:       Renal/Genitourinary:       Endo:     (+) Obesity,     Psychiatric/Substance Use: Comment: ADHD (attention deficit hyperactivity disorder)  PTSD (post-traumatic stress disorder    (+) anxiety, depression and bipolar     Infectious Disease:       Malignancy:       Other:               OUTSIDE LABS:  CBC:   Lab Results   Component Value Date    WBC 8.7 07/19/2023    WBC 20.1 (H) 04/19/2023    HGB 11.6 (L) 07/19/2023    HGB 8.7 (L) 04/19/2023    HCT 38.1 07/19/2023    HCT 29.4 (L) 04/19/2023     07/19/2023     04/19/2023     BMP:   Lab Results   Component Value Date     07/19/2023     (L) 04/19/2023    POTASSIUM 4.4 07/19/2023    POTASSIUM 4.2 04/19/2023     CHLORIDE 101 07/19/2023    CHLORIDE 104 04/19/2023    CO2 27 07/19/2023    CO2 17 (L) 04/19/2023    BUN 14.3 07/19/2023    BUN 10.5 04/19/2023    CR 0.58 07/19/2023    CR 0.52 04/19/2023    GLC 82 07/19/2023     (H) 04/19/2023     COAGS: No results found for: PTT, INR, FIBR  POC:   Lab Results   Component Value Date    BGM 74 09/01/2020    HCG Negative 07/19/2023    HCGS Negative 03/14/2018     HEPATIC:   Lab Results   Component Value Date    ALBUMIN 3.0 (L) 04/19/2023    PROTTOTAL 5.9 (L) 04/19/2023    ALT 10 04/19/2023    AST 18 04/19/2023    ALKPHOS 146 (H) 04/19/2023    BILITOTAL 0.3 04/19/2023     OTHER:   Lab Results   Component Value Date    ABEL 10.2 (H) 07/19/2023    LIPASE 67 (L) 07/31/2020    TSH 0.11 (L) 07/19/2023    T4 1.22 07/19/2023    CRP 22.4 (H) 04/03/2021    SED 45 (H) 04/03/2021               ARIEL Leong CRNA

## 2023-07-24 ENCOUNTER — HOSPITAL ENCOUNTER (OUTPATIENT)
Facility: CLINIC | Age: 28
Discharge: HOME | End: 2023-07-24
Attending: OBSTETRICS & GYNECOLOGY | Admitting: OBSTETRICS & GYNECOLOGY
Payer: COMMERCIAL

## 2023-07-24 ENCOUNTER — ANESTHESIA (OUTPATIENT)
Dept: SURGERY | Facility: CLINIC | Age: 28
End: 2023-07-24
Payer: COMMERCIAL

## 2023-07-24 VITALS
RESPIRATION RATE: 16 BRPM | TEMPERATURE: 98.1 F | HEIGHT: 61 IN | SYSTOLIC BLOOD PRESSURE: 127 MMHG | OXYGEN SATURATION: 97 % | HEART RATE: 97 BPM | DIASTOLIC BLOOD PRESSURE: 85 MMHG | WEIGHT: 222 LBS | BODY MASS INDEX: 41.91 KG/M2

## 2023-07-24 DIAGNOSIS — Z64.1 MULTIPARITY: ICD-10-CM

## 2023-07-24 DIAGNOSIS — Z30.2 ENCOUNTER FOR STERILIZATION: ICD-10-CM

## 2023-07-24 PROCEDURE — 999N000141 HC STATISTIC PRE-PROCEDURE NURSING ASSESSMENT: Performed by: OBSTETRICS & GYNECOLOGY

## 2023-07-24 RX ORDER — ACETAMINOPHEN 325 MG/1
975 TABLET ORAL ONCE
Status: DISCONTINUED | OUTPATIENT
Start: 2023-07-24 | End: 2023-07-24

## 2023-07-24 RX ORDER — CLINDAMYCIN PHOSPHATE 900 MG/50ML
900 INJECTION, SOLUTION INTRAVENOUS
Status: DISCONTINUED | OUTPATIENT
Start: 2023-07-24 | End: 2023-07-24 | Stop reason: HOSPADM

## 2023-07-24 RX ORDER — SODIUM CHLORIDE, SODIUM LACTATE, POTASSIUM CHLORIDE, CALCIUM CHLORIDE 600; 310; 30; 20 MG/100ML; MG/100ML; MG/100ML; MG/100ML
INJECTION, SOLUTION INTRAVENOUS CONTINUOUS
Status: DISCONTINUED | OUTPATIENT
Start: 2023-07-24 | End: 2023-07-24 | Stop reason: HOSPADM

## 2023-07-24 RX ORDER — ACETAMINOPHEN 325 MG/1
975 TABLET ORAL ONCE
Status: DISCONTINUED | OUTPATIENT
Start: 2023-07-24 | End: 2023-07-24 | Stop reason: HOSPADM

## 2023-07-24 RX ORDER — LIDOCAINE 40 MG/G
CREAM TOPICAL
Status: DISCONTINUED | OUTPATIENT
Start: 2023-07-24 | End: 2023-07-24 | Stop reason: HOSPADM

## 2023-07-24 RX ORDER — GABAPENTIN 300 MG/1
300 CAPSULE ORAL
Status: DISCONTINUED | OUTPATIENT
Start: 2023-07-24 | End: 2023-07-24 | Stop reason: HOSPADM

## 2023-07-24 RX ORDER — CLINDAMYCIN PHOSPHATE 900 MG/50ML
900 INJECTION, SOLUTION INTRAVENOUS SEE ADMIN INSTRUCTIONS
Status: DISCONTINUED | OUTPATIENT
Start: 2023-07-24 | End: 2023-07-24 | Stop reason: HOSPADM

## 2023-07-25 NOTE — TELEPHONE ENCOUNTER
Per Dr. Tavarez procedure was not performed. Pt left before surgery. Pt states she did not know she would be put under with anesthesia and decided against the surgery. Left before procedure.      Kriss Garcia   Clinic Station    [facility]  [phone]

## 2023-08-03 ENCOUNTER — OFFICE VISIT (OUTPATIENT)
Dept: OBGYN | Facility: CLINIC | Age: 28
End: 2023-08-03
Payer: COMMERCIAL

## 2023-08-03 VITALS
HEART RATE: 69 BPM | SYSTOLIC BLOOD PRESSURE: 138 MMHG | TEMPERATURE: 98.1 F | HEIGHT: 61 IN | WEIGHT: 227.8 LBS | RESPIRATION RATE: 18 BRPM | BODY MASS INDEX: 43.01 KG/M2 | DIASTOLIC BLOOD PRESSURE: 91 MMHG

## 2023-08-03 DIAGNOSIS — Z30.017 NEXPLANON INSERTION: ICD-10-CM

## 2023-08-03 DIAGNOSIS — Z30.011 ENCOUNTER FOR INITIAL PRESCRIPTION OF CONTRACEPTIVE PILLS: Primary | ICD-10-CM

## 2023-08-03 LAB
HCG UR QL: NEGATIVE
INTERNAL QC OK POCT: NORMAL
POCT KIT EXPIRATION DATE: NORMAL
POCT KIT LOT NUMBER: NORMAL

## 2023-08-03 PROCEDURE — 81025 URINE PREGNANCY TEST: CPT | Performed by: OBSTETRICS & GYNECOLOGY

## 2023-08-03 PROCEDURE — 99213 OFFICE O/P EST LOW 20 MIN: CPT | Performed by: OBSTETRICS & GYNECOLOGY

## 2023-08-03 RX ORDER — ACETAMINOPHEN AND CODEINE PHOSPHATE 120; 12 MG/5ML; MG/5ML
0.35 SOLUTION ORAL DAILY
Qty: 84 TABLET | Refills: 3 | Status: SHIPPED | OUTPATIENT
Start: 2023-08-03

## 2023-08-03 RX ORDER — IBUPROFEN 200 MG
200 TABLET ORAL EVERY 4 HOURS PRN
COMMUNITY

## 2023-08-03 NOTE — PROGRESS NOTES
Contraception start -   Method interested in: oral contraceptives              Methods used previously: pill: OCP counseling given. The use of the oral contraceptive has been fully discussed with the patient including, possible side effects , the physiology which make the pill effective, the proper method to initiate (i.e.  start or as as directed) the pills, the need for back up contraception during the first cycle of pills, the need for regular compliance to ensure adequate contraceptive effect, the instructions for what to do in event of 1-2 missed pills, warnings about the risks of STDs if a condom is not used, the risks of smoking while on the pill, and the need for annual pap and STD screening and none  Problems with previous methods: No  History of pregnancies:         Patient's last menstrual period was 2023 (approximate).           Lab Results   Component Value Date    PAP LSIL 2021     : 4  Para: 4  Menstrual cycle: no bleeding  Flow: Breastfeeding presently  History of migraines: No  Smoker: No  1st degree relative with History of: none  Accompanying Signs & Symptoms:   Dysuria: No  Vaginal discharge: No  Painful intercourse: No  Precipitating and/or Alleviating factors:    Currently sexually active: YES  In stable relationship: YES  Desire STD testing: No  Are you planning a pregnancy soon: No      (Z30.011) Encounter for initial prescription of contraceptive pills  (primary encounter diagnosis)  Comment: Breast feeding  Plan: norethindrone (MICRONOR) 0.35 MG tablet                (Z30.017) Nexplanon insertion  Comment: refused after seeing the needle  Plan: HCG qualitative urine POCT          Vlad Tavarez MD

## 2023-11-04 ENCOUNTER — HOSPITAL ENCOUNTER (EMERGENCY)
Facility: CLINIC | Age: 28
Discharge: HOME | End: 2023-11-04
Attending: EMERGENCY MEDICINE | Admitting: EMERGENCY MEDICINE
Payer: COMMERCIAL

## 2023-11-04 VITALS
SYSTOLIC BLOOD PRESSURE: 144 MMHG | HEART RATE: 109 BPM | OXYGEN SATURATION: 97 % | TEMPERATURE: 98 F | DIASTOLIC BLOOD PRESSURE: 88 MMHG | RESPIRATION RATE: 18 BRPM

## 2023-11-04 DIAGNOSIS — J02.0 STREP THROAT: ICD-10-CM

## 2023-11-04 LAB — DEPRECATED S PYO AG THROAT QL EIA: POSITIVE

## 2023-11-04 PROCEDURE — 99283 EMERGENCY DEPT VISIT LOW MDM: CPT | Performed by: EMERGENCY MEDICINE

## 2023-11-04 PROCEDURE — 87880 STREP A ASSAY W/OPTIC: CPT | Performed by: EMERGENCY MEDICINE

## 2023-11-04 RX ORDER — CEPHALEXIN 500 MG/1
500 CAPSULE ORAL 3 TIMES DAILY
Qty: 30 CAPSULE | Refills: 0 | Status: SHIPPED | OUTPATIENT
Start: 2023-11-04 | End: 2023-11-14

## 2023-11-04 ASSESSMENT — ACTIVITIES OF DAILY LIVING (ADL): ADLS_ACUITY_SCORE: 35

## 2023-11-04 NOTE — ED TRIAGE NOTES
Sore throat, ear pain, body aches not improving.      Triage Assessment (Adult)       Row Name 11/04/23 1800          Triage Assessment    Airway WDL WDL        Respiratory WDL    Respiratory WDL WDL        Cardiac WDL    Cardiac WDL WDL
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
## 2023-11-05 NOTE — DISCHARGE INSTRUCTIONS
Start antibiotics as prescribed.    Drink lots of fluids.  Alternate Tylenol with ibuprofen if needed for pain or fevers.    Be sure to get a new toothbrush.    Follow-up in clinic if not improving through the week.  Return for worsening, changes or concerns.    I hope everyone is completely better soon!!

## 2023-11-06 ENCOUNTER — PATIENT OUTREACH (OUTPATIENT)
Dept: FAMILY MEDICINE | Facility: CLINIC | Age: 28
End: 2023-11-06
Payer: COMMERCIAL

## 2023-11-06 NOTE — TELEPHONE ENCOUNTER
ED / Discharge Outreach Protocol    Patient Contact    Attempt # 1    Was call answered?  No.  Unable to leave message.  Katie Lora RN

## 2023-11-07 ENCOUNTER — HOSPITAL ENCOUNTER (EMERGENCY)
Facility: CLINIC | Age: 28
Discharge: HOME | End: 2023-11-07
Attending: PHYSICIAN ASSISTANT | Admitting: PHYSICIAN ASSISTANT
Payer: COMMERCIAL

## 2023-11-07 ENCOUNTER — TELEPHONE (OUTPATIENT)
Dept: FAMILY MEDICINE | Facility: CLINIC | Age: 28
End: 2023-11-07
Payer: COMMERCIAL

## 2023-11-07 VITALS
DIASTOLIC BLOOD PRESSURE: 65 MMHG | HEART RATE: 99 BPM | SYSTOLIC BLOOD PRESSURE: 125 MMHG | TEMPERATURE: 99.5 F | OXYGEN SATURATION: 97 % | RESPIRATION RATE: 20 BRPM

## 2023-11-07 DIAGNOSIS — J06.9 VIRAL URI WITH COUGH: ICD-10-CM

## 2023-11-07 DIAGNOSIS — J02.9 ACUTE PHARYNGITIS: ICD-10-CM

## 2023-11-07 PROCEDURE — 99213 OFFICE O/P EST LOW 20 MIN: CPT | Performed by: PHYSICIAN ASSISTANT

## 2023-11-07 PROCEDURE — G0463 HOSPITAL OUTPT CLINIC VISIT: HCPCS

## 2023-11-07 RX ORDER — BENZONATATE 100 MG/1
100 CAPSULE ORAL 3 TIMES DAILY PRN
Qty: 30 CAPSULE | Refills: 0 | Status: SHIPPED | OUTPATIENT
Start: 2023-11-07 | End: 2024-02-14

## 2023-11-07 RX ORDER — ALBUTEROL SULFATE 90 UG/1
2 AEROSOL, METERED RESPIRATORY (INHALATION) EVERY 6 HOURS PRN
Qty: 18 G | Refills: 0 | Status: SHIPPED | OUTPATIENT
Start: 2023-11-07 | End: 2024-08-15

## 2023-11-07 RX ORDER — AZITHROMYCIN 250 MG/1
TABLET, FILM COATED ORAL
Qty: 6 TABLET | Refills: 0 | Status: SHIPPED | OUTPATIENT
Start: 2023-11-07 | End: 2023-11-12

## 2023-11-07 ASSESSMENT — ACTIVITIES OF DAILY LIVING (ADL)
ADLS_ACUITY_SCORE: 33
ADLS_ACUITY_SCORE: 35

## 2023-11-07 ASSESSMENT — ENCOUNTER SYMPTOMS
SORE THROAT: 1
CARDIOVASCULAR NEGATIVE: 1
RHINORRHEA: 0
CHILLS: 1
STRIDOR: 0
GASTROINTESTINAL NEGATIVE: 1
FEVER: 1
APPETITE CHANGE: 0
APNEA: 0
ACTIVITY CHANGE: 0
SHORTNESS OF BREATH: 0
WHEEZING: 0
CHOKING: 0
COUGH: 1
CHEST TIGHTNESS: 1

## 2023-11-07 NOTE — TELEPHONE ENCOUNTER
Symptoms    Describe your symptoms: Shaky, Chest feels tight, Hot and Cold, Migraine 2 am, Throat is still sore & coughing     Pt was in the ER [place] 11/4/23 Positive for Strep Placed on Cephalexin. Pt feels worse today then when she went to the ER.    Please Adivse.     Preferred Pharmacy:   [facility] [address] [place] [address] - [address] [facility]  [address] [facility] [address]  Phone: [phone] Fax: [phone]          Could we send this information to you in [facility] or would you prefer to receive a phone call?:   Patient would prefer a phone call   Okay to leave a detailed message?: Yes at Home number on file [phone] (home)    [facility]

## 2023-11-07 NOTE — DISCHARGE INSTRUCTIONS
Symptomatic cares include: pushing fluids, rest, OTC cold medication such as Mucinex DM for symptomatic relief of cough.  May also take Tessalon in addition to Mucinex DM for symptomatic relief of cough. For the sore throat patient can use salt water gargles, cough drops, chloraseptic spray/drops and tylenol/ibuprofen. Follow up with PCP if no improvement in 4 to 5 days.     May continue over-the-counter ibuprofen and Tylenol for symptomatic relief of headache as needed.  Seek urgent medical evaluation if there are new or worsening symptoms such as fever of 103 degrees F or greater, chest tightness, wheezing, chest pain, shortness of breath, facial pressure, severe headaches, trouble breathing, trouble swallowing, severe or worsening nausea/vomiting, or severe abdominal pain.

## 2023-11-07 NOTE — ED TRIAGE NOTES
Pt reports note feeling better since assessment 11/4/23 and is strep positive   Pt states she is on keflex with no improvement

## 2023-11-07 NOTE — TELEPHONE ENCOUNTER
"ED/Discharge Protocol    \"Hi, my name is Nicole Acuña RN, a registered nurse, and I am calling on behalf of [facility]   I am calling to follow up and see how things are going for you after your recent visit.\"    \"I see that you were in the (ER/UC/IP) on 11/04.    How are you doing now that you are home?\" Liat states she already spoke with a nurse this morning who recommended she return to the ER for worsening symptoms. She plans to follow that advice and will keep tomorrow's follow up clinic appt.       \"Thank you for your time and take care!\"  Nicole BRUNO RN        "
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
## 2023-11-07 NOTE — TELEPHONE ENCOUNTER
Spoke with patient who states she feels worse then when she was seen in ED on 11/4/23 for strep throat.     Chest is tight, feels shaky, chills, tight cough and ST. Also reports having a migraine since 0200.     Advised patient to return to the ED for re-evaluation.     Update sent to Alicia Graham NP.     Julie Behrendt RN

## 2023-11-08 ENCOUNTER — VIRTUAL VISIT (OUTPATIENT)
Dept: FAMILY MEDICINE | Facility: CLINIC | Age: 28
End: 2023-11-08
Payer: COMMERCIAL

## 2023-11-08 DIAGNOSIS — H10.32 ACUTE BACTERIAL CONJUNCTIVITIS OF LEFT EYE: ICD-10-CM

## 2023-11-08 DIAGNOSIS — F41.1 GENERALIZED ANXIETY DISORDER: Primary | ICD-10-CM

## 2023-11-08 DIAGNOSIS — R14.0 ABDOMINAL BLOATING: ICD-10-CM

## 2023-11-08 DIAGNOSIS — L65.9 LOSS OF HAIR: ICD-10-CM

## 2023-11-08 PROCEDURE — 99214 OFFICE O/P EST MOD 30 MIN: CPT | Mod: VID | Performed by: NURSE PRACTITIONER

## 2023-11-08 RX ORDER — POLYMYXIN B SULFATE AND TRIMETHOPRIM 1; 10000 MG/ML; [USP'U]/ML
1-2 SOLUTION OPHTHALMIC EVERY 4 HOURS
Qty: 10 ML | Refills: 0 | Status: SHIPPED | OUTPATIENT
Start: 2023-11-08 | End: 2024-08-15

## 2023-11-08 ASSESSMENT — ANXIETY QUESTIONNAIRES
6. BECOMING EASILY ANNOYED OR IRRITABLE: NEARLY EVERY DAY
5. BEING SO RESTLESS THAT IT IS HARD TO SIT STILL: MORE THAN HALF THE DAYS
IF YOU CHECKED OFF ANY PROBLEMS ON THIS QUESTIONNAIRE, HOW DIFFICULT HAVE THESE PROBLEMS MADE IT FOR YOU TO DO YOUR WORK, TAKE CARE OF THINGS AT HOME, OR GET ALONG WITH OTHER PEOPLE: SOMEWHAT DIFFICULT
GAD7 TOTAL SCORE: 16
2. NOT BEING ABLE TO STOP OR CONTROL WORRYING: NEARLY EVERY DAY
3. WORRYING TOO MUCH ABOUT DIFFERENT THINGS: MORE THAN HALF THE DAYS
GAD7 TOTAL SCORE: 16
7. FEELING AFRAID AS IF SOMETHING AWFUL MIGHT HAPPEN: SEVERAL DAYS
1. FEELING NERVOUS, ANXIOUS, OR ON EDGE: NEARLY EVERY DAY

## 2023-11-08 ASSESSMENT — PATIENT HEALTH QUESTIONNAIRE - PHQ9
SUM OF ALL RESPONSES TO PHQ QUESTIONS 1-9: 9
5. POOR APPETITE OR OVEREATING: MORE THAN HALF THE DAYS

## 2023-11-08 NOTE — PROGRESS NOTES
"Liat is a 28 year old who is being evaluated via a billable video visit.      How would you like to obtain your AVS? MyChart  If the video visit is dropped, the invitation should be resent by: Text to cell phone: [phone]  Will anyone else be joining your video visit? No        Assessment & Plan     Generalized anxiety disorder  Chronic, worsening.  Feels worse since starting birth control.  At times is so bad has difficulty leaving her home.  No previous history of thyroid dysfunction.  Did discuss that this may contribute, would recommend patient schedule lab only appointment to have this checked.  If negative, patient would like to start a medication.  - TSH with free T4 reflex; Future    Loss of hair  Patient having significant hair loss.  Did discuss nutrition and stress test contributors.  We will check labs to rule out any underlying cause.  Patient schedule lab only appointment.  - TSH with free T4 reflex; Future  - CBC with platelets; Future  - Ferritin; Future    Abdominal bloating  Patient reports feeling significant bloating the majority of the time regardless of what she eats.  Does not notice with any particular food groups.  Has cut out pop and drinking more water which does not seem to help.  But movements are normal and urinating normal.  Advised patient to start a food/symptom diary and will check celiac lab when schedules lab only appointment.  - Tissue transglutaminase sarah IgA and IgG; Future  - Complement C4; Future    Acute bacterial conjunctivitis of left eye  Acute left eye redness, noted on video exam.  Goopiness present.  Start eyedrops and warm compresses and follow-up if symptoms not improving or worsening.  - trimethoprim-polymyxin b (POLYTRIM) 20215-5.1 UNIT/ML-% ophthalmic solution; Place 1-2 drops Into the left eye every 4 hours            BMI:   Estimated body mass index is 43.04 kg/m  as calculated from the following:    Height as of 8/3/23: 1.549 m (5' 1\").    Weight as of " 8/3/23: 103.3 kg (227 lb 12.8 oz).   Weight management plan: Discussed healthy diet and exercise guidelines    See Patient Instructions    Alicia Graham DNP, APRN-CNP   [facility]    Fatuma Josue is a 28 year old, presenting for the following health issues:  Anxiety        2023     2:47 PM   Additional Questions   Roomed by Ariela JULES MA   Accompanied by Self       HPI       Patient would also like to discuss-  She has a significant amount of hair loss.  Back and neck spasms-has been going on since he epidural from her last child.    Feeling bloated all the time, regardless of what she eats or what she does.  Is constant  Doesn't feel it is anything in particular. Cut out pop and drinking more water  Bowel movement's okay, urinating well     Left pink eye    Anxiety Follow-Up  How are you doing with your anxiety since your last visit? Worsened- Feels like she's noticed it more since starting birth control. Has had issues on and off with anxiety, feels like it's worsening. Feels like she might need some kind of medication management. Sometimes is so bad she has a hard time leaving her house.  Are you having other symptoms that might be associated with anxiety? Yes:  Significant hair loss-unsure if it's stress related  Have you had a significant life event? No   Are you feeling depressed? No  Do you have any concerns with your use of alcohol or other drugs? No    No more breast feeding   Not taking iron anymore     Social History     Tobacco Use    Smoking status: Former     Packs/day: .25     Types: Cigarettes     Quit date: 2022     Years since quittin.7    Smokeless tobacco: Never    Tobacco comments:     smoking 10 cig/day with pregnancy   Vaping Use    Vaping Use: Never used   Substance Use Topics    Alcohol use: Not Currently     Comment: occas-quit with pregnancy    Drug use: No         2022     6:02 PM 2022    11:11 AM 2023     2:50 PM   LIDIA-7  SCORE   Total Score 9 19 16         4/12/2022    11:11 AM 6/24/2022     2:23 PM 11/8/2023     2:54 PM   PHQ   PHQ-9 Total Score 21 7 9   Q9: Thoughts of better off dead/self-harm past 2 weeks Several days Not at all Not at all         Review of Systems   Constitutional, HEENT, cardiovascular, pulmonary, gi and gu systems are negative, except as otherwise noted.      Objective           Vitals:  No vitals were obtained today due to virtual visit.    Physical Exam   GENERAL: Healthy, alert and no distress  EYES: conjunctiva/corneas- conjunctival injection OS  RESP: No audible wheeze, cough, or visible cyanosis.  No visible retractions or increased work of breathing.    SKIN: Visible skin clear. No significant rash, abnormal pigmentation or lesions.  NEURO: Cranial nerves grossly intact.  Mentation and speech appropriate for age.  PSYCH: Mentation appears normal, affect normal/bright, judgement and insight intact, normal speech and appearance well-groomed.    Diagnostic Test Results:  Labs reviewed in Epic  Pending            Video-Visit Details    Type of service:  Video Visit   Video Start Time: 3:11 PM  Video End Time:3:28 PM    Originating Location (pt. Location): Home    Distant Location (provider location):  On-site  Platform used for Video Visit: Cloupia    Chart documentation with Dragon Voice recognition Software. Although reviewed after completion, some words and grammatical errors may remain.

## 2023-11-14 ENCOUNTER — MYC REFILL (OUTPATIENT)
Dept: OBGYN | Facility: CLINIC | Age: 28
End: 2023-11-14
Payer: COMMERCIAL

## 2023-11-14 DIAGNOSIS — Z30.011 ENCOUNTER FOR INITIAL PRESCRIPTION OF CONTRACEPTIVE PILLS: ICD-10-CM

## 2023-11-14 RX ORDER — ACETAMINOPHEN AND CODEINE PHOSPHATE 120; 12 MG/5ML; MG/5ML
0.35 SOLUTION ORAL DAILY
Qty: 84 TABLET | Refills: 3 | Status: CANCELLED | OUTPATIENT
Start: 2023-11-14

## 2023-11-14 NOTE — TELEPHONE ENCOUNTER
Refill not needed - pt has 3 refills available at her pharmacy  SmartHub message sent to pt to inform her to contact pharmacy for refill    MARY Hdez  [facility]

## 2023-11-27 NOTE — PATIENT INSTRUCTIONS
Generalized anxiety disorder  Chronic, worsening.  Feels worse since starting birth control.  At times is so bad has difficulty leaving her home.  No previous history of thyroid dysfunction.  Did discuss that this may contribute, would recommend patient schedule lab only appointment to have this checked.  If negative, patient would like to start a medication.  - TSH with free T4 reflex; Future    Loss of hair  Patient having significant hair loss.  Did discuss nutrition and stress test contributors.  We will check labs to rule out any underlying cause.  Patient schedule lab only appointment.  - TSH with free T4 reflex; Future  - CBC with platelets; Future  - Ferritin; Future    Abdominal bloating  Patient reports feeling significant bloating the majority of the time regardless of what she eats.  Does not notice with any particular food groups.  Has cut out pop and drinking more water which does not seem to help.  But movements are normal and urinating normal.  Advised patient to start a food/symptom diary and will check celiac lab when schedules lab only appointment.  - Tissue transglutaminase sarah IgA and IgG; Future  - Complement C4; Future    Acute bacterial conjunctivitis of left eye  Acute left eye redness, noted on video exam.  Goopiness present.  Start eyedrops and warm compresses and follow-up if symptoms not improving or worsening.  - trimethoprim-polymyxin b (POLYTRIM) 95837-5.1 UNIT/ML-% ophthalmic solution; Place 1-2 drops Into the left eye every 4 hours

## 2024-01-04 ENCOUNTER — LAB (OUTPATIENT)
Dept: LAB | Facility: CLINIC | Age: 29
End: 2024-01-04
Payer: COMMERCIAL

## 2024-01-04 DIAGNOSIS — L65.9 LOSS OF HAIR: ICD-10-CM

## 2024-01-04 DIAGNOSIS — F41.1 GENERALIZED ANXIETY DISORDER: ICD-10-CM

## 2024-01-04 DIAGNOSIS — R14.0 ABDOMINAL BLOATING: ICD-10-CM

## 2024-01-04 LAB
ERYTHROCYTE [DISTWIDTH] IN BLOOD BY AUTOMATED COUNT: 13.7 % (ref 10–15)
FERRITIN SERPL-MCNC: 23 NG/ML (ref 6–175)
HCT VFR BLD AUTO: 40.9 % (ref 35–47)
HGB BLD-MCNC: 12.5 G/DL (ref 11.7–15.7)
MCH RBC QN AUTO: 25 PG (ref 26.5–33)
MCHC RBC AUTO-ENTMCNC: 30.6 G/DL (ref 31.5–36.5)
MCV RBC AUTO: 82 FL (ref 78–100)
PLATELET # BLD AUTO: 435 10E3/UL (ref 150–450)
RBC # BLD AUTO: 5 10E6/UL (ref 3.8–5.2)
TSH SERPL DL<=0.005 MIU/L-ACNC: 1.58 UIU/ML (ref 0.3–4.2)
WBC # BLD AUTO: 12.3 10E3/UL (ref 4–11)

## 2024-01-04 PROCEDURE — 86364 TISS TRNSGLTMNASE EA IG CLAS: CPT

## 2024-01-04 PROCEDURE — 82728 ASSAY OF FERRITIN: CPT

## 2024-01-04 PROCEDURE — 36415 COLL VENOUS BLD VENIPUNCTURE: CPT

## 2024-01-04 PROCEDURE — 86160 COMPLEMENT ANTIGEN: CPT

## 2024-01-04 PROCEDURE — 84443 ASSAY THYROID STIM HORMONE: CPT

## 2024-01-04 PROCEDURE — 85027 COMPLETE CBC AUTOMATED: CPT

## 2024-01-05 LAB
C4 SERPL-MCNC: 57 MG/DL (ref 13–39)
TTG IGA SER-ACNC: 0.5 U/ML
TTG IGG SER-ACNC: 1.7 U/ML

## 2024-01-26 ENCOUNTER — TELEPHONE (OUTPATIENT)
Dept: FAMILY MEDICINE | Facility: CLINIC | Age: 29
End: 2024-01-26
Payer: COMMERCIAL

## 2024-01-26 DIAGNOSIS — Z20.818 EXPOSURE TO STREP THROAT: Primary | ICD-10-CM

## 2024-01-26 NOTE — TELEPHONE ENCOUNTER
"Liat called again stating she does not want to sit around all day waiting for an answer last minute. She and her children are ill. She does not want to bring everyone in to the clinic. She states \"We've been dealing with this for 6 months\". She states when one child gets sick, the rest get treated.   Liat's symptoms started this morning with a cough and nasal congestion. Unknown fever. No difficulty breathing. Informed there are lots of viruses going around. She wants Alicia Cartagena to address.  Nicole BRUNO RN    "

## 2024-01-26 NOTE — TELEPHONE ENCOUNTER
Spoke with Alicia Cartagena. She is ok with ordering a strep swab. Called Liat to relay message and offer an appointment on the CMA schedule. She declines stating her oldest child has diarrhea so she cannot handle taking all the kids to the clinic. Will place order for strep in case she is able to schedule today, order expires 01/31/24 per conversation with Alicia Cartagena. Notified Liat. She would need to schedule a lab appointment or with [facility]. She may try to schedule in [place].   Nicole BRUNO RN

## 2024-01-26 NOTE — TELEPHONE ENCOUNTER
New Medication Request        What medication are you requesting?: antibiotic    Reason for medication request: patients child was diagnosed with strep throat, and she is wondering if she could get an Rx without being seen. She has a cough and her nose is plugged.    Have you taken this medication before?: Yes:     Controlled Substance Agreement on file:   CSA -- Patient Level:    CSA: None found at the patient level.         Patient offered an appointment? Yes:     Preferred Pharmacy:   [facility]             Could we send this information to you in [facility] or would you prefer to receive a phone call?:   Patient would prefer a phone call   Okay to leave a detailed message?: Yes at Home number on file [phone] (home)

## 2024-01-28 ENCOUNTER — LAB (OUTPATIENT)
Dept: LAB | Facility: CLINIC | Age: 29
End: 2024-01-28
Attending: NURSE PRACTITIONER
Payer: COMMERCIAL

## 2024-01-28 DIAGNOSIS — Z20.818 EXPOSURE TO STREP THROAT: ICD-10-CM

## 2024-01-28 DIAGNOSIS — J02.0 ACUTE STREPTOCOCCAL PHARYNGITIS: Primary | ICD-10-CM

## 2024-01-28 LAB — DEPRECATED S PYO AG THROAT QL EIA: POSITIVE

## 2024-01-28 PROCEDURE — 87880 STREP A ASSAY W/OPTIC: CPT

## 2024-01-28 RX ORDER — AZITHROMYCIN 250 MG/1
TABLET, FILM COATED ORAL
Qty: 6 TABLET | Refills: 0 | Status: SHIPPED | OUTPATIENT
Start: 2024-01-28 | End: 2024-02-02

## 2024-02-01 ENCOUNTER — NURSE TRIAGE (OUTPATIENT)
Dept: NURSING | Facility: CLINIC | Age: 29
End: 2024-02-01
Payer: COMMERCIAL

## 2024-02-01 NOTE — TELEPHONE ENCOUNTER
Chest pain. Was fine getting up. Took Nyquil and drank coffee. Tightness in chest, left shoulder blade. Took inhaler, two puffs. Once taking the inhaler numbness in the left arm and lightheaded.  She's stated she has four kids at home and she doesn't know what she will do. I told her it's important, if she's not going to call 911, someone has to get her to the ER right away. She understands.  Mary Jo Hamilton RN  [facility]    Reason for Disposition   Chest pain lasting longer than 5 minutes and ANY of the following:    history of heart disease  (i.e., heart attack, bypass surgery, angina, angioplasty, CHF; not just a heart murmur)    described as crushing, pressure-like, or heavy    age > 50    age > 30 AND at least one cardiac risk factor (i.e., hypertension, diabetes, obesity, smoker or strong family history of heart disease)    not relieved with nitroglycerin    Additional Information   Negative: SEVERE difficulty breathing (e.g., struggling for each breath, speaks in single words)   Negative: Difficult to awaken or acting confused (e.g., disoriented, slurred speech)   Negative: Shock suspected (e.g., cold/pale/clammy skin, too weak to stand, low BP, rapid pulse)   Negative: Passed out (i.e., lost consciousness, collapsed and was not responding)    Protocols used: Chest Pain-A-

## 2024-02-13 ENCOUNTER — NURSE TRIAGE (OUTPATIENT)
Dept: FAMILY MEDICINE | Facility: CLINIC | Age: 29
End: 2024-02-13
Payer: COMMERCIAL

## 2024-02-13 NOTE — TELEPHONE ENCOUNTER
Pt is calling with c/o body aches, headache and sweating. Has cough, her son had influenza.  Not sob likely fever. Home care instructions given.  Ximena Engle RN on 2/13/2024 at 12:30 PM   Reason for Disposition   Muscle aches from influenza (flu) suspected   Probable mild influenza (no fever) or a common cold with no complications and not HIGH RISK    Additional Information   Negative: SEVERE difficulty breathing (e.g., struggling for each breath, speaks in single words)   Negative: Bluish (or gray) lips or face   Negative: Shock suspected (e.g., cold/pale/clammy skin, too weak to stand, low BP, rapid pulse)   Negative: Sounds like a life-threatening emergency to the triager   Negative: SEVERE difficulty breathing (e.g., struggling for each breath, speaks in single words)   Negative: Bluish (or gray) lips or face   Negative: Shock suspected (e.g., cold/pale/clammy skin, too weak to stand, low BP, rapid pulse)   Negative: Sounds like a life-threatening emergency to the triager   Negative: Difficulty breathing that is not severe and not relieved by cleaning out the nose   Negative: Patient sounds very sick or weak to the triager   Negative: Headache and stiff neck (can't touch chin to chest)   Negative: Chest pain  (Exception: MILD central chest pain, present only when coughing.)   Negative: Symptoms of COVID-19 (e.g., cough, fever, SOB, or others) and lives in an area with community spread   Negative: Sounds like a cold and there is no fever   Negative: Cough and there is no fever   Negative: Severe cough   Negative: Throat pain and there is no fever   Negative: Severe sore throat   Negative: Influenza vaccine reaction is suspected   Negative: Fever > 104 F (40 C)   Negative: Fever > 101 F (38.3 C) and over 60 years of age   Negative: Fever > 100.0 F (37.8 C) and diabetes mellitus or weak immune system (e.g., HIV positive, cancer chemo, splenectomy, organ transplant, chronic steroids)   Negative: Fever > 100.0 F  (37.8 C) and bedridden (e.g., nursing home patient, stroke, chronic illness, recovering from surgery)   Negative: Using nasal washes and pain medicine > 24 hours and sinus pain (lower forehead, cheekbone, or eye) persists   Negative: Fever present > 3 days (72 hours)   Negative: Fever returns after gone for over 24 hours and symptoms worse (or not improved)   Negative: Earache    Protocols used: Muscle Aches and Body Pain-A-OH, Influenza (Flu) - Seasonal-A-OH, Influenza - Seasonal-A-OH

## 2024-02-14 ENCOUNTER — HOSPITAL ENCOUNTER (EMERGENCY)
Facility: CLINIC | Age: 29
Discharge: HOME | End: 2024-02-14
Attending: FAMILY MEDICINE | Admitting: FAMILY MEDICINE
Payer: COMMERCIAL

## 2024-02-14 VITALS
HEART RATE: 112 BPM | OXYGEN SATURATION: 99 % | WEIGHT: 240 LBS | TEMPERATURE: 98.4 F | HEIGHT: 60 IN | SYSTOLIC BLOOD PRESSURE: 128 MMHG | BODY MASS INDEX: 47.12 KG/M2 | RESPIRATION RATE: 18 BRPM | DIASTOLIC BLOOD PRESSURE: 84 MMHG

## 2024-02-14 DIAGNOSIS — J10.1 INFLUENZA A: ICD-10-CM

## 2024-02-14 LAB
FLUAV RNA SPEC QL NAA+PROBE: POSITIVE
FLUBV RNA RESP QL NAA+PROBE: NEGATIVE
RSV RNA SPEC NAA+PROBE: NEGATIVE
SARS-COV-2 RNA RESP QL NAA+PROBE: NEGATIVE

## 2024-02-14 PROCEDURE — 99283 EMERGENCY DEPT VISIT LOW MDM: CPT | Performed by: FAMILY MEDICINE

## 2024-02-14 PROCEDURE — 87637 SARSCOV2&INF A&B&RSV AMP PRB: CPT | Performed by: FAMILY MEDICINE

## 2024-02-14 RX ORDER — BENZONATATE 200 MG/1
200 CAPSULE ORAL 3 TIMES DAILY PRN
Qty: 20 CAPSULE | Refills: 0 | Status: SHIPPED | OUTPATIENT
Start: 2024-02-14 | End: 2024-08-15

## 2024-02-14 NOTE — ED TRIAGE NOTES
Pt presents with concerns of body aches, chills, cough, and headache.  Pt states that oldest son had influenza last week.  Nyquil early this am.  Midol also this am.       Triage Assessment (Adult)       Row Name 02/14/24 0907          Triage Assessment    Airway WDL WDL        Respiratory WDL    Respiratory WDL WDL        Skin Circulation/Temperature WDL    Skin Circulation/Temperature WDL WDL        Cardiac WDL    Cardiac WDL WDL        Peripheral/Neurovascular WDL    Peripheral Neurovascular WDL WDL
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
## 2024-03-04 ENCOUNTER — TELEPHONE (OUTPATIENT)
Dept: FAMILY MEDICINE | Facility: CLINIC | Age: 29
End: 2024-03-04
Payer: COMMERCIAL

## 2024-03-04 NOTE — TELEPHONE ENCOUNTER
Reason for Call:  Appointment Request    Patient requesting this type of appt:  Ear Pain    Requested provider: Alicia Cartagena    Reason patient unable to be scheduled: Not within requested timeframe    When does patient want to be seen/preferred time: 1-2 days    Comments: Pt has scheduled a virtual visit for 3/5 for lingering headaches, but would also like to be seen for ear pain as well. Pt was wondering if she could be fit in in person or if she could discuss the ear pain at her virtual appointment tomorrow.    Could we send this information to you in VAN[facility] or would you prefer to receive a phone call?:   Patient would prefer a phone call   Okay to leave a detailed message?: Yes at Cell number on file:    Telephone Information:   Mobile [phone]       Call taken on 3/4/2024 at 9:18 AM by Maya Fernandes

## 2024-03-04 NOTE — TELEPHONE ENCOUNTER
Just reviewing this, can try to address severe pain during visit tomorrow.    Alicia Maguire, DNP, APRN-CNP

## 2024-03-05 ENCOUNTER — VIRTUAL VISIT (OUTPATIENT)
Dept: FAMILY MEDICINE | Facility: CLINIC | Age: 29
End: 2024-03-05
Payer: COMMERCIAL

## 2024-03-05 DIAGNOSIS — J01.90 ACUTE SINUSITIS WITH SYMPTOMS > 10 DAYS: Primary | ICD-10-CM

## 2024-03-05 DIAGNOSIS — H92.02 OTALGIA, LEFT EAR: ICD-10-CM

## 2024-03-05 PROCEDURE — 99213 OFFICE O/P EST LOW 20 MIN: CPT | Mod: 95 | Performed by: NURSE PRACTITIONER

## 2024-03-05 RX ORDER — DOXYCYCLINE 100 MG/1
100 CAPSULE ORAL 2 TIMES DAILY
Qty: 20 CAPSULE | Refills: 0 | Status: SHIPPED | OUTPATIENT
Start: 2024-03-05 | End: 2024-03-15

## 2024-03-05 ASSESSMENT — ENCOUNTER SYMPTOMS: HEADACHES: 1

## 2024-03-05 NOTE — PROGRESS NOTES
Liat is a 28 year old who is being evaluated via a billable video visit.      How would you like to obtain your AVS? MyChart  If the video visit is dropped, the invitation should be resent by: Text to cell phone: [phone]  Will anyone else be joining your video visit? No          Assessment & Plan     Acute sinusitis with symptoms > 10 days  Otalgia, left ear  Patient has been ill for the last several weeks, had influenza A mid February.  Some of her symptoms have improved, however others have worsened including sinus pressure/pain, left ear pain,/teeth pain and fatigue.  Denies any current fevers.  Given length and severity of symptoms, would recommend treatment with antibiotic for sinus infection and possible left ear infection.  Advised patient to continue good hydration, rest and Tylenol and/or ibuprofen as needed.  If symptoms or not improving or worsening, recommend being seen in clinic.  - doxycycline hyclate (VIBRAMYCIN) 100 MG capsule; Take 1 capsule (100 mg) by mouth 2 times daily for 10 days        MED REC REQUIRED  Post Medication Reconciliation Status: discharge medications reconciled and changed, per note/orders    See Patient Instructions    Subjective   Liat is a 28 year old, presenting for the following health issues:  Headache and ER F/U    Headache          ED/UC Followup:    Facility:  [facility]   Date of visit: 2/14/2024  Reason for visit: Influenza A   Current Status: continued headache  twice per day, left ear pain, sinus pressure-blows lime-green. Gums/mouth hurt. fatigue        Review of Systems  Constitutional, HEENT, cardiovascular, pulmonary, gi and gu systems are negative, except as otherwise noted.      Objective           Vitals:  No vitals were obtained today due to virtual visit.    Physical Exam   GENERAL: alert, no distress, and fatigued  EYES: Eyes grossly normal to inspection.  No discharge or erythema, or obvious scleral/conjunctival  abnormalities.  RESP: No audible wheeze, cough, or visible cyanosis.    SKIN: Visible skin clear. No significant rash, abnormal pigmentation or lesions.  NEURO: Cranial nerves grossly intact.  Mentation and speech appropriate for age.  PSYCH: Appropriate affect, tone, and pace of words    Diagnostic Test Results:  Labs reviewed in Epic  none        Video-Visit Details    Type of service:  Video Visit   Video Start Time: 2:35 PM  Video End Time:2:50 PM    Originating Location (pt. Location): Home    Distant Location (provider location):  Off-site  Platform used for Video Visit: [facility]  Signed Electronically by: Alicia Maguire DNP, APRN-CNP       Chart documentation with Dragon Voice recognition Software. Although reviewed after completion, some words and grammatical errors may remain.

## 2024-03-05 NOTE — PATIENT INSTRUCTIONS
Acute sinusitis with symptoms > 10 days  Otalgia, left ear  Patient has been ill for the last several weeks, had influenza A mid February.  Some of her symptoms have improved, however others have worsened including sinus pressure/pain, left ear pain,/teeth pain and fatigue.  Denies any current fevers.  Given length and severity of symptoms, would recommend treatment with antibiotic for sinus infection and possible left ear infection.  Advised patient to continue good hydration, rest and Tylenol and/or ibuprofen as needed.  If symptoms or not improving or worsening, recommend being seen in clinic.  - doxycycline hyclate (VIBRAMYCIN) 100 MG capsule; Take 1 capsule (100 mg) by mouth 2 times daily for 10 days

## 2024-04-15 ENCOUNTER — TELEPHONE (OUTPATIENT)
Dept: FAMILY MEDICINE | Facility: CLINIC | Age: 29
End: 2024-04-15
Payer: COMMERCIAL

## 2024-04-15 NOTE — TELEPHONE ENCOUNTER
S-(situation): Liat calling to ask if should be seen. C/O sore throat and chest congestion. No fever.  Has dry cough. Denies shortness of breath.  Was outside all weekend, so maybe allergies she says    B-(background): getting  in 4 days    A-(assessment): sore throat and chest congestion     R-(recommendations): advised could go to Urgent Care or push fluids, maybe try allergy medication.   Claire Lora RN

## 2024-06-16 ENCOUNTER — HEALTH MAINTENANCE LETTER (OUTPATIENT)
Age: 29
End: 2024-06-16

## 2024-07-02 ENCOUNTER — VIRTUAL VISIT (OUTPATIENT)
Dept: FAMILY MEDICINE | Facility: CLINIC | Age: 29
End: 2024-07-02
Payer: COMMERCIAL

## 2024-07-02 DIAGNOSIS — E66.01 CLASS 3 SEVERE OBESITY DUE TO EXCESS CALORIES WITHOUT SERIOUS COMORBIDITY WITH BODY MASS INDEX (BMI) OF 45.0 TO 49.9 IN ADULT (H): Primary | ICD-10-CM

## 2024-07-02 DIAGNOSIS — E66.813 CLASS 3 SEVERE OBESITY DUE TO EXCESS CALORIES WITHOUT SERIOUS COMORBIDITY WITH BODY MASS INDEX (BMI) OF 45.0 TO 49.9 IN ADULT (H): Primary | ICD-10-CM

## 2024-07-02 PROCEDURE — 99213 OFFICE O/P EST LOW 20 MIN: CPT | Mod: 95 | Performed by: NURSE PRACTITIONER

## 2024-07-02 NOTE — PATIENT INSTRUCTIONS
Class 3 severe obesity due to excess calories without serious comorbidity with body mass index (BMI) of 45.0 to 49.9 in adult (H)  Patient struggling to lose weight, has cut out pop in the last couple months and was down 5 pounds and has gained that back.  Current weight is 236 pounds.  Last TSH normal.  Discussed sleep, exercise, diet and stress at great length and its effect on weight management.  Discussed working on stress management at home, increasing physical activity during the week to get heart working harder even if its 15 minutes a day to start.  Discussed working on diet, increasing vegetables, decreasing carbs from pastas and breads.  Recommend nutrition referral, patient is agreeable to this.  Referral placed.  Patient to follow-up with this provider in 1 to 3 months depending on where she is at to help with accountability and to recheck.  - Adult Nutrition  Referral; Future

## 2024-07-02 NOTE — PROGRESS NOTES
Liat is a 29 year old who is being evaluated via a billable video visit.    How would you like to obtain your AVS? MyChart  If the video visit is dropped, the invitation should be resent by: Text to cell phone: [phone]   Will anyone else be joining your video visit? No      Assessment & Plan     Class 3 severe obesity due to excess calories without serious comorbidity with body mass index (BMI) of 45.0 to 49.9 in adult (H)  Patient struggling to lose weight, has cut out pop in the last couple months and was down 5 pounds and has gained that back.  Current weight is 236 pounds.  Last TSH normal.  Discussed sleep, exercise, diet and stress at great length and its effect on weight management.  Discussed working on stress management at home, increasing physical activity during the week to get heart working harder even if its 15 minutes a day to start.  Discussed working on diet, increasing vegetables, decreasing carbs from pastas and breads.  Recommend nutrition referral, patient is agreeable to this.  Referral placed.  Patient to follow-up with this provider in 1 to 3 months depending on where she is at to help with accountability and to recheck.  - Adult Nutrition  Referral; Future          BMI  Estimated body mass index is 46.87 kg/m  as calculated from the following:    Height as of 2/14/24: 1.524 m (5').    Weight as of 2/14/24: 108.9 kg (240 lb).   Weight management plan: Discussed healthy diet and exercise guidelines nutrition referral placed      See Patient Instructions    Subjective   Liat is a 29 year old, presenting for the following health issues:        7/2/2024     3:59 PM   Additional Questions   Roomed by Lorie CARDENAS   Accompanied by self         7/2/2024     3:59 PM   Patient Reported Additional Medications   Patient reports taking the following new medications no new meds       Video Start Time: 5:15 PM    HPI     Weight Problem (Would like to discuss losing weight. Has been trying  to lose weight since November of last year. Quit all pop two months ago, lost 5 pounds right away and gained it back. Did try changing diet and does walk everyday. Would like to discuss alternative options. )    Current weight today : 236 at home  Wt Readings from Last 3 Encounters:   02/14/24 108.9 kg (240 lb)   08/03/23 103.3 kg (227 lb 12.8 oz)   07/24/23 100.7 kg (222 lb)      TSH has been normal   Sleep ~ falling asleep is hard, but once asleep sleeps well. Tosses anywhere from 10:30-1 a.m.   Exercise ~ active with kids activities, zoo  Diet ~ taking out junk food. Lots of fruits, vegetables, meats. Pastas here and there. Salads sometimes.    Does have stress 2/2 family   Drinking a lot of water         Review of Systems  Constitutional, HEENT, cardiovascular, pulmonary, gi and gu systems are negative, except as otherwise noted.      Objective    Vitals - Patient Reported  Weight (Patient Reported): 107 kg (236 lb)      Vitals:  No vitals were obtained today due to virtual visit.    Physical Exam   GENERAL: alert and no distress  EYES: Eyes grossly normal to inspection.  No discharge or erythema, or obvious scleral/conjunctival abnormalities.  RESP: No audible wheeze, cough, or visible cyanosis.    SKIN: Visible skin clear. No significant rash, abnormal pigmentation or lesions.  NEURO: Cranial nerves grossly intact.  Mentation and speech appropriate for age.  PSYCH: Appropriate affect, tone, and pace of words    Diagnostic Test Results:  Labs reviewed in Epic  none        Video-Visit Details    Type of service:  Video Visit   Video End Time:5:40 PM  Originating Location (pt. Location): Home    Distant Location (provider location):  Off-site  Platform used for Video Visit: [facility]iley  Signed Electronically by: Ting Caro DNP,, ARIEL CNP      Chart documentation with Dragon Voice recognition Software. Although reviewed after completion, some words and grammatical errors may remain.

## 2024-08-15 ENCOUNTER — TELEPHONE (OUTPATIENT)
Dept: FAMILY MEDICINE | Facility: CLINIC | Age: 29
End: 2024-08-15

## 2024-08-15 ENCOUNTER — VIRTUAL VISIT (OUTPATIENT)
Dept: INTERNAL MEDICINE | Facility: CLINIC | Age: 29
End: 2024-08-15
Payer: COMMERCIAL

## 2024-08-15 DIAGNOSIS — H10.32 ACUTE BACTERIAL CONJUNCTIVITIS OF LEFT EYE: Primary | ICD-10-CM

## 2024-08-15 PROCEDURE — 99213 OFFICE O/P EST LOW 20 MIN: CPT | Mod: 95

## 2024-08-15 RX ORDER — POLYMYXIN B SULFATE AND TRIMETHOPRIM 1; 10000 MG/ML; [USP'U]/ML
1-2 SOLUTION OPHTHALMIC EVERY 4 HOURS
Qty: 10 ML | Refills: 0 | Status: SHIPPED | OUTPATIENT
Start: 2024-08-15 | End: 2024-08-22

## 2024-08-15 ASSESSMENT — ENCOUNTER SYMPTOMS: EYE PAIN: 1

## 2024-08-15 NOTE — PROGRESS NOTES
Liat is a 29 year old who is being evaluated via a billable video visit.    How would you like to obtain your AVS? MyChart  If the video visit is dropped, the invitation should be resent by: Text to cell phone: [phone]  Will anyone else be joining your video visit? No      Assessment & Plan     (H10.32) Acute bacterial conjunctivitis of left eye  (primary encounter diagnosis)  Comment:   She endorses drainage and erythema of left eye.   She tells me it feels like she has sand in her left eye.  Two of her children were recently treated for conjunctivitis as well.  I will send antibiotic for polytrim   Plan: polymixin b-trimethoprim (POLYTRIM) 18692-9.1         UNIT/ML-% ophthalmic solution                Subjective   Liat is a 29 year old, presenting for the following health issues:  Eye Problem      8/15/2024     8:36 AM   Additional Questions   Roomed by DRAKE Horne LPN   Accompanied by self         8/15/2024     8:36 AM   Patient Reported Additional Medications   Patient reports taking the following new medications none     Video Start Time: 9:07 AM    Eye Problem     History of Present Illness       Reason for visit:  Left eye red and feels like sand in eye, eye lid swollen, drainage for 3 day. One & two year old kids have pink eye.    She eats 2-3 servings of fruits and vegetables daily.She consumes 0 sweetened beverage(s) daily.She exercises with enough effort to increase her heart rate 9 or less minutes per day.  She exercises with enough effort to increase her heart rate 3 or less days per week.   She is taking medications regularly.                   Objective    Vitals - Patient Reported  Weight (Patient Reported): 106.1 kg (234 lb)  Height (Patient Reported): 152.4 cm (5')  BMI (Based on Pt Reported Ht/Wt): 45.7        Physical Exam   GENERAL: alert and no distress  EYES: Eyes grossly normal to inspection.  Left eye with erythema and discharge     RESP: No audible wheeze, cough, or visible  cyanosis.    SKIN: Visible skin clear. No significant rash, abnormal pigmentation or lesions.  NEURO: Cranial nerves grossly intact.  Mentation and speech appropriate for age.  PSYCH: Appropriate affect, tone, and pace of words          Video-Visit Details    Type of service:  Video Visit   Video End Time:9:09 AM  Originating Location (pt. Location): Home    Distant Location (provider location):  On-site  Platform used for Video Visit: [facility]  Signed Electronically by: ARIEL Heredia CNP

## 2024-08-15 NOTE — TELEPHONE ENCOUNTER
Liat called requesting a link be sent to her my chart so she can request an e-visit. She states she is not able to do this in my chart without the link being sent to her. Both of her children have pink eye and are being treated. Liat became symptomatic a few days ago. Yesterday she reported the feeling of sand in her eyes, constant drainage and swelling. She is using ice packs for comfort.  She denies change or loss of vision.   Link sent.  Nicole BRUNO RN

## 2024-08-24 ENCOUNTER — NURSE TRIAGE (OUTPATIENT)
Dept: NURSING | Facility: CLINIC | Age: 29
End: 2024-08-24
Payer: MEDICAID

## 2024-08-24 ENCOUNTER — HOSPITAL ENCOUNTER (EMERGENCY)
Facility: CLINIC | Age: 29
Discharge: HOME | End: 2024-08-24
Attending: FAMILY MEDICINE | Admitting: FAMILY MEDICINE
Payer: MEDICAID

## 2024-08-24 VITALS
OXYGEN SATURATION: 99 % | HEART RATE: 108 BPM | SYSTOLIC BLOOD PRESSURE: 141 MMHG | BODY MASS INDEX: 47.12 KG/M2 | HEIGHT: 60 IN | DIASTOLIC BLOOD PRESSURE: 117 MMHG | WEIGHT: 240 LBS | TEMPERATURE: 98.2 F | RESPIRATION RATE: 18 BRPM

## 2024-08-24 DIAGNOSIS — K08.89 PAIN, DENTAL: ICD-10-CM

## 2024-08-24 PROCEDURE — 99283 EMERGENCY DEPT VISIT LOW MDM: CPT | Mod: 25 | Performed by: FAMILY MEDICINE

## 2024-08-24 PROCEDURE — 64400 NJX AA&/STRD TRIGEMINAL NRV: CPT | Performed by: FAMILY MEDICINE

## 2024-08-24 RX ORDER — OXYCODONE HYDROCHLORIDE 5 MG/1
5 TABLET ORAL EVERY 6 HOURS PRN
Qty: 6 TABLET | Refills: 0 | Status: SHIPPED | OUTPATIENT
Start: 2024-08-24 | End: 2024-08-27 | Stop reason: ALTCHOICE

## 2024-08-24 ASSESSMENT — COLUMBIA-SUICIDE SEVERITY RATING SCALE - C-SSRS
2. HAVE YOU ACTUALLY HAD ANY THOUGHTS OF KILLING YOURSELF IN THE PAST MONTH?: NO
1. IN THE PAST MONTH, HAVE YOU WISHED YOU WERE DEAD OR WISHED YOU COULD GO TO SLEEP AND NOT WAKE UP?: NO
6. HAVE YOU EVER DONE ANYTHING, STARTED TO DO ANYTHING, OR PREPARED TO DO ANYTHING TO END YOUR LIFE?: NO

## 2024-08-24 ASSESSMENT — ACTIVITIES OF DAILY LIVING (ADL): ADLS_ACUITY_SCORE: 35

## 2024-08-25 NOTE — ED TRIAGE NOTES
Chipped tooth L upper mouth and pt states scheduled for root canal but issues with insurance and pain is severe worsening over 48 hours.      Triage Assessment (Adult)       Row Name 08/24/24 9403          Triage Assessment    Airway WDL WDL        Respiratory WDL    Respiratory WDL WDL        Cardiac WDL    Cardiac WDL WDL

## 2024-08-25 NOTE — TELEPHONE ENCOUNTER
"Patient calling. She is having severe left mouth pain. She was supposed to have a root canal, and it was deferred due to insurance problems. No swelling. The nerve is exposed.     The pain has gotten too severe over the past 48 hours. She is trying Oragel, acetaminophen, ibuprofen and a mouthwash. She has been using ice on it. All of the interventions she has tried no longer manage her pain. She is beginning to shake due to the pain.     She is currently taking clindamycin, tomorrow is the last day of the prescription, and her pain continues to increase, interfering with sleep.    Care advice given for patient to be seen within 4 hours. She will go to the emergency department at [facility] [place] in [place].     Coleen Chase RN  [facility]  August 24, 2024, 8:35 PM    Reason for Disposition   Tooth is painful or swelling around a tooth   [1] SEVERE pain (e.g., excruciating, unable to eat, unable to do any normal activities) AND [2] not improved 2 hours after pain medicine    Additional Information   Negative: SEVERE difficulty breathing (e.g., struggling for each breath, speaks in single words, stridor)   Negative: Sounds like a life-threatening emergency to the triager   Negative: Followed a tooth (or teeth) injury   Negative: Followed a mouth injury   Negative: Throat is painful   Negative: Canker sore suspected (i.e., aphthous ulcer) in the mouth   Negative: Cold sore suspected (i.e., fever blister sore on the outer lip)   Negative: Shock suspected (e.g., cold/pale/clammy skin, too weak to stand, low BP, rapid pulse)   Negative: [1] Similar pain previously AND [2] it was from \"heart attack\"   Negative: [1] Similar pain previously AND [2] it was from \"angina\" AND [3] not relieved by nitroglycerin   Negative: Sounds like a life-threatening emergency to the triager   Negative: Chest pain   Negative: Toothache followed tooth injury   Negative: Toothache or mouth pain after tooth extraction   " Negative: Canker sore (i.e., aphthous ulcer)   Negative: Tongue is very swollen and tender   Negative: [1] Face is swollen AND [2] fever   Negative: Patient sounds very sick or weak to the triager    Protocols used: Mouth Pain-A-AH, Toothache-A-AH

## 2024-08-25 NOTE — DISCHARGE INSTRUCTIONS
Many of these clinics offer a sliding fee option for patients that qualify, and see patients on a walk-in or same day basis. Please call each clinic directly. As services, hours, fees and policies vary greatly.          [facility], \[facility] [place] [facility]\""  [phone]  Sees no insurance  [facility], [place]  [phone]  Preventive services only  [facility] (mult loc) [phone]  [facility]    ([facility]), \[facility] [place] [facility]\""  [phone]  [facility], [place]       [phone]  Preventive services only  [facility]  [phone]  Accepts MA & sees no ins  [facility],      Accepts MA & sees no ins   [place]   [phone]; [phone]  [facility] Care, [facility]   Accepts MA & sees no ins       [phone]  [facility], \[facility] [place] [facility]\""  [phone]   Accepts MA emergencies  [facility] [place] [phone]  [facility],     Accepts [place]   [place]   [phone]    [facility] [phone]  Accepts MA & sees no ins   [facility]   Dental Clinic    [phone]  [facility], \[facility] [place] [facility]\""  [phone]   [facility] [phone]  [facility]  Preventive services only   [place]   [phone]  [facility] [phone]  [facility], [place]  [phone]  Same day [facility] [phone]  Same day [facility],      Same day [facility]   [phone]    [facility], \[facility] [place] [facility]\"" [phone]  Free [facility], walk-in only  [facility] (multiple locations) [phone]      [facility] , \[facility] [place] [facility]\"" [phone]    [facility] [phone]  Free clinic, walk-in only  [facility]  [phone]  [facility] [phone] (adults)       [phone]  (children)  [facility] [phone]    Also, referral service for low cost dental and healthcare: [phone]  And [phone]

## 2024-08-26 ENCOUNTER — PATIENT OUTREACH (OUTPATIENT)
Dept: FAMILY MEDICINE | Facility: CLINIC | Age: 29
End: 2024-08-26
Payer: MEDICAID

## 2024-08-26 ENCOUNTER — TELEPHONE (OUTPATIENT)
Dept: FAMILY MEDICINE | Facility: CLINIC | Age: 29
End: 2024-08-26
Payer: MEDICAID

## 2024-08-26 NOTE — TELEPHONE ENCOUNTER
Needs to be seen for consideration of refill. Can be virtual.    Thanks,  Alicia Maguire, DNP, APRN-CNP

## 2024-08-26 NOTE — TELEPHONE ENCOUNTER
Patient calling. States she has had a chipped tooth for a little over a month and states that she needs to have a root canal done however she has been waiting for her new insurance to start.     Over the weekend, patient was seen in ED for tooth pain. Patient states that she was managing the pain with tylenol, ibuprofen, orgel and ice packs, but she was no longer able to keep the pain at ease. She was prescribed Oxycodone 5Mg in the ED and was told to see a dentist on Monday. Patient is not able to get into dentist until 9/1. Patient states she has not been able to eat or sleep because of pain. States she has been crying and shaking due to the pain.     Patient is asking if Alicia Cartagena would be able to refill Oxycodone until patient can see dentist on 9/1.       Preferred Pharmacy:     [facility] [address] [place] [address] [address] [facility]  [address] [facility] [address]  Phone: [phone] Fax: [phone]    Could we send this information to you in [facility] or would you prefer to receive a phone call?:   Patient would prefer a phone call   Okay to leave a detailed message?: Yes at Home number on file [phone] (home)

## 2024-08-27 ENCOUNTER — VIRTUAL VISIT (OUTPATIENT)
Dept: FAMILY MEDICINE | Facility: CLINIC | Age: 29
End: 2024-08-27
Payer: MEDICAID

## 2024-08-27 ENCOUNTER — TELEPHONE (OUTPATIENT)
Dept: FAMILY MEDICINE | Facility: CLINIC | Age: 29
End: 2024-08-27

## 2024-08-27 DIAGNOSIS — K08.89 PAIN, DENTAL: Primary | ICD-10-CM

## 2024-08-27 DIAGNOSIS — K08.89 PAIN, DENTAL: ICD-10-CM

## 2024-08-27 PROCEDURE — 99213 OFFICE O/P EST LOW 20 MIN: CPT | Mod: 95 | Performed by: NURSE PRACTITIONER

## 2024-08-27 RX ORDER — OXYCODONE AND ACETAMINOPHEN 5; 325 MG/1; MG/1
1-2 TABLET ORAL EVERY 6 HOURS PRN
Qty: 24 TABLET | Refills: 0 | Status: SHIPPED | OUTPATIENT
Start: 2024-08-27 | End: 2024-08-27

## 2024-08-27 RX ORDER — OXYCODONE AND ACETAMINOPHEN 5; 325 MG/1; MG/1
1-2 TABLET ORAL EVERY 6 HOURS PRN
Qty: 24 TABLET | Refills: 0 | Status: SHIPPED | OUTPATIENT
Start: 2024-08-27

## 2024-08-27 NOTE — PROGRESS NOTES
Liat is a 29 year old who is being evaluated via a billable video visit.    How would you like to obtain your AVS? MyChart  If the video visit is dropped, the invitation should be resent by: Text to cell phone: [phone]  Will anyone else be joining your video visit? No      Assessment & Plan     Pain, dental  Ongoing upper left tooth pain for approximately 1 month or more.  Having difficulty getting into dentist due to insurance mixup.  Had a nerve block done in the ER which did not help.  Unable to eat, prescribed oxycodone which did help some and also taking ibuprofen and Tylenol, using ice and heat as well as mouth rinses.  Has appointment on 9/8 for root canal.  Will trial Percocet for breakthrough pain as needed, advised to take ibuprofen 600 to 800 mg every 6-8 hours throughout the day and okay to take 1 extra strength Tylenol with Percocet.  Advised not to exceed 4000 mg of Tylenol in a 24-hour period.  Continue all conservative management as above and follow-up with dentist as  - oxyCODONE-acetaminophen (PERCOCET) 5-325 MG tablet; Take 1-2 tablets by mouth every 6 hours as needed for moderate to severe pain.        MED REC REQUIRED  Post Medication Reconciliation Status: discharge medications reconciled and changed, per note/orders    See Patient Instructions    Subjective   Liat is a 29 year old, presenting for the following health issues:  ER F/U        8/27/2024    10:20 AM   Additional Questions   Roomed by Adilene LUDWIG/GAL Followup:    Facility:  [facility]   Date of visit: 08/24/2024   Reason for visit: Tooth pain ~ left upper back molar   Current Status: pain scale 7/10   Did a nerve block in the ER ~ lasted only 10 minutes  Can't get into dental clinic until 09/09   Went in and saw dentist and needed an emergency root canal; had to see a specialist and when went to schedule told her insurance was changed. Had a mix up. Won't back pay.   Was able to make an appointment     Cannot eat,  hasn't since Friday   Doing heat, ice, ibuprofen, tylenol, orajel, oral mouth wash  Was taking oxycodone every 6 hours    Review of Systems  Constitutional, HEENT, cardiovascular, pulmonary, gi and gu systems are negative, except as otherwise noted.      Objective           Vitals:  No vitals were obtained today due to virtual visit.    Physical Exam   GENERAL: alert, no distress, and fatigued  EYES: Eyes grossly normal to inspection.  No discharge or erythema, or obvious scleral/conjunctival abnormalities.  RESP: No audible wheeze, cough, or visible cyanosis.    SKIN: Visible skin clear. No significant rash, abnormal pigmentation or lesions.  NEURO: Cranial nerves grossly intact.  Mentation and speech appropriate for age.  PSYCH: Appropriate affect, tone, and pace of words    Diagnostic Test Results:  Labs reviewed in Epic  none        Video-Visit Details    Type of service:  Video Visit   Originating Location (pt. Location): Home    Distant Location (provider location):  Off-site  Platform used for Video Visit: [facility]  Signed Electronically by: Ting Caro DNP,, ARIEL CNP      Chart documentation with Dragon Voice recognition Software. Although reviewed after completion, some words and grammatical errors may remain.

## 2024-08-27 NOTE — TELEPHONE ENCOUNTER
"[facility] pharmacy calling. Needing RX for Percocet 5-325 updated.     Directions need to include \"MAX 6 Tablets per day\"     Please update and send to pharmacy. Patient is waiting in store.     Preferred Pharmacy:     [facility] [address] [place] [address] [address] [facility]  [address] [facility] [address]  Phone: [phone] Fax: [phone]      "

## 2024-08-27 NOTE — PATIENT INSTRUCTIONS
Pain, dental  Ongoing upper left tooth pain for approximately 1 month or more.  Having difficulty getting into dentist due to insurance mixup.  Had a nerve block done in the ER which did not help.  Unable to eat, prescribed oxycodone which did help some and also taking ibuprofen and Tylenol, using ice and heat as well as mouth rinses.  Has appointment on 9/8 for root canal.  Will trial Percocet for breakthrough pain as needed, advised to take ibuprofen 600 to 800 mg every 6-8 hours throughout the day and okay to take 1 extra strength Tylenol with Percocet.  Advised not to exceed 4000 mg of Tylenol in a 24-hour period.  Continue all conservative management as above and follow-up with dentist as  - oxyCODONE-acetaminophen (PERCOCET) 5-325 MG tablet; Take 1-2 tablets by mouth every 6 hours as needed for moderate to severe pain.

## 2024-11-19 NOTE — DISCHARGE INSTRUCTIONS
Emergency Medicine Note  HPI   HISTORY OF PRESENT ILLNESS     The patient is an 89-year-old female with past medical history including atrial fibrillation, CHF, hypertension type 2 diabetes, and macular degeneration who presents today for evaluation of fatigue and generalized weakness since last evening.  The patient and her daughter report that she had a 6 pound weight gain over the weekend a few days ago.  As instructed she took an extra dose of Lasix yesterday.  She states last evening she developed significant fatigue.  Patient reports chronic orthopnea.  She denies any significant chest discomfort or shortness of breath at this time.  Patient did have some nausea overnight and 1 episode of vomiting and diarrhea but that has since resolved. She denies any abdominal pain, cough, or fevers.       History provided by:  Patient (daughter)   used: No    Weakness - Generalized  Associated symptoms: nausea, shortness of breath and vomiting    Associated symptoms: no abdominal pain, no arthralgias, no chest pain, no cough, no dysuria, no fever and no seizures          Patient History   PAST HISTORY     Reviewed from Nursing Triage:  Tobacco  Allergies  Meds  Problems  Med Hx  Surg Hx  Fam Hx  Soc   Hx      Past Medical History:   Diagnosis Date    A-fib (CMS/HCC)     Accelerated hypertension     CHF (congestive heart failure) (CMS/HCC)     Hearing loss     Macular degeneration     Thyroid nodule     Type 2 diabetes mellitus (CMS/HCC)        Past Surgical History   Procedure Laterality Date    Appendectomy      Hip surgery Left     Hysterectomy      Joint replacement      Knee arthroplasty         No family history on file.    Social History     Tobacco Use    Smoking status: Never    Smokeless tobacco: Never   Substance Use Topics    Alcohol use: Never    Drug use: Never         Review of Systems   REVIEW OF SYSTEMS     Review of Systems   Constitutional:  Positive for fatigue. Negative for  Many of these clinics offer a sliding fee option for patients that qualify, and see patients on a walk-in or same day basis. Please call each clinic directly. As services, hours, fees and policies vary greatly.          [facility], [facility]  [phone]  Sees no insurance  [facility], [place]  [phone]  Preventive services only  [facility] (mult loc) [phone]  [facility]    ([facility]), [facility]  [phone]  [facility], [place]       [phone]  Preventive services only  [facility]  [phone]  Accepts MA & sees no ins  [facility],      Accepts MA & sees no ins   [place]   [phone]; [phone]  [facility], [facility]   Accepts MA & sees no ins       [phone]  [facility], [facility]  [phone]   Accepts MA emergencies  [facility], [place] [phone]  [facility],     Accepts MA   [place]   [phone]    [facility] [phone]  Accepts MA & sees no ins   [facility]    [phone]  [facility], [facility]  [phone]   [facility] [phone]  [facility]  Preventive services only   [place]   [phone]  [facility]) [phone]  [facility], [place]  [phone]  Same day [facility] [phone]  Same day [facility],      Same day [facility]   [phone]    [facility], [facility] [phone]  [facility], walk-in only  [facility] (multiple locations) [phone]      [facility] , [facility] [phone]    [facility] [phone]  Free clinic, walk-in only  [place] [facility]  [phone]  [facility] [phone] (adults)       [phone]  (children)  [facility] [phone]    Also, referral service for low cost dental and healthcare: [phone]  And [phone]         chills and fever.   HENT:  Negative for ear pain and sore throat.    Eyes:  Negative for pain and visual disturbance.   Respiratory:  Positive for shortness of breath. Negative for cough.    Cardiovascular:  Negative for chest pain and palpitations.   Gastrointestinal:  Positive for nausea and vomiting. Negative for abdominal pain.   Endocrine: Negative for polyuria.   Genitourinary:  Negative for dysuria and hematuria.   Musculoskeletal:  Negative for arthralgias and back pain.   Skin:  Negative for color change and rash.   Neurological:  Negative for seizures and syncope.   Psychiatric/Behavioral:  Negative for agitation and confusion.          VITALS     ED Vitals      Date/Time Temp Pulse Resp BP SpO2 [facility]   11/19/24 1810 -- -- 16 146/72 93 % MJR   11/19/24 1730 -- 80 -- 146/72 93 % MJR   11/19/24 1538 -- 67 18 164/79 98 % FT   11/19/24 1415 -- 80 -- 128/62 88 % MJR   11/19/24 1309 36.7 °C (98 °F) 83 17 134/61 94 % KLM   11/19/24 1223 -- -- -- -- 89 % MJR   11/19/24 1221 -- 75 18 117/56 93 % FT   11/19/24 1100 -- 78 -- 127/62 96 % MJR   11/19/24 1005 37.1 °C (98.8 °F) 87 16 152/67 91 % MJR          Pulse Ox %: 88 % (11/19/24 2046)  Pulse Ox Interpretation: Low (11/19/24 2046)  Heart Rate: 86 (11/19/24 2046)  Rhythm Strip Interpretation: Normal Sinus Rhythm (11/19/24 2046)     Physical Exam   PHYSICAL EXAM     Physical Exam  Vitals and nursing note reviewed.   Constitutional:       General: She is not in acute distress.     Appearance: She is well-developed. She is ill-appearing. She is not toxic-appearing or diaphoretic.   HENT:      Head: Normocephalic and atraumatic.      Nose: Nose normal.   Eyes:      Extraocular Movements: Extraocular movements intact.      Conjunctiva/sclera: Conjunctivae normal.      Pupils: Pupils are equal, round, and reactive to light.   Neck:      Trachea: Trachea normal.   Cardiovascular:      Rate and Rhythm: Normal rate and regular rhythm.      Heart sounds: S1 normal and S2 normal.  Murmur heard.   Pulmonary:      Effort: Pulmonary effort is normal. No respiratory distress.      Breath sounds: Rales present. No wheezing.   Chest:      Chest wall: No tenderness.   Abdominal:      General: Bowel sounds are normal. There is no distension.      Palpations: Abdomen is soft. There is no mass.      Tenderness: There is no abdominal tenderness. There is no guarding or rebound.   Musculoskeletal:      Cervical back: Neck supple.      Right lower leg: Edema (trace) present.      Left lower leg: Edema (trace) present.   Skin:     General: Skin is warm and dry.      Coloration: Skin is not pale.      Findings: No rash.   Neurological:      General: No focal deficit present.      Mental Status: She is alert and oriented to person, place, and time.      Sensory: No sensory deficit.   Psychiatric:         Speech: Speech normal.         Behavior: Behavior normal. Behavior is cooperative.         Thought Content: Thought content normal.         Judgment: Judgment normal.           PROCEDURES     Procedures     DATA     Results       Procedure Component Value Units Date/Time    SARS-COV-2 (COVID-19)/ FLU A/B, AND RSV, PCR Nasopharynx [[id number]]  (Normal) Collected: 11/19/24 1144    Specimen: Nasopharyngeal Swab from Nasopharynx Updated: 11/19/24 1229     SARS-CoV-2 (COVID-19) Negative     Influenza A Negative     Influenza B Negative     Respiratory Syncytial Virus Negative    Narrative:      Testing performed using real-time PCR for detection of COVID-19. EUA approved validation studies performed on site.     BNP (B-type natriuretic peptide) [[id number]]  (Abnormal) Collected: 11/19/24 1010    Specimen: Blood, Venous Updated: 11/19/24 1114      pg/mL     Comprehensive metabolic panel [[id number]]  (Abnormal) Collected: 11/19/24 1010    Specimen: Blood, Venous Updated: 11/19/24 1046     Sodium 135 mEQ/L      Potassium 3.8 mEQ/L      Comment: Results obtained on plasma. Plasma Potassium values may be up to  0.4 mEQ/L less than serum values. The differences may be greater for patients with high platelet or white cell counts.        Chloride 98 mEQ/L      CO2 30 mEQ/L      BUN 23 mg/dL      Creatinine 0.8 mg/dL      Glucose 144 mg/dL      Calcium 8.4 mg/dL      AST (SGOT) 20 IU/L      ALT (SGPT) 20 IU/L      Alkaline Phosphatase 37 IU/L      Total Protein 6.0 g/dL      Comment: Test performed on plasma which typically contains approximately 0.4 g/dL more protein than serum.        Albumin 3.7 g/dL      Bilirubin, Total 0.7 mg/dL      eGFR >60.0 mL/min/1.73m*2      Comment: Calculation based on the Chronic Kidney Disease Epidemiology Collaboration (CKD-EPI) equation refit without adjustment for race.        Anion Gap 7 mEQ/L     HS Troponin I (with 2 hour reflex) [[id number]]  (Normal) Collected: 11/19/24 1010    Specimen: Blood, Venous Updated: 11/19/24 1045     High Sens Troponin I 9.3 pg/mL     CBC and differential [[id number]]  (Abnormal) Collected: 11/19/24 1010    Specimen: Blood, Venous Updated: 11/19/24 1027     WBC 6.18 K/uL      RBC 3.92 M/uL      Hemoglobin 12.0 g/dL      Hematocrit 36.6 %      MCV 93.4 fL      MCH 30.6 pg      MCHC 32.8 g/dL      RDW 13.8 %      Platelets 170 K/uL      MPV 9.6 fL      Differential Type Auto     nRBC 0.0 %      Immature Granulocytes 0.3 %      Neutrophils 88.9 %      Lymphocytes 4.9 %      Monocytes 4.7 %      Eosinophils 1.0 %      Basophils 0.2 %      Immature Granulocytes, Absolute 0.02 K/uL      Neutrophils, Absolute 5.50 K/uL      Lymphocytes, Absolute 0.30 K/uL      Monocytes, Absolute 0.29 K/uL      Eosinophils, Absolute 0.06 K/uL      Basophils, Absolute 0.01 K/uL     [facility] Draw Panel [[id number]] Collected: 11/19/24 1010    Specimen: Blood, Venous Updated: 11/19/24 1013    Narrative:      The following orders were created for panel order [facility] Draw Panel.  Procedure                               Abnormality         Status                     ---------                                -----------         ------                     ROSIO GOLDSMITH[[id number]]                                  In process                   Please view results for these tests on the individual orders.    ROSIO GOLDSMITH [[id number]] Collected: 11/19/24 1010    Specimen: Blood, Venous Updated: 11/19/24 1013            Imaging Results              CT ANGIOGRAPHY CHEST PULMONARY EMBOLISM WITH IV CONTRAST (Final result)  Result time 11/19/24 13:50:28      Final result                   Impression:    IMPRESSION:    1.  No pulmonary embolism or acute infiltrate.  2.  Other findings as detailed above.               Narrative:    CLINICAL HISTORY: Dyspnea on exertion.    CT angiogram of the chest was performed after the administration of 100 cc of  Isovue-370 intravenous contrast.  Sagittal and coronal reformatted images were  obtained.  Coronal MIP reformatted images were obtained.  Automated exposure  control was used.    COMPARISON: Chest x-ray 11/19/2024    COMMENT:    There is no pulmonary embolism.  The main pulmonary artery is prominent which  can be seen with pulmonary arterial hypertension.  The heart is enlarged.  There  are atherosclerotic coronary artery calcifications.  The aorta is normal in  caliber with atherosclerotic calcifications.  There is an aberrant right  subclavian artery.    There is marked nodular enlargement of the thyroid with substernal extension,  not significantly changed.  This compresses the trachea and esophagus.    An enlarged 1.5 cm short axis subcarinal lymph node has not significantly  changed, nonspecific.  There are no additional pathologically enlarged  mediastinal, hilar or axillary lymph nodes by size criteria    The lungs and central airways are clear.    Limited evaluation of the upper abdomen demonstrates a splenic hypodensity, too  small to characterize, unchanged.    There is a partially imaged intrathecal lead.  There is thoracic  dextroscoliosis.                                        X-RAY CHEST 2 VIEWS (Final result)  Result time 11/19/24 11:47:01      Final result                   Impression:    IMPRESSION:    No acute pulmonary disease seen.               Narrative:      CLINICAL HISTORY: weakness   .    Comparison: Chest x-ray and CT 4/3/2024.    COMMENT: 2 view chest x-ray was obtained.    Lungs: No consolidation or edema.  Pleura: No pneumothorax or effusion.  Cardiomediastinal/hilar silhouettes: Unremarkable.  Bones: No acute abnormality seen. A lead is again noted extending into the upper  thoracic spinal canal region.                                       X-RAY THORACIC SPINE 2 VIEWS (Final result)  Result time 11/19/24 11:49:38      Final result                   Impression:    IMPRESSION: See comment.               Narrative:      CLINICAL HISTORY: atraumatic back pain   .    Comparison: Chest x-ray and CT 4/3/2024.    COMMENT: Three views of the thoracic spine were obtained. Bony demineralization  limits evaluation. Stable mild degenerative changes of the thoracic spine noted  with small multilevel marginal osteophytes and mild endplate degenerative  changes. No thoracic vertebral body compression fracture is identified. There is  stable chronic compression deformity of the L1 vertebral body. Leads are again  visualized extending into the thoracic spinal canal region. The battery pack is  partially imaged in the region of the right hemiabdomen. Vascular calcifications  of the aortic arch noted.                                      ECG 12 lead   Independent Interpretation by ED Provider   Rhythm: [nsr]  Rate: 86  P waves: [normal interval]  QRS: [normal QRS]  Axis: [normal]  ST Segments: [no obvious ST elevation or ischemia]  The study has been interpreted contemporaneously by maritza GIRARD            Scoring tools                                  ED Course & MDM   MDM / ED COURSE / CLINICAL IMPRESSION / DISPO     Medical Decision Making  89-year-old female presented for  evaluation of fatigue.  She was noted to be mildly hypoxic with room air saturations intermittently around 88 to 89%.  This is not typical for this patient.  EKG nonischemic, troponins flat x 2, chest x-ray without acute findings.  CTA of the chest was performed for further evaluation which did not show any evidence of pulmonary embolism but did possible pulmonary artery hypertension.  I am recommending that the patient be hospitalized for further evaluation and consideration of echocardiogram regarding this finding in relation to acute hypoxia and fatigue.    Problems Addressed:  Hypoxia: acute illness or injury    Amount and/or Complexity of Data Reviewed  Independent Historian:      Details: daughter  External Data Reviewed: labs and radiology.  Labs: ordered. Decision-making details documented in ED Course.  Radiology: ordered. Decision-making details documented in ED Course.  ECG/medicine tests: ordered and independent interpretation performed.    Risk  Prescription drug management.  Decision regarding hospitalization.        ED Course as of 11/19/24 2052 Tue Nov 19, 2024   1203 X-RAY THORACIC SPINE 2 VIEWS  COMMENT: Three views of the thoracic spine were obtained. Bony demineralization  limits evaluation. Stable mild degenerative changes of the thoracic spine noted  with small multilevel marginal osteophytes and mild endplate degenerative  changes. No thoracic vertebral body compression fracture is identified. There is  stable chronic compression deformity of the L1 vertebral body. Leads are again  visualized extending into the thoracic spinal canal region. The battery pack is  partially imaged in the region of the right hemiabdomen. Vascular calcifications  of the aortic arch noted.   [SL]   1204 X-RAY CHEST 2 VIEWS  IMPRESSION:     No acute pulmonary disease seen.           [SL]   1242 SpO2(!): 89 % [SL]   1353 High Sens Troponin I: 8.6  Delta <5 [SL]   1354 CT ANGIOGRAPHY CHEST PULMONARY EMBOLISM WITH IV  CONTRAST  IMPRESSION:     1.  No pulmonary embolism or acute infiltrate.  2.  Other findings as detailed above.   [SL]      ED Course User Index  [SL] Radha Tomas PA C     Clinical Impression      Hypoxia     _________________       ED Disposition   Admit / Observation                       Radha Tomas PA C  11/19/24 2052

## 2025-01-14 ENCOUNTER — VIRTUAL VISIT (OUTPATIENT)
Dept: FAMILY MEDICINE | Facility: CLINIC | Age: 30
End: 2025-01-14
Payer: COMMERCIAL

## 2025-01-14 DIAGNOSIS — Z82.49 FAMILY HISTORY OF HEART DISEASE: ICD-10-CM

## 2025-01-14 DIAGNOSIS — R63.5 ABNORMAL WEIGHT GAIN: ICD-10-CM

## 2025-01-14 DIAGNOSIS — L74.9 SWEATING ABNORMALITY: ICD-10-CM

## 2025-01-14 DIAGNOSIS — Z83.49 FAMILY HISTORY OF THYROID DISORDER: ICD-10-CM

## 2025-01-14 DIAGNOSIS — R06.83 SNORING: ICD-10-CM

## 2025-01-14 DIAGNOSIS — R53.83 OTHER FATIGUE: ICD-10-CM

## 2025-01-14 DIAGNOSIS — R51.9 CHRONIC NONINTRACTABLE HEADACHE, UNSPECIFIED HEADACHE TYPE: Primary | ICD-10-CM

## 2025-01-14 DIAGNOSIS — G89.29 CHRONIC NONINTRACTABLE HEADACHE, UNSPECIFIED HEADACHE TYPE: Primary | ICD-10-CM

## 2025-01-14 DIAGNOSIS — R03.0 ELEVATED BLOOD PRESSURE READING WITHOUT DIAGNOSIS OF HYPERTENSION: ICD-10-CM

## 2025-01-14 PROCEDURE — 98006 SYNCH AUDIO-VIDEO EST MOD 30: CPT | Performed by: NURSE PRACTITIONER

## 2025-01-14 ASSESSMENT — ENCOUNTER SYMPTOMS: HEADACHES: 1

## 2025-01-14 NOTE — PROGRESS NOTES
Liat is a 29 year old who is being evaluated via a billable video visit.    How would you like to obtain your AVS? MyChart  If the video visit is dropped, the invitation should be resent by: Text to cell phone: [phone]  Will anyone else be joining your video visit? No      Assessment & Plan     Chronic nonintractable headache, unspecified headache type  Ongoing daily headache for going on 1 month.  Goes to sleep and wakes up with.  Patient has significantly decreased pop intake, has been eating better and drinking more fluids.  Started exercising as well.  Patient also reports abnormal weight gain and significant sweating also.  Is up approximately 15 pounds in the last 2 weeks without any significant changes.  Sweats with little activity.  Has not had any medication changes or any other significant stress.  Feels she sleeps fairly well, however her  does note she does snore, unsure about any apneic spells.  Does have obstructive sleep apnea history in the family.  Did discuss headaches can be multifactorial.  Would recommend repeating lab work to rule out any underlying deficiencies.  Possibility of sleep apnea which could be causing not only headache, however other symptoms below as well.  Would recommend sleep evaluation/study.  Referral placed for patient to schedule.  Advised patient to keep a headache diary, be sure she is still getting in plenty of fluids, exercising as usual and eating healthy.  Will be in touch with lab results and further recommendations.  - Adult Sleep Eval & Management  Referral; Future  - TSH with free T4 reflex; Future  - CBC with platelets; Future  - Ferritin; Future  - Comprehensive metabolic panel (BMP + Alb, Alk Phos, ALT, AST, Total. Bili, TP); Future  - CRP inflammation; Future  - Vitamin D Deficiency; Future    Abnormal weight gain  Sweating abnormality  Patient has had abnormal weight gain as above as well as abnormal sweating.  Again, will obtain lab  work to rule out any underlying deficiencies or causes and also sleep evaluation.  - Adult Sleep Eval & Management  Referral; Future  - TSH with free T4 reflex; Future  - CBC with platelets; Future  - Ferritin; Future  - Comprehensive metabolic panel (BMP + Alb, Alk Phos, ALT, AST, Total. Bili, TP); Future  - CRP inflammation; Future    Family history of thyroid disorder  Strong family history of thyroid disorders.  Given patient's symptoms above, recommend repeating thyroid hormone.  Last 1 was normal, prior to that was slightly low with normal T4.  Patient to schedule lab only appointment.  - TSH with free T4 reflex; Future    Other fatigue  Patient having increased fatigue accompanied by symptoms as above.  Patient has family history of early heart disease, mother passing at age 36.  Recommend repeating echocardiogram as well as lab work.  Study as above.  - Adult Sleep Eval & Management  Referral; Future  - Echocardiogram Complete; Future  - TSH with free T4 reflex; Future  - CBC with platelets; Future  - Ferritin; Future  - Comprehensive metabolic panel (BMP + Alb, Alk Phos, ALT, AST, Total. Bili, TP); Future  - CRP inflammation; Future  - Vitamin D Deficiency; Future    Snoring  Patient significant other notes patient snoring, recommend ruling out sleep apnea as cause of mild symptoms.  Referral placed.  - Adult Sleep Eval & Management  Referral; Future  - CRP inflammation; Future    Elevated blood pressure reading without diagnosis of hypertension  Patient has had elevated blood pressures on and off, mostly related to acute care visits.  However, given family history and elevated blood pressures as well as, would recommend patient schedule nurse only blood pressure check to continue monitoring blood pressure as a cause of symptoms.  - Adult Sleep Eval & Management  Referral; Future  - Echocardiogram Complete; Future  - Comprehensive metabolic panel (BMP + Alb, Alk Phos,  "ALT, AST, Total. Bili, TP); Future    Family history of heart disease  Strong family history of heart disease, mother passed away at age 36 due to heart issues.  Per patient, autopsy report showed heart enlargement and \"half of her heart not working\".  Patient had initial echocardiogram in 2013 which was normal and has not had any repeats.  Given symptoms above, recommend rechecking.  Orders placed.  - Echocardiogram Complete; Future  - Comprehensive metabolic panel (BMP + Alb, Alk Phos, ALT, AST, Total. Bili, TP); Future          BMI  Estimated body mass index is 46.87 kg/m  as calculated from the following:    Height as of 8/24/24: 1.524 m (5').    Weight as of 8/24/24: 108.9 kg (240 lb).   Weight management plan: Discussed healthy diet and exercise guidelines      See Patient Instructions    Fatuma Josue is a 29 year old, presenting for the following health issues:  Headache, Weight Problem, and Excessive Sweating        1/14/2025    10:15 AM   Additional Questions   Roomed by Lynn COHEN([facility])       Video Start Time: 1:42 PM    Headache          Weight Problem  Wt Readings from Last 4 Encounters:   08/24/24 108.9 kg (240 lb)   02/14/24 108.9 kg (240 lb)   08/03/23 103.3 kg (227 lb 12.8 oz)   07/24/23 100.7 kg (222 lb)       Concern - Headaches, sweating   Onset: A month or so   Description: For the past 3 weeks pt is having headaches constantly   Intensity: moderate  Progression of Symptoms:  constant  Accompanying Signs & Symptoms: see above -   Previous history of similar problem: na  Precipitating factors:        Worsened by: na  Alleviating factors:        Improved by: na  Therapies tried and outcome: tylenol and ibuprofen depending on the day it doesn't work     Waking up and going to bed with them   Quit drinking pop, drinking a lot more pop. Has improved her diet and exercising.   Recent illness about a month and half ago had stomach virus for 48 hours   Drinking fluids well  Sleep feels is good, takes " a while until she wakes up, sometimes doesn't feel well rested  Does snore some   Stress levels - normal every day stress   Gained 15 lbs in just 2 weeks even after cutting everything out. Not noting any specific swelling.   Every 1/2 hour seems to sweat no matter what she does   No shortness of breath, chest pain, pressure or dizziness   Not taking any new medications     History of hypertension in the family   Mom passed 11 years ago from an enlarged heart and 1/2 wasn't working ~ was 36 y.o.       Concern - Sweating  - PT declined talk with the CMA today - Verbalize that she never had to have someone calling her early for intake - CMA verbalize that she was just doing her job and try to help the provider - PT continue being rude - so CMA told the pt that the provider will call her and do the intake -           Review of Systems  Constitutional, neuro, ENT, endocrine, pulmonary, cardiac, gastrointestinal, genitourinary, musculoskeletal, integument and psychiatric systems are negative, except as otherwise noted.      Objective           Vitals:  No vitals were obtained today due to virtual visit.    Physical Exam   GENERAL: alert, no distress, and fatigued  EYES: Eyes grossly normal to inspection.  No discharge or erythema, or obvious scleral/conjunctival abnormalities.  RESP: No audible wheeze, cough, or visible cyanosis.    SKIN: Visible skin clear. No significant rash, abnormal pigmentation or lesions.  NEURO: Cranial nerves grossly intact.  Mentation and speech appropriate for age.  PSYCH: Appropriate affect, tone, and pace of words    Diagnostic Test Results:  Labs reviewed in Epic  Pending      Video-Visit Details    Type of service:  Video Visit   Video End Time:2:18 PM  Originating Location (pt. Location): Home    Distant Location (provider location):  Off-site  Platform used for Video Visit: Antonino  Signed Electronically by: Alicia Maguire DNP      Chart documentation with Dragon Voice recognition  Software. Although reviewed after completion, some words and grammatical errors may remain.

## 2025-01-14 NOTE — PATIENT INSTRUCTIONS
Chronic nonintractable headache, unspecified headache type  Ongoing daily headache for going on 1 month.  Goes to sleep and wakes up with.  Patient has significantly decreased pop intake, has been eating better and drinking more fluids.  Started exercising as well.  Patient also reports abnormal weight gain and significant sweating also.  Is up approximately 15 pounds in the last 2 weeks without any significant changes.  Sweats with little activity.  Has not had any medication changes or any other significant stress.  Feels she sleeps fairly well, however her  does note she does snore, unsure about any apneic spells.  Does have obstructive sleep apnea history in the family.  Did discuss headaches can be multifactorial.  Would recommend repeating lab work to rule out any underlying deficiencies.  Possibility of sleep apnea which could be causing not only headache, however other symptoms below as well.  Would recommend sleep evaluation/study.  Referral placed for patient to schedule.  Advised patient to keep a headache diary, be sure she is still getting in plenty of fluids, exercising as usual and eating healthy.  Will be in touch with lab results and further recommendations.  - Adult Sleep Eval & Management  Referral; Future  - TSH with free T4 reflex; Future  - CBC with platelets; Future  - Ferritin; Future  - Comprehensive metabolic panel (BMP + Alb, Alk Phos, ALT, AST, Total. Bili, TP); Future  - CRP inflammation; Future  - Vitamin D Deficiency; Future    Abnormal weight gain  Sweating abnormality  Patient has had abnormal weight gain as above as well as abnormal sweating.  Again, will obtain lab work to rule out any underlying deficiencies or causes and also sleep evaluation.  - Adult Sleep Eval & Management  Referral; Future  - TSH with free T4 reflex; Future  - CBC with platelets; Future  - Ferritin; Future  - Comprehensive metabolic panel (BMP + Alb, Alk Phos, ALT, AST, Total. Bili,  "TP); Future  - CRP inflammation; Future    Family history of thyroid disorder  Strong family history of thyroid disorders.  Given patient's symptoms above, recommend repeating thyroid hormone.  Last 1 was normal, prior to that was slightly low with normal T4.  Patient to schedule lab only appointment.  - TSH with free T4 reflex; Future    Other fatigue  Patient having increased fatigue accompanied by symptoms as above.  Patient has family history of early heart disease, mother passing at age 36.  Recommend repeating echocardiogram as well as lab work.  Study as above.  - Adult Sleep Eval & Management  Referral; Future  - Echocardiogram Complete; Future  - TSH with free T4 reflex; Future  - CBC with platelets; Future  - Ferritin; Future  - Comprehensive metabolic panel (BMP + Alb, Alk Phos, ALT, AST, Total. Bili, TP); Future  - CRP inflammation; Future  - Vitamin D Deficiency; Future    Snoring  Patient significant other notes patient snoring, recommend ruling out sleep apnea as cause of mild symptoms.  Referral placed.  - Adult Sleep Eval & Management  Referral; Future  - CRP inflammation; Future    Elevated blood pressure reading without diagnosis of hypertension  Patient has had elevated blood pressures on and off, mostly related to acute care visits.  However, given family history and elevated blood pressures as well as, would recommend patient schedule nurse only blood pressure check to continue monitoring blood pressure as a cause of symptoms.  - Adult Sleep Eval & Management  Referral; Future  - Echocardiogram Complete; Future  - Comprehensive metabolic panel (BMP + Alb, Alk Phos, ALT, AST, Total. Bili, TP); Future    Family history of heart disease  Strong family history of heart disease, mother passed away at age 36 due to heart issues.  Per patient, autopsy report showed heart enlargement and \"half of her heart not working\".  Patient had initial echocardiogram in 2013 which was " normal and has not had any repeats.  Given symptoms above, recommend rechecking.  Orders placed.  - Echocardiogram Complete; Future  - Comprehensive metabolic panel (BMP + Alb, Alk Phos, ALT, AST, Total. Bili, TP); Future

## 2025-02-10 ENCOUNTER — HOSPITAL ENCOUNTER (OUTPATIENT)
Dept: CARDIOLOGY | Facility: CLINIC | Age: 30
Discharge: HOME | End: 2025-02-10
Attending: NURSE PRACTITIONER | Admitting: NURSE PRACTITIONER
Payer: COMMERCIAL

## 2025-02-10 ENCOUNTER — TELEPHONE (OUTPATIENT)
Dept: FAMILY MEDICINE | Facility: CLINIC | Age: 30
End: 2025-02-10

## 2025-02-10 DIAGNOSIS — R03.0 ELEVATED BLOOD PRESSURE READING WITHOUT DIAGNOSIS OF HYPERTENSION: ICD-10-CM

## 2025-02-10 DIAGNOSIS — R53.83 OTHER FATIGUE: ICD-10-CM

## 2025-02-10 DIAGNOSIS — Z82.49 FAMILY HISTORY OF HEART DISEASE: ICD-10-CM

## 2025-02-10 LAB — LVEF ECHO: NORMAL

## 2025-02-10 PROCEDURE — 93306 TTE W/DOPPLER COMPLETE: CPT | Mod: 26 | Performed by: INTERNAL MEDICINE

## 2025-02-10 PROCEDURE — 93306 TTE W/DOPPLER COMPLETE: CPT

## 2025-02-10 NOTE — TELEPHONE ENCOUNTER
Test Results    Contacts       Contact Date/Time Type Contact Phone/Fax    02/10/2025 12:37 PM CST Phone (Incoming) Liat French (Self) [phone] (H)            Who ordered the test:  Alicia Cartagena    Type of test: Echo    Date of test:  2/10    Where was the test performed:  [place]     What are your questions/concerns?:  Patient would like her provider to call her back to discuss the results of her echo.     Could we send this information to you in [facility] or would you prefer to receive a phone call?:   Patient would prefer a phone call   Okay to leave a detailed message?: Yes at Cell number on file:    Telephone Information:   Mobile [phone]

## 2025-02-11 DIAGNOSIS — I51.7 LVH (LEFT VENTRICULAR HYPERTROPHY): Primary | ICD-10-CM

## 2025-03-24 ENCOUNTER — TELEPHONE (OUTPATIENT)
Dept: FAMILY MEDICINE | Facility: CLINIC | Age: 30
End: 2025-03-24

## 2025-03-24 ENCOUNTER — ALLIED HEALTH/NURSE VISIT (OUTPATIENT)
Dept: FAMILY MEDICINE | Facility: CLINIC | Age: 30
End: 2025-03-24
Payer: COMMERCIAL

## 2025-03-24 ENCOUNTER — E-VISIT (OUTPATIENT)
Dept: FAMILY MEDICINE | Facility: CLINIC | Age: 30
End: 2025-03-24
Payer: COMMERCIAL

## 2025-03-24 DIAGNOSIS — J02.9 SORE THROAT: Primary | ICD-10-CM

## 2025-03-24 DIAGNOSIS — J02.9 SORE THROAT: ICD-10-CM

## 2025-03-24 DIAGNOSIS — J02.0 ACUTE STREPTOCOCCAL PHARYNGITIS: Primary | ICD-10-CM

## 2025-03-24 LAB — DEPRECATED S PYO AG THROAT QL EIA: POSITIVE

## 2025-03-24 PROCEDURE — 87880 STREP A ASSAY W/OPTIC: CPT

## 2025-03-24 PROCEDURE — 99207 PR NO CHARGE NURSE ONLY: CPT

## 2025-03-24 RX ORDER — AZITHROMYCIN 250 MG/1
TABLET, FILM COATED ORAL
Qty: 6 TABLET | Refills: 0 | Status: SHIPPED | OUTPATIENT
Start: 2025-03-24 | End: 2025-03-29

## 2025-03-24 NOTE — TELEPHONE ENCOUNTER
Pt called in and said that she had an appointment today and then requested it to be an evisit due to her thinking she may have strep and she has all her kids with her. She still has not heard back and she would like a response if that could be possible.     Lorie Landin/ Patient

## 2025-03-24 NOTE — TELEPHONE ENCOUNTER
Patient calling to request medication from today's e-visit for positive strep with Alicia Cartagena.     Mother is waiting at [facility] to  medications ASAP.     Alicia Cartagena sent medication in while RN was on the phone with patient.   Disp Refills Start End JAISON   azithromycin (ZITHROMAX) 250 MG tablet 6 tablet 0 3/24/2025 3/29/2025 --   Sig - Route: Take 2 tablets (500 mg) by mouth daily for 1 day, THEN 1 tablet (250 mg) daily for 4 days. - Oral   Sent to pharmacy as: Azithromycin 250 MG Oral Tablet (ZITHROMAX)       Please call patient back to notify med was sent in.     Berkley Hendricks, RN on 3/24/2025 at 1:01 PM

## 2025-03-24 NOTE — PATIENT INSTRUCTIONS
Dear Liat French,    After reviewing your responses, I would like you to come in for a swab to make sure we treat you correctly. This swab is to evaluate you for possible Strep Throat, and should be scheduled for today or tomorrow. Please use the Schedule Now button in [facility] to schedule your swab. Otherwise, click this link to schedule a lab only appointment.    Lab appointments are not available at most locations on the weekends. If no Lab Only appointment is available, you should be seen in any of our convenient Urgent Care Centers for an in person visit, which can be found on our website here.    You will receive instructions with your results in [facility] once they are available.     If your symptoms worsen, you develop difficulty breathing, difficulty with drinking enough to stay hydrated, difficulty swallowing your saliva or have fevers for more than 5 days, please contact your primary care provider for an appointment or visit an Urgent Care Center to be seen.      Thanks again for choosing us as your health care partner.   Alicia Maguire, DNP

## 2025-04-16 ENCOUNTER — ORDERS ONLY (AUTO-RELEASED) (OUTPATIENT)
Dept: CARDIOLOGY | Facility: CLINIC | Age: 30
End: 2025-04-16

## 2025-04-16 ENCOUNTER — OFFICE VISIT (OUTPATIENT)
Dept: CARDIOLOGY | Facility: CLINIC | Age: 30
End: 2025-04-16
Payer: COMMERCIAL

## 2025-04-16 VITALS
OXYGEN SATURATION: 99 % | DIASTOLIC BLOOD PRESSURE: 77 MMHG | HEIGHT: 60 IN | RESPIRATION RATE: 20 BRPM | BODY MASS INDEX: 47 KG/M2 | WEIGHT: 239.4 LBS | SYSTOLIC BLOOD PRESSURE: 120 MMHG | HEART RATE: 79 BPM

## 2025-04-16 DIAGNOSIS — I51.7 LVH (LEFT VENTRICULAR HYPERTROPHY): ICD-10-CM

## 2025-04-16 DIAGNOSIS — R00.2 PALPITATIONS: ICD-10-CM

## 2025-04-16 DIAGNOSIS — I25.9 CHEST PAIN DUE TO MYOCARDIAL ISCHEMIA, UNSPECIFIED ISCHEMIC CHEST PAIN TYPE: Primary | ICD-10-CM

## 2025-04-16 DIAGNOSIS — I25.9 CHEST PAIN DUE TO MYOCARDIAL ISCHEMIA, UNSPECIFIED ISCHEMIC CHEST PAIN TYPE: ICD-10-CM

## 2025-04-16 RX ORDER — ACETAMINOPHEN 325 MG/1
325-650 TABLET ORAL EVERY 6 HOURS PRN
COMMUNITY

## 2025-04-16 RX ORDER — IBUPROFEN 200 MG
200 TABLET ORAL EVERY 4 HOURS PRN
COMMUNITY

## 2025-04-16 NOTE — PROGRESS NOTES
CARDIOLOGY CLINIC CONSULTATION    PRIMARY CARE PHYSICIAN:  Alicia Maguire    HISTORY OF PRESENT ILLNESS:  This is a pleasant 29 year old female who is here with her significant other.  The patient has had 4 pregnancies in the past.  The patient recently had an echocardiogram that reported mild left ventricular hypertrophy and as a result of that she was referred to cardiology.    More importantly, the patient tells me that her mother  at 36 years of age.  On autopsy she was noted to have an enlarged heart.  Does not know any further details.  She has multiple other members in her family who have had cardiovascular issues.    Patient has been complaining of significant amount of palpitations and chest discomfort that been going on for the last many months.  Symptoms last for about 30 seconds and happen about 2 times a day.  These are not typically anginal in nature.  No syncope presyncope.  However she feels dizzy and lightheaded.  She saw her PCP in January of this year and multiple labs were ordered which she has not had done as yet.  Complains of dyspnea on exertion which she attributes to obesity.    Patient has had pregnancy related hypertension but currently is not on any medications long-term.  Denies any active pregnancies.    PAST MEDICAL HISTORY:  Past Medical History:   Diagnosis Date    Abnormal Pap smear of cervix 2021    Accidental overdose     sleep meds, tylenol, opioids    Anemia     Aspiration pneumonia (H) 2015    Bipolar disorder (H)     Chickenpox     Chlamydia infection affecting pregnancy, antepartum 2018    Depressive disorder     History of recurrent ear infection     Intracranial swelling 2015    Ovarian cysts     ultrasound dx by genoveva    Postpartum depression 2019    also in        MEDICATIONS:  Current Outpatient Medications   Medication Sig Dispense Refill    acetaminophen (TYLENOL) 325 MG tablet Take 325-650 mg by mouth every 6 hours as needed for mild  pain.      ibuprofen (ADVIL/MOTRIN) 200 MG tablet Take 200 mg by mouth every 4 hours as needed for pain.       No current facility-administered medications for this visit.       SOCIAL HISTORY:  I have reviewed this patient's social history and updated it with pertinent information if needed. Liat French  reports that she has been smoking vaping device. She has never been exposed to tobacco smoke. She has never used smokeless tobacco. She reports that she does not currently use alcohol. She reports that she does not use drugs.    PHYSICAL EXAM:  Pulse:  [79] 79  Resp:  [20] 20  BP: (120)/(77) 120/77  SpO2:  [99 %] 99 %  239 lbs 6.4 oz    Constitutional: alert, no distress  Respiratory: Good bilateral air entry  Cardiovascular: Normal sounds, no edema  GI: nondistended  Neuropsychiatric: appropriate affact    ASSESSMENT: Pertinent issues addressed/ reviewed during this cardiology visit  Mild left ventricular hypertrophy on echo  Family history of premature likely sudden cardiac death with cardiomyopathy in mother at 36 years of age    RECOMMENDATIONS:  This patient is only 29 years of age.  She has mild left ventricular hypertrophy of unclear etiology.  May be pregnancy-induced hypertension has done that obesity may have contributed however given her family history with her mother dying at 36 years of age definitely warrants further investigation.  Patient complains of atypical symptoms of palpitation and chest discomfort.  I recommend further evaluation for that as well.  I recommend getting a cardiac MRI with contrast and stress for evaluation of left ventricular hypertrophy and chest pain.  I recommend getting a cardiac monitor to ensure no arrhythmic etiologies.  Patient will get all the lab testing done including LP(a) TSH and inflammatory markers as ordered by PCP.  We will follow those results  Lastly recommend referral to genetics given her family history of mother dying at 36 years of age with  cardiomyopathy.    Follow-up with KERLINE or myself in the next couple of months    It was a pleasure seeing this patient in clinic today. Please do not hesitate to contact me with any future questions.     MARC Rosario, [facility]  Cardiology - [facility]  April 16, 2025    Review of the result(s) of each unique test - Last CBC BMP TSH     The level of medical decision making during this visit was of moderate complexity. The longitudinal plan of care for the diagnosis(es)/condition(s) as documented were addressed during this visit. Due to the added complexity in care, I will continue to support Liat French in the subsequent management and with ongoing continuity of care.    This note was completed in part using dictation via the Dragon voice recognition software. Some word and grammatical errors may occur and must be interpreted in the appropriate clinical context.  If there are any questions pertaining to this issue, please contact me for further clarification.

## 2025-04-16 NOTE — LETTER
2025    Alicia Maguire, DNP  [address] [place] [id number]    RE: Liat French       Dear Colleague,     I had the pleasure of seeing Liat EDSON French in the [facility].  CARDIOLOGY CLINIC CONSULTATION    PRIMARY CARE PHYSICIAN:  Alicia Maguire    HISTORY OF PRESENT ILLNESS:  This is a pleasant 29 year old female who is here with her significant other.  The patient has had 4 pregnancies in the past.  The patient recently had an echocardiogram that reported mild left ventricular hypertrophy and as a result of that she was referred to cardiology.    More importantly, the patient tells me that her mother  at 36 years of age.  On autopsy she was noted to have an enlarged heart.  Does not know any further details.  She has multiple other members in her family who have had cardiovascular issues.    Patient has been complaining of significant amount of palpitations and chest discomfort that been going on for the last many months.  Symptoms last for about 30 seconds and happen about 2 times a day.  These are not typically anginal in nature.  No syncope presyncope.  However she feels dizzy and lightheaded.  She saw her PCP in January of this year and multiple labs were ordered which she has not had done as yet.  Complains of dyspnea on exertion which she attributes to obesity.    Patient has had pregnancy related hypertension but currently is not on any medications long-term.  Denies any active pregnancies.    PAST MEDICAL HISTORY:  Past Medical History:   Diagnosis Date     Abnormal Pap smear of cervix 2021     Accidental overdose     sleep meds, tylenol, opioids     Anemia      Aspiration pneumonia (H) 2015     Bipolar disorder (H)      Chickenpox      Chlamydia infection affecting pregnancy, antepartum 2018     Depressive disorder      History of recurrent ear infection      Intracranial swelling 2015     Ovarian cysts     ultrasound dx by genoveva      Postpartum depression 2019    also in 2021       MEDICATIONS:  Current Outpatient Medications   Medication Sig Dispense Refill     acetaminophen (TYLENOL) 325 MG tablet Take 325-650 mg by mouth every 6 hours as needed for mild pain.       ibuprofen (ADVIL/MOTRIN) 200 MG tablet Take 200 mg by mouth every 4 hours as needed for pain.       No current facility-administered medications for this visit.       SOCIAL HISTORY:  I have reviewed this patient's social history and updated it with pertinent information if needed. Liat French  reports that she has been smoking vaping device. She has never been exposed to tobacco smoke. She has never used smokeless tobacco. She reports that she does not currently use alcohol. She reports that she does not use drugs.    PHYSICAL EXAM:  Pulse:  [79] 79  Resp:  [20] 20  BP: (120)/(77) 120/77  SpO2:  [99 %] 99 %  239 lbs 6.4 oz    Constitutional: alert, no distress  Respiratory: Good bilateral air entry  Cardiovascular: Normal sounds, no edema  GI: nondistended  Neuropsychiatric: appropriate affact    ASSESSMENT: Pertinent issues addressed/ reviewed during this cardiology visit  Mild left ventricular hypertrophy on echo  Family history of premature likely sudden cardiac death with cardiomyopathy in mother at 36 years of age    RECOMMENDATIONS:  This patient is only 29 years of age.  She has mild left ventricular hypertrophy of unclear etiology.  May be pregnancy-induced hypertension has done that obesity may have contributed however given her family history with her mother dying at 36 years of age definitely warrants further investigation.  Patient complains of atypical symptoms of palpitation and chest discomfort.  I recommend further evaluation for that as well.  I recommend getting a cardiac MRI with contrast and stress for evaluation of left ventricular hypertrophy and chest pain.  I recommend getting a cardiac monitor to ensure no arrhythmic etiologies.  Patient will get all  the lab testing done including LP(a) TSH and inflammatory markers as ordered by PCP.  We will follow those results  Lastly recommend referral to genetics given her family history of mother dying at 36 years of age with cardiomyopathy.    Follow-up with KERLINE or myself in the next couple of months    It was a pleasure seeing this patient in clinic today. Please do not hesitate to contact me with any future questions.     MARC Rosario, [facility]  Cardiology - [facility]  April 16, 2025    Review of the result(s) of each unique test - Last CBC BMP TSH     The level of medical decision making during this visit was of moderate complexity. The longitudinal plan of care for the diagnosis(es)/condition(s) as documented were addressed during this visit. Due to the added complexity in care, I will continue to support Liat Gillian in the subsequent management and with ongoing continuity of care.    This note was completed in part using dictation via the Dragon voice recognition software. Some word and grammatical errors may occur and must be interpreted in the appropriate clinical context.  If there are any questions pertaining to this issue, please contact me for further clarification.    Thank you for allowing me to participate in the care of your patient.      Sincerely,     Perla Brandon MD     [facility]  cc:   Alicia Cartagena, DNP  [address] [place], [place] [id number]

## 2025-05-01 ENCOUNTER — TELEPHONE (OUTPATIENT)
Dept: CARDIOLOGY | Facility: CLINIC | Age: 30
End: 2025-05-01
Payer: COMMERCIAL

## 2025-05-11 LAB — CV ZIO PRELIM RESULTS: NORMAL

## 2025-05-12 ENCOUNTER — RESULTS FOLLOW-UP (OUTPATIENT)
Dept: CARDIOLOGY | Facility: CLINIC | Age: 30
End: 2025-05-12

## 2025-05-12 NOTE — RESULT ENCOUNTER NOTE
Predominant rhythm sinus  with avg HR 86; rare PACs/PVCs; symptoms reported were related to SR (). Follow up with Jeannie Ramesh NP on 5/22/25.

## 2025-05-29 NOTE — PROGRESS NOTES
Virtual Visit Details     Type of service:  Video Visit   Video Start Time: 11:00 am  Video End Time:11:40 am    Originating Location (pt. Location): Home  Distant Location (provider location):  Off-site  Platform used for Video Visit: Hailey    Here is a copy of the progress note from your recent genetic counseling visit to [facility] on Date: 6/3/2025.    PROGRESS NOTE: Liat was referred byPerla Brandon MDfor genetic counseling due to her history of left ventricular hypertrophy and family history of sudden death in her mother.  I had the opportunity to talk with Liat today to discuss the genetic component of LVH/HCM and testing options available to her.     MEDICAL HISTORY: Liat is a 29 year-old female recently found to have LVH. She had been experiencing a significant amount of palpitations, chest discomfort, and dizziness. An echocardiogram was performed which showed mild concentric left ventricular hypertrophy, LVEF of 55-60%, IVSd 1.2 cm. 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    FAMILY HISTORY: A detailed family history was obtained today and was significant for the following cardiac history:    Maternal history  Mother  at age 36 in her sleep after spiking a fever that night. She had a history of an enlarged heart and per the patient half of her heart wasn't working, although it is not known if she had a diagnosis of HCM. She had a history of smoking and drinking occasionally. She had other health concerns but did not have high blood pressure. Liat is planning on trying to obtain a copy of her medical records or autopsy.  Two uncles who are in their 40s and have atrial fibrillation.  Grandmother  at age 57 but it was not thought to be cardiac related.  Grandfather is 71 and has atrial fibrillation.  Paternal history  Father is 51 and has a history of stroke.  Aunts/uncles  and grandparents are in good health.  Children  Five children, ages 1-13, who are in good health.    There is no additional history of cardiomyopathy, arrhythmias, heart attacks, fainting, sudden cardiac death, genetic conditions, or birth defects. (Scanned pedigree may be under media tab)    DISCUSSION:  Liat has been found to have left ventricular hypertrophy (LVH) not meeting criteria for hypertrophic cardiomyopathy. LVH can have other causes besides HCM, most commonly uncontrolled high blood pressure. Liat does have a history of pregnancy-related hypertension but she is not on any medications long-term. Discussed that this may have contributed to her LVH. However, given the family history of sudden death and an enlarged heart in her mother at a young age, suspicion has been raised for HCM. Reviewed definition of hypertrophic cardiomyopathy (HCM). Explained that a good portion of HCM cases have a genetic component.     Reviewed autosomal dominant (AD) inheritance pattern most commonly associated with HCM, including the 50% risk for recurrence. Briefly reviewed autosomal recessive and X-linked inheritance. Explained that HCM gene mutations are associated with reduced penetrance and variable expressivity, meaning that individuals who carry a gene mutation may or may not get the disease and onset and severity can vary from one family member to the next. This explains why you may not see cardiomyopathy in each generation of a family.     Genetic testing is indicated for individuals with cardiomyopathy to better understand the cause of their heart disease, progression, the likelihood of noncardiac health risks, availability of gene therapy or enzyme replacement therapies, and risk to relatives. Genetic testing that includes a panel of genes is the most cost effective and timely approach. Reviewed capabilities, limitations, and logistics of testing. Currently over 30 genes have been found to be related to HCM.  Genetic testing currently identifies mutations in about 50-60% of idiopathic cases.     In this case, a positive result would indicate Liat does have HCM and that her LVH is not solely caused by high blood pressure. Thus, this testing may help clarify diagnosis and allow for appropriate management. If genetic testing is negative, it does not completely rule out HCM given that only 50-60% of cases have a mutation that can be detected on current genetic testing technology. Liat is planning to obtain a copy of her mother's medical records to determine if she was ever diagnosed with HCM.    DNA sample via saliva or blood is collected and sent to testing lab for evaluation of selected genes. The results could directly impact care and treatment. This testing is medically necessary.     Explained three possible outcomes of genetic testing including: positive identification of a mutation, no mutation identified, and identification of a variant of unknown significance (VUS). If a mutation is identified, presymptomatic testing would be available to at risk family members. If no mutation is identified, it does not rule out the possibility of a genetic component to this disease. Family members could still be at risk for developing the same condition. If a VUS is identified, it is unclear if the mutation is disease causing or just a normal variation. It may take time and possibly additional testing to determine the meaning of a VUS result.     Test results take approximately 2-4 weeks on average. Discussed cost of testing through commercial labs. Explained that the lab will work with insurance to determine coverage and contact patient if out of pocket costs are expected to exceed $100. If so, patient has the option to pay reported price, cancel testing or elect self pay pricing (typcially around $250).     Discussed pros and cons of genetic testing. Explained that results could determine the cause for HCM. If a mutation  is identified, presymptomatic testing is available to all at risk relatives. Reviewed possible issues associated with presymptomatic testing including genetic discrimination, current laws to prevent discrimination (ie. GLYNN), insurance issues, and emotional and psychosocial outcomes of testing.     Recommend clinical evaluation for all first degree relatives (parents, siblings, and children) of an affected individual regardless of decision to pursue genetic testing. Based on the Heart Failure Society of [employer] Guidelines (Justine et al, 2018), clinical evaluation should be performed at least every 3 years, except yearly during puberty, and should include history, cardiac exam, ECHO, EKG, Holter monitoring, and exercise treadmill.    All questions answered at this time.      PLAN: Liat elected to proceed with genetic testing.  Requisition was completed and verbal consent was obtained, due to virtual visit.  A saliva sample kit will be sent directly to the patient.  Once sample is collected, kit will be mailed to [facility] laboratory.  I will contact patient when results are available.     TOTAL TIME SPENT: I spent 55 minutes on the day of the encounter doing chart review, collecting medical and family history, counseling, documentation and further activities as noted above.    Mildred Garcia MS, [facility]  Licensed, Certified Genetic Counselor  [facility]  [facility]

## 2025-06-03 ENCOUNTER — VIRTUAL VISIT (OUTPATIENT)
Dept: CARDIOLOGY | Facility: CLINIC | Age: 30
End: 2025-06-03
Payer: COMMERCIAL

## 2025-06-03 DIAGNOSIS — I51.7 LVH (LEFT VENTRICULAR HYPERTROPHY): Primary | ICD-10-CM

## 2025-06-03 DIAGNOSIS — R00.2 PALPITATIONS: ICD-10-CM

## 2025-06-03 DIAGNOSIS — I25.9 CHEST PAIN DUE TO MYOCARDIAL ISCHEMIA, UNSPECIFIED ISCHEMIC CHEST PAIN TYPE: ICD-10-CM

## 2025-06-03 DIAGNOSIS — Z82.49 FAMILY HISTORY OF HEART DISEASE: ICD-10-CM

## 2025-06-03 PROCEDURE — 96041 GENETIC COUNSELING SVC EA 30: CPT | Mod: GT,95

## 2025-06-03 ASSESSMENT — PAIN SCALES - GENERAL: PAINLEVEL_OUTOF10: NO PAIN (0)

## 2025-06-03 NOTE — LETTER
6/3/2025      RE: Liat French  [address]  [facility] [address]       Dear Colleague,    Thank you for the opportunity to participate in the care of your patient, Liat French, at the [facility]. Please see a copy of my visit note below.    Virtual Visit Details     Type of service:  Video Visit   Video Start Time: 11:00 am  Video End Time:11:40 am    Originating Location (pt. Location): Home  Distant Location (provider location):  Off-site  Platform used for Video Visit: Hailey    Here is a copy of the progress note from your recent genetic counseling visit to the [facility] on Date: 6/3/2025.    PROGRESS NOTE: Liat was referred Perla Gonzalez MDfor genetic counseling due to her history of left ventricular hypertrophy and family history of sudden death in her mother.  I had the opportunity to talk with Liat today to discuss the genetic component of LVH/HCM and testing options available to her.     MEDICAL HISTORY: Liat is a 29 year-old female recently found to have LVH. She had been experiencing a significant amount of palpitations, chest discomfort, and dizziness. An echocardiogram was performed which showed mild concentric left ventricular hypertrophy, LVEF of 55-60%, IVSd 1.2 cm. 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    FAMILY HISTORY: A detailed family history was obtained today and was significant for the following cardiac history:    Maternal history  Mother  at age 36 in her sleep after spiking a fever that night. She had a history of an enlarged heart and per the patient half of her heart wasn't working, although it is not known if she had a diagnosis of HCM. She had a history of smoking and drinking occasionally. She had other health concerns but did not have high blood pressure. Liat  is planning on trying to obtain a copy of her medical records or autopsy.  Two uncles who are in their 40s and have atrial fibrillation.  Grandmother  at age 57 but it was not thought to be cardiac related.  Grandfather is 71 and has atrial fibrillation.  Paternal history  Father is 51 and has a history of stroke.  Aunts/uncles and grandparents are in good health.  Children  Five children, ages 1-13, who are in good health.    There is no additional history of cardiomyopathy, arrhythmias, heart attacks, fainting, sudden cardiac death, genetic conditions, or birth defects. (Scanned pedigree may be under media tab)    DISCUSSION:  Liat has been found to have left ventricular hypertrophy (LVH) not meeting criteria for hypertrophic cardiomyopathy. LVH can have other causes besides HCM, most commonly uncontrolled high blood pressure. Liat does have a history of pregnancy-related hypertension but she is not on any medications long-term. Discussed that this may have contributed to her LVH. However, given the family history of sudden death and an enlarged heart in her mother at a young age, suspicion has been raised for HCM. Reviewed definition of hypertrophic cardiomyopathy (HCM). Explained that a good portion of HCM cases have a genetic component.     Reviewed autosomal dominant (AD) inheritance pattern most commonly associated with HCM, including the 50% risk for recurrence. Briefly reviewed autosomal recessive and X-linked inheritance. Explained that HCM gene mutations are associated with reduced penetrance and variable expressivity, meaning that individuals who carry a gene mutation may or may not get the disease and onset and severity can vary from one family member to the next. This explains why you may not see cardiomyopathy in each generation of a family.     Genetic testing is indicated for individuals with cardiomyopathy to better understand the cause of their heart disease, progression, the  likelihood of noncardiac health risks, availability of gene therapy or enzyme replacement therapies, and risk to relatives. Genetic testing that includes a panel of genes is the most cost effective and timely approach. Reviewed capabilities, limitations, and logistics of testing. Currently over 30 genes have been found to be related to HCM. Genetic testing currently identifies mutations in about 50-60% of idiopathic cases.     In this case, a positive result would indicate Liat does have HCM and that her LVH is not solely caused by high blood pressure. Thus, this testing may help clarify diagnosis and allow for appropriate management. If genetic testing is negative, it does not completely rule out HCM given that only 50-60% of cases have a mutation that can be detected on current genetic testing technology. Liat is planning to obtain a copy of her mother's medical records to determine if she was ever diagnosed with HCM.    DNA sample via saliva or blood is collected and sent to testing lab for evaluation of selected genes. The results could directly impact care and treatment. This testing is medically necessary.     Explained three possible outcomes of genetic testing including: positive identification of a mutation, no mutation identified, and identification of a variant of unknown significance (VUS). If a mutation is identified, presymptomatic testing would be available to at risk family members. If no mutation is identified, it does not rule out the possibility of a genetic component to this disease. Family members could still be at risk for developing the same condition. If a VUS is identified, it is unclear if the mutation is disease causing or just a normal variation. It may take time and possibly additional testing to determine the meaning of a VUS result.     Test results take approximately 2-4 weeks on average. Discussed cost of testing through commercial labs. Explained that the lab will work with  insurance to determine coverage and contact patient if out of pocket costs are expected to exceed $100. If so, patient has the option to pay reported price, cancel testing or elect self pay pricing (typcially around $250).     Discussed pros and cons of genetic testing. Explained that results could determine the cause for HCM. If a mutation is identified, presymptomatic testing is available to all at risk relatives. Reviewed possible issues associated with presymptomatic testing including genetic discrimination, current laws to prevent discrimination (ie. GLYNN), insurance issues, and emotional and psychosocial outcomes of testing.     Recommend clinical evaluation for all first degree relatives (parents, siblings, and children) of an affected individual regardless of decision to pursue genetic testing. Based on the Heart Failure Society of [employer] Guidelines (Justine et al, 2018), clinical evaluation should be performed at least every 3 years, except yearly during puberty, and should include history, cardiac exam, ECHO, EKG, Holter monitoring, and exercise treadmill.    All questions answered at this time.      PLAN: Liat elected to proceed with genetic testing.  Requisition was completed and verbal consent was obtained, due to virtual visit.  A saliva sample kit will be sent directly to the patient.  Once sample is collected, kit will be mailed to [facility] laboratory.  I will contact patient when results are available.     TOTAL TIME SPENT: I spent 55 minutes on the day of the encounter doing chart review, collecting medical and family history, counseling, documentation and further activities as noted above.    Mildred Garcia MS, [facility]  Licensed, Certified Genetic Counselor  [facility]  [facility]      Please do not hesitate to contact me if you have any questions/concerns.     Sincerely,     Mildred Garcia GC

## 2025-06-03 NOTE — PROGRESS NOTES
"Virtual Visit Details    Type of service:  Video Visit   Video Start Time: {video visit start/end time for provider to select:646023}  Video End Time:{video visit start/end time for provider to select:968163}    Originating Location (pt. Location): {video visit patient location:100[address]::\"Home\"}  {PROVIDER LOCATION On-site should be selected for visits conducted from your clinic location or adjoining [facility] hospital, academic office, or other nearby [facility] building. Off-site should be selected for all other provider locations, including home:192287}  Distant Location (provider location):  {virtual location provider:307924}  Platform used for Video Visit: {Virtual Visit Platforms:666133::\"ScoreGrid\"}    "

## 2025-06-03 NOTE — NURSING NOTE
Current patient location: [address] [address]    Is the patient currently in the state of [place]? YES    Visit mode: VIDEO    If the visit is dropped, the patient can be reconnected by:VIDEO VISIT: Text to cell phone:   Telephone Information:   Mobile [phone]       Will anyone else be joining the visit? NO  (If patient encounters technical issues they should call [phone])    Are changes needed to the allergy or medication list? No    Are refills needed on medications prescribed by this physician? NO    Rooming Documentation:  Questionnaire(s) completed    Reason for visit: Consult    Mary BOUCHERF

## 2025-06-04 NOTE — PATIENT INSTRUCTIONS
"Indication for Genetic Counseling:     Hypertrophic cardiomyopathy (HCM) is a condition that causes part of the heart muscle (the left ventricle) to become thick and enlarged (hypertrophy).  It is usually diagnosed by echocardiogram (ECHO) or cardiac magnetic resonance imaging (MRI).  When the heart becomes enlarged, it cannot pump blood as effectively, which can lead to symptoms such as shortness of breath, fatigue, chest pains, irregular heartbeat, fainting, stroke, cardiac arrest, and sudden cardiac death (SCD).  HCM can be caused by a variety of factors, such as chronic hypertension, a narrow aorta, athletic heart, or genetics.  There are at least 20 known genes that, when not working properly, can cause HCM.    Inheritance:   Humans have over 20,000 genes that instruct our bodies how to function.  We have two copies of each gene because we inherit one from our mother and one from our father.  In most cardiac cases with a genetic component, the condition is inherited in an autosomal dominant (AD) pattern.  This means that in order to have the condition, a person needs to inherit a mutation on one copy of a particular gene.  This mutation or pathogenic variant dominates the \"normal\" working copy of the gene.  When an affected individual has children, they can either pass on the \"normal\" copy of the gene or the mutation.  Therefore, children have a 50% chance of inheriting the mutation.  Other family members also have an increased risk but the specific risk depends on the degree of relationship.  Additional inheritance patterns can occur within families and may alter the risk of recurrence.     Testing Options:   Genetic testing is available to assess a panel of genes known to cause this condition.  This test reads through the DNA (sequencing) of these genes to look for spelling mistakes or mutations that could cause the condition.      There are three types of results you could receive from this test.     " -Positive result (mutation identified) - confirms diagnosis and provides an answer to why this happened.  In addition, identifying a mutation allows family members to have testing to determine their risk.     -Negative result (mutation not identified) - no genetic changes were identified.  This does not rule out a genetic cause for the condition as the genetic testing only identifies 50-60% of genetic causes for this condition.    -Variant of uncertain significance (VUS) - a genetic change was identified, but there is not enough information to determine whether it is disease-causing or normal human genetic variation.     Although genetic testing may identify a mutation, it cannot provide information about the severity of symptoms or the progression of disease.  We cannot predict age of onset or severity of symptoms due to reduced penetrance and variable expressivity.    Logistics:   Genetic testing involves collecting a sample of DNA, thru blood, saliva, or cheek cells.  The sample will be sent to a laboratory to extract the DNA and sequence the genes for mutations.  The laboratory will work with your insurance company to determine the out of pocket (OOP) cost and will notify you if the OOP cost is greater than $100.  Remember to ask the lab about financial assistance pricing and self pay options as well.  Sometimes those are much lower than insurance pricing.  When testing is initiated, results take about 2-4 weeks to return. I will contact you over the phone when results are available.     Genetic Information and Nondiscrimination Act:  The Genetic Information and Nondiscrimination Act of 2008 (GLYNN) is a federal law that protects individuals from genetic discrimination in health insurance and employment. Genetic discrimination is defined as the misuse of genetic information. This law does not address potential discrimination regarding life insurance or disability insurance.      This is especially relevant for at  risk individuals who are considering presymptomatic testing.    Screening Recommendations:  Recommend clinical evaluation for all first degree relatives (parents, siblings, and children) of an affected individual regardless of decision to pursue genetic testing.  Clinical evaluation should be performed at least every 3 years, except yearly during puberty, and should include history, cardiac exam, ECHO, EKG, Holter monitoring, and exercise treadmill.    Resources:  Hypertrophic Cardiomyopathy Association - 4hcm.org  Children's Cardiomyopathy Foundation - childrenscardiomyopathy.org  [place] Cardiomyopathy Association - cardiomyopathy.org  HCM Care phone miki    General   American Heart Association - americanheart.org  Genetics Home Reference - ghr.CyberHeart.nih.gov  Genetic Information and Nondiscrimination Act - Twitchnahelp.org    Contact Information:  Mildred Garcia MS, [facility] [place]  Licensed, Certified Genetic Counselor  [facility]  [facility]     Office:  [phone]  Appointments:  [phone]  Fax: [phone]  Email: [email or web address]

## 2025-06-05 ENCOUNTER — PATIENT OUTREACH (OUTPATIENT)
Dept: CARE COORDINATION | Facility: CLINIC | Age: 30
End: 2025-06-05
Payer: COMMERCIAL

## 2025-06-21 ENCOUNTER — HEALTH MAINTENANCE LETTER (OUTPATIENT)
Age: 30
End: 2025-06-21

## 2025-06-25 ENCOUNTER — TELEPHONE (OUTPATIENT)
Dept: CARDIOLOGY | Facility: CLINIC | Age: 30
End: 2025-06-25
Payer: COMMERCIAL

## 2025-06-25 NOTE — TELEPHONE ENCOUNTER
Message to Sophie scheduling to call pt to reschedule cMRI and labs as she had to cx last appointment. Jessica Tom RN Cardiology June 25, 2025, 3:46 PM

## 2025-06-25 NOTE — TELEPHONE ENCOUNTER
----- Message from Caitie SANDHU sent at 6/25/2025  3:35 PM CDT -----  Regarding: FW: labs and Cardiac MRI  Last saw tan Brandon!  ----- Message -----  From: Melany Rosado RN  Sent: 6/25/2025   3:31 PM CDT  To: [facility] Core Nurse  Subject: FW: labs and Cardiac MRI                           ----- Message -----  From: Jennifer Hathaway MA  Sent: 6/25/2025   3:30 PM CDT  To: [facility] Team 5  Subject: labs and Cardiac MRI                             Hello,    This patient is seeing Sushma on 7/1/25 (Video) follow up testing per Dr Brandon.  (R/s 5/22/25 w/ Jeannie SZYMANSKI)    She has not completed the labs and MRI (Cancel or has no show)    Please advise.    Thank you,  KAELA Rodriguez

## 2025-06-26 ENCOUNTER — RESULTS FOLLOW-UP (OUTPATIENT)
Dept: CARDIOLOGY | Facility: CLINIC | Age: 30
End: 2025-06-26

## 2025-07-21 ENCOUNTER — TELEPHONE (OUTPATIENT)
Dept: FAMILY MEDICINE | Facility: CLINIC | Age: 30
End: 2025-07-21
Payer: COMMERCIAL

## 2025-07-21 NOTE — TELEPHONE ENCOUNTER
Patient Quality Outreach    Patient is due for the following:   IVD  -  IVD Follow-up Visit      Topic Date Due    Pneumococcal Vaccine (1 of 2 - PCV) Never done    Diptheria Tetanus Pertussis (DTAP/TDAP/TD) Vaccine (7 - Td or Tdap) 11/13/2017    COVID-19 Vaccine (1 - 2024-25 season) Never done       Action(s) Taken:   Schedule a office visit for IVD follow up    Type of outreach:    Sent Triptease message.    Questions for provider review:    None         Bailee Kahler  Chart routed to None.

## (undated) DEVICE — ENDO TROCAR FIRST ENTRY KII FIOS ADV FIX 05X100MM CFF03

## (undated) DEVICE — GOWN IMPERVIOUS SPECIALTY XLG/XLONG 32474

## (undated) DEVICE — TUBING C02 INSUFFLATION LAP FILTER HEATER 6198

## (undated) DEVICE — SYR 10ML LL W/O NDL

## (undated) DEVICE — SUCTION MANIFOLD NEPTUNE 2 SYS 1 PORT 702-025-000

## (undated) DEVICE — SOL WATER IRRIG 1000ML BOTTLE 07139-09

## (undated) DEVICE — STOCKING SLEEVE COMPRESSION CALF LG

## (undated) DEVICE — DRAPE POUCH INSTRUMENT 3 POCKET 1018L

## (undated) DEVICE — SU VICRYL 4-0 FS-2 27" J422-H

## (undated) DEVICE — PREP CHLORAPREP 26ML TINTED HI-LITE ORANGE 930815

## (undated) DEVICE — SYSTEM LAPAROVUE VISIBILITY LAPVUE10

## (undated) DEVICE — LUBRICATING JELLY 4.25OZ

## (undated) DEVICE — DECANTER VIAL 2006S

## (undated) DEVICE — BLADE CLIPPER 4406

## (undated) DEVICE — PREP CHLORHEXIDINE 4% 4OZ (HIBICLENS) 57504

## (undated) DEVICE — SU DERMABOND ADVANCED .7ML DNX12

## (undated) DEVICE — SUCTION IRR STRYKERFLOW II W/TIP 250-070-520

## (undated) DEVICE — GLOVE BIOGEL PI MICRO SZ 7.5 48575

## (undated) DEVICE — NDL BUTTERFLY 25GA .75" 367298

## (undated) DEVICE — GLOVE BIOGEL PI ULTRATOUCH G SZ 8.0 42180

## (undated) DEVICE — ESU HOLSTER PLASTIC DISP E2400

## (undated) DEVICE — SOL NACL 0.9% IRRIG 1000ML BOTTLE 07138-09

## (undated) DEVICE — ENDO TROCAR OPTICAL ACCESS KII Z-THRD 08X100MM C0Q19

## (undated) DEVICE — PAD PERI INDIV WRAP 11" 2022

## (undated) DEVICE — ANTIFOG SOLUTION W/FOAM PAD 31142527

## (undated) DEVICE — SYR 20ML LL W/O NDL

## (undated) DEVICE — NDL INSUFFLATION 13GA 150MM C2202

## (undated) DEVICE — PACK LAPAROSCOPY/PELVISCOPY STD

## (undated) DEVICE — ESU LIGASURE MARYLAND LAPAROSCOPIC SLR/DVDR 5MMX37CM LF1937

## (undated) RX ORDER — EPINEPHRINE 1 MG/ML(1)
AMPUL (ML) INJECTION
Status: DISPENSED
Start: 2021-09-08

## (undated) RX ORDER — FENTANYL CITRATE 50 UG/ML
INJECTION, SOLUTION INTRAMUSCULAR; INTRAVENOUS
Status: DISPENSED
Start: 2021-09-08

## (undated) RX ORDER — ACETAMINOPHEN 325 MG/1
TABLET ORAL
Status: DISPENSED
Start: 2023-07-24

## (undated) RX ORDER — GABAPENTIN 300 MG/1
CAPSULE ORAL
Status: DISPENSED
Start: 2023-07-24